# Patient Record
Sex: FEMALE | Race: WHITE | NOT HISPANIC OR LATINO | Employment: FULL TIME | URBAN - METROPOLITAN AREA
[De-identification: names, ages, dates, MRNs, and addresses within clinical notes are randomized per-mention and may not be internally consistent; named-entity substitution may affect disease eponyms.]

---

## 2021-07-15 ENCOUNTER — LAB (OUTPATIENT)
Dept: LAB | Facility: HOSPITAL | Age: 41
End: 2021-07-15

## 2021-07-15 ENCOUNTER — OFFICE VISIT (OUTPATIENT)
Dept: INTERNAL MEDICINE | Facility: CLINIC | Age: 41
End: 2021-07-15

## 2021-07-15 VITALS
BODY MASS INDEX: 28.88 KG/M2 | SYSTOLIC BLOOD PRESSURE: 114 MMHG | HEART RATE: 62 BPM | HEIGHT: 67 IN | RESPIRATION RATE: 14 BRPM | OXYGEN SATURATION: 98 % | WEIGHT: 184 LBS | TEMPERATURE: 97.2 F | DIASTOLIC BLOOD PRESSURE: 76 MMHG

## 2021-07-15 DIAGNOSIS — I10 ESSENTIAL HYPERTENSION: ICD-10-CM

## 2021-07-15 DIAGNOSIS — Z12.4 PAPANICOLAOU SMEAR: ICD-10-CM

## 2021-07-15 DIAGNOSIS — I10 ESSENTIAL HYPERTENSION: Primary | ICD-10-CM

## 2021-07-15 DIAGNOSIS — J30.2 SEASONAL ALLERGIES: ICD-10-CM

## 2021-07-15 LAB
ALBUMIN SERPL-MCNC: 4.2 G/DL (ref 3.5–5.2)
ALBUMIN/GLOB SERPL: 1.6 G/DL
ALP SERPL-CCNC: 93 U/L (ref 39–117)
ALT SERPL W P-5'-P-CCNC: 22 U/L (ref 1–33)
ANION GAP SERPL CALCULATED.3IONS-SCNC: 12.6 MMOL/L (ref 5–15)
AST SERPL-CCNC: 14 U/L (ref 1–32)
BILIRUB SERPL-MCNC: 0.4 MG/DL (ref 0–1.2)
BUN SERPL-MCNC: 18 MG/DL (ref 6–20)
BUN/CREAT SERPL: 16.4 (ref 7–25)
CALCIUM SPEC-SCNC: 8.7 MG/DL (ref 8.6–10.5)
CHLORIDE SERPL-SCNC: 100 MMOL/L (ref 98–107)
CHOLEST SERPL-MCNC: 180 MG/DL (ref 0–200)
CO2 SERPL-SCNC: 24.4 MMOL/L (ref 22–29)
CREAT SERPL-MCNC: 1.1 MG/DL (ref 0.57–1)
GFR SERPL CREATININE-BSD FRML MDRD: 55 ML/MIN/1.73
GLOBULIN UR ELPH-MCNC: 2.7 GM/DL
GLUCOSE SERPL-MCNC: 85 MG/DL (ref 65–99)
HDLC SERPL-MCNC: 37 MG/DL (ref 40–60)
LDLC SERPL CALC-MCNC: 117 MG/DL (ref 0–100)
LDLC/HDLC SERPL: 3.09 {RATIO}
POTASSIUM SERPL-SCNC: 4.3 MMOL/L (ref 3.5–5.2)
PROT SERPL-MCNC: 6.9 G/DL (ref 6–8.5)
SODIUM SERPL-SCNC: 137 MMOL/L (ref 136–145)
TRIGL SERPL-MCNC: 143 MG/DL (ref 0–150)
VLDLC SERPL-MCNC: 26 MG/DL (ref 5–40)

## 2021-07-15 PROCEDURE — 99204 OFFICE O/P NEW MOD 45 MIN: CPT | Performed by: INTERNAL MEDICINE

## 2021-07-15 PROCEDURE — 80053 COMPREHEN METABOLIC PANEL: CPT

## 2021-07-15 PROCEDURE — 36415 COLL VENOUS BLD VENIPUNCTURE: CPT

## 2021-07-15 PROCEDURE — 80061 LIPID PANEL: CPT

## 2021-07-15 RX ORDER — HYDROXYZINE HYDROCHLORIDE 10 MG/1
10 TABLET, FILM COATED ORAL 3 TIMES DAILY PRN
COMMUNITY
End: 2022-06-30 | Stop reason: SDUPTHER

## 2021-07-15 RX ORDER — CLOBETASOL PROPIONATE 0.46 MG/ML
SOLUTION TOPICAL
COMMUNITY
Start: 2021-04-02 | End: 2021-07-15

## 2021-07-15 RX ORDER — APREMILAST 30 MG/1
TABLET, FILM COATED ORAL
COMMUNITY
Start: 2021-06-11

## 2021-07-15 RX ORDER — DICYCLOMINE HYDROCHLORIDE 10 MG/1
10 CAPSULE ORAL
COMMUNITY
End: 2022-07-27

## 2021-07-15 RX ORDER — MONTELUKAST SODIUM 10 MG/1
10 TABLET ORAL NIGHTLY
Qty: 90 TABLET | Refills: 1 | Status: SHIPPED | OUTPATIENT
Start: 2021-07-15 | End: 2022-05-31

## 2021-07-15 RX ORDER — LISINOPRIL AND HYDROCHLOROTHIAZIDE 20; 12.5 MG/1; MG/1
1 TABLET ORAL DAILY
COMMUNITY
Start: 2021-07-07 | End: 2021-07-15 | Stop reason: SDUPTHER

## 2021-07-15 RX ORDER — CETIRIZINE HYDROCHLORIDE 10 MG/1
10 TABLET ORAL DAILY
COMMUNITY
End: 2021-08-02

## 2021-07-15 RX ORDER — LISINOPRIL AND HYDROCHLOROTHIAZIDE 20; 12.5 MG/1; MG/1
1 TABLET ORAL DAILY
Qty: 90 TABLET | Refills: 1 | Status: SHIPPED | OUTPATIENT
Start: 2021-07-15 | End: 2021-08-02 | Stop reason: SDUPTHER

## 2021-07-15 RX ORDER — FLUTICASONE PROPIONATE 50 MCG
2 SPRAY, SUSPENSION (ML) NASAL DAILY
COMMUNITY
End: 2023-01-04

## 2021-07-15 RX ORDER — OMEPRAZOLE 20 MG/1
CAPSULE, DELAYED RELEASE ORAL
COMMUNITY
Start: 2021-02-26 | End: 2022-07-27 | Stop reason: DRUGHIGH

## 2021-07-15 NOTE — PROGRESS NOTES
Office Note      Date: 07/15/2021  Patient Name: Lesly iBll  MRN: [id number]  : 1980    Chief Complaint   Patient presents with   • Hypertension   • Migraine       History of Present Illness: Lesly Bill is a 40 y.o. female who presents for Hypertension and Migraine.   Follows w/ [facility] for IBS. Trying FOD MAP diet.   Last pap close to 5 years ago.   On lisinopril hydrochlorothiazide for blood pressure.  Denies any dizziness.  Has migraines and unsure as from her sinuses.  Was recently on medication for fluid behind her ears.  Takes Zyrtec in the morning and uses Flonase in the morning.  Subjective      Review of Systems:   Pertinent review of systems per HPI.    Review of Systems   Constitutional: Negative for activity change, appetite change, chills, diaphoresis, fatigue, fever and unexpected weight change.   HENT: Negative for congestion, dental problem, drooling, ear discharge, ear pain, facial swelling, hearing loss and mouth sores.    Eyes: Negative for pain, discharge and itching.   Respiratory: Negative for apnea, cough, choking, chest tightness and shortness of breath.    Cardiovascular: Negative for chest pain, palpitations and leg swelling.   Gastrointestinal: Negative for abdominal distention, abdominal pain, blood in stool, constipation and diarrhea.   Endocrine: Negative for cold intolerance, heat intolerance, polydipsia and polyuria.   Genitourinary: Negative for difficulty urinating, dysuria, frequency and hematuria.   Skin: Negative for color change, pallor, rash and wound.   Allergic/Immunologic: Negative for environmental allergies, food allergies and immunocompromised state.   Neurological: Negative for dizziness, weakness and light-headedness.   Psychiatric/Behavioral: Negative for agitation, behavioral problems, confusion, decreased concentration and self-injury. The patient is not nervous/anxious.    All other systems reviewed and are negative.    Allergies   Allergen  "Reactions   • Sulfacetamide Hives and Unknown - Low Severity   • Other Other (See Comments) and Unknown - Low Severity     Sulfa  Clove powder       Objective     Physical Exam:  Vital Signs:   Vitals:    07/15/21 0934   BP: 114/76   Pulse: 62   Resp: 14   Temp: 97.2 °F (36.2 °C)   TempSrc: Temporal   SpO2: 98%   Weight: 83.5 kg (184 lb)   Height: 170.2 cm (67\")      Body mass index is 28.82 kg/m².    Physical Exam  Vitals and nursing note reviewed.   Constitutional:       General: She is not in acute distress.     Appearance: She is well-developed.   HENT:      Head: Normocephalic and atraumatic.      Right Ear: External ear normal.      Left Ear: External ear normal.      Ears:      Comments: Tympanic membranes clear and bulging bilaterally  Cardiovascular:      Rate and Rhythm: Normal rate and regular rhythm.      Heart sounds: Normal heart sounds. No murmur heard.   No friction rub. No gallop.    Pulmonary:      Effort: Pulmonary effort is normal. No respiratory distress.      Breath sounds: Normal breath sounds. No wheezing or rales.   Skin:     General: Skin is warm and dry.      Coloration: Skin is not pale.         Assessment / Plan      Assessment & Plan:    1. Essential hypertension  Blood pressure well controlled today.  Checking labs below, refilled lisinopril hydrochlorothiazide.  - Comprehensive Metabolic Panel; Future  - Lipid Panel; Future    2. Seasonal allergies  Rx for Singulair    3. Papanicolaou smear  Referral to OB/GYN for Pap smear  - Ambulatory Referral to Obstetrics / Gynecology      Oksana Majano MD  07/15/2021     Please note that portions of this note may have been completed with a voice recognition program. Efforts were made to edit the dictations, but occasionally words are mistranscribed.  "

## 2021-07-20 DIAGNOSIS — K58.9 IRRITABLE BOWEL SYNDROME, UNSPECIFIED TYPE: Primary | ICD-10-CM

## 2021-07-21 ENCOUNTER — PATIENT ROUNDING (BHMG ONLY) (OUTPATIENT)
Dept: INTERNAL MEDICINE | Facility: CLINIC | Age: 41
End: 2021-07-21

## 2021-07-21 NOTE — PROGRESS NOTES
July 21, 2021    Hello, may I speak with Lesly Bill?    My name is Katty.      I am  with [facility]  [address] [place] [address] [place] [id number] [phone].    Before we get started may I verify your date of birth? 1980    I am calling to officially welcome you to our practice and ask about your recent visit. Is this a good time to talk? yes    Tell me about your visit with us. What things went well? Everything went good. Dr. Majano was very attentive and referrals went quickly.        We're always looking for ways to make our patients' experiences even better. Do you have recommendations on ways we may improve?  no    Overall were you satisfied with your first visit to our practice? yes       I appreciate you taking the time to speak with me today. Is there anything else I can do for you? Yes, pt wanted to schedule her physical.       Thank you, and have a great day.

## 2021-08-01 PROBLEM — Z01.419 WELL WOMAN EXAM: Status: ACTIVE | Noted: 2021-08-01

## 2021-08-02 ENCOUNTER — OFFICE VISIT (OUTPATIENT)
Dept: INTERNAL MEDICINE | Facility: CLINIC | Age: 41
End: 2021-08-02

## 2021-08-02 ENCOUNTER — LAB (OUTPATIENT)
Dept: LAB | Facility: HOSPITAL | Age: 41
End: 2021-08-02

## 2021-08-02 ENCOUNTER — OFFICE VISIT (OUTPATIENT)
Dept: OBSTETRICS AND GYNECOLOGY | Facility: CLINIC | Age: 41
End: 2021-08-02

## 2021-08-02 VITALS
BODY MASS INDEX: 27.94 KG/M2 | WEIGHT: 178 LBS | TEMPERATURE: 97.3 F | RESPIRATION RATE: 18 BRPM | SYSTOLIC BLOOD PRESSURE: 98 MMHG | OXYGEN SATURATION: 98 % | DIASTOLIC BLOOD PRESSURE: 64 MMHG | HEART RATE: 68 BPM | HEIGHT: 67 IN

## 2021-08-02 VITALS
SYSTOLIC BLOOD PRESSURE: 100 MMHG | BODY MASS INDEX: 27.94 KG/M2 | DIASTOLIC BLOOD PRESSURE: 64 MMHG | WEIGHT: 178 LBS | HEIGHT: 67 IN

## 2021-08-02 DIAGNOSIS — Z12.31 BREAST CANCER SCREENING BY MAMMOGRAM: ICD-10-CM

## 2021-08-02 DIAGNOSIS — E66.3 OVERWEIGHT (BMI 25.0-29.9): ICD-10-CM

## 2021-08-02 DIAGNOSIS — R41.840 CONCENTRATION DEFICIT: ICD-10-CM

## 2021-08-02 DIAGNOSIS — Z00.00 ANNUAL PHYSICAL EXAM: Primary | ICD-10-CM

## 2021-08-02 DIAGNOSIS — I10 ESSENTIAL HYPERTENSION: ICD-10-CM

## 2021-08-02 DIAGNOSIS — Z01.419 WELL WOMAN EXAM WITH ROUTINE GYNECOLOGICAL EXAM: Primary | ICD-10-CM

## 2021-08-02 LAB
DEPRECATED RDW RBC AUTO: 42.2 FL (ref 37–54)
ERYTHROCYTE [DISTWIDTH] IN BLOOD BY AUTOMATED COUNT: 13 % (ref 12.3–15.4)
HBA1C MFR BLD: 5.5 % (ref 4.8–5.6)
HCT VFR BLD AUTO: 42.5 % (ref 34–46.6)
HGB BLD-MCNC: 14 G/DL (ref 12–15.9)
MCH RBC QN AUTO: 29 PG (ref 26.6–33)
MCHC RBC AUTO-ENTMCNC: 32.9 G/DL (ref 31.5–35.7)
MCV RBC AUTO: 88 FL (ref 79–97)
PLATELET # BLD AUTO: 263 10*3/MM3 (ref 140–450)
PMV BLD AUTO: 12.4 FL (ref 6–12)
RBC # BLD AUTO: 4.83 10*6/MM3 (ref 3.77–5.28)
WBC # BLD AUTO: 8.21 10*3/MM3 (ref 3.4–10.8)

## 2021-08-02 PROCEDURE — 85027 COMPLETE CBC AUTOMATED: CPT | Performed by: INTERNAL MEDICINE

## 2021-08-02 PROCEDURE — 99214 OFFICE O/P EST MOD 30 MIN: CPT | Performed by: INTERNAL MEDICINE

## 2021-08-02 PROCEDURE — 36415 COLL VENOUS BLD VENIPUNCTURE: CPT | Performed by: INTERNAL MEDICINE

## 2021-08-02 PROCEDURE — 99386 PREV VISIT NEW AGE 40-64: CPT | Performed by: OBSTETRICS & GYNECOLOGY

## 2021-08-02 PROCEDURE — 82306 VITAMIN D 25 HYDROXY: CPT | Performed by: INTERNAL MEDICINE

## 2021-08-02 PROCEDURE — 80061 LIPID PANEL: CPT | Performed by: INTERNAL MEDICINE

## 2021-08-02 PROCEDURE — 83036 HEMOGLOBIN GLYCOSYLATED A1C: CPT | Performed by: INTERNAL MEDICINE

## 2021-08-02 PROCEDURE — 99396 PREV VISIT EST AGE 40-64: CPT | Performed by: INTERNAL MEDICINE

## 2021-08-02 PROCEDURE — 80053 COMPREHEN METABOLIC PANEL: CPT | Performed by: INTERNAL MEDICINE

## 2021-08-02 PROCEDURE — 86803 HEPATITIS C AB TEST: CPT | Performed by: INTERNAL MEDICINE

## 2021-08-02 PROCEDURE — 82607 VITAMIN B-12: CPT | Performed by: INTERNAL MEDICINE

## 2021-08-02 RX ORDER — LISINOPRIL AND HYDROCHLOROTHIAZIDE 20; 12.5 MG/1; MG/1
0.5 TABLET ORAL DAILY
Qty: 90 TABLET | Refills: 1 | Status: SHIPPED | OUTPATIENT
Start: 2021-08-02 | End: 2022-11-21

## 2021-08-02 NOTE — PROGRESS NOTES
Office Note      Date: 2021  Patient Name: Lesly Bill  MRN: [id number]  : 1980    Chief Complaint   Patient presents with   • Annual Exam       History of Present Illness: Lesly Bill is a 40 y.o. female who presents for Annual Exam. Has lost weight trying FODMAP diet for IBS. Feels this has helped psorisasis as well.     Has dizziness when bending over.   On prinziade 20-12.5mg which she took this am.   Has anxiety and has tried multiple classes of meds for this without relief. Believes she may have underlying adhd causing anxiety.     Subjective      Review of Systems:   Pertinent review of systems per HPI.    Review of Systems   Constitutional: Negative for activity change, appetite change, chills, diaphoresis, fatigue, fever and unexpected weight change.   HENT: Negative for congestion, dental problem, drooling, ear discharge, ear pain, facial swelling, hearing loss and mouth sores.    Eyes: Negative for pain, discharge and itching.   Respiratory: Negative for apnea, cough, choking, chest tightness and shortness of breath.    Cardiovascular: Negative for chest pain, palpitations and leg swelling.   Gastrointestinal: Negative for abdominal distention, abdominal pain, blood in stool, constipation and diarrhea.   Endocrine: Negative for cold intolerance, heat intolerance, polydipsia and polyuria.   Genitourinary: Negative for difficulty urinating, dysuria, frequency and hematuria.   Skin: Negative for color change, pallor, rash and wound.   Allergic/Immunologic: Negative for environmental allergies, food allergies and immunocompromised state.   Neurological: Negative for dizziness, weakness and light-headedness.   Psychiatric/Behavioral: Negative for agitation, behavioral problems, confusion, decreased concentration and self-injury. The patient is not nervous/anxious.    All other systems reviewed and are negative.    Allergies   Allergen Reactions   • Sulfacetamide Hives and Unknown -  "Low Severity   • Other Other (See Comments) and Unknown - Low Severity     Sulfa  Clove powder       Objective     Physical Exam:  Vital Signs:   Vitals:    08/02/21 1301   BP: 98/64   Pulse: 68   Resp: 18   Temp: 97.3 °F (36.3 °C)   TempSrc: Temporal   SpO2: 98%   Weight: 80.7 kg (178 lb)   Height: 170.2 cm (67\")   PainSc: 0-No pain      Body mass index is 27.88 kg/m².    Physical Exam  Vitals and nursing note reviewed.   Constitutional:       General: She is not in acute distress.     Appearance: She is well-developed.   HENT:      Head: Normocephalic and atraumatic.      Right Ear: External ear normal.      Left Ear: External ear normal.   Eyes:      General: No scleral icterus.        Right eye: No discharge.         Left eye: No discharge.      Conjunctiva/sclera: Conjunctivae normal.   Cardiovascular:      Rate and Rhythm: Normal rate and regular rhythm.      Heart sounds: Normal heart sounds. No murmur heard.   No friction rub. No gallop.    Pulmonary:      Effort: Pulmonary effort is normal. No respiratory distress.      Breath sounds: Normal breath sounds. No wheezing or rales.   Skin:     General: Skin is warm and dry.      Coloration: Skin is not pale.         Assessment / Plan      Assessment & Plan:    1. Annual physical exam  Counseled on:  Mammo starting at age 40  Pap smear q5 years if normal pap cytology with neg HPV, otherwise q3 years  Colonoscopy at 45-51 y/o  PNA vaccinations starting at age 65  shingrix at age 50  Td/Tdap q 10 years  Wear seatbelt when driving  Flu shot annually    - CBC (No Diff)  - Comprehensive Metabolic Panel  - Lipid Panel  - Vitamin B12  - Vitamin D 25 Hydroxy  - Hemoglobin A1c  - Hepatitis C Antibody    2. Essential hypertension  BP improved due to weight loss, decrease prizide to 1/2 tab qd. F/u in 3-4 months for repeat BP check    3. Concentration deficit    - Ambulatory Referral to Neuropsychology    4. Overweight (BMI 25.0-29.9)  Discussed continued weight loss and " exercise. F/u in 3-4 months for weight check in case further weight loss helps control BP, can stop hctz at that time.       Oksana Majano MD  08/02/2021     Please note that portions of this note may have been completed with a voice recognition program. Efforts were made to edit the dictations, but occasionally words are mistranscribed.

## 2021-08-02 NOTE — PROGRESS NOTES
Subjective   Chief Complaint   Patient presents with   • Establish Care     New pt.   • Gynecologic Exam     annual. last pap per pt was about 3 years ago at .      Lesly Bill is a 40 y.o. year old  presenting to be seen for her annual exam.     SEXUAL Hx:  She is not currently sexually active.  In the past year there she has not been sexually active.    Condoms are not needed because she is not sexually active.  She would not like to be screened for STD's at today's exam.  Current birth control method: abstinence.  She is happy with her current method of contraception and does not want to discuss alternative methods of contraception.  MENSTRUAL Hx:  Patient's last menstrual period was 2021 (exact date).  In the past 6 months her cycles have been regular, predictable and occur monthly.  Her menstrual flow is typically normal.   Each month on average there are roughly 0 day(s) of very heavy flow.    Duration 4 days  Intermenstrual bleeding is absent.    Post-coital bleeding is n/a.  Dysmenorrhea: mild and is not affecting her activities of daily living  PMS: moderate and is not affecting her activities of daily living  Her cycles are not a source of concern for her that she wishes to discuss today.  HEALTH Hx:  She exercises regularly: yes.  She wears her seat belt: yes.  She has concerns about domestic violence: no.  OTHER THINGS SHE WANTS TO DISCUSS TODAY:  Nothing else    The following portions of the patient's history were reviewed and updated as appropriate:problem list, current medications, allergies, past family history, past medical history, past social history and past surgical history.    Social History    Tobacco Use      Smoking status: Never Smoker      Smokeless tobacco: Never Used    Review of Systems  Constitutional POS: nothing reported    NEG: anorexia or night sweats   Genitourinary POS: nothing reported    NEG: dysuria or hematuria      Gastointestinal POS: nothing reported     "NEG: bloating, change in bowel habits, melena or reflux symptoms   Integument POS: nothing reported    NEG: moles that are changing in size, shape, color or rashes   Breast POS: nothing reported    NEG: persistent breast lump, skin dimpling or nipple discharge        Objective   /64   Ht 170.2 cm (67\")   Wt 80.7 kg (178 lb)   LMP 07/13/2021 (Exact Date)   Breastfeeding No   BMI 27.88 kg/m²     General:  well developed; well nourished  no acute distress   Skin:  No suspicious lesions seen   Thyroid: normal to inspection and palpation   Breasts:  Examined in supine position  Symmetric without masses or skin dimpling  Nipples normal without inversion, lesions or discharge  There are no palpable axillary nodes   Abdomen: soft, non-tender; no masses  no umbilical or inguinal hernias are present  no hepato-splenomegaly   Pelvis: Clinical staff was present for exam  External genitalia:  normal appearance of the external genitalia including Bartholin's and [place]'s glands.  :  urethral meatus normal;  Vaginal:  normal pink mucosa without prolapse or lesions.  Cervix:  normal appearance.  Uterus:  normal size, shape and consistency.  Adnexa:  normal bimanual exam of the adnexa.  Rectal:  digital rectal exam not performed; anus visually normal appearing.        Assessment   1. Normal GYN exam     Plan   Pap and HPV were done today.  If she does not receive the results of the Pap within 2 weeks  time, she was instructed to call to find out the results.  I explained to Lesly that the recommendations for Pap smear interval in a low risk patient's has lengthened to 5 years time if both cytology and HPV testing were normal.  I encouraged her to be seen yearly for a full physical exam including breast and pelvic exam even during the off years when PAP's will not be performed.  She was encouraged to get yearly mammograms.  She should report any palpable breast lump(s) or skin changes regardless of mammographic findings. "  I explained to Lesly that notification regarding her mammogram results will come from the center performing the study.  Our office will not be routinely calling with mammogram results.  It is her responsibility to make sure that the results from the mammogram are communicated to her by the breast center.  If she has any questions about the results, she is welcome to call our office anytime.  Request last mammogram, colonoscopy, and pap  No prescription was given or electronically sent at today's visit  The importance of keeping all planned follow-up and taking all medications as prescribed was emphasized.  Follow up for annual exam in one year    No orders of the defined types were placed in this encounter.         This note was electronically signed.    Rola Banerjee MD  August 2, 2021    Note: Speech recognition transcription software may have been used to create portions of this document.  An attempt at proofreading has been made but errors in transcription could still be present.

## 2021-08-03 LAB
25(OH)D3 SERPL-MCNC: 32.6 NG/ML (ref 30–100)
ALBUMIN SERPL-MCNC: 4.2 G/DL (ref 3.5–5.2)
ALBUMIN/GLOB SERPL: 1.4 G/DL
ALP SERPL-CCNC: 74 U/L (ref 39–117)
ALT SERPL W P-5'-P-CCNC: 16 U/L (ref 1–33)
ANION GAP SERPL CALCULATED.3IONS-SCNC: 7.4 MMOL/L (ref 5–15)
AST SERPL-CCNC: 14 U/L (ref 1–32)
BILIRUB SERPL-MCNC: 0.4 MG/DL (ref 0–1.2)
BUN SERPL-MCNC: 14 MG/DL (ref 6–20)
BUN/CREAT SERPL: 15.9 (ref 7–25)
CALCIUM SPEC-SCNC: 8.9 MG/DL (ref 8.6–10.5)
CHLORIDE SERPL-SCNC: 104 MMOL/L (ref 98–107)
CHOLEST SERPL-MCNC: 163 MG/DL (ref 0–200)
CO2 SERPL-SCNC: 23.6 MMOL/L (ref 22–29)
CREAT SERPL-MCNC: 0.88 MG/DL (ref 0.57–1)
GFR SERPL CREATININE-BSD FRML MDRD: 71 ML/MIN/1.73
GLOBULIN UR ELPH-MCNC: 3 GM/DL
GLUCOSE SERPL-MCNC: 88 MG/DL (ref 65–99)
HCV AB SER DONR QL: NORMAL
HDLC SERPL-MCNC: 38 MG/DL (ref 40–60)
LDLC SERPL CALC-MCNC: 107 MG/DL (ref 0–100)
LDLC/HDLC SERPL: 2.78 {RATIO}
POTASSIUM SERPL-SCNC: 4.3 MMOL/L (ref 3.5–5.2)
PROT SERPL-MCNC: 7.2 G/DL (ref 6–8.5)
SODIUM SERPL-SCNC: 135 MMOL/L (ref 136–145)
TRIGL SERPL-MCNC: 96 MG/DL (ref 0–150)
VIT B12 BLD-MCNC: 418 PG/ML (ref 211–946)
VLDLC SERPL-MCNC: 18 MG/DL (ref 5–40)

## 2021-08-05 ENCOUNTER — HOSPITAL ENCOUNTER (EMERGENCY)
Facility: HOSPITAL | Age: 41
Discharge: HOME | End: 2021-08-06
Attending: EMERGENCY MEDICINE | Admitting: EMERGENCY MEDICINE

## 2021-08-05 ENCOUNTER — APPOINTMENT (OUTPATIENT)
Dept: CT IMAGING | Facility: HOSPITAL | Age: 41
End: 2021-08-05

## 2021-08-05 ENCOUNTER — APPOINTMENT (OUTPATIENT)
Dept: GENERAL RADIOLOGY | Facility: HOSPITAL | Age: 41
End: 2021-08-05

## 2021-08-05 VITALS
DIASTOLIC BLOOD PRESSURE: 78 MMHG | SYSTOLIC BLOOD PRESSURE: 117 MMHG | TEMPERATURE: 98.7 F | OXYGEN SATURATION: 97 % | BODY MASS INDEX: 28.09 KG/M2 | HEART RATE: 65 BPM | RESPIRATION RATE: 16 BRPM | WEIGHT: 179 LBS | HEIGHT: 67 IN

## 2021-08-05 DIAGNOSIS — D72.829 LEUKOCYTOSIS, UNSPECIFIED TYPE: ICD-10-CM

## 2021-08-05 DIAGNOSIS — K52.9 COLITIS: ICD-10-CM

## 2021-08-05 DIAGNOSIS — R55 SYNCOPE, UNSPECIFIED SYNCOPE TYPE: Primary | ICD-10-CM

## 2021-08-05 LAB
ABO GROUP BLD: NORMAL
ALBUMIN SERPL-MCNC: 4.8 G/DL (ref 3.5–5.2)
ALBUMIN/GLOB SERPL: 1.5 G/DL
ALP SERPL-CCNC: 90 U/L (ref 39–117)
ALT SERPL W P-5'-P-CCNC: 26 U/L (ref 1–33)
ANION GAP SERPL CALCULATED.3IONS-SCNC: 12 MMOL/L (ref 5–15)
AST SERPL-CCNC: 20 U/L (ref 1–32)
B-HCG UR QL: NEGATIVE
BACTERIA UR QL AUTO: ABNORMAL /HPF
BASOPHILS # BLD AUTO: 0.08 10*3/MM3 (ref 0–0.2)
BASOPHILS NFR BLD AUTO: 0.4 % (ref 0–1.5)
BILIRUB SERPL-MCNC: 0.4 MG/DL (ref 0–1.2)
BILIRUB UR QL STRIP: NEGATIVE
BLD GP AB SCN SERPL QL: NEGATIVE
BUN SERPL-MCNC: 22 MG/DL (ref 6–20)
BUN/CREAT SERPL: 17.6 (ref 7–25)
CALCIUM SPEC-SCNC: 9.9 MG/DL (ref 8.6–10.5)
CHLORIDE SERPL-SCNC: 101 MMOL/L (ref 98–107)
CLARITY UR: ABNORMAL
CO2 SERPL-SCNC: 24 MMOL/L (ref 22–29)
COLOR UR: YELLOW
CREAT SERPL-MCNC: 1.25 MG/DL (ref 0.57–1)
DEPRECATED RDW RBC AUTO: 39.8 FL (ref 37–54)
EOSINOPHIL # BLD AUTO: 0.17 10*3/MM3 (ref 0–0.4)
EOSINOPHIL NFR BLD AUTO: 0.9 % (ref 0.3–6.2)
ERYTHROCYTE [DISTWIDTH] IN BLOOD BY AUTOMATED COUNT: 12.4 % (ref 12.3–15.4)
GFR SERPL CREATININE-BSD FRML MDRD: 47 ML/MIN/1.73
GLOBULIN UR ELPH-MCNC: 3.1 GM/DL
GLUCOSE SERPL-MCNC: 158 MG/DL (ref 65–99)
GLUCOSE UR STRIP-MCNC: NEGATIVE MG/DL
HCT VFR BLD AUTO: 44.6 % (ref 34–46.6)
HGB BLD-MCNC: 14.8 G/DL (ref 12–15.9)
HGB UR QL STRIP.AUTO: NEGATIVE
HOLD SPECIMEN: NORMAL
HOLD SPECIMEN: NORMAL
HYALINE CASTS UR QL AUTO: ABNORMAL /LPF
IMM GRANULOCYTES # BLD AUTO: 0.09 10*3/MM3 (ref 0–0.05)
IMM GRANULOCYTES NFR BLD AUTO: 0.5 % (ref 0–0.5)
INTERNAL NEGATIVE CONTROL: NEGATIVE
INTERNAL POSITIVE CONTROL: POSITIVE
KETONES UR QL STRIP: ABNORMAL
LEUKOCYTE ESTERASE UR QL STRIP.AUTO: ABNORMAL
LIPASE SERPL-CCNC: 33 U/L (ref 13–60)
LYMPHOCYTES # BLD AUTO: 1.67 10*3/MM3 (ref 0.7–3.1)
LYMPHOCYTES NFR BLD AUTO: 8.8 % (ref 19.6–45.3)
Lab: NORMAL
MCH RBC QN AUTO: 29 PG (ref 26.6–33)
MCHC RBC AUTO-ENTMCNC: 33.2 G/DL (ref 31.5–35.7)
MCV RBC AUTO: 87.3 FL (ref 79–97)
MONOCYTES # BLD AUTO: 1.08 10*3/MM3 (ref 0.1–0.9)
MONOCYTES NFR BLD AUTO: 5.7 % (ref 5–12)
NEUTROPHILS NFR BLD AUTO: 15.9 10*3/MM3 (ref 1.7–7)
NEUTROPHILS NFR BLD AUTO: 83.7 % (ref 42.7–76)
NITRITE UR QL STRIP: NEGATIVE
NRBC BLD AUTO-RTO: 0 /100 WBC (ref 0–0.2)
PH UR STRIP.AUTO: <=5 [PH] (ref 5–8)
PLATELET # BLD AUTO: 293 10*3/MM3 (ref 140–450)
PMV BLD AUTO: 12.1 FL (ref 6–12)
POTASSIUM SERPL-SCNC: 3.9 MMOL/L (ref 3.5–5.2)
PROT SERPL-MCNC: 7.9 G/DL (ref 6–8.5)
PROT UR QL STRIP: ABNORMAL
RBC # BLD AUTO: 5.11 10*6/MM3 (ref 3.77–5.28)
RBC # UR: ABNORMAL /HPF
REF LAB TEST METHOD: ABNORMAL
RH BLD: POSITIVE
SODIUM SERPL-SCNC: 137 MMOL/L (ref 136–145)
SP GR UR STRIP: 1.02 (ref 1–1.03)
SQUAMOUS #/AREA URNS HPF: ABNORMAL /HPF
T&S EXPIRATION DATE: NORMAL
TROPONIN T SERPL-MCNC: <0.01 NG/ML (ref 0–0.03)
UROBILINOGEN UR QL STRIP: ABNORMAL
WBC # BLD AUTO: 18.99 10*3/MM3 (ref 3.4–10.8)
WBC UR QL AUTO: ABNORMAL /HPF
WHOLE BLOOD HOLD SPECIMEN: NORMAL

## 2021-08-05 PROCEDURE — 84484 ASSAY OF TROPONIN QUANT: CPT | Performed by: EMERGENCY MEDICINE

## 2021-08-05 PROCEDURE — 81001 URINALYSIS AUTO W/SCOPE: CPT | Performed by: EMERGENCY MEDICINE

## 2021-08-05 PROCEDURE — 86850 RBC ANTIBODY SCREEN: CPT | Performed by: EMERGENCY MEDICINE

## 2021-08-05 PROCEDURE — 86901 BLOOD TYPING SEROLOGIC RH(D): CPT

## 2021-08-05 PROCEDURE — 93005 ELECTROCARDIOGRAM TRACING: CPT | Performed by: EMERGENCY MEDICINE

## 2021-08-05 PROCEDURE — 99283 EMERGENCY DEPT VISIT LOW MDM: CPT

## 2021-08-05 PROCEDURE — 80053 COMPREHEN METABOLIC PANEL: CPT | Performed by: EMERGENCY MEDICINE

## 2021-08-05 PROCEDURE — 25010000002 IOPAMIDOL 61 % SOLUTION: Performed by: EMERGENCY MEDICINE

## 2021-08-05 PROCEDURE — 86900 BLOOD TYPING SEROLOGIC ABO: CPT | Performed by: EMERGENCY MEDICINE

## 2021-08-05 PROCEDURE — 86901 BLOOD TYPING SEROLOGIC RH(D): CPT | Performed by: EMERGENCY MEDICINE

## 2021-08-05 PROCEDURE — 71045 X-RAY EXAM CHEST 1 VIEW: CPT

## 2021-08-05 PROCEDURE — 74177 CT ABD & PELVIS W/CONTRAST: CPT

## 2021-08-05 PROCEDURE — 83690 ASSAY OF LIPASE: CPT | Performed by: EMERGENCY MEDICINE

## 2021-08-05 PROCEDURE — 86900 BLOOD TYPING SEROLOGIC ABO: CPT

## 2021-08-05 PROCEDURE — 81025 URINE PREGNANCY TEST: CPT | Performed by: EMERGENCY MEDICINE

## 2021-08-05 PROCEDURE — 85025 COMPLETE CBC W/AUTO DIFF WBC: CPT | Performed by: EMERGENCY MEDICINE

## 2021-08-05 RX ORDER — AMOXICILLIN AND CLAVULANATE POTASSIUM 875; 125 MG/1; MG/1
1 TABLET, FILM COATED ORAL ONCE
Status: COMPLETED | OUTPATIENT
Start: 2021-08-05 | End: 2021-08-06

## 2021-08-05 RX ORDER — AMOXICILLIN AND CLAVULANATE POTASSIUM 875; 125 MG/1; MG/1
1 TABLET, FILM COATED ORAL 2 TIMES DAILY
Qty: 20 TABLET | Refills: 0 | Status: SHIPPED | OUTPATIENT
Start: 2021-08-05 | End: 2021-09-08

## 2021-08-05 RX ORDER — SODIUM CHLORIDE 0.9 % (FLUSH) 0.9 %
10 SYRINGE (ML) INJECTION AS NEEDED
Status: DISCONTINUED | OUTPATIENT
Start: 2021-08-05 | End: 2021-08-06 | Stop reason: HOSPADM

## 2021-08-05 RX ADMIN — SODIUM CHLORIDE 1000 ML: 9 INJECTION, SOLUTION INTRAVENOUS at 22:27

## 2021-08-05 RX ADMIN — IOPAMIDOL 85 ML: 612 INJECTION, SOLUTION INTRAVENOUS at 23:22

## 2021-08-06 LAB
ABO GROUP BLD: NORMAL
HOLD SPECIMEN: NORMAL
QT INTERVAL: 396 MS
QTC INTERVAL: 442 MS
RH BLD: POSITIVE

## 2021-08-06 RX ADMIN — AMOXICILLIN AND CLAVULANATE POTASSIUM 1 TABLET: 875; 125 TABLET, FILM COATED ORAL at 00:04

## 2021-08-06 NOTE — ED PROVIDER NOTES
Subjective   40-year-old female presents for evaluation following near syncopal episode.  She states that this evening she went out she was shopping when she became lightheaded, pale, diaphoretic, and felt like she was going to pass out.  She denies any similar episodes before in the past.  She went home and had a bloody bowel movement.  Afterward, she came to the emergency department to be evaluated.  She states that just before she had her near syncopal episode at the shoe store she began experiencing intense abdominal cramps that have bothered her intermittently since that time.  No family history of sudden cardiac death.  She is not anticoagulated.  She states that her abdominal pain has significantly improved spontaneously since time of onset.          Review of Systems   Constitutional: Positive for diaphoresis.   Gastrointestinal: Positive for abdominal pain and blood in stool.   Neurological: Positive for dizziness and syncope (near).   All other systems reviewed and are negative.      Past Medical History:   Diagnosis Date   • Acid reflux    • Anxiety    • Fracture    • Hypertension    • IBS (irritable bowel syndrome)    • Psoriasis     [facility]        Allergies   Allergen Reactions   • Sulfacetamide Hives and Unknown - Low Severity   • Sulfa Antibiotics Hives   • Clove [Syzygium Aromaticum] Rash and GI Intolerance       Past Surgical History:   Procedure Laterality Date   • BREAST BIOPSY  2008   • WRIST SURGERY Left 2015       Family History   Problem Relation Age of Onset   • Hypertension Mother    • Cancer Father    • Hypertension Father    • Ovarian cancer Paternal Grandmother    • Breast cancer Neg Hx    • Uterine cancer Neg Hx    • Colon cancer Neg Hx    • Osteoporosis Neg Hx        Social History     Socioeconomic History   • Marital status: Single     Spouse name: Not on file   • Number of children: Not on file   • Years of education: Not on file   • Highest education level: Not on file    Tobacco Use   • Smoking status: Never Smoker   • Smokeless tobacco: Never Used   Substance and Sexual Activity   • Alcohol use: Yes     Comment: social    • Drug use: Never   • Sexual activity: Not Currently     Birth control/protection: None           Objective   Physical Exam  Vitals and nursing note reviewed.   Constitutional:       General: She is not in acute distress.     Appearance: Normal appearance. She is well-developed. She is not diaphoretic.      Comments: Nontoxic-appearing female   HENT:      Head: Normocephalic and atraumatic.   Eyes:      Pupils: Pupils are equal, round, and reactive to light.   Cardiovascular:      Rate and Rhythm: Normal rate and regular rhythm.      Pulses: Normal pulses.      Heart sounds: Normal heart sounds. No murmur heard.   No friction rub. No gallop.    Pulmonary:      Effort: Pulmonary effort is normal. No respiratory distress.      Breath sounds: Normal breath sounds. No wheezing or rales.   Abdominal:      General: Bowel sounds are normal. There is no distension.      Palpations: Abdomen is soft. There is no mass.      Tenderness: There is no abdominal tenderness. There is no guarding or rebound.      Comments: No focal abdominal tenderness noted, no peritoneal signs, no pain out of proportion to exam   Genitourinary:     Comments: No bright red blood per rectum, no visible or palpable hemorrhoids noted  Musculoskeletal:         General: Normal range of motion.      Cervical back: Neck supple.   Skin:     General: Skin is warm and dry.      Findings: No erythema or rash.   Neurological:      General: No focal deficit present.      Mental Status: She is alert and oriented to person, place, and time.   Psychiatric:         Mood and Affect: Mood normal.         Thought Content: Thought content normal.         Judgment: Judgment normal.         Procedures           ED Course  ED Course as of Aug 06 0334   Fri Aug 06, 2021   0333 40-year-old female presents for evaluation  following near syncopal episode this evening followed by a bloody bowel movement.  On arrival to the ED, the patient is nontoxic-appearing.  Benign exam.  EKG revealed normal sinus rhythm with a heart rate of 75 and no ST segments suggestive of or concerning for ischemia with normal NH and QT intervals and no EKG evidence of Brugada syndrome or hypertrophic cardiomyopathy.  No family history of sudden cardiac death.  Her near syncopal episode sounds pretty classic for a vasovagal spell.  Labs remarkable for leukocytosis and mild renal insufficiency.  Fecal occult blood test negative.  CT suggestive of colitis.  Prescription for Augmentin.  Upon reevaluation following IV fluids and medications, the patient feels well.  I feel that she can be managed as an outpatient at this point.  Prescription for Augmentin.  First dose given here in the emergency department.  She was referred to our heart valve syncope clinic and will follow-up as well.  Agreeable with plan and given appropriate strict return precautions.    [DD]      ED Course User Index  [DD] Jose Huertas MD                                   Recent Results (from the past 24 hour(s))   Comprehensive Metabolic Panel    Collection Time: 08/05/21  9:33 PM    Specimen: Blood   Result Value Ref Range    Glucose 158 (H) 65 - 99 mg/dL    BUN 22 (H) 6 - 20 mg/dL    Creatinine 1.25 (H) 0.57 - 1.00 mg/dL    Sodium 137 136 - 145 mmol/L    Potassium 3.9 3.5 - 5.2 mmol/L    Chloride 101 98 - 107 mmol/L    CO2 24.0 22.0 - 29.0 mmol/L    Calcium 9.9 8.6 - 10.5 mg/dL    Total Protein 7.9 6.0 - 8.5 g/dL    Albumin 4.80 3.50 - 5.20 g/dL    ALT (SGPT) 26 1 - 33 U/L    AST (SGOT) 20 1 - 32 U/L    Alkaline Phosphatase 90 39 - 117 U/L    Total Bilirubin 0.4 0.0 - 1.2 mg/dL    eGFR Non African Amer 47 (L) >60 mL/min/1.73    Globulin 3.1 gm/dL    A/G Ratio 1.5 g/dL    BUN/Creatinine Ratio 17.6 7.0 - 25.0    Anion Gap 12.0 5.0 - 15.0 mmol/L   Lipase    Collection Time: 08/05/21   9:33 PM    Specimen: Blood   Result Value Ref Range    Lipase 33 13 - 60 U/L   Troponin    Collection Time: 08/05/21  9:33 PM    Specimen: Blood   Result Value Ref Range    Troponin T <0.010 0.000 - 0.030 ng/mL   Type & Screen    Collection Time: 08/05/21  9:33 PM    Specimen: Blood   Result Value Ref Range    ABO Type A     RH type Positive     Antibody Screen Negative     T&S Expiration Date 8/8/2021 11:59:59 PM    Green Top (Gel)    Collection Time: 08/05/21  9:33 PM   Result Value Ref Range    Extra Tube Hold for add-ons.    Lavender Top    Collection Time: 08/05/21  9:33 PM   Result Value Ref Range    Extra Tube hold for add-on    Gold Top - SST    Collection Time: 08/05/21  9:33 PM   Result Value Ref Range    Extra Tube Hold for add-ons.    Gray Top    Collection Time: 08/05/21  9:33 PM   Result Value Ref Range    Extra Tube Hold for add-ons.    CBC Auto Differential    Collection Time: 08/05/21  9:33 PM    Specimen: Blood   Result Value Ref Range    WBC 18.99 (H) 3.40 - 10.80 10*3/mm3    RBC 5.11 3.77 - 5.28 10*6/mm3    Hemoglobin 14.8 12.0 - 15.9 g/dL    Hematocrit 44.6 34.0 - 46.6 %    MCV 87.3 79.0 - 97.0 fL    MCH 29.0 26.6 - 33.0 pg    MCHC 33.2 31.5 - 35.7 g/dL    RDW 12.4 12.3 - 15.4 %    RDW-SD 39.8 37.0 - 54.0 fl    MPV 12.1 (H) 6.0 - 12.0 fL    Platelets 293 140 - 450 10*3/mm3    Neutrophil % 83.7 (H) 42.7 - 76.0 %    Lymphocyte % 8.8 (L) 19.6 - 45.3 %    Monocyte % 5.7 5.0 - 12.0 %    Eosinophil % 0.9 0.3 - 6.2 %    Basophil % 0.4 0.0 - 1.5 %    Immature Grans % 0.5 0.0 - 0.5 %    Neutrophils, Absolute 15.90 (H) 1.70 - 7.00 10*3/mm3    Lymphocytes, Absolute 1.67 0.70 - 3.10 10*3/mm3    Monocytes, Absolute 1.08 (H) 0.10 - 0.90 10*3/mm3    Eosinophils, Absolute 0.17 0.00 - 0.40 10*3/mm3    Basophils, Absolute 0.08 0.00 - 0.20 10*3/mm3    Immature Grans, Absolute 0.09 (H) 0.00 - 0.05 10*3/mm3    nRBC 0.0 0.0 - 0.2 /100 WBC   Urinalysis With Microscopic If Indicated (No Culture) - Urine, Clean Catch     Collection Time: 08/05/21  9:39 PM    Specimen: Urine, Clean Catch   Result Value Ref Range    Color, UA Yellow Yellow, Straw    Appearance, UA Cloudy (A) Clear    pH, UA <=5.0 5.0 - 8.0    Specific Gravity, UA 1.023 1.001 - 1.030    Glucose, UA Negative Negative    Ketones, UA Trace (A) Negative    Bilirubin, UA Negative Negative    Blood, UA Negative Negative    Protein, UA 30 mg/dL (1+) (A) Negative    Leuk Esterase, UA Trace (A) Negative    Nitrite, UA Negative Negative    Urobilinogen, UA 1.0 E.U./dL 0.2 - 1.0 E.U./dL   Urinalysis, Microscopic Only - Urine, Clean Catch    Collection Time: 08/05/21  9:39 PM    Specimen: Urine, Clean Catch   Result Value Ref Range    RBC, UA 7-12 (A) None Seen, 0-2 /HPF    WBC, UA 6-12 (A) None Seen, 0-2 /HPF    Bacteria, UA 3+ (A) None Seen, Trace /HPF    Squamous Epithelial Cells, UA 3-6 (A) None Seen, 0-2 /HPF    Hyaline Casts, UA 21-30 0 - 6 /LPF    Methodology Manual Light Microscopy    POCT Pregnancy, Urine    Collection Time: 08/05/21  9:44 PM    Specimen: Urine   Result Value Ref Range    HCG, Urine, QL Negative Negative    Lot Number [id number]     Internal Positive Control Positive Positive, Passed    Internal Negative Control Negative Negative, Passed   ABO RH Specimen Verification    Collection Time: 08/05/21 10:15 PM    Specimen: Blood   Result Value Ref Range    ABO Type A     RH type Positive      Note: In addition to lab results from this visit, the labs listed above may include labs taken at another facility or during a different encounter within the last 24 hours. Please correlate lab times with ED admission and discharge times for further clarification of the services performed during this visit.    CT Abdomen Pelvis With Contrast   Final Result      1. Wall thickening of the descending colon and transverse colon suspicious for colitis given provided history. No complicating feature on CT.   2. Normal appendix.   3. Duplicated left renal collecting system with  "associated secondary atrophy and cortical scarring on the left.               Signer Name: Des Salazar MD    Signed: 8/5/2021 11:40 PM    Workstation Name: [facility]     [facility]      XR Chest 1 View   Final Result   1. Negative chest.      Signer Name: Des Salazar MD    Signed: 8/5/2021 10:37 PM    Workstation Name: [email or web address]     [facility]        Vitals:    08/05/21 2121 08/05/21 2215 08/05/21 2300 08/05/21 2330   BP: 94/59 98/71 97/62 117/78   BP Location: Left arm      Patient Position: Sitting      Pulse: 65      Resp: 16      Temp: 98.7 °F (37.1 °C)      TempSrc: Oral      SpO2: 99% 98% 98% 97%   Weight: 81.2 kg (179 lb)      Height: 170.2 cm (67\")        Medications   sodium chloride 0.9 % flush 10 mL (has no administration in time range)   sodium chloride 0.9 % bolus 1,000 mL (0 mL Intravenous Stopped 8/5/21 2332)   iopamidol (ISOVUE-300) 61 % injection 100 mL (85 mL Intravenous Given 8/5/21 2322)   amoxicillin-clavulanate (AUGMENTIN) 875-125 MG per tablet 1 tablet (1 tablet Oral Given 8/6/21 0004)     ECG/EMG Results (last 24 hours)     Procedure Component Value Units Date/Time    ECG 12 Lead [[id number]] Collected: 08/05/21 2139     Updated: 08/05/21 2139        ECG 12 Lead                     [facility]    Final diagnoses:   Syncope, unspecified syncope type   Colitis   Leukocytosis, unspecified type       ED Disposition  ED Disposition     ED Disposition Condition Comment    Discharge Stable           [facility]  [address]  [address]  [facility] [address] [phone]  In 3 days      Oksana Majano MD  [address]  [facility] [address] [phone]    In 1 week           Medication List      New Prescriptions    amoxicillin-clavulanate 875-125 MG per tablet  Commonly known as: AUGMENTIN  Take 1 tablet by mouth 2 (Two) Times a Day.           Where to Get Your Medications      These " medications were sent to [id number] - [place], [place] - [address] [facility] [address] - [phone]  - [phone] FX  [address] [facility] [address], [facility] [address]    Phone: [phone]   · amoxicillin-clavulanate 875-125 MG per tablet          Jose Huertas MD  08/06/21 0335

## 2021-08-12 ENCOUNTER — OFFICE VISIT (OUTPATIENT)
Dept: INTERNAL MEDICINE | Facility: CLINIC | Age: 41
End: 2021-08-12

## 2021-08-12 ENCOUNTER — LAB (OUTPATIENT)
Dept: LAB | Facility: HOSPITAL | Age: 41
End: 2021-08-12

## 2021-08-12 VITALS
WEIGHT: 177 LBS | OXYGEN SATURATION: 98 % | RESPIRATION RATE: 16 BRPM | TEMPERATURE: 97.3 F | HEART RATE: 79 BPM | SYSTOLIC BLOOD PRESSURE: 110 MMHG | DIASTOLIC BLOOD PRESSURE: 70 MMHG | HEIGHT: 67 IN | BODY MASS INDEX: 27.78 KG/M2

## 2021-08-12 DIAGNOSIS — K52.9 COLITIS: Primary | ICD-10-CM

## 2021-08-12 LAB
ALBUMIN SERPL-MCNC: 4.1 G/DL (ref 3.5–5.2)
ALBUMIN/GLOB SERPL: 1.5 G/DL
ALP SERPL-CCNC: 68 U/L (ref 39–117)
ALT SERPL W P-5'-P-CCNC: 25 U/L (ref 1–33)
ANION GAP SERPL CALCULATED.3IONS-SCNC: 8.8 MMOL/L (ref 5–15)
AST SERPL-CCNC: 15 U/L (ref 1–32)
BILIRUB SERPL-MCNC: 0.3 MG/DL (ref 0–1.2)
BUN SERPL-MCNC: 14 MG/DL (ref 6–20)
BUN/CREAT SERPL: 12.2 (ref 7–25)
CALCIUM SPEC-SCNC: 8.6 MG/DL (ref 8.6–10.5)
CHLORIDE SERPL-SCNC: 104 MMOL/L (ref 98–107)
CO2 SERPL-SCNC: 26.2 MMOL/L (ref 22–29)
CREAT SERPL-MCNC: 1.15 MG/DL (ref 0.57–1)
DEPRECATED RDW RBC AUTO: 40 FL (ref 37–54)
ERYTHROCYTE [DISTWIDTH] IN BLOOD BY AUTOMATED COUNT: 12.7 % (ref 12.3–15.4)
GFR SERPL CREATININE-BSD FRML MDRD: 52 ML/MIN/1.73
GLOBULIN UR ELPH-MCNC: 2.7 GM/DL
GLUCOSE SERPL-MCNC: 106 MG/DL (ref 65–99)
HCT VFR BLD AUTO: 42.1 % (ref 34–46.6)
HGB BLD-MCNC: 14 G/DL (ref 12–15.9)
MCH RBC QN AUTO: 29.2 PG (ref 26.6–33)
MCHC RBC AUTO-ENTMCNC: 33.3 G/DL (ref 31.5–35.7)
MCV RBC AUTO: 87.7 FL (ref 79–97)
PLATELET # BLD AUTO: 264 10*3/MM3 (ref 140–450)
PMV BLD AUTO: 12.4 FL (ref 6–12)
POTASSIUM SERPL-SCNC: 4.8 MMOL/L (ref 3.5–5.2)
PROT SERPL-MCNC: 6.8 G/DL (ref 6–8.5)
RBC # BLD AUTO: 4.8 10*6/MM3 (ref 3.77–5.28)
SODIUM SERPL-SCNC: 139 MMOL/L (ref 136–145)
WBC # BLD AUTO: 7.23 10*3/MM3 (ref 3.4–10.8)

## 2021-08-12 PROCEDURE — 99214 OFFICE O/P EST MOD 30 MIN: CPT | Performed by: INTERNAL MEDICINE

## 2021-08-12 PROCEDURE — 80053 COMPREHEN METABOLIC PANEL: CPT | Performed by: INTERNAL MEDICINE

## 2021-08-12 PROCEDURE — 85027 COMPLETE CBC AUTOMATED: CPT | Performed by: INTERNAL MEDICINE

## 2021-08-12 NOTE — PROGRESS NOTES
Office Note      Date: 2021  Patient Name: Lesly Bill  MRN: [id number]  : 1980    Chief Complaint   Patient presents with   • Loss of Consciousness   • Ulcerative Colitis       History of Present Illness: Lesly Bill is a 40 y.o. female who presents for Loss of Consciousness and Ulcerative Colitis.  Seen in the emergency department for near syncopal episode followed by bloody bowel movement.  Fecal occult blood test negative.  CT suggestive of colitis.  Given prescription for Augmentin.  Given IV fluids.  Refer to heart bowel syncope clinic.    Still taking augmentin. Has not f/u w/ syncope clinic bc she did not feel she needed too. Doing FODMAP diet. Has GI specialist.     Subjective      Review of Systems:   Pertinent review of systems per HPI.    Review of Systems   Constitutional: Negative for activity change, appetite change, chills, diaphoresis, fatigue, fever and unexpected weight change.   HENT: Negative for congestion, dental problem, drooling, ear discharge, ear pain, facial swelling, hearing loss and mouth sores.    Eyes: Negative for pain, discharge and itching.   Respiratory: Negative for apnea, cough, choking, chest tightness and shortness of breath.    Cardiovascular: Negative for chest pain, palpitations and leg swelling.   Gastrointestinal: Negative for abdominal distention, abdominal pain, blood in stool, constipation and diarrhea.   Endocrine: Negative for cold intolerance, heat intolerance, polydipsia and polyuria.   Genitourinary: Negative for difficulty urinating, dysuria, frequency and hematuria.   Skin: Negative for color change, pallor, rash and wound.   Allergic/Immunologic: Negative for environmental allergies, food allergies and immunocompromised state.   Neurological: Negative for dizziness, weakness and light-headedness.   Psychiatric/Behavioral: Negative for agitation, behavioral problems, confusion, decreased concentration and self-injury. The patient is  "not nervous/anxious.    All other systems reviewed and are negative.    Allergies   Allergen Reactions   • Sulfacetamide Hives and Unknown - Low Severity   • Sulfa Antibiotics Hives   • Clove [Syzygium Aromaticum] Rash and GI Intolerance       Objective     Physical Exam:  Vital Signs:   Vitals:    08/12/21 0905   BP: 110/70   Pulse: 79   Resp: 16   Temp: 97.3 °F (36.3 °C)   SpO2: 98%   Weight: 80.3 kg (177 lb)   Height: 170.2 cm (67\")      Body mass index is 27.72 kg/m².    Physical Exam  Vitals and nursing note reviewed.   Constitutional:       General: She is not in acute distress.     Appearance: She is well-developed.   HENT:      Head: Normocephalic and atraumatic.      Right Ear: External ear normal.      Left Ear: External ear normal.   Eyes:      General: No scleral icterus.        Right eye: No discharge.         Left eye: No discharge.      Conjunctiva/sclera: Conjunctivae normal.   Cardiovascular:      Rate and Rhythm: Normal rate and regular rhythm.      Heart sounds: Normal heart sounds. No murmur heard.   No friction rub. No gallop.    Pulmonary:      Effort: Pulmonary effort is normal. No respiratory distress.      Breath sounds: Normal breath sounds. No wheezing or rales.   Skin:     General: Skin is warm and dry.      Coloration: Skin is not pale.         Assessment / Plan      Assessment & Plan:    1. Colitis  Doing well. Continue augmentin. Advised f/u with GI. Repeat labs below.   - CBC (No Diff)  - Comprehensive Metabolic Panel      Oksana Majano MD  08/12/2021     Please note that portions of this note may have been completed with a voice recognition program. Efforts were made to edit the dictations, but occasionally words are mistranscribed.  "

## 2021-08-13 ENCOUNTER — TELEPHONE (OUTPATIENT)
Dept: INTERNAL MEDICINE | Facility: CLINIC | Age: 41
End: 2021-08-13

## 2021-08-13 NOTE — TELEPHONE ENCOUNTER
Katty, this patient called yesterday and said the the lab person dragged a needle. On her vein yesterday and on the 8/2/21. She wants you to call her. [phone]

## 2021-08-18 NOTE — TELEPHONE ENCOUNTER
Spoke w/pt about this and offered an appt. To have arm checked out. She said it was better and ok now. I informed Rob,  and completed a safe report.

## 2021-08-26 ENCOUNTER — HOSPITAL ENCOUNTER (OUTPATIENT)
Dept: NUTRITION | Facility: HOSPITAL | Age: 41
Setting detail: RECURRING SERIES
Discharge: HOME | End: 2021-08-26

## 2021-08-26 VITALS — BODY MASS INDEX: 27.78 KG/M2 | HEIGHT: 67 IN | WEIGHT: 177 LBS

## 2021-08-26 PROCEDURE — 97802 MEDICAL NUTRITION INDIV IN: CPT | Performed by: DIETITIAN, REGISTERED

## 2021-08-26 NOTE — CONSULTS
"Adult Outpatient Nutrition  Assessment/PES    Patient Name:  Lesly Bill  YOB: 1980  MRN: [id number]    Assessment Date:  8/26/2021    Comments:  Telephone nutrition consult, 60 minutes. This medical referred consult was provided as a telephone call, tele-health or e-visit, as patient is unable to attend an in-office appointment due to the COVID-19 crisis. Consent for treatment was given verbally.    Patient describes problems with    Wants to obtain information on    Instructional process includes the plate method, nutrition label, meal planning, portions, restaurant nutrition, food record keeping, and exercise.    Materials provided include [facility] Weight Loss Toolkit, kcal meal plan and sample menu, and supporting nutrition education materials.     Goals:  - challenge lactose  - low residue diet    Total of 60 minutes spent with patient on nutrition counseling. Education based on Academy of Dietetics and Nutrition guidelines. Patient was provided with RD's contact information. Follow up visit is scheduled for 9/24/21 at 11:15 am. Thank you for this referral.      General Info     Row Name 08/26/21 1132       Today's Session    Person(s) attending today's session  Patient     Services Used Today?  No       General Information    How Well Do You Speak English?  very well    Do You Speak a Language Other Than English at Home?  no    Are you able to read and write English?  Yes    Lives With  alone    Is patient pregnant?  no          Anthropometrics     Row Name 08/26/21 1134          Anthropometrics    Height  170.2 cm (67\")     Weight  80.3 kg (177 lb)        Ideal Body Weight (IBW)    Ideal Body Weight (IBW) (kg)  61.86     % Ideal Body Weight  129.78        Body Mass Index (BMI)    BMI (kg/m2)  27.78         Nutritional Info/Activity     Row Name 08/26/21 113       Nutritional Information    Have you had weight changes?  No    What is your desired body weight?  77.1 kg (170 " "lb)    Have you tried to lose weight before?  No    What is your motivation to lose weight?  none, maintain    Food Allergies  other (see comments) bell peppers, rosemary, fructose    History of eating disorder?  No    What cultural diet influences are important for you to follow?  none    Do you have difficulty chewing food?  No    Functional Status  able to prepare meals;able to purchase food;ambulatory    How often during the day do you find yourself snacking?  rarely    Do you use Food Assistance programs (WIC, food stamps, food bank)?  no    Do you need information about Food Assistance programs?  no    How many meals do you eat each day?  3    How many snacks do you eat each day?  1    What is the biggest challenge you have with your diet?  Food causing negative symptoms    What type of support do you currently use to help you with your health issues?  none    Enter everything you can remember eating in the last 24 hours (1 day)  Breakfast: overnight oats and fruit Lunch: chicken and roasted vegetables Dinner: meatloaf and potatoes or chicken with veggies and GF noodles       Eating Environment    Eating environment  Work;Alone          Estimated/Assessed Needs     Row Name 08/26/21 1134          Calculation Measurements    Weight Used For Calculations  80.3 kg (177 lb)     Height  170.2 cm (67\")        Estimated/Assessed Needs    Additional Documentation  Gove-St. Jeor Equation (Group)        Gove-St. Jeor Equation    RMR (Gove-St. Jeor Equation)  1500.495     Gove-St. Jeor Activity Factors  1.2     Activity Factors (Gove-St. Jeor)  1800.594           Labs/Tests/Procedures/Meds     Row Name 08/26/21 1142          Labs/Procedures/Meds    Lab Results Reviewed  reviewed        Diagnostic Tests/Procedures    Diagnostic Test/Procedure Reviewed  reviewed        Medications    Pertinent Medications Reviewed  reviewed             Problem/Interventions:  Problem 1     Row Name 08/26/21 1142          " Nutrition Diagnoses Problem 1    Problem 1  Altered GI Function     Etiology (related to)  Medical Diagnosis     Gastrointestinal  IBS;IBD;Colitis     Signs/Symptoms (evidenced by)  Report/Observation     Reported/Observed By  MD     Reported GI Symptoms  Diarrhea;GI distress                 Intervention Goal     Row Name 08/26/21 1146          Intervention Goal    General  Meet nutritional needs for age/condition;Disease management/therapy;Reduce/improve symptoms     PO  Meet estimated needs     Weight  Maintain weight           Nutrition Prescription     Row Name 08/26/21 1147          Nutrition Prescription PO    PO Prescription  Begin/change diet     Begin/Change Diet to  Regular     Fluid Consistency  Thin     Common Modifiers  GI Soft/Cochran     New PO Prescription Ordered?  No, recommended         Education/Evaluation     Row Name 08/26/21 1147          Education    Education  Education topics;Provided education regarding;Advised regarding habits/behavior     Provided education regarding  Diet rationale;Medical diagnosis;Avoidance of associated complications;Avoidance/improvement of symptoms     Education Topics  GI disease        Monitor/Evaluation    Monitor  Per protocol     Education Follow-up  Other (comment) 9/24/21           Electronically signed by:  Cherise Lee RD  08/26/21 12:58 EDT

## 2021-09-08 ENCOUNTER — TELEMEDICINE (OUTPATIENT)
Dept: PSYCHIATRY | Facility: CLINIC | Age: 41
End: 2021-09-08

## 2021-09-08 DIAGNOSIS — F33.1 MODERATE EPISODE OF RECURRENT MAJOR DEPRESSIVE DISORDER (HCC): Chronic | ICD-10-CM

## 2021-09-08 DIAGNOSIS — G47.9 SLEEPING DIFFICULTIES: ICD-10-CM

## 2021-09-08 DIAGNOSIS — R41.840 ATTENTION DEFICIT: ICD-10-CM

## 2021-09-08 DIAGNOSIS — F41.1 GENERALIZED ANXIETY DISORDER: Primary | Chronic | ICD-10-CM

## 2021-09-08 PROCEDURE — 90792 PSYCH DIAG EVAL W/MED SRVCS: CPT | Performed by: NURSE PRACTITIONER

## 2021-09-08 NOTE — PROGRESS NOTES
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 medications as prescribed.  The patient verbalized understanding and agreement with goals that were mutually set.      SUICIDE RISK ASSESSMENT: Unalterable demographics and a history of mental health intervention indicate this patient is in a high risk category compared to the general population. At present, the patient denies active SI/HI, intentions, or plans at this time and agrees to seek immediate care should such thoughts develop. The patient verbalizes understanding of how to access emergency care if needed and agrees to do so. Consideration of suicide risk and protective factors such as history, current presentation, individual strengths and weaknesses, psychosocial and environmental stressors and variables, psychiatric illness and symptoms, medical conditions and pain, took place in this interview. Based on those considerations, the patient is determined: within individual baseline and presenting no imminent risk for suicide or homicide. Other recommendations: The patient does not meet the criteria for inpatient admission and is not a safety risk to self or others at today's visit. Inpatient treatment offers no significant advantages over outpatient treatment for this patient at today's visit.      SAFETY PLAN:  Patient was given ample time for questions and  fully participated in treatment planning.  Patient was encouraged to call the clinic with any questions or concerns.  Patient was informed of access to emergency care. If patient were to develop any significant symptomatology, suicidal ideation, homicidal ideation, any concerns, or feel unsafe at any time they are to call the clinic and if unable to get immediate assistance should immediately call 911 or go to the nearest emergency room.  The patient is advised to remove or secure (lock away) all lethal weapons (including guns) and sharps (including razors, scissors, knives, etc.).  All medications (including any prescribed and any over the counter medications) should be stored in a safe and secured location that is not obtainable by children/adolescents.  Patient was given an opportunity and encouraged to ask questions about their medication, illness, and treatment. Patient contracted verbally for the following: If you are experiencing an emotional crisis or have thoughts of harming yourself or others, please go to your nearest local emergency room or call 911. Will continue to re-assess medication response and side effects frequently to establish efficacy and ensure safety. Risks, any black box warnings, side effects, off label usage, and benefits of medication and treatment discussed with patient, along with potential adverse side effects of current and/or newly prescribed medication, alternative treatment options, and OTC medications.  Patient verbalized understanding of potential risks, any off label use of medication, any black box warnings, and any side effects in their own words. The patient verbalized understanding and agreed to comply with the safety plan discussed in their own words.  Patient given the number to the office. Number also available to the 24- hour suicide hotline.      TREATMENT PLAN: Continue supportive psychotherapy efforts and medications as indicated.  Medication and treatment options,  "both pharmacological and non-pharmacological treatment options, discussed during today's visit, including any off label use of medication. Patient acknowledged and verbally consented with current treatment plan and was educated on the importance of compliance with treatment and follow-up appointments.        -Discussed with patient that due to past side effect of severe exacerbation of hypertension that caused her to seek evaluation in the emergency room with antidepressant medications, no medications will be started today until review of past records can be completed.  Patient states that she has requested records from the [facility] through her PCP, but endorses that they have not been sent to [facility] yet.  Patient endorsed that she cannot remember the names of the medications that caused the severe exacerbations of her hypertension, but endorses that she can recall she tried multiple medications \"in different families\" and had similar reactions with each of the medications.  Discussed patient that due to this risk this provider would like to review her past medical records to determine which medication she had this type of response with in order to make an appropriate decision regarding which medications she would better tolerate to address her depression and anxiety.  Patient verbalizes understanding and is agreeable to this.  -Discussed with patient in the event that it is determined she can tolerate serotonergic medications well and has not consistently shown adverse reactions to serotonergic medications, this provider would recommend that we begin Trintellix.  Discussed with patient that Trintellix can be of benefit for both depression and anxiety and has shown some benefit in additional studies for cognition and focus/concentration.  Discussed with patient that for this reason this medication would be a good choice for her and potentially address all of the symptoms that she is " experiencing.  Patient verbalizes understanding and would be agreeable to begin this medication once provider has been able to review her past medical records.  -Discussed with patient that based on today's evaluation and patient report of symptoms, it appears that difficulty with focus and concentration are occurring secondarily to uncontrolled depression and anxiety.  Discussed with patient that for this reason we will attempt to address her depression and anxiety with medications and psychotherapy.  Discussed with patient that if ADHD-like symptoms persist once anxiety and depression have become well managed and controlled, we will continue to further evaluate for diagnosis of ADHD and discuss treatment options to address the symptoms at that time.  Patient verbalizes understanding and is agreeable to this.  -Begin psychotherapy        MEDICATION ISSUES: None today          [facility] No Show Policy:  We understand unexpected circumstances arise; however, anytime you miss your appointment we are unable to provide you appropriate care.  In addition, each appointment missed could have been used to provide care for others.  We ask that you call at least 24 hours in advance to cancel or reschedule an appointment.  We would like to take this opportunity to remind you of our policy stating patients who miss THREE or more appointments without cancelling or rescheduling 24 hours in advance of the appointment may be subject to cancellation of any further visits with our clinic and recommendation to seek in-person services/visits.    Please call [phone] to reschedule your appointment. If there are reasons that make it difficult for you to keep the appointments, please call and let us know how we can help.  Please understand that medication prescribing will not continue without seeing your provider.      [facility]'s No Show Policy reviewed with patient at today's visit.  Patient verbalized understanding of this policy. Discussed with patient that in the event that there are three or more no show visits, it will be recommended that they pursue in-person services/visits as noncompliance with telehealth visits indicates that patient is not an appropriate candidate for telemedicine and would likely be more appropriate for in-person services/visits. Patient verbalizes understanding and is agreeable to this.        MEDS ORDERED DURING VISIT:  No orders of the defined types were placed in this encounter.      Return in about 4 weeks (around 10/6/2021), or if symptoms worsen or fail to improve, for Recheck.        Patient will follow-up in 4 weeks, highly encouraged the patient if she had any questions or concerns to contact the behavioral health virtual clinic for sooner appointment patient verbalized understanding.      Functional Status: Moderate impairment     Prognosis: Guarded with Ongoing Treatment             This document has been electronically signed by RAF Alva  September 8, 2021 15:57 EDT    Part of this note may be an electronic transcription/translation of spoken language to printed text using the Dragon Dictation System.

## 2021-09-23 ENCOUNTER — DOCUMENTATION (OUTPATIENT)
Dept: NUTRITION | Facility: HOSPITAL | Age: 41
End: 2021-09-23

## 2021-09-23 NOTE — CONSULTS
Adult Outpatient Nutrition  Assessment/PES    Patient Name:  Lesly Bill  YOB: 1980  MRN: [id number]    Assessment Date:  9/23/2021    Comments:  Chart Addendum for note written 8/26/21. EMR glitch in note written 8/26/21. Please review the information below for more detailed note.    Patient describes problems with diarrhea and fecal urgency. Patient states that she has been following low FODMAP diet elimination since July 5th with minimal improvement. She was evaluated in the ED on 8/10/21 following bloody bowel movement and near syncope. During ED course she was given IV fluids and underwent CT scan that was unremarkable. She was given a course of augmentin which she states has helped some. She has also proceeded to challenge gluten, onions and fructose. Of these, fructose produced symptoms. She has not challenged mannitol, sorbitol or lactose. She does note intolerance to bell peppers. RD advised to continue challenging low FODMAP foods systematically to identify triggers.    Goals:  - challenge lactose  - maintain weight    Total of 60 minutes spent with patient on nutrition counseling. Education based on Academy [employer] Diet[employer] and Nutrition guidelines. Patient was provided with RD's contact information. Follow up visit is scheduled for 9/24/21 at 11:15 am. Thank you for this referral.      Electronically signed by:  Cherise Lee RD  09/23/21 10:06 EDT

## 2021-09-24 ENCOUNTER — HOSPITAL ENCOUNTER (OUTPATIENT)
Dept: NUTRITION | Facility: HOSPITAL | Age: 41
Setting detail: RECURRING SERIES
Discharge: HOME | End: 2021-09-24

## 2021-09-24 NOTE — PROGRESS NOTES
Adult Outpatient Nutrition  Assessment/PES    Patient Name:  Lesly Bill  YOB: 1980  MRN: [id number]    Assessment Date:  9/24/2021    Comments:  Telephone nutrition consult, 30 minutes. This medical referred consult was provided as a ZOOM call, as patient is unable to attend an in-office appointment due to the COVID-19 crisis. Consent for treatment was given verbally.    Patient presents for continued follow up for low FODMAP diet. She has completed challenge phase and has identified that mannitol, particularly bell peppers, do cause adverse effects. She has also noted some intolerance to onions/garlic. These seems to only present either in high doses or subsequent exposures. RD discussed avoiding eating foods containing onions/garlic back to back or multiple days in a row. When eating out RD advised to discuss intolerances with  to ensure she is getting a meal that will not cause adverse reaction. Gluten, lactose and sorbitol did not cause adverse effects. Fructose was not well tolerated in challenge, however she states that foods such as ketchup and bbq sauce do not seem to cause problems, so this may be more of a dose-dependent tolerance.    Patient states that she feels well and now has a good understanding of what her body will and won't tolerate. This has helped tremendously to remove frustration of unexpected symptoms. She does report intent to begin eating healthier and asks RD for advise on meal prep strategies. RD referred to diabetes food hub as well as recommended several quick and healthy snacks. Patient verbalized appreciation for insight and information. She requests additional follow up for accountability.     Goals:  - challenge lactose, 100% met  - maintain weight, 100% met      Total of 60 minutes spent with patient on nutrition counseling. Education based on [employer] of Diet[employer] and Nutrition guidelines. Patient was provided with RD's contact information.  Follow up 11/3/21 at 11:15 am. Thank you for this referral.    Electronically signed by:  Cherise Lee RD  09/24/21 11:28 EDT

## 2021-09-27 ENCOUNTER — TELEMEDICINE (OUTPATIENT)
Dept: PSYCHIATRY | Facility: CLINIC | Age: 41
End: 2021-09-27

## 2021-09-27 DIAGNOSIS — F33.9 EPISODE OF RECURRENT MAJOR DEPRESSIVE DISORDER, UNSPECIFIED DEPRESSION EPISODE SEVERITY (HCC): ICD-10-CM

## 2021-09-27 DIAGNOSIS — F41.1 GENERALIZED ANXIETY DISORDER: Primary | ICD-10-CM

## 2021-09-27 PROCEDURE — 90791 PSYCH DIAGNOSTIC EVALUATION: CPT | Performed by: SOCIAL WORKER
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
## 2021-10-06 ENCOUNTER — TELEMEDICINE (OUTPATIENT)
Dept: PSYCHIATRY | Facility: CLINIC | Age: 41
End: 2021-10-06

## 2021-10-06 DIAGNOSIS — G47.9 SLEEPING DIFFICULTIES: ICD-10-CM

## 2021-10-06 DIAGNOSIS — F33.1 MODERATE EPISODE OF RECURRENT MAJOR DEPRESSIVE DISORDER (HCC): Chronic | ICD-10-CM

## 2021-10-06 DIAGNOSIS — R41.840 ATTENTION DEFICIT: ICD-10-CM

## 2021-10-06 DIAGNOSIS — F41.1 GENERALIZED ANXIETY DISORDER: Primary | Chronic | ICD-10-CM

## 2021-10-06 PROCEDURE — 99214 OFFICE O/P EST MOD 30 MIN: CPT | Performed by: NURSE PRACTITIONER

## 2021-10-06 NOTE — PROGRESS NOTES
"This provider is located at the [facility] (through [facility]), [address], [place], [address] using a secure [employer] Video Visit through [employer]. Patient is being seen remotely via telehealth at their home address in [place], and stated they are in a secure environment for this session. The patient's condition being diagnosed/treated is appropriate for telemedicine. The provider identified herself as well as her credentials. The patient, and/or patients guardian, consent to be seen remotely, and when consent is given they understand that the consent allows for patient identifiable information to be sent to a third party as needed. They may refuse to be seen remotely at any time. The electronic data is encrypted and password protected, and the patient and/or guardian has been advised of the potential risks to privacy not withstanding such measures.    You have chosen to receive care through a telehealth visit.  Do you consent to use a video/audio connection for your medical care today? Yes     Subjective   Lesly Bill is a 41 y.o. female who is here today for medication management follow up.    Chief Complaint: Depression, anxiety, sleeping difficulties, attention deficit     History of Present Illness:  The patient describes her mood as \"okay\" over the last few weeks.  Patient states that things have been about the same over the past few weeks and she has not noticed any changes recently.  Patient states that anxiety and her difficulty with focus and concentration continue to be her main concerns.  The patient reports her appetite as good.  Patient states that she has been trying to focus on an IBS recommended diet recently.  The patient reports her sleep as fair.  Patient states that sleep continues to be a struggle for her.  Patient states that she is continuing to have difficulty winding down in the evenings in order to fall asleep.  Patient states " "that whenever she goes to sleep typically stays asleep well.  The patient denies any nightmares or bad dreams.  The patient denies any new medical problems or changes in medications since last appointment with this facility.    The patient denies any abnormal muscle movements or tics.  The patient rates her depression at a 2-3/10 on a 0-10 scale, with 10 being the worst.  Patient states that it is difficult for her to write her anxiety on a 0-10 scale, 10 being worse.  Patient states that she does not know what is like to live without anxiety so she does not feel that she can adequately rate her anxiety on a scale.  Patient states she does know that it is on the \"higher end\", but endorses that she does not feel that she can rate it on the 0-to-10 scale.  Patient does state that although she does have a significant amount of anxiety she does not allow it to keep her from doing the things that she likes her needs to do.  Patient states that she is still functional and it is not debilitating, but endorses that her anxiety is significantly distressing for her.  Patient endorses that her difficulty with focus and concentration continue to be a struggle for her as well.  Patient states that it is also difficult for her to write this on a 0-to-10 scale.  the patient rates hopelessness at a 0/10 on a 0-10 scale with 10 being the worst.  The patient denies suicidal ideations and homicidal ideations, and is convincing.  The patient denies any auditory hallucinations or visual hallucinations.  The patient does not endorse significant symptoms consistent with bipolar disorder or a psychotic illness.                The patient denies any chance of pregnancy at this time.  The patient was educated that her prescribed medications can have potential risk to a developing fetus. The patient is advised to contact this APRN/this office if she becomes pregnant or plans to become pregnant.  Pt verbalizes understanding and acknowledged " agreement with this plan in her own words.      Prior Psychiatric Medications:  Zoloft  Cymbalta  Prozac  Lexapro  Effexor      The following portions of the patient's history were reviewed and updated as appropriate: allergies, current medications, past family history, past medical history, past social history, past surgical history and problem list.    Review of Systems   Constitutional: Positive for fatigue. Negative for activity change, appetite change and unexpected weight change.   Psychiatric/Behavioral: Positive for decreased concentration, dysphoric mood and sleep disturbance. Negative for agitation, behavioral problems, confusion, hallucinations, self-injury and suicidal ideas. The patient is nervous/anxious and is hyperactive.        Objective   Physical Exam  Constitutional:       Appearance: Normal appearance.   Neurological:      Mental Status: She is alert.   Psychiatric:         Attention and Perception: Perception normal. She is inattentive.         Mood and Affect: Affect normal. Mood is anxious.         Speech: Speech normal.         Behavior: Behavior normal. Behavior is cooperative.         Thought Content: Thought content normal. Thought content does not include homicidal or suicidal ideation. Thought content does not include homicidal or suicidal plan.         Cognition and Memory: Cognition and memory normal.         Judgment: Judgment normal.       not currently breastfeeding.    The patient was seen remotely today via a MyChart Video Visit through [facility].  Unable to obtain vital signs due to nature of remote visit.  Height stated at 67 inches.  Weight stated at 177 pounds.     Allergies   Allergen Reactions   • Sulfacetamide Hives and Unknown - Low Severity   • Sulfa Antibiotics Hives   • Clove [Syzygium Aromaticum] Rash and GI Intolerance       Recent Results (from the past 2016 hour(s))   Comprehensive Metabolic Panel    Collection Time: 07/15/21 11:57 AM    Specimen: Blood   Result Value  Ref Range    Glucose 85 65 - 99 mg/dL    BUN 18 6 - 20 mg/dL    Creatinine 1.10 (H) 0.57 - 1.00 mg/dL    Sodium 137 136 - 145 mmol/L    Potassium 4.3 3.5 - 5.2 mmol/L    Chloride 100 98 - 107 mmol/L    CO2 24.4 22.0 - 29.0 mmol/L    Calcium 8.7 8.6 - 10.5 mg/dL    Total Protein 6.9 6.0 - 8.5 g/dL    Albumin 4.20 3.50 - 5.20 g/dL    ALT (SGPT) 22 1 - 33 U/L    AST (SGOT) 14 1 - 32 U/L    Alkaline Phosphatase 93 39 - 117 U/L    Total Bilirubin 0.4 0.0 - 1.2 mg/dL    eGFR Non African Amer 55 (L) >60 mL/min/1.73    Globulin 2.7 gm/dL    A/G Ratio 1.6 g/dL    BUN/Creatinine Ratio 16.4 7.0 - 25.0    Anion Gap 12.6 5.0 - 15.0 mmol/L   Lipid Panel    Collection Time: 07/15/21 11:57 AM    Specimen: Blood   Result Value Ref Range    Total Cholesterol 180 0 - 200 mg/dL    Triglycerides 143 0 - 150 mg/dL    HDL Cholesterol 37 (L) 40 - 60 mg/dL    LDL Cholesterol  117 (H) 0 - 100 mg/dL    VLDL Cholesterol 26 5 - 40 mg/dL    LDL/HDL Ratio 3.09    CBC (No Diff)    Collection Time: 08/02/21  1:19 PM    Specimen: Blood   Result Value Ref Range    WBC 8.21 3.40 - 10.80 10*3/mm3    RBC 4.83 3.77 - 5.28 10*6/mm3    Hemoglobin 14.0 12.0 - 15.9 g/dL    Hematocrit 42.5 34.0 - 46.6 %    MCV 88.0 79.0 - 97.0 fL    MCH 29.0 26.6 - 33.0 pg    MCHC 32.9 31.5 - 35.7 g/dL    RDW 13.0 12.3 - 15.4 %    RDW-SD 42.2 37.0 - 54.0 fl    MPV 12.4 (H) 6.0 - 12.0 fL    Platelets 263 140 - 450 10*3/mm3   Comprehensive Metabolic Panel    Collection Time: 08/02/21  1:19 PM    Specimen: Blood   Result Value Ref Range    Glucose 88 65 - 99 mg/dL    BUN 14 6 - 20 mg/dL    Creatinine 0.88 0.57 - 1.00 mg/dL    Sodium 135 (L) 136 - 145 mmol/L    Potassium 4.3 3.5 - 5.2 mmol/L    Chloride 104 98 - 107 mmol/L    CO2 23.6 22.0 - 29.0 mmol/L    Calcium 8.9 8.6 - 10.5 mg/dL    Total Protein 7.2 6.0 - 8.5 g/dL    Albumin 4.20 3.50 - 5.20 g/dL    ALT (SGPT) 16 1 - 33 U/L    AST (SGOT) 14 1 - 32 U/L    Alkaline Phosphatase 74 39 - 117 U/L    Total Bilirubin 0.4 0.0 - 1.2  mg/dL    eGFR Non African Amer 71 >60 mL/min/1.73    Globulin 3.0 gm/dL    A/G Ratio 1.4 g/dL    BUN/Creatinine Ratio 15.9 7.0 - 25.0    Anion Gap 7.4 5.0 - 15.0 mmol/L   Lipid Panel    Collection Time: 08/02/21  1:19 PM    Specimen: Blood   Result Value Ref Range    Total Cholesterol 163 0 - 200 mg/dL    Triglycerides 96 0 - 150 mg/dL    HDL Cholesterol 38 (L) 40 - 60 mg/dL    LDL Cholesterol  107 (H) 0 - 100 mg/dL    VLDL Cholesterol 18 5 - 40 mg/dL    LDL/HDL Ratio 2.78    Vitamin B12    Collection Time: 08/02/21  1:19 PM    Specimen: Blood   Result Value Ref Range    Vitamin B-12 418 211 - 946 pg/mL   Vitamin D 25 Hydroxy    Collection Time: 08/02/21  1:19 PM    Specimen: Blood   Result Value Ref Range    25 Hydroxy, Vitamin D 32.6 30.0 - 100.0 ng/ml   Hemoglobin A1c    Collection Time: 08/02/21  1:19 PM    Specimen: Blood   Result Value Ref Range    Hemoglobin A1C 5.50 4.80 - 5.60 %   Hepatitis C Antibody    Collection Time: 08/02/21  1:19 PM    Specimen: Blood   Result Value Ref Range    Hepatitis C Ab Non-Reactive Non-Reactive   Comprehensive Metabolic Panel    Collection Time: 08/05/21  9:33 PM    Specimen: Blood   Result Value Ref Range    Glucose 158 (H) 65 - 99 mg/dL    BUN 22 (H) 6 - 20 mg/dL    Creatinine 1.25 (H) 0.57 - 1.00 mg/dL    Sodium 137 136 - 145 mmol/L    Potassium 3.9 3.5 - 5.2 mmol/L    Chloride 101 98 - 107 mmol/L    CO2 24.0 22.0 - 29.0 mmol/L    Calcium 9.9 8.6 - 10.5 mg/dL    Total Protein 7.9 6.0 - 8.5 g/dL    Albumin 4.80 3.50 - 5.20 g/dL    ALT (SGPT) 26 1 - 33 U/L    AST (SGOT) 20 1 - 32 U/L    Alkaline Phosphatase 90 39 - 117 U/L    Total Bilirubin 0.4 0.0 - 1.2 mg/dL    eGFR Non African Amer 47 (L) >60 mL/min/1.73    Globulin 3.1 gm/dL    A/G Ratio 1.5 g/dL    BUN/Creatinine Ratio 17.6 7.0 - 25.0    Anion Gap 12.0 5.0 - 15.0 mmol/L   Lipase    Collection Time: 08/05/21  9:33 PM    Specimen: Blood   Result Value Ref Range    Lipase 33 13 - 60 U/L   Troponin    Collection Time:  08/05/21  9:33 PM    Specimen: Blood   Result Value Ref Range    Troponin T <0.010 0.000 - 0.030 ng/mL   Type & Screen    Collection Time: 08/05/21  9:33 PM    Specimen: Blood   Result Value Ref Range    ABO Type A     RH type Positive     Antibody Screen Negative     T&S Expiration Date 8/8/2021 11:59:59 PM    Green Top (Gel)    Collection Time: 08/05/21  9:33 PM   Result Value Ref Range    Extra Tube Hold for add-ons.    Lavender Top    Collection Time: 08/05/21  9:33 PM   Result Value Ref Range    Extra Tube hold for add-on    Gold Top - SST    Collection Time: 08/05/21  9:33 PM   Result Value Ref Range    Extra Tube Hold for add-ons.    Gray Top    Collection Time: 08/05/21  9:33 PM   Result Value Ref Range    Extra Tube Hold for add-ons.    CBC Auto Differential    Collection Time: 08/05/21  9:33 PM    Specimen: Blood   Result Value Ref Range    WBC 18.99 (H) 3.40 - 10.80 10*3/mm3    RBC 5.11 3.77 - 5.28 10*6/mm3    Hemoglobin 14.8 12.0 - 15.9 g/dL    Hematocrit 44.6 34.0 - 46.6 %    MCV 87.3 79.0 - 97.0 fL    MCH 29.0 26.6 - 33.0 pg    MCHC 33.2 31.5 - 35.7 g/dL    RDW 12.4 12.3 - 15.4 %    RDW-SD 39.8 37.0 - 54.0 fl    MPV 12.1 (H) 6.0 - 12.0 fL    Platelets 293 140 - 450 10*3/mm3    Neutrophil % 83.7 (H) 42.7 - 76.0 %    Lymphocyte % 8.8 (L) 19.6 - 45.3 %    Monocyte % 5.7 5.0 - 12.0 %    Eosinophil % 0.9 0.3 - 6.2 %    Basophil % 0.4 0.0 - 1.5 %    Immature Grans % 0.5 0.0 - 0.5 %    Neutrophils, Absolute 15.90 (H) 1.70 - 7.00 10*3/mm3    Lymphocytes, Absolute 1.67 0.70 - 3.10 10*3/mm3    Monocytes, Absolute 1.08 (H) 0.10 - 0.90 10*3/mm3    Eosinophils, Absolute 0.17 0.00 - 0.40 10*3/mm3    Basophils, Absolute 0.08 0.00 - 0.20 10*3/mm3    Immature Grans, Absolute 0.09 (H) 0.00 - 0.05 10*3/mm3    nRBC 0.0 0.0 - 0.2 /100 WBC   Urinalysis With Microscopic If Indicated (No Culture) - Urine, Clean Catch    Collection Time: 08/05/21  9:39 PM    Specimen: Urine, Clean Catch   Result Value Ref Range    Color, UA  Yellow Yellow, Straw    Appearance, UA Cloudy (A) Clear    pH, UA <=5.0 5.0 - 8.0    Specific Gravity, UA 1.023 1.001 - 1.030    Glucose, UA Negative Negative    Ketones, UA Trace (A) Negative    Bilirubin, UA Negative Negative    Blood, UA Negative Negative    Protein, UA 30 mg/dL (1+) (A) Negative    Leuk Esterase, UA Trace (A) Negative    Nitrite, UA Negative Negative    Urobilinogen, UA 1.0 E.U./dL 0.2 - 1.0 E.U./dL   Urinalysis, Microscopic Only - Urine, Clean Catch    Collection Time: 08/05/21  9:39 PM    Specimen: Urine, Clean Catch   Result Value Ref Range    RBC, UA 7-12 (A) None Seen, 0-2 /HPF    WBC, UA 6-12 (A) None Seen, 0-2 /HPF    Bacteria, UA 3+ (A) None Seen, Trace /HPF    Squamous Epithelial Cells, UA 3-6 (A) None Seen, 0-2 /HPF    Hyaline Casts, UA 21-30 0 - 6 /LPF    Methodology Manual Light Microscopy    ECG 12 Lead    Collection Time: 08/05/21  9:39 PM   Result Value Ref Range    QT Interval 396 ms    QTC Interval 442 ms   POCT Pregnancy, Urine    Collection Time: 08/05/21  9:44 PM    Specimen: Urine   Result Value Ref Range    HCG, Urine, QL Negative Negative    Lot Number [id number]     Internal Positive Control Positive Positive, Passed    Internal Negative Control Negative Negative, Passed   ABO RH Specimen Verification    Collection Time: 08/05/21 10:15 PM    Specimen: Blood   Result Value Ref Range    ABO Type A     RH type Positive    CBC (No Diff)    Collection Time: 08/12/21  9:16 AM    Specimen: Blood   Result Value Ref Range    WBC 7.23 3.40 - 10.80 10*3/mm3    RBC 4.80 3.77 - 5.28 10*6/mm3    Hemoglobin 14.0 12.0 - 15.9 g/dL    Hematocrit 42.1 34.0 - 46.6 %    MCV 87.7 79.0 - 97.0 fL    MCH 29.2 26.6 - 33.0 pg    MCHC 33.3 31.5 - 35.7 g/dL    RDW 12.7 12.3 - 15.4 %    RDW-SD 40.0 37.0 - 54.0 fl    MPV 12.4 (H) 6.0 - 12.0 fL    Platelets 264 140 - 450 10*3/mm3   Comprehensive Metabolic Panel    Collection Time: 08/12/21  9:16 AM    Specimen: Blood   Result Value Ref Range    Glucose  106 (H) 65 - 99 mg/dL    BUN 14 6 - 20 mg/dL    Creatinine 1.15 (H) 0.57 - 1.00 mg/dL    Sodium 139 136 - 145 mmol/L    Potassium 4.8 3.5 - 5.2 mmol/L    Chloride 104 98 - 107 mmol/L    CO2 26.2 22.0 - 29.0 mmol/L    Calcium 8.6 8.6 - 10.5 mg/dL    Total Protein 6.8 6.0 - 8.5 g/dL    Albumin 4.10 3.50 - 5.20 g/dL    ALT (SGPT) 25 1 - 33 U/L    AST (SGOT) 15 1 - 32 U/L    Alkaline Phosphatase 68 39 - 117 U/L    Total Bilirubin 0.3 0.0 - 1.2 mg/dL    eGFR Non African Amer 52 (L) >60 mL/min/1.73    Globulin 2.7 gm/dL    A/G Ratio 1.5 g/dL    BUN/Creatinine Ratio 12.2 7.0 - 25.0    Anion Gap 8.8 5.0 - 15.0 mmol/L       Current Medications:   Current Outpatient Medications   Medication Sig Dispense Refill   • dicyclomine (BENTYL) 10 MG capsule Take 10 mg by mouth 4 (Four) Times a Day Before Meals & at Bedtime.     • fluticasone (FLONASE) 50 MCG/ACT nasal spray 2 sprays into the nostril(s) as directed by provider Daily.     • hydrOXYzine (ATARAX) 10 MG tablet Take 10 mg by mouth 3 (Three) Times a Day As Needed for Itching.     • lisinopril-hydrochlorothiazide (PRINZIDE,ZESTORETIC) 20-12.5 MG per tablet Take 0.5 tablets by mouth Daily. 90 tablet 1   • montelukast (Singulair) 10 MG tablet Take 1 tablet by mouth Every Night. 90 tablet 1   • omeprazole (priLOSEC) 20 MG capsule TAKE 1 CAPSULE DAILY EVERY MORNING BEFORE BREAKFAST.     • Otezla 30 MG tablet      • Vortioxetine HBr 5 MG tablet Take 5 mg by mouth Daily With Breakfast. 30 tablet 0     No current facility-administered medications for this visit.       Mental Status Exam:   Hygiene:   good  Cooperation:  Cooperative  Eye Contact:  Good  Psychomotor Behavior:  Appropriate  Affect:  Full range  Hopelessness: Denies  Speech:  Normal  Thought Process:  Goal directed  Thought Content:  Normal  Suicidal:  None  Homicidal:  None  Hallucinations:  None  Delusion:  None  Memory:  Intact  Orientation:  Person, Place, Time and Situation  Reliability:  good  Insight:   Good  Judgement:  Good  Impulse Control:  Good  Physical/Medical Issues:  Yes See medical history    PHQ-9 Depression Screening  Little interest or pleasure in doing things? (P) 1   Feeling down, depressed, or hopeless? (P) 1   Trouble falling or staying asleep, or sleeping too much? (P) 3   Feeling tired or having little energy? (P) 1   Poor appetite or overeating? (P) 1   Feeling bad about yourself - or that you are a failure or have let yourself or your family down? (P) 0   Trouble concentrating on things, such as reading the newspaper or watching television? (P) 3   Moving or speaking so slowly that other people could have noticed? Or the opposite - being so fidgety or restless that you have been moving around a lot more than usual? (P) 2   Thoughts that you would be better off dead, or of hurting yourself in some way? (P) 0   PHQ-9 Total Score (P) 12   If you checked off any problems, how difficult have these problems made it for you to do your work, take care of things at home, or get along with other people? (P) Somewhat difficult        PHQ-Score Total:  PHQ-9 Total Score: (P) 12    YOUNG-7  Feeling nervous, anxious or on edge: (P) More than half the days  Not being able to stop or control worrying: (P) Nearly every day  Worrying too much about different things: (P) Several days  Trouble Relaxing: (P) More than half the days  Being so restless that it is hard to sit still: (P) More than half the days  Feeling afraid as if something awful might happen: (P) Several days  Becoming easily annoyed or irritable: (P) Several days  YOUNG 7 Total Score: (P) 12  If you checked any problems, how difficult have these problems made it for you to do your work, take care of things at home, or get along with other people: (P) Somewhat difficult       PROMIS scale screening tool that patient filled out virtually reviewed by this APRN at today's encounter.     Assessment/Plan   Diagnoses and all orders for this visit:    1.  Generalized anxiety disorder (Primary)  -     Vortioxetine HBr 5 MG tablet; Take 5 mg by mouth Daily With Breakfast.  Dispense: 30 tablet; Refill: 0    2. Moderate episode of recurrent major depressive disorder (HCC)  -     Vortioxetine HBr 5 MG tablet; Take 5 mg by mouth Daily With Breakfast.  Dispense: 30 tablet; Refill: 0    3. Attention deficit    4. Sleeping difficulties            Visit Diagnoses:    ICD-10-CM ICD-9-CM   1. Generalized anxiety disorder  F41.1 300.02   2. Moderate episode of recurrent major depressive disorder (HCC)  F33.1 296.32   3. Attention deficit  R41.840 799.51   4. Sleeping difficulties  G47.9 780.50       GOALS:  Short Term Goals: Patient will be compliant with medication, and patient will have no significant medication related side effects.  Patient will be engaged in psychotherapy as indicated.  Patient will report subjective improvement of symptoms.  Long term goals: To stabilize mood and treat/improve subjective symptoms, the patient will stay out of the hospital, the patient will be at an optimal level of functioning, and the patient will take all medications as prescribed.  The patient verbalized understanding and agreement with goals that were mutually set.      SUICIDE RISK ASSESSMENT: Unalterable demographics and a history of mental health intervention indicate this patient is in a high risk category compared to the general population. At present, the patient denies active SI/HI, intentions, or plans at this time and agrees to seek immediate care should such thoughts develop. The patient verbalizes understanding of how to access emergency care if needed and agrees to do so. Consideration of suicide risk and protective factors such as history, current presentation, individual strengths and weaknesses, psychosocial and environmental stressors and variables, psychiatric illness and symptoms, medical conditions and pain, took place in this interview. Based on those considerations,  the patient is determined: within individual baseline and presenting no imminent risk for suicide or homicide. Other recommendations: The patient does not meet the criteria for inpatient admission and is not a safety risk to self or others at today's visit. Inpatient treatment offers no significant advantages over outpatient treatment for this patient at today's visit.      SAFETY PLAN:  Patient was given ample time for questions and fully participated in treatment planning.  Patient was encouraged to call the clinic with any questions or concerns.  Patient was informed of access to emergency care. If patient were to develop any significant symptomatology, suicidal ideation, homicidal ideation, any concerns, or feel unsafe at any time they are to call the clinic and if unable to get immediate assistance should immediately call 911 or go to the nearest emergency room.  The patient is advised to remove or secure (lock away) all lethal weapons (including guns) and sharps (including razors, scissors, knives, etc.).  All medications (including any prescribed and any over the counter medications) should be stored in a safe and secured location that is not obtainable by children/adolescents.  Patient was given an opportunity and encouraged to ask questions about their medication, illness, and treatment. Patient contracted verbally for the following: If you are experiencing an emotional crisis or have thoughts of harming yourself or others, please go to your nearest local emergency room or call 911. Will continue to re-assess medication response and side effects frequently to establish efficacy and ensure safety. Risks, any black box warnings, side effects, off label usage, and benefits of medication and treatment discussed with patient, along with potential adverse side effects of current and/or newly prescribed medication, alternative treatment options, and OTC medications.  Patient verbalized understanding of potential  risks, any off label use of medication, any black box warnings, and any side effects in their own words. The patient verbalized understanding and agreed to comply with the safety plan discussed in their own words.  Patient given the number to the office. Number also available to the 24- hour suicide hotline.      TREATMENT PLAN/GOALS: Continue medications and treatment plan as indicated. Treatment and medication options discussed during today's visit. Patient acknowledged and verbally consented to continue with current treatment plan and was educated on the importance of compliance with treatment and follow-up appointments.      -Begin Trintellix 5 mg daily for anxiety and depression.  Discussed with patient that we will likely have to do a prior authorization for her insurance to cover this medication.  Discussed with patient due to adequate trials and failures with other antidepressant medications, including both SSRIs and SNRIs, that the prior authorization should be approved.  -Educated patient to closely monitor for any changes in blood pressure with beginning Trintellix.  Patient has reported history of hypertension with taking antidepressant medications.  Discussed with patient that typically this is a side effect of stimulant medications that affect norepinephrine and serotonin and discussed with patient the Trintellix is a medication that only affect serotonin and should not cause elevated blood pressure.  Nevertheless encouraged patient to monitor closely for any changes in blood pressure intermittently discontinue the Trintellix and notify provider in the event that she develops signs or symptoms consistent with blood pressure increase after beginning the Trintellix.  Educated patient regarding signs and symptoms of hypertensive crisis and instructed patient to immediately discontinue Trintellix and seek evaluation at the nearest emergency department in the event that she develops any of the symptoms.   Patient verbalizes understanding and is agreeable to this.  -Discussed with patient that based on today's assessment, initial evaluation completed at last visit, and patient report of symptoms, it appears that difficulty with focus and concentration are occurring secondarily to uncontrolled depression and anxiety.  Discussed with patient that for this reason we will attempt to address her depression and anxiety with medication management and psychotherapy.  Discussed with patient that if ADHD-like symptoms persist once anxiety and depression have become well managed and controlled, we will continue to further evaluate for diagnosis of ADHD and discuss treatment options to address the symptoms at that time.  Patient verbalizes understanding and is agreeable to this.  -Continue psychotherapy with Dick Corey LCSW      MEDICATION ISSUES: Discussed medication options and treatment plan of prescribed medication, any off label use of medication, as well as the risks, benefits, any black box warnings including increased suicidality, and side effects including but not limited to potential falls, dizziness, possible impaired driving, GI side effects (change in appetite, abdominal discomfort, nausea, vomiting, diarrhea, and/or constipation), dry mouth, somnolence, sedation, insomnia, activation, agitation, irritation, tremors, abnormal muscle movements or disorders, headache, sweating, possible bruising or rare bleeding, electrolyte and/or fluid abnormalities, change in blood pressure/heart rate/and or heart rhythm, sexual dysfunction, and metabolic adversities among others. Patient and/or guardian agreeable to call the office with any worsening of symptoms or onset of side effects, or if any concerns or questions arise.  The contact information for the office is made available to the patient and/or guardian.  Patient and/or guardian agreeable to call 911 or go to the nearest ER should they begin having any SI/HI, or if  any urgent concerns arise. No medication side effects or related complaints today.            [facility] No Show Policy:  We understand unexpected circumstances arise; however, anytime you miss your appointment we are unable to provide you appropriate care.  In addition, each appointment missed could have been used to provide care for others.  We ask that you call at least 24 hours in advance to cancel or reschedule an appointment.  We would like to take this opportunity to remind you of our policy stating patients who miss THREE or more appointments without cancelling or rescheduling 24 hours in advance of the appointment may be subject to cancellation of any further visits with our clinic and recommendation to seek in-person services/visits.    Please call [phone] to reschedule your appointment. If there are reasons that make it difficult for you to keep the appointments, please call and let us know how we can help.  Please understand that medication prescribing will not continue without seeing your provider.      [facility]'s No Show Policy reviewed with patient at today's visit. Patient verbalized understanding of this policy. Discussed with patient that in the event that there are three or more no show visits, it will be recommended that they pursue in-person services/visits as noncompliance with telehealth visits indicates that patient is not an appropriate candidate for telemedicine and would likely be more appropriate for in-person services/visits. Patient verbalizes understanding and is agreeable to this.        MEDS ORDERED DURING VISIT:  New Medications Ordered This Visit   Medications   • Vortioxetine HBr 5 MG tablet     Sig: Take 5 mg by mouth Daily With Breakfast.     Dispense:  30 tablet     Refill:  0       Return in about 4 weeks (around 11/3/2021), or if symptoms worsen or fail to improve, for Recheck.        Patient will follow-up in 4 weeks,  highly encouraged the patient if she had any questions or concerns to contact the behavioral health [facility] clinic for sooner appointment patient verbalized understanding.      Functional Status: Moderate impairment     Prognosis: Guarded with Ongoing Treatment            This document has been electronically signed by RAF Alva  October 6, 2021 14:02 EDT    Part of this note may be an electronic transcription/translation of spoken language to printed text using the Dragon Dictation System.

## 2021-10-12 ENCOUNTER — TELEPHONE (OUTPATIENT)
Dept: PSYCHIATRY | Facility: CLINIC | Age: 41
End: 2021-10-12

## 2021-10-12 NOTE — TELEPHONE ENCOUNTER
Patient called, she went to  Vortioxetine and her co-pay was $60, she did not take the medication as she cannot afford the co-pay. She said you told her her there is a program that can help her to afford medicine, she wanted to get specifics on that please?    Thank You

## 2021-11-01 ENCOUNTER — TELEMEDICINE (OUTPATIENT)
Dept: PSYCHIATRY | Facility: CLINIC | Age: 41
End: 2021-11-01

## 2021-11-01 DIAGNOSIS — G47.9 SLEEPING DIFFICULTIES: ICD-10-CM

## 2021-11-01 DIAGNOSIS — F33.1 MODERATE EPISODE OF RECURRENT MAJOR DEPRESSIVE DISORDER (HCC): Chronic | ICD-10-CM

## 2021-11-01 DIAGNOSIS — R41.840 ATTENTION DEFICIT: ICD-10-CM

## 2021-11-01 DIAGNOSIS — F41.1 GENERALIZED ANXIETY DISORDER: Primary | Chronic | ICD-10-CM

## 2021-11-01 PROCEDURE — 99214 OFFICE O/P EST MOD 30 MIN: CPT | Performed by: NURSE PRACTITIONER

## 2021-11-01 PROCEDURE — 90833 PSYTX W PT W E/M 30 MIN: CPT | Performed by: NURSE PRACTITIONER

## 2021-11-01 NOTE — PROGRESS NOTES
"This provider is located at the [facility] (through [facility]), [address], [facility], [address] using a secure [employer] Video Visit through [employer]. Patient is being seen remotely via telehealth at their home address in [place], and stated they are in a secure environment for this session. The patient's condition being diagnosed/treated is appropriate for telemedicine. The provider identified herself as well as her credentials. The patient, and/or patients guardian, consent to be seen remotely, and when consent is given they understand that the consent allows for patient identifiable information to be sent to a third party as needed. They may refuse to be seen remotely at any time. The electronic data is encrypted and password protected, and the patient and/or guardian has been advised of the potential risks to privacy not withstanding such measures.    You have chosen to receive care through a telehealth visit.  Do you consent to use a video/audio connection for your medical care today? Yes     Subjective   Lesly Bill is a 41 y.o. female who is here today for medication management follow up.    Chief Complaint: Depression, anxiety, sleeping difficulties, attention deficit     History of Present Illness:  The patient describes her mood as \"okay\" over the last few weeks.  Patient states that things have been going about the same since her last visit.  Patient states that she was unable to start the Trintellix after her last visit due to her inability to afford the co-pay.  Patient states that she has submitted her information to a patient assistance program in effort to get this medication covered, but endorses that she has not heard anything back from them at this time.  Patient states that she submitted her paperwork approximately 1 week ago.  Discussed with patient getting samples for this medication from her PCP while she is awaiting to hear back from " the patient assistance program.  Discussed with patient that her PCP will likely need to see her for a visit in order to provide her with the samples, but that this provider will help facilitate getting this set up and call her primary care office.  Patient verbalizes understanding and endorses that she is agreeable to this.  Patient states that she has been researching about the Trintellix and she is very excited to start the medication as it seems that it will be very beneficial for the symptoms that she is experiencing.  Patient endorses that she is continuing to experience a lot of stress and anxiety as well as difficulty sleeping.  Patient states that she sleeps very well when she is able to fall asleep, but endorses that she has difficulty winding down in the evenings in order to go to sleep.  Patient states that she has been working on nonpharmacologic behavioral interventions to help with sleep which have been somewhat beneficial.  Patient states for type of that she is realized that if she exercises too late in the afternoons it causes her to more difficulty sleeping, so endorses that she has been focusing on this.  Patient states that she frequently feels as if she is experiencing a constant level of anxiety and stress at all times.  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  Patient states that whenever she was working in the office prior to COVID she had become very good at managing her stress and anxiety.  Patient states that she would find ways to push her limits and face her anxiety in order to manage it.  Patient states that since she was working remotely from home and very socially isolated due to COVID she feels that she regressed regarding how she manages and deals with her anxiety, leading to a subsequent increase in the symptoms.  Patient states that she thinks that her sleeping difficulties  have also been very related to this.  Patient states that when she is working from home she would just be able to wake up and go to her computer and begin working.  Patient states that since she is been working back in the office she has to wake up early in order to get ready and commute to her job.  Patient states that she frequently finds herself feeling anxious and overwhelmed regarding picking out her clothing/outfits for the day, which she was not having to do during COVID.  Patient states that she has been using nonpharmacologic behavioral interventions to help with this as well by planning out her outfits for the week and setting them out so that she does not have to think about what she is going to wear that day.  Patient states that this is been somewhat beneficial for managing anxiety/stress regarding the situation specifically.  Patient states that she has realized that over the past few months depression has been progressively increasing.  Patient states that initially she did not think that she felt depressed, but endorses that as she has self monitored the symptoms over the past few months she is noticed that they have been gradually increasing.  Patient states for example that she did not go out for Halloween and she normally enjoys this.  Patient states that she has noticed that she avoids situations that she normally enjoys, leading her to feel that depression is actually more present than she had realized previously.  Patient endorses that she continues to struggle with focus and concentration.  Patient reports significant difficulties with indecisiveness.  Patient states that she has significant difficulty making small, unimportant decisions.  Patient states for example that she frequently has difficulty deciding what she wants to eat or what she wants to wear to work, which she endorses causes her to feel very stressed and overwhelmed.  Patient states that whenever she is faced with a more  important decision she does not typically struggle with indecisiveness.  Patient states that she has noticed that she only struggles with the indecisiveness whenever it is a small, unimportant decision that only affects her.  Patient states that if the decision is more important and/or affects other people she can make those decisions without difficulty.  Patient rates her difficulties with focus concentration 6-7/10 on a 0-10 scale, with 10 being the worst.  The patient reports her appetite as fair.  The patient denies any new medical problems or changes in medications since last appointment with this facility.  The patient denies any abnormal muscle movements or tics.  The patient rates her depression at a 3-4/10 on a 0-10 scale, with 10 being the worst.  The patient rates her anxiety at a 8/10 on a 0-10 scale, with 10 being the worst.  The patient rates hopelessness at a 1-2/10 on a 0-10 scale with 10 being the worst.  The patient denies suicidal ideations and homicidal ideations, and is convincing.  The patient denies any auditory hallucinations or visual hallucinations.  The patient does not endorse significant symptoms consistent with bipolar disorder or a psychotic illness.                    The patient denies any chance of pregnancy at this time.  The patient was educated that her prescribed medications can have potential risk to a developing fetus. The patient is advised to contact this APRN/this office if she becomes pregnant or plans to become pregnant.  Pt verbalizes understanding and acknowledged agreement with this plan in her own words.      Prior Psychiatric Medications:  Zoloft  Cymbalta  Prozac  Lexapro  Effexor      The following portions of the patient's history were reviewed and updated as appropriate: allergies, current medications, past family history, past medical history, past social history, past surgical history and problem list.    Review of Systems   Constitutional: Positive for  fatigue. Negative for activity change, appetite change and unexpected weight change.   Psychiatric/Behavioral: Positive for decreased concentration, dysphoric mood and sleep disturbance. Negative for agitation, behavioral problems, confusion, hallucinations, self-injury and suicidal ideas. The patient is nervous/anxious and is hyperactive.        Objective   Physical Exam  Constitutional:       Appearance: Normal appearance.   Neurological:      Mental Status: She is alert.   Psychiatric:         Attention and Perception: Perception normal. She is inattentive.         Mood and Affect: Affect normal. Mood is anxious.         Speech: Speech normal.         Behavior: Behavior normal. Behavior is cooperative.         Thought Content: Thought content normal. Thought content does not include homicidal or suicidal ideation. Thought content does not include homicidal or suicidal plan.         Cognition and Memory: Cognition and memory normal.         Judgment: Judgment normal.       not currently breastfeeding.    The patient was seen remotely today via a MyChart Video Visit through [facility].  Unable to obtain vital signs due to nature of remote visit.  Height stated at 67 inches.  Weight stated at 177 pounds.     Allergies   Allergen Reactions   • Sulfacetamide Hives and Unknown - Low Severity   • Sulfa Antibiotics Hives   • Clove [Syzygium Aromaticum] Rash and GI Intolerance       Recent Results (from the past 2016 hour(s))   CBC (No Diff)    Collection Time: 08/12/21  9:16 AM    Specimen: Blood   Result Value Ref Range    WBC 7.23 3.40 - 10.80 10*3/mm3    RBC 4.80 3.77 - 5.28 10*6/mm3    Hemoglobin 14.0 12.0 - 15.9 g/dL    Hematocrit 42.1 34.0 - 46.6 %    MCV 87.7 79.0 - 97.0 fL    MCH 29.2 26.6 - 33.0 pg    MCHC 33.3 31.5 - 35.7 g/dL    RDW 12.7 12.3 - 15.4 %    RDW-SD 40.0 37.0 - 54.0 fl    MPV 12.4 (H) 6.0 - 12.0 fL    Platelets 264 140 - 450 10*3/mm3   Comprehensive Metabolic Panel    Collection Time: 08/12/21  9:16 AM     Specimen: Blood   Result Value Ref Range    Glucose 106 (H) 65 - 99 mg/dL    BUN 14 6 - 20 mg/dL    Creatinine 1.15 (H) 0.57 - 1.00 mg/dL    Sodium 139 136 - 145 mmol/L    Potassium 4.8 3.5 - 5.2 mmol/L    Chloride 104 98 - 107 mmol/L    CO2 26.2 22.0 - 29.0 mmol/L    Calcium 8.6 8.6 - 10.5 mg/dL    Total Protein 6.8 6.0 - 8.5 g/dL    Albumin 4.10 3.50 - 5.20 g/dL    ALT (SGPT) 25 1 - 33 U/L    AST (SGOT) 15 1 - 32 U/L    Alkaline Phosphatase 68 39 - 117 U/L    Total Bilirubin 0.3 0.0 - 1.2 mg/dL    eGFR Non African Amer 52 (L) >60 mL/min/1.73    Globulin 2.7 gm/dL    A/G Ratio 1.5 g/dL    BUN/Creatinine Ratio 12.2 7.0 - 25.0    Anion Gap 8.8 5.0 - 15.0 mmol/L   COVID-19,[facility] ROUTINE, NP/OP SWAB IN TRANSPORT MEDIA OR ESWAB 72 HR TAT - ,    Collection Time: 10/05/21 12:03 PM   Result Value Ref Range    SARS-CoV-2, MENDEZ Not Detected Not Detected       Current Medications:   Current Outpatient Medications   Medication Sig Dispense Refill   • dicyclomine (BENTYL) 10 MG capsule Take 10 mg by mouth 4 (Four) Times a Day Before Meals & at Bedtime.     • fluticasone (FLONASE) 50 MCG/ACT nasal spray 2 sprays into the nostril(s) as directed by provider Daily.     • hydrOXYzine (ATARAX) 10 MG tablet Take 10 mg by mouth 3 (Three) Times a Day As Needed for Itching.     • lisinopril-hydrochlorothiazide (PRINZIDE,ZESTORETIC) 20-12.5 MG per tablet Take 0.5 tablets by mouth Daily. 90 tablet 1   • montelukast (Singulair) 10 MG tablet Take 1 tablet by mouth Every Night. 90 tablet 1   • omeprazole (priLOSEC) 20 MG capsule TAKE 1 CAPSULE DAILY EVERY MORNING BEFORE BREAKFAST.     • Otezla 30 MG tablet      • Vortioxetine HBr 5 MG tablet Take 5 mg by mouth Daily With Breakfast. 30 tablet 0     No current facility-administered medications for this visit.       Mental Status Exam:   Hygiene:   good  Cooperation:  Cooperative  Eye Contact:  Good  Psychomotor Behavior:  Appropriate  Affect:  Full range  Hopelessness: 2  Speech:   Normal  Thought Process:  Goal directed  Thought Content:  Normal  Suicidal:  None  Homicidal:  None  Hallucinations:  None  Delusion:  None  Memory:  Intact  Orientation:  Person, Place, Time and Situation  Reliability:  good  Insight:  Good  Judgement:  Good  Impulse Control:  Good  Physical/Medical Issues:  Yes See medical history    PHQ-9 Depression Screening  Little interest or pleasure in doing things? (P) 1   Feeling down, depressed, or hopeless? (P) 1   Trouble falling or staying asleep, or sleeping too much? (P) 2   Feeling tired or having little energy? (P) 1   Poor appetite or overeating? (P) 1   Feeling bad about yourself - or that you are a failure or have let yourself or your family down? (P) 1   Trouble concentrating on things, such as reading the newspaper or watching television? (P) 2   Moving or speaking so slowly that other people could have noticed? Or the opposite - being so fidgety or restless that you have been moving around a lot more than usual? (P) 2   Thoughts that you would be better off dead, or of hurting yourself in some way? (P) 0   PHQ-9 Total Score (P) 11   If you checked off any problems, how difficult have these problems made it for you to do your work, take care of things at home, or get along with other people? (P) Very difficult        PHQ-Score Total:  PHQ-9 Total Score: (P) 11    YOUNG-7  Feeling nervous, anxious or on edge: (P) Nearly every day  Not being able to stop or control worrying: (P) Nearly every day  Worrying too much about different things: (P) More than half the days  Trouble Relaxing: (P) Nearly every day  Being so restless that it is hard to sit still: (P) Nearly every day  Feeling afraid as if something awful might happen: (P) Nearly every day  Becoming easily annoyed or irritable: (P) More than half the days  YOUNG 7 Total Score: (P) 19  If you checked any problems, how difficult have these problems made it for you to do your work, take care of things at home, or  get along with other people: (P) Extremely difficult       PROMIS scale screening tool that patient filled out virtually reviewed by this APRN at today's encounter.     Assessment/Plan   Diagnoses and all orders for this visit:    1. Generalized anxiety disorder (Primary)    2. Moderate episode of recurrent major depressive disorder (HCC)    3. Attention deficit    4. Sleeping difficulties        In/Start Time: 11:09 AM.  Out/Stop Time: 11:26 AM.  17 minutes of face to face direct patient care with patient was spent for supportive psychotherapy including promoting the therapeutic alliance, strengthening self awareness and insights, strengthening coping skills, counseling the patient regarding diagnoses, utilizing cognitive behavioral therapy to assist the patient in recognizing more appropriate coping mechanisms which are proven effective in reducing the severity of frequency of symptoms and and in coordination of care.  This APRN assisted the patient in processing the patient's diagnoses including depression and anxiety, and also acknowledged and normalized the patient's thoughts, feelings, and concerns  The patient is also strongly urged to eat healthy well balanced foods in order to reduce further health risks, and exercise as tolerated and in guidance with the patient's PCP's recommendations. This APRN allowed the patient to freely discuss issues without interruption or judgment.  This APRN answered any questions the patient had regarding medication and treatment plan.          Visit Diagnoses:    ICD-10-CM ICD-9-CM   1. Generalized anxiety disorder  F41.1 300.02   2. Moderate episode of recurrent major depressive disorder (HCC)  F33.1 296.32   3. Attention deficit  R41.840 799.51   4. Sleeping difficulties  G47.9 780.50       GOALS:  Short Term Goals: Patient will be compliant with medication, and patient will have no significant medication related side effects.  Patient will be engaged in psychotherapy as  indicated.  Patient will report subjective improvement of symptoms.  Long term goals: To stabilize mood and treat/improve subjective symptoms, the patient will stay out of the hospital, the patient will be at an optimal level of functioning, and the patient will take all medications as prescribed.  The patient verbalized understanding and agreement with goals that were mutually set.      SUICIDE RISK ASSESSMENT: Unalterable demographics and a history of mental health intervention indicate this patient is in a high risk category compared to the general population. At present, the patient denies active SI/HI, intentions, or plans at this time and agrees to seek immediate care should such thoughts develop. The patient verbalizes understanding of how to access emergency care if needed and agrees to do so. Consideration of suicide risk and protective factors such as history, current presentation, individual strengths and weaknesses, psychosocial and environmental stressors and variables, psychiatric illness and symptoms, medical conditions and pain, took place in this interview. Based on those considerations, the patient is determined: within individual baseline and presenting no imminent risk for suicide or homicide. Other recommendations: The patient does not meet the criteria for inpatient admission and is not a safety risk to self or others at today's visit. Inpatient treatment offers no significant advantages over outpatient treatment for this patient at today's visit.      SAFETY PLAN:  Patient was given ample time for questions and fully participated in treatment planning.  Patient was encouraged to call the clinic with any questions or concerns.  Patient was informed of access to emergency care. If patient were to develop any significant symptomatology, suicidal ideation, homicidal ideation, any concerns, or feel unsafe at any time they are to call the clinic and if unable to get immediate assistance should  immediately call 911 or go to the nearest emergency room.  The patient is advised to remove or secure (lock away) all lethal weapons (including guns) and sharps (including razors, scissors, knives, etc.).  All medications (including any prescribed and any over the counter medications) should be stored in a safe and secured location that is not obtainable by children/adolescents.  Patient was given an opportunity and encouraged to ask questions about their medication, illness, and treatment. Patient contracted verbally for the following: If you are experiencing an emotional crisis or have thoughts of harming yourself or others, please go to your nearest local emergency room or call 911. Will continue to re-assess medication response and side effects frequently to establish efficacy and ensure safety. Risks, any black box warnings, side effects, off label usage, and benefits of medication and treatment discussed with patient, along with potential adverse side effects of current and/or newly prescribed medication, alternative treatment options, and OTC medications.  Patient verbalized understanding of potential risks, any off label use of medication, any black box warnings, and any side effects in their own words. The patient verbalized understanding and agreed to comply with the safety plan discussed in their own words.  Patient given the number to the office. Number also available to the 24- hour suicide hotline.      TREATMENT PLAN/GOALS: Continue medications and treatment plan as indicated. Treatment and medication options discussed during today's visit. Patient acknowledged and verbally consented to continue with current treatment plan and was educated on the importance of compliance with treatment and follow-up appointments.        -Begin Trintellix 5 mg daily for anxiety and depression.  Patient states that she was unable to afford her co-pay for this medication, but endorses that she has submitted her  information to a patient assistance program to help get this medication paid for.  Patient states that she submitted her paperwork over a week ago, but endorses that she has not her back regarding approval for the assistance program.  Discussed with patient that while she is awaiting to hear from the patient assistance program we can reach out to her PCP regarding getting samples of this medication.  Discussed with patient that her PCP will likely need to see her for a visit in order to begin this medication, but that this provider will contact her PCPs office in order to facilitate this.  Patient verbalizes understanding and endorses that she is agreeable to this.  No refills sent in for this medication at this time because patient has not been able to  last month's prescription from the pharmacy yet due to inability to afford co-pay.  Plan to begin this medication with samples from PCP while awaiting approval from patient assistance program for coverage of the Trintellix.  Discussed with patient that if pharmacy needs a new prescription for this medication when she is ready to pick it up from the pharmacy to notify provider and a refill can be sent in at that time.  Patient verbalizes understanding and endorses that she is agreeable to this.  -Educated patient to closely monitor for any changes in blood pressure with beginning Trintellix.  Patient has reported history of hypertension with taking antidepressant medications in the past.  Discussed with patient that typically this is a side effect of stimulant medications and also of antidepressants that affect norepinephrine and serotonin, but discussed with patient the Trintellix is a medication that only affect serotonin and should not cause elevated blood pressure.  Nevertheless encouraged patient to monitor closely for any changes in blood pressure intermittently discontinue the Trintellix and notify provider in the event that she develops signs or  symptoms consistent with blood pressure increase after beginning the Trintellix.  Educated patient regarding signs and symptoms of hypertensive crisis and instructed patient to immediately discontinue Trintellix and seek evaluation at the nearest emergency department in the event that she develops any of these symptoms.  Patient verbalizes understanding and is agreeable to this.  -Discussed with patient that based on today's assessment, initial evaluation completed at last visit, and patient report of symptoms, it appears that difficulty with focus and concentration are occurring secondarily to uncontrolled depression and anxiety.  Discussed with patient that for this reason we will attempt to address her depression and anxiety with psychiatric medication management and psychotherapy.  Discussed with patient that if ADHD-like symptoms persist once anxiety and depression have become well managed and controlled, we will continue to further evaluate for diagnosis of ADHD and discuss treatment options to address the symptoms at that time.  Patient verbalizes understanding and is agreeable to this.  -Continue psychotherapy with Dick Corey LCSW        MEDICATION ISSUES: Discussed medication options and treatment plan of prescribed medication, any off label use of medication, as well as the risks, benefits, any black box warnings including increased suicidality, and side effects including but not limited to potential falls, dizziness, possible impaired driving, GI side effects (change in appetite, abdominal discomfort, nausea, vomiting, diarrhea, and/or constipation), dry mouth, somnolence, sedation, insomnia, activation, agitation, irritation, tremors, abnormal muscle movements or disorders, headache, sweating, possible bruising or rare bleeding, electrolyte and/or fluid abnormalities, change in blood pressure/heart rate/and or heart rhythm, sexual dysfunction, and metabolic adversities among others. Patient and/or  guardian agreeable to call the office with any worsening of symptoms or onset of side effects, or if any concerns or questions arise.  The contact information for the office is made available to the patient and/or guardian.  Patient and/or guardian agreeable to call 911 or go to the nearest ER should they begin having any SI/HI, or if any urgent concerns arise. No medication side effects or related complaints today.            [facility] No Show Policy:  We understand unexpected circumstances arise; however, anytime you miss your appointment we are unable to provide you appropriate care.  In addition, each appointment missed could have been used to provide care for others.  We ask that you call at least 24 hours in advance to cancel or reschedule an appointment.  We would like to take this opportunity to remind you of our policy stating patients who miss THREE or more appointments without cancelling or rescheduling 24 hours in advance of the appointment may be subject to cancellation of any further visits with our clinic and recommendation to seek in-person services/visits.    Please call [phone] to reschedule your appointment. If there are reasons that make it difficult for you to keep the appointments, please call and let us know how we can help.  Please understand that medication prescribing will not continue without seeing your provider.      [facility]'s No Show Policy reviewed with patient at today's visit. Patient verbalized understanding of this policy. Discussed with patient that in the event that there are three or more no show visits, it will be recommended that they pursue in-person services/visits as noncompliance with telehealth visits indicates that patient is not an appropriate candidate for telemedicine and would likely be more appropriate for in-person services/visits. Patient verbalizes understanding and is agreeable to this.        MEDS ORDERED  DURING VISIT:  No orders of the defined types were placed in this encounter.      Return in about 4 weeks (around 11/29/2021), or if symptoms worsen or fail to improve, for Recheck.        Patient will follow-up in 4 weeks, highly encouraged the patient if she had any questions or concerns to contact the behavioral health [facility] clinic for sooner appointment patient verbalized understanding.      Functional Status: Moderate impairment     Prognosis: Guarded with Ongoing Treatment            This document has been electronically signed by RAF Alva  November 1, 2021 12:16 EDT    Part of this note may be an electronic transcription/translation of spoken language to printed text using the Dragon Dictation System.

## 2021-11-03 ENCOUNTER — HOSPITAL ENCOUNTER (OUTPATIENT)
Dept: NUTRITION | Facility: HOSPITAL | Age: 41
Setting detail: RECURRING SERIES
Discharge: HOME | End: 2021-11-03

## 2021-11-03 ENCOUNTER — OFFICE VISIT (OUTPATIENT)
Dept: INTERNAL MEDICINE | Facility: CLINIC | Age: 41
End: 2021-11-03

## 2021-11-03 VITALS
HEART RATE: 78 BPM | DIASTOLIC BLOOD PRESSURE: 70 MMHG | SYSTOLIC BLOOD PRESSURE: 104 MMHG | HEIGHT: 67 IN | WEIGHT: 179 LBS | RESPIRATION RATE: 20 BRPM | BODY MASS INDEX: 28.09 KG/M2 | TEMPERATURE: 97.4 F | OXYGEN SATURATION: 98 %

## 2021-11-03 DIAGNOSIS — F32.A ANXIETY AND DEPRESSION: Primary | ICD-10-CM

## 2021-11-03 DIAGNOSIS — F41.9 ANXIETY AND DEPRESSION: Primary | ICD-10-CM

## 2021-11-03 DIAGNOSIS — F33.1 MODERATE EPISODE OF RECURRENT MAJOR DEPRESSIVE DISORDER (HCC): Chronic | ICD-10-CM

## 2021-11-03 PROCEDURE — 97803 MED NUTRITION INDIV SUBSEQ: CPT | Performed by: DIETITIAN, REGISTERED

## 2021-11-03 PROCEDURE — 99212 OFFICE O/P EST SF 10 MIN: CPT | Performed by: PHYSICIAN ASSISTANT

## 2021-11-03 RX ORDER — CLOBETASOL PROPIONATE 0.05 G/100ML
SHAMPOO TOPICAL
COMMUNITY
Start: 2021-10-07

## 2021-11-03 RX ORDER — DESONIDE 0.5 MG/G
CREAM TOPICAL
COMMUNITY
Start: 2021-10-07

## 2021-11-03 NOTE — PROGRESS NOTES
[facility]IMARY [facility]  [address]  [place] [address]  Dept: [phone]  Dept Fax: [phone]  Loc: [phone]  Loc Fax: [phone]    Lesly Aroradridge  1980    Follow Up Office Visit Note    History of Present Illness:  Pt is a 42 y/o female in today for anxiety and depression. Pt was seen and started on Trintellix but cannot afford this med so she was referred here to get samples. Pt does have anxieity and depression and has tried other meds without any significant alleviation of her sx. Pt denies any SI/HI.    Irritable Bowel Syndrome  Pertinent negatives include no arthralgias, chest pain, chills, congestion, coughing, fatigue, fever, headaches, myalgias, nausea, rash, sore throat or vomiting.       The following portions of the patient's history were reviewed and updated as appropriate: allergies, current medications, past family history, past medical history, past social history, past surgical history, and problem list.    Medications:    Current Outpatient Medications:   •  clobetasol propionate (CLOBEX) 0.05 % shampoo, APPLY TOPICALLY TO THE SCALP AND LET SIT FOR 5-10 MINUTES BEFORE RINSING AS NEEDED., Disp: , Rfl:   •  desonide (DESOWEN) 0.05 % cream, apply to the affected areas on scalp and feet twice daily x 2 wks. take week break then use as needed for flares, Disp: , Rfl:   •  dicyclomine (BENTYL) 10 MG capsule, Take 10 mg by mouth 4 (Four) Times a Day Before Meals & at Bedtime., Disp: , Rfl:   •  fluticasone (FLONASE) 50 MCG/ACT nasal spray, 2 sprays into the nostril(s) as directed by provider Daily., Disp: , Rfl:   •  hydrOXYzine (ATARAX) 10 MG tablet, Take 10 mg by mouth 3 (Three) Times a Day As Needed for Itching., Disp: , Rfl:   •  lisinopril-hydrochlorothiazide (PRINZIDE,ZESTORETIC) 20-12.5 MG per tablet, Take 0.5 tablets by mouth Daily., Disp: 90 tablet, Rfl: 1  •  montelukast (Singulair) 10 MG tablet, Take 1 tablet by  mouth Every Night., Disp: 90 tablet, Rfl: 1  •  omeprazole (priLOSEC) 20 MG capsule, TAKE 1 CAPSULE DAILY EVERY MORNING BEFORE BREAKFAST., Disp: , Rfl:   •  Otezla 30 MG tablet, , Disp: , Rfl:   •  Vortioxetine HBr 5 MG tablet, Take 5 mg by mouth Daily With Breakfast., Disp: 30 tablet, Rfl: 0    Subjective  Allergies   Allergen Reactions   • Sulfacetamide Hives and Unknown - Low Severity   • Sulfa Antibiotics Hives   • Clove [Syzygium Aromaticum] Rash and GI Intolerance        Past Medical History:   Diagnosis Date   • Acid reflux    • Anxiety    • Fracture    • Hypertension    • IBS (irritable bowel syndrome)    • Psoriasis     [facility]        Past Surgical History:   Procedure Laterality Date   • BREAST BIOPSY  2008   • WRIST SURGERY Left 2015       Family History   Problem Relation Age of Onset   • Hypertension Mother    • Cancer Father    • Hypertension Father    • Ovarian cancer Paternal Grandmother    • Anxiety disorder Maternal Grandmother    • Breast cancer Neg Hx    • Uterine cancer Neg Hx    • Colon cancer Neg Hx    • Osteoporosis Neg Hx         Social History     Socioeconomic History   • Marital status: Single   Tobacco Use   • Smoking status: Never Smoker   • Smokeless tobacco: Never Used   Vaping Use   • Vaping Use: Never used   Substance and Sexual Activity   • Alcohol use: Yes     Comment: social    • Drug use: Never   • Sexual activity: Not Currently       Review of Systems   Constitutional: Negative for activity change, chills, fatigue, fever and unexpected weight change.   HENT: Negative for congestion, ear pain, postnasal drip, sinus pressure and sore throat.    Eyes: Negative for pain, discharge and redness.   Respiratory: Negative for cough, shortness of breath and wheezing.    Cardiovascular: Negative for chest pain, palpitations and leg swelling.   Gastrointestinal: Negative for diarrhea, nausea and vomiting.   Endocrine: Negative for cold intolerance and heat intolerance.  "  Genitourinary: Negative for decreased urine volume and dysuria.   Musculoskeletal: Negative for arthralgias and myalgias.   Skin: Negative for rash and wound.   Neurological: Negative for dizziness, light-headedness and headaches.   Hematological: Does not bruise/bleed easily.   Psychiatric/Behavioral: Positive for dysphoric mood. Negative for confusion and sleep disturbance. The patient is nervous/anxious.          Objective  Vitals:    11/03/21 0942   BP: 104/70   Pulse: 78   Resp: 20   Temp: 97.4 °F (36.3 °C)   SpO2: 98%   Weight: 81.2 kg (179 lb)   Height: 170.2 cm (67\")     Body mass index is 28.04 kg/m².     Physical Exam  Physical Exam  Vitals and nursing note reviewed.   Constitutional:       General: She is not in acute distress.     Appearance: She is not ill-appearing.   HENT:      Head: Normocephalic.      Right Ear: Tympanic membrane, ear canal and external ear normal. There is no impacted cerumen.      Left Ear: Tympanic membrane, ear canal and external ear normal. There is no impacted cerumen.      Nose: No congestion or rhinorrhea.      Mouth/Throat:      Mouth: Mucous membranes are moist.      Pharynx: Oropharynx is clear. No oropharyngeal exudate or posterior oropharyngeal erythema.   Eyes:      General:         Right eye: No discharge.         Left eye: No discharge.      Extraocular Movements: Extraocular movements intact.      Conjunctiva/sclera: Conjunctivae normal.      Pupils: Pupils are equal, round, and reactive to light.   Cardiovascular:      Rate and Rhythm: Normal rate and regular rhythm.      Heart sounds: Normal heart sounds. No murmur heard.  No friction rub. No gallop.    Pulmonary:      Effort: Pulmonary effort is normal. No respiratory distress.      Breath sounds: Normal breath sounds. No wheezing.   Abdominal:      General: Bowel sounds are normal. There is no distension.      Palpations: Abdomen is soft. There is no mass.      Tenderness: There is no abdominal tenderness. "   Musculoskeletal:         General: No swelling. Normal range of motion.      Cervical back: Normal range of motion. No tenderness.      Right lower leg: No edema.      Left lower leg: No edema.   Lymphadenopathy:      Cervical: No cervical adenopathy.   Skin:     Findings: No bruising, erythema or rash.   Neurological:      Mental Status: She is oriented to person, place, and time.      Gait: Gait normal.   Psychiatric:         Mood and Affect: Mood normal.         Behavior: Behavior normal.         Thought Content: Thought content normal.         Judgment: Judgment normal.         Diagnostic Data  Procedures    Assessment  Diagnoses and all orders for this visit:    1. Anxiety and depression (Primary)    2. Moderate episode of recurrent major depressive disorder (HCC)  -     Vortioxetine HBr 5 MG tablet; Take 5 mg by mouth Daily With Breakfast.  Dispense: 30 tablet; Refill: 0        Plan  Anxiety and depression- start Trintellix 5 mg daily. Will titrate appropriately. Samples given in office. Advised for any behavioral changes or SI/HI to stop taking med and call 911. Patient verbalized understanding of all instructions given and complied.       Return in about 2 weeks (around 11/17/2021) for anxiety.    Des Connell PA-C  11/03/2021

## 2021-11-03 NOTE — PROGRESS NOTES
Adult Outpatient Nutrition  Assessment/PES    Patient Name:  Lesly Bill  YOB: 1980  MRN: [id number]    Assessment Date:  11/3/2021    Comments:  Telephone nutrition consult, 30 minutes. This medical referred consult was provided as a ZOOM call, as patient is unable to attend an in-office appointment due to the COVID-19 crisis. Consent for treatment was given verbally.     Patient presents for continued follow up for low FODMAP diet. She did complete low FODMAP elimination/challenge and identified mannitol, fructose and oligosaccharides as trigger foods. Over the last month she has done well avoiding triggers, but is still having 1-2 instances of explosive diarrhea daily. She did follow up with GI who is going to run a stool sample. RD also recommended fiber supplementation daily as well as recording intake and symptoms. Patient agreeable to both. We will review intake/symptom log at follow up to see if there are any trends to identify.  We will also consider low residue diet at follow up for GI reset.     Goals:   - fiber supplement daily  - log intake and symptoms     Total of 30 minutes spent with patient on nutrition counseling. Education based on Academy of Dietetics and Nutrition guidelines. Patient was provided with RD's contact information. Follow up 11/15/21 at 11:15 am. Thank you for this referral.    Electronically signed by:  Cherise Lee RD  11/03/21 11:19 EDT

## 2021-11-03 NOTE — PATIENT INSTRUCTIONS
"[email or web address]">   Generalized Anxiety Disorder, Adult  Generalized anxiety disorder (YOUNG) is a mental health condition. Unlike normal worries, anxiety related to YOUNG is not triggered by a specific event. These worries do not fade or get better with time. YOUNG interferes with relationships, work, and school.  YOUNG symptoms can vary from mild to severe. People with severe YOUNG can have intense waves of anxiety with physical symptoms that are similar to panic attacks.  What are the causes?  The exact cause of YOUNG is not known, but the following are believed to have an impact:  · Differences in natural brain chemicals.  · Genes passed down from parents to children.  · Differences in the way threats are perceived.  · Development during childhood.  · Personality.  What increases the risk?  The following factors may make you more likely to develop this condition:  · Being female.  · Having a family history of anxiety disorders.  · Being very shy.  · Experiencing very stressful life events, such as the death of a loved one.  · Having a very stressful family environment.  What are the signs or symptoms?  People with YOUNG often worry excessively about many things in their lives, such as their health and family. Symptoms may also include:  · Mental and emotional symptoms:  ? Worrying excessively about natural disasters.  ? Fear of being late.  ? Difficulty concentrating.  ? Fears that others are judging your performance.  · Physical symptoms:  ? Fatigue.  ? Headaches, muscle tension, muscle twitches, trembling, or feeling shaky.  ? Feeling like your heart is pounding or beating very fast.  ? Feeling out of breath or like you cannot take a deep breath.  ? Having trouble falling asleep or staying asleep, or experiencing restlessness.  ? Sweating.  ? Nausea, diarrhea, or irritable bowel syndrome (IBS).  · Behavioral symptoms:  ? Experiencing erratic moods or irritability.  ? Avoidance of new situations.  ? Avoidance of " people.  ? Extreme difficulty making decisions.  How is this diagnosed?  This condition is diagnosed based on your symptoms and medical history. You will also have a physical exam. Your health care provider may perform tests to rule out other possible causes of your symptoms.  To be diagnosed with YOUNG, a person must have anxiety that:  · Is out of his or her control.  · Affects several different aspects of his or her life, such as work and relationships.  · Causes distress that makes him or her unable to take part in normal activities.  · Includes at least three symptoms of YOUNG, such as restlessness, fatigue, trouble concentrating, irritability, muscle tension, or sleep problems.  Before your health care provider can confirm a diagnosis of YOUNG, these symptoms must be present more days than they are not, and they must last for 6 months or longer.  How is this treated?  This condition may be treated with:  · Medicine. Antidepressant medicine is usually prescribed for long-term daily control. Anti-anxiety medicines may be added in severe cases, especially when panic attacks occur.  · Talk therapy (psychotherapy). Certain types of talk therapy can be helpful in treating YOUNG by providing support, education, and guidance. Options include:  ? Cognitive behavioral therapy (CBT). People learn coping skills and self-calming techniques to ease their physical symptoms. They learn to identify unrealistic thoughts and behaviors and to replace them with more appropriate thoughts and behaviors.  ? Acceptance and commitment therapy (ACT). This treatment teaches people how to be mindful as a way to cope with unwanted thoughts and feelings.  ? Biofeedback. This process trains you to manage your body's response (physiological response) through breathing techniques and relaxation methods. You will work with a therapist while machines are used to monitor your physical symptoms.  · Stress management techniques. These include yoga,  meditation, and exercise.  A mental health specialist can help determine which treatment is best for you. Some people see improvement with one type of therapy. However, other people require a combination of therapies.  Follow these instructions at home:  Lifestyle  · Maintain a consistent routine and schedule.  · Anticipate stressful situations. Create a plan, and allow extra time to work with your plan.  · Practice stress management or self-calming techniques that you have learned from your therapist or your health care provider.  General instructions  · Take over-the-counter and prescription medicines only as told by your health care provider.  · Understand that you are likely to have setbacks. Accept this and be kind to yourself as you persist to take better care of yourself.  · Recognize and accept your accomplishments, even if you  them as small.  · Keep all follow-up visits as told by your health care provider. This is important.  Contact a health care provider if:  · Your symptoms do not get better.  · Your symptoms get worse.  · You have signs of depression, such as:  ? A persistently sad or irritable mood.  ? Loss of enjoyment in activities that used to bring you dc.  ? Change in weight or eating.  ? Changes in sleeping habits.  ? Avoiding friends or family members.  ? Loss of energy for normal tasks.  ? Feelings of guilt or worthlessness.  Get help right away if:  · You have serious thoughts about hurting yourself or others.  If you ever feel like you may hurt yourself or others, or have thoughts about taking your own life, get help right away. Go to your nearest emergency department or:  · Call your local emergency services (771 in the [place]  · Call a suicide crisis helpline, such as the National Suicide Prevention Lifeline at 1-582.842.4996. This is open 24 hours a day in the [place]  · Text the Crisis Text Line at 466909 (in the [place]  Summary  · Generalized anxiety disorder (YOUNG) is a mental  health condition that involves worry that is not triggered by a specific event.  · People with YOUNG often worry excessively about many things in their lives, such as their health and family.  · YOUNG may cause symptoms such as restlessness, trouble concentrating, sleep problems, frequent sweating, nausea, diarrhea, headaches, and trembling or muscle twitching.  · A mental health specialist can help determine which treatment is best for you. Some people see improvement with one type of therapy. However, other people require a combination of therapies.  This information is not intended to replace advice given to you by your health care provider. Make sure you discuss any questions you have with your health care provider.  Document Revised: 10/07/2020 Document Reviewed: 10/07/2020  [employer]vier Patient Education © 2021 [employer]

## 2021-11-16 ENCOUNTER — OFFICE VISIT (OUTPATIENT)
Dept: INTERNAL MEDICINE | Facility: CLINIC | Age: 41
End: 2021-11-16

## 2021-11-16 VITALS
BODY MASS INDEX: 28.41 KG/M2 | DIASTOLIC BLOOD PRESSURE: 72 MMHG | HEIGHT: 67 IN | OXYGEN SATURATION: 98 % | TEMPERATURE: 96.9 F | HEART RATE: 74 BPM | WEIGHT: 181 LBS | SYSTOLIC BLOOD PRESSURE: 118 MMHG

## 2021-11-16 DIAGNOSIS — F41.9 ANXIETY AND DEPRESSION: Primary | ICD-10-CM

## 2021-11-16 DIAGNOSIS — J01.90 ACUTE BACTERIAL SINUSITIS: ICD-10-CM

## 2021-11-16 DIAGNOSIS — F32.A ANXIETY AND DEPRESSION: Primary | ICD-10-CM

## 2021-11-16 DIAGNOSIS — B96.89 ACUTE BACTERIAL SINUSITIS: ICD-10-CM

## 2021-11-16 PROCEDURE — 99213 OFFICE O/P EST LOW 20 MIN: CPT | Performed by: PHYSICIAN ASSISTANT

## 2021-11-16 RX ORDER — AMOXICILLIN AND CLAVULANATE POTASSIUM 875; 125 MG/1; MG/1
1 TABLET, FILM COATED ORAL 2 TIMES DAILY
Qty: 20 TABLET | Refills: 0 | Status: SHIPPED | OUTPATIENT
Start: 2021-11-16 | End: 2021-11-26

## 2021-11-16 RX ORDER — DEXTROMETHORPHAN HYDROBROMIDE AND PROMETHAZINE HYDROCHLORIDE 15; 6.25 MG/5ML; MG/5ML
5 SYRUP ORAL 4 TIMES DAILY PRN
Qty: 240 ML | Refills: 0 | Status: SHIPPED | OUTPATIENT
Start: 2021-11-16 | End: 2021-11-24

## 2021-11-16 NOTE — PROGRESS NOTES
[facility] PRIMARY [facility]ARE  [address]  [facility] [address]  Dept: [phone]  Dept Fax: [phone]  Loc: [phone]  Loc Fax: [phone]    Lesly Bill  1980    Follow Up Office Visit Note    History of Present Illness:  Patient is a 41-year-old female in today to follow-up for anxiety and depression.  Patient on Trintellix 5 mg daily.  Patient taking medication as directed without any problems or side effects and reports good symptomatic control.    Patient also in today for sinus congestion.  Patient reports sinus pressure and pain.  Patient reports blowing thick, green mucus out of her nose.  This is been going on for over a week.      The following portions of the patient's history were reviewed and updated as appropriate: allergies, current medications, past family history, past medical history, past social history, past surgical history, and problem list.    Medications:    Current Outpatient Medications:   •  amoxicillin-clavulanate (Augmentin) 875-125 MG per tablet, Take 1 tablet by mouth 2 (Two) Times a Day for 10 days., Disp: 20 tablet, Rfl: 0  •  clobetasol propionate (CLOBEX) 0.05 % shampoo, APPLY TOPICALLY TO THE SCALP AND LET SIT FOR 5-10 MINUTES BEFORE RINSING AS NEEDED., Disp: , Rfl:   •  desonide (DESOWEN) 0.05 % cream, apply to the affected areas on scalp and feet twice daily x 2 wks. take week break then use as needed for flares, Disp: , Rfl:   •  dicyclomine (BENTYL) 10 MG capsule, Take 10 mg by mouth 4 (Four) Times a Day Before Meals & at Bedtime., Disp: , Rfl:   •  fluticasone (FLONASE) 50 MCG/ACT nasal spray, 2 sprays into the nostril(s) as directed by provider Daily., Disp: , Rfl:   •  hydrOXYzine (ATARAX) 10 MG tablet, Take 10 mg by mouth 3 (Three) Times a Day As Needed for Itching., Disp: , Rfl:   •  lisinopril-hydrochlorothiazide (PRINZIDE,ZESTORETIC) 20-12.5 MG per tablet, Take 0.5 tablets by mouth Daily., Disp:  90 tablet, Rfl: 1  •  montelukast (Singulair) 10 MG tablet, Take 1 tablet by mouth Every Night., Disp: 90 tablet, Rfl: 1  •  omeprazole (priLOSEC) 20 MG capsule, TAKE 1 CAPSULE DAILY EVERY MORNING BEFORE BREAKFAST., Disp: , Rfl:   •  Otezla 30 MG tablet, , Disp: , Rfl:   •  promethazine-dextromethorphan (PROMETHAZINE-DM) 6.25-15 MG/5ML syrup, Take 5 mL by mouth 4 (Four) Times a Day As Needed for Cough., Disp: 240 mL, Rfl: 0  •  Vortioxetine HBr 5 MG tablet, Take 5 mg by mouth Daily With Breakfast., Disp: 30 tablet, Rfl: 0    Subjective  Allergies   Allergen Reactions   • Sulfacetamide Hives and Unknown - Low Severity   • Sulfa Antibiotics Hives   • Clove [Syzygium Aromaticum] Rash and GI Intolerance        Past Medical History:   Diagnosis Date   • Acid reflux    • Anxiety    • Fracture    • Hypertension    • IBS (irritable bowel syndrome)    • Psoriasis     [facility]        Past Surgical History:   Procedure Laterality Date   • BREAST BIOPSY  2008   • WRIST SURGERY Left 2015       Family History   Problem Relation Age of Onset   • Hypertension Mother    • Cancer Father    • Hypertension Father    • Ovarian cancer Paternal Grandmother    • Anxiety disorder Maternal Grandmother    • Breast cancer Neg Hx    • Uterine cancer Neg Hx    • Colon cancer Neg Hx    • Osteoporosis Neg Hx         Social History     Socioeconomic History   • Marital status: Single   Tobacco Use   • Smoking status: Never Smoker   • Smokeless tobacco: Never Used   Vaping Use   • Vaping Use: Never used   Substance and Sexual Activity   • Alcohol use: Yes     Comment: social    • Drug use: Never   • Sexual activity: Not Currently       Review of Systems   Constitutional: Negative for activity change, chills, fatigue, fever and unexpected weight change.   HENT: Positive for postnasal drip, sinus pressure, sinus pain and sore throat. Negative for congestion and ear pain.    Eyes: Negative for pain, discharge and redness.   Respiratory:  "Negative for cough, shortness of breath and wheezing.    Cardiovascular: Negative for chest pain, palpitations and leg swelling.   Gastrointestinal: Negative for diarrhea, nausea and vomiting.   Endocrine: Negative for cold intolerance and heat intolerance.   Genitourinary: Negative for decreased urine volume and dysuria.   Musculoskeletal: Negative for arthralgias and myalgias.   Skin: Negative for rash and wound.   Neurological: Negative for dizziness, light-headedness and headaches.   Hematological: Does not bruise/bleed easily.   Psychiatric/Behavioral: Negative for confusion, dysphoric mood and sleep disturbance. The patient is not nervous/anxious.          Objective  Vitals:    11/16/21 0806   BP: 118/72   BP Location: Left arm   Patient Position: Sitting   Pulse: 74   Temp: 96.9 °F (36.1 °C)   TempSrc: Infrared   SpO2: 98%   Weight: 82.1 kg (181 lb)   Height: 170.2 cm (67.01\")     Body mass index is 28.34 kg/m².     Physical Exam  Physical Exam  Vitals and nursing note reviewed.   Constitutional:       General: She is not in acute distress.     Appearance: She is not ill-appearing.   HENT:      Head: Normocephalic.      Right Ear: Tympanic membrane, ear canal and external ear normal. There is no impacted cerumen.      Left Ear: Tympanic membrane, ear canal and external ear normal. There is no impacted cerumen.      Nose: No congestion or rhinorrhea.      Mouth/Throat:      Mouth: Mucous membranes are moist.      Pharynx: Oropharynx is clear. Posterior oropharyngeal erythema present. No oropharyngeal exudate.   Eyes:      General:         Right eye: No discharge.         Left eye: No discharge.      Extraocular Movements: Extraocular movements intact.      Conjunctiva/sclera: Conjunctivae normal.      Pupils: Pupils are equal, round, and reactive to light.   Cardiovascular:      Rate and Rhythm: Normal rate and regular rhythm.      Heart sounds: Normal heart sounds. No murmur heard.  No friction rub. No " gallop.    Pulmonary:      Effort: Pulmonary effort is normal. No respiratory distress.      Breath sounds: Normal breath sounds. No wheezing.   Abdominal:      General: Bowel sounds are normal. There is no distension.      Palpations: Abdomen is soft. There is no mass.      Tenderness: There is no abdominal tenderness.   Musculoskeletal:         General: No swelling. Normal range of motion.      Cervical back: Normal range of motion. No tenderness.      Right lower leg: No edema.      Left lower leg: No edema.   Lymphadenopathy:      Cervical: No cervical adenopathy.   Skin:     Findings: No bruising, erythema or rash.   Neurological:      Mental Status: She is oriented to person, place, and time.      Gait: Gait normal.   Psychiatric:         Mood and Affect: Mood normal.         Behavior: Behavior normal.         Thought Content: Thought content normal.         Judgment: Judgment normal.         Diagnostic Data  Procedures    Assessment  Diagnoses and all orders for this visit:    1. Anxiety and depression (Primary)    2. Acute bacterial sinusitis  -     promethazine-dextromethorphan (PROMETHAZINE-DM) 6.25-15 MG/5ML syrup; Take 5 mL by mouth 4 (Four) Times a Day As Needed for Cough.  Dispense: 240 mL; Refill: 0    Other orders  -     amoxicillin-clavulanate (Augmentin) 875-125 MG per tablet; Take 1 tablet by mouth 2 (Two) Times a Day for 10 days.  Dispense: 20 tablet; Refill: 0        Plan    1. Anxiety and depression (Primary)- well controlled on trintellix 5 mg daily. Keep the same. Cont to monitor.    2. Acute bacterial sinusitis- augmentin 875 mg bid x 10 days, phenergan dm cough syrup as directed.        Return in about 4 weeks (around 12/14/2021) for anxiety.    Des Connell PA-C  11/16/2021

## 2021-11-16 NOTE — PATIENT INSTRUCTIONS
Sinusitis, Adult  Sinusitis is inflammation of your sinuses. Sinuses are hollow spaces in the bones around your face. Your sinuses are located:  · Around your eyes.  · In the middle of your forehead.  · Behind your nose.  · In your cheekbones.  Mucus normally drains out of your sinuses. When your nasal tissues become inflamed or swollen, mucus can become trapped or blocked. This allows bacteria, viruses, and fungi to grow, which leads to infection. Most infections of the sinuses are caused by a virus.  Sinusitis can develop quickly. It can last for up to 4 weeks (acute) or for more than 12 weeks (chronic). Sinusitis often develops after a cold.  What are the causes?  This condition is caused by anything that creates swelling in the sinuses or stops mucus from draining. This includes:  · Allergies.  · Asthma.  · Infection from bacteria or viruses.  · Deformities or blockages in your nose or sinuses.  · Abnormal growths in the nose (nasal polyps).  · Pollutants, such as chemicals or irritants in the air.  · Infection from fungi (rare).  What increases the risk?  You are more likely to develop this condition if you:  · Have a weak body defense system (immune system).  · Do a lot of swimming or diving.  · Overuse nasal sprays.  · Smoke.  What are the signs or symptoms?  The main symptoms of this condition are pain and a feeling of pressure around the affected sinuses. Other symptoms include:  · Stuffy nose or congestion.  · Thick drainage from your nose.  · Swelling and warmth over the affected sinuses.  · Headache.  · Upper toothache.  · A cough that may get worse at night.  · Extra mucus that collects in the throat or the back of the nose (postnasal drip).  · Decreased sense of smell and taste.  · Fatigue.  · A fever.  · Sore throat.  · Bad breath.  How is this diagnosed?  This condition is diagnosed based on:  · Your symptoms.  · Your medical history.  · A physical exam.  · Tests to find out if your condition is  acute or chronic. This may include:  ? Checking your nose for nasal polyps.  ? Viewing your sinuses using a device that has a light (endoscope).  ? Testing for allergies or bacteria.  ? Imaging tests, such as an MRI or CT scan.  In rare cases, a bone biopsy may be done to rule out more serious types of fungal sinus disease.  How is this treated?  Treatment for sinusitis depends on the cause and whether your condition is chronic or acute.  · If caused by a virus, your symptoms should go away on their own within 10 days. You may be given medicines to relieve symptoms. They include:  ? Medicines that shrink swollen nasal passages (topical intranasal decongestants).  ? Medicines that treat allergies (antihistamines).  ? A spray that eases inflammation of the nostrils (topical intranasal corticosteroids).  ? Rinses that help get rid of thick mucus in your nose (nasal saline washes).  · If caused by bacteria, your health care provider may recommend waiting to see if your symptoms improve. Most bacterial infections will get better without antibiotic medicine. You may be given antibiotics if you have:  ? A severe infection.  ? A weak immune system.  · If caused by narrow nasal passages or nasal polyps, you may need to have surgery.  Follow these instructions at home:  Medicines  · Take, use, or apply over-the-counter and prescription medicines only as told by your health care provider. These may include nasal sprays.  · If you were prescribed an antibiotic medicine, take it as told by your health care provider. Do not stop taking the antibiotic even if you start to feel better.  Hydrate and humidify    · Drink enough fluid to keep your urine pale yellow. Staying hydrated will help to thin your mucus.  · Use a cool mist humidifier to keep the humidity level in your home above 50%.  · Inhale steam for 10-15 minutes, 3-4 times a day, or as told by your health care provider. You can do this in the bathroom while a hot shower is  running.  · Limit your exposure to cool or dry air.    Rest  · Rest as much as possible.  · Sleep with your head raised (elevated).  · Make sure you get enough sleep each night.  General instructions    · Apply a warm, moist washcloth to your face 3-4 times a day or as told by your health care provider. This will help with discomfort.  · Wash your hands often with soap and water to reduce your exposure to germs. If soap and water are not available, use hand .  · Do not smoke. Avoid being around people who are smoking (secondhand smoke).  · Keep all follow-up visits as told by your health care provider. This is important.    Contact a health care provider if:  · You have a fever.  · Your symptoms get worse.  · Your symptoms do not improve within 10 days.  Get help right away if:  · You have a severe headache.  · You have persistent vomiting.  · You have severe pain or swelling around your face or eyes.  · You have vision problems.  · You develop confusion.  · Your neck is stiff.  · You have trouble breathing.  Summary  · Sinusitis is soreness and inflammation of your sinuses. Sinuses are hollow spaces in the bones around your face.  · This condition is caused by nasal tissues that become inflamed or swollen. The swelling traps or blocks the flow of mucus. This allows bacteria, viruses, and fungi to grow, which leads to infection.  · If you were prescribed an antibiotic medicine, take it as told by your health care provider. Do not stop taking the antibiotic even if you start to feel better.  · Keep all follow-up visits as told by your health care provider. This is important.  This information is not intended to replace advice given to you by your health care provider. Make sure you discuss any questions you have with your health care provider.  Document Revised: 05/20/2019 Document Reviewed: 05/20/2019  [employer] Patient Education © 2021 [employer]

## 2021-11-18 ENCOUNTER — TELEPHONE (OUTPATIENT)
Dept: INTERNAL MEDICINE | Facility: CLINIC | Age: 41
End: 2021-11-18

## 2021-11-18 NOTE — TELEPHONE ENCOUNTER
Caller: Lesly Bill    Relationship to patient: Self    Best call back number: [phone]    What is the call regarding:  PATIENT STATES THAT SHE SAW SEEN EARLIER THIS WEEK FOR SINUS INFECTION, BUT NOW SHE IS COUGHING AND WONDERED IF SHE COULD GET SOME MEDICINE CALLED IN.  HER PHARMACY IS [facility]

## 2021-11-22 DIAGNOSIS — J01.90 ACUTE BACTERIAL SINUSITIS: ICD-10-CM

## 2021-11-22 DIAGNOSIS — B96.89 ACUTE BACTERIAL SINUSITIS: ICD-10-CM

## 2021-11-24 RX ORDER — DEXTROMETHORPHAN HYDROBROMIDE AND PROMETHAZINE HYDROCHLORIDE 15; 6.25 MG/5ML; MG/5ML
SYRUP ORAL
Qty: 240 ML | Refills: 0 | Status: SHIPPED | OUTPATIENT
Start: 2021-11-24 | End: 2022-05-31

## 2021-11-29 ENCOUNTER — TELEMEDICINE (OUTPATIENT)
Dept: PSYCHIATRY | Facility: CLINIC | Age: 41
End: 2021-11-29

## 2021-11-29 DIAGNOSIS — F41.1 GENERALIZED ANXIETY DISORDER: Primary | Chronic | ICD-10-CM

## 2021-11-29 DIAGNOSIS — G47.9 SLEEPING DIFFICULTIES: ICD-10-CM

## 2021-11-29 DIAGNOSIS — F33.1 MODERATE EPISODE OF RECURRENT MAJOR DEPRESSIVE DISORDER (HCC): Chronic | ICD-10-CM

## 2021-11-29 DIAGNOSIS — R41.840 ATTENTION DEFICIT: ICD-10-CM

## 2021-11-29 PROCEDURE — 99214 OFFICE O/P EST MOD 30 MIN: CPT | Performed by: NURSE PRACTITIONER

## 2021-11-29 NOTE — PROGRESS NOTES
"This provider is located at the [facility] (through [facility]), [address], [place], [address] using a secure Daily[employer] Video Visit through [employer]. Patient is being seen remotely via telehealth at their home address in [place], and stated they are in a secure environment for this session. The patient's condition being diagnosed/treated is appropriate for telemedicine. The provider identified herself as well as her credentials. The patient, and/or patients guardian, consent to be seen remotely, and when consent is given they understand that the consent allows for patient identifiable information to be sent to a third party as needed. They may refuse to be seen remotely at any time. The electronic data is encrypted and password protected, and the patient and/or guardian has been advised of the potential risks to privacy not withstanding such measures.    You have chosen to receive care through a telehealth visit.  Do you consent to use a video/audio connection for your medical care today? Yes     Subjective   Lesly Bill is a 41 y.o. female who is here today for medication management follow up.    Chief Complaint: Depression, anxiety, sleeping difficulties, attention deficit     History of Present Illness:  The patient describes her mood as \"okay\" over the last few weeks.  The patient states that she was able to start the Trintellix approximately 2 weeks ago.  The patient states that initially she began taking the medication in the morning, but endorses that it was causing her to feel more tired and fatigued.  The patient states that she switch to taking the medication at nighttime and endorses that this was much more tolerable for her.  The patient states that last week she noticed an improvement in both her depression and anxiety.  The patient states that she was off work the entire week for Thanksgiving and endorses that symptoms are very well controlled " during that time.  Patient states that today is her first day back to work and endorses that her anxiety has been significantly increased.  The patient states that she would like to allow the medication a more adequate trial before making any changes.  The patient states that she is only taking the medication for approximately 2 weeks and would like to allow more time to monitor for further therapeutic benefit and symptomatic control before making any adjustment/changes.  The patient reports her appetite as good.  The patient reports her sleep as good.  The patient denies any nightmares or bad dreams.  The patient denies any new medical problems or changes in medications since last appointment with this facility.  The patient reports compliance with current medication regimen.  The patient denies any abnormal muscle movements or tics.  The patient rates her depression at a 2-3/10 on a 0-10 scale, with 10 being the worst.  The patient rates her anxiety currently at a 7-8/10 on a 0-10 scale, with 10 being the worst.  The patient states that when she was off of work last week she would have rated her anxiety 3-4/10 on a 0-10 scale, with 10 being the worst.  The patient rates hopelessness at a 0/10 on a 0-10 scale with 10 being the worst.  The patient denies suicidal ideations and homicidal ideations, and is convincing.  The patient denies any auditory hallucinations or visual hallucinations.  The patient does not endorse significant symptoms consistent with bipolar disorder or a psychotic illness.                  LMP:  1 week ago.  The patient denies any chance of pregnancy at this time.  The patient was educated that her prescribed medications can have potential risk to a developing fetus. The patient is advised to contact this APRN/this office if she becomes pregnant or plans to become pregnant.  Pt verbalizes understanding and acknowledged agreement with this plan in her own words.          Prior Psychiatric  Medications:  Zoloft  Cymbalta  Prozac  Lexapro  Effexor      The following portions of the patient's history were reviewed and updated as appropriate: allergies, current medications, past family history, past medical history, past social history, past surgical history and problem list.    Review of Systems   Constitutional: Positive for fatigue. Negative for activity change, appetite change and unexpected weight change.   Psychiatric/Behavioral: Positive for decreased concentration and dysphoric mood. Negative for agitation, behavioral problems, confusion, hallucinations, self-injury, sleep disturbance and suicidal ideas. The patient is nervous/anxious. The patient is not hyperactive.        Objective   Physical Exam  Constitutional:       Appearance: Normal appearance.   Neurological:      Mental Status: She is alert.   Psychiatric:         Attention and Perception: Perception normal. She is inattentive.         Mood and Affect: Affect normal. Mood is anxious and depressed.         Speech: Speech normal.         Behavior: Behavior normal. Behavior is cooperative.         Thought Content: Thought content normal. Thought content does not include homicidal or suicidal ideation. Thought content does not include homicidal or suicidal plan.         Cognition and Memory: Cognition and memory normal.         Judgment: Judgment normal.       not currently breastfeeding.    The patient was seen remotely today via a MyChart Video Visit through [facility].  Unable to obtain vital signs due to nature of remote visit.  Height stated at 67 inches.  Weight stated at 181 pounds.     Allergies   Allergen Reactions   • Sulfacetamide Hives and Unknown - Low Severity   • Sulfa Antibiotics Hives   • Clove [Syzygium Aromaticum] Rash and GI Intolerance       Recent Results (from the past 2016 hour(s))   COVID-19,[facility] ROUTINE, NP/OP SWAB IN TRANSPORT MEDIA OR ESWAB 72 HR TAT - ,    Collection Time: 10/05/21 12:03 PM   Result Value Ref Range     SARS-CoV-2, MENDEZ Not Detected Not Detected       Current Medications:   Current Outpatient Medications   Medication Sig Dispense Refill   • clobetasol propionate (CLOBEX) 0.05 % shampoo APPLY TOPICALLY TO THE SCALP AND LET SIT FOR 5-10 MINUTES BEFORE RINSING AS NEEDED.     • desonide (DESOWEN) 0.05 % cream apply to the affected areas on scalp and feet twice daily x 2 wks. take week break then use as needed for flares     • dicyclomine (BENTYL) 10 MG capsule Take 10 mg by mouth 4 (Four) Times a Day Before Meals & at Bedtime.     • fluticasone (FLONASE) 50 MCG/ACT nasal spray 2 sprays into the nostril(s) as directed by provider Daily.     • hydrOXYzine (ATARAX) 10 MG tablet Take 10 mg by mouth 3 (Three) Times a Day As Needed for Itching.     • lisinopril-hydrochlorothiazide (PRINZIDE,ZESTORETIC) 20-12.5 MG per tablet Take 0.5 tablets by mouth Daily. 90 tablet 1   • montelukast (Singulair) 10 MG tablet Take 1 tablet by mouth Every Night. 90 tablet 1   • omeprazole (priLOSEC) 20 MG capsule TAKE 1 CAPSULE DAILY EVERY MORNING BEFORE BREAKFAST.     • Otezla 30 MG tablet      • promethazine-dextromethorphan (PROMETHAZINE-DM) 6.25-15 MG/5ML syrup TAKE 5 MLS BY MOUTH FOUR TIMES A DAY AS NEEDED FOR COUGH 240 mL 0   • Vortioxetine HBr 5 MG tablet Take 5 mg by mouth Daily With Breakfast. 30 tablet 0     No current facility-administered medications for this visit.       Mental Status Exam:   Hygiene:   good  Cooperation:  Cooperative  Eye Contact:  Good  Psychomotor Behavior:  Appropriate  Affect:  Full range  Hopelessness: Denies  Speech:  Normal  Thought Process:  Goal directed  Thought Content:  Normal  Suicidal:  None  Homicidal:  None  Hallucinations:  None  Delusion:  None  Memory:  Intact  Orientation:  Person, Place, Time and Situation  Reliability:  good  Insight:  Good  Judgement:  Good  Impulse Control:  Good  Physical/Medical Issues:  Yes See medical history      PHQ-2 Depression Screening  Little interest or  pleasure in doing things? 1   Feeling down, depressed, or hopeless? 1   PHQ-2 Total Score 2          Assessment/Plan   Diagnoses and all orders for this visit:    1. Generalized anxiety disorder (Primary)  -     Vortioxetine HBr 5 MG tablet; Take 5 mg by mouth Daily With Breakfast.  Dispense: 30 tablet; Refill: 0    2. Moderate episode of recurrent major depressive disorder (HCC)  -     Vortioxetine HBr 5 MG tablet; Take 5 mg by mouth Daily With Breakfast.  Dispense: 30 tablet; Refill: 0    3. Attention deficit    4. Sleeping difficulties            Visit Diagnoses:    ICD-10-CM ICD-9-CM   1. Generalized anxiety disorder  F41.1 300.02   2. Moderate episode of recurrent major depressive disorder (HCC)  F33.1 296.32   3. Attention deficit  R41.840 799.51   4. Sleeping difficulties  G47.9 780.50       GOALS:  Short Term Goals: Patient will be compliant with medication, and patient will have no significant medication related side effects.  Patient will be engaged in psychotherapy as indicated.  Patient will report subjective improvement of symptoms.  Long term goals: To stabilize mood and treat/improve subjective symptoms, the patient will stay out of the hospital, the patient will be at an optimal level of functioning, and the patient will take all medications as prescribed.  The patient verbalized understanding and agreement with goals that were mutually set.      SUICIDE RISK ASSESSMENT: Unalterable demographics and a history of mental health intervention indicate this patient is in a high risk category compared to the general population. At present, the patient denies active SI/HI, intentions, or plans at this time and agrees to seek immediate care should such thoughts develop. The patient verbalizes understanding of how to access emergency care if needed and agrees to do so. Consideration of suicide risk and protective factors such as history, current presentation, individual strengths and weaknesses, psychosocial  and environmental stressors and variables, psychiatric illness and symptoms, medical conditions and pain, took place in this interview. Based on those considerations, the patient is determined: within individual baseline and presenting no imminent risk for suicide or homicide. Other recommendations: The patient does not meet the criteria for inpatient admission and is not a safety risk to self or others at today's visit. Inpatient treatment offers no significant advantages over outpatient treatment for this patient at today's visit.      SAFETY PLAN:  Patient was given ample time for questions and fully participated in treatment planning.  Patient was encouraged to call the clinic with any questions or concerns.  Patient was informed of access to emergency care. If patient were to develop any significant symptomatology, suicidal ideation, homicidal ideation, any concerns, or feel unsafe at any time they are to call the clinic and if unable to get immediate assistance should immediately call 911 or go to the nearest emergency room.  The patient is advised to remove or secure (lock away) all lethal weapons (including guns) and sharps (including razors, scissors, knives, etc.).  All medications (including any prescribed and any over the counter medications) should be stored in a safe and secured location that is not obtainable by children/adolescents.  Patient was given an opportunity and encouraged to ask questions about their medication, illness, and treatment. Patient contracted verbally for the following: If you are experiencing an emotional crisis or have thoughts of harming yourself or others, please go to your nearest local emergency room or call 911. Will continue to re-assess medication response and side effects frequently to establish efficacy and ensure safety. Risks, any black box warnings, side effects, off label usage, and benefits of medication and treatment discussed with patient, along with potential  adverse side effects of current and/or newly prescribed medication, alternative treatment options, and OTC medications.  Patient verbalized understanding of potential risks, any off label use of medication, any black box warnings, and any side effects in their own words. The patient verbalized understanding and agreed to comply with the safety plan discussed in their own words.  Patient given the number to the office. Number also available to the 24- hour suicide hotline.      TREATMENT PLAN/GOALS: Continue medications and treatment plan as indicated. Treatment and medication options discussed during today's visit. Patient acknowledged and verbally consented to continue with current treatment plan and was educated on the importance of compliance with treatment and follow-up appointments.        -Continue Trintellix 5 mg daily for anxiety and depression        MEDICATION ISSUES: Discussed medication options and treatment plan of prescribed medication, any off label use of medication, as well as the risks, benefits, any black box warnings including increased suicidality, and side effects including but not limited to potential falls, dizziness, possible impaired driving, GI side effects (change in appetite, abdominal discomfort, nausea, vomiting, diarrhea, and/or constipation), dry mouth, somnolence, sedation, insomnia, activation, agitation, irritation, tremors, abnormal muscle movements or disorders, headache, sweating, possible bruising or rare bleeding, electrolyte and/or fluid abnormalities, change in blood pressure/heart rate/and or heart rhythm, sexual dysfunction, and metabolic adversities among others. Patient and/or guardian agreeable to call the office with any worsening of symptoms or onset of side effects, or if any concerns or questions arise.  The contact information for the office is made available to the patient and/or guardian.  Patient and/or guardian agreeable to call 911 or go to the nearest ER  should they begin having any SI/HI, or if any urgent concerns arise. No medication side effects or related complaints today.                  [facility] No Show Policy:  We understand unexpected circumstances arise; however, anytime you miss your appointment we are unable to provide you appropriate care.  In addition, each appointment missed could have been used to provide care for others.  We ask that you call at least 24 hours in advance to cancel or reschedule an appointment.  We would like to take this opportunity to remind you of our policy stating patients who miss THREE or more appointments without cancelling or rescheduling 24 hours in advance of the appointment may be subject to cancellation of any further visits with our clinic and recommendation to seek in-person services/visits.    Please call [phone] to reschedule your appointment. If there are reasons that make it difficult for you to keep the appointments, please call and let us know how we can help.  Please understand that medication prescribing will not continue without seeing your provider.      [facility]'s No Show Policy reviewed with patient at today's visit. Patient verbalized understanding of this policy. Discussed with patient that in the event that there are three or more no show visits, it will be recommended that they pursue in-person services/visits as noncompliance with telehealth visits indicates that patient is not an appropriate candidate for telemedicine and would likely be more appropriate for in-person services/visits. Patient verbalizes understanding and is agreeable to this.          MEDS ORDERED DURING VISIT:  New Medications Ordered This Visit   Medications   • Vortioxetine HBr 5 MG tablet     Sig: Take 5 mg by mouth Daily With Breakfast.     Dispense:  30 tablet     Refill:  0       Return in about 5 weeks (around 1/3/2022), or if symptoms worsen or fail to improve, for  Recheck.        Patient will follow-up in 5 weeks, highly encouraged the patient if she had any questions or concerns to contact the behavioral health [facility] clinic for sooner appointment patient verbalized understanding.      Functional Status: Moderate impairment     Prognosis: Guarded with Ongoing Treatment            This document has been electronically signed by RAF Alva  November 29, 2021 11:47 EST    Part of this note may be an electronic transcription/translation of spoken language to printed text using the Dragon Dictation System.

## 2021-12-03 ENCOUNTER — HOSPITAL ENCOUNTER (OUTPATIENT)
Dept: NUTRITION | Facility: HOSPITAL | Age: 41
Setting detail: RECURRING SERIES
Discharge: HOME | End: 2021-12-03

## 2021-12-03 NOTE — PROGRESS NOTES
"Adult Outpatient Nutrition  Assessment    Patient Name:  Lesly Bill  YOB: 1980  MRN: [id number]    Assessment Date:  12/3/2021    Telephone nutrition consult, 30 minutes. This medical referred consult was provided as a telephone call, tele-health or e-visit, as patient is unable to attend an in-office appointment due to the COVID-19 crisis. Consent for treatment was given verbally.    Comments:      Patient seen today for follow-up appointment for nutrition counseling r/t management of IBS. Patient reports significant improvements to consistency and timing of bowel movements by the inclusion of a fiber supplement. Patient states that with the fiber supplement and with avoiding triggering foods the urgency of their bowel movements has decreased significantly. Patient describes recent exposure to triggering foods during Thanksgiving Holiday but since returning to \"normal\" eating habits their symptoms have resided as well. RD determined not necessary to attempt a low-residue diet d/t the significant improvement of overall symptoms. Patient describes certain pre-packaged foods that cause negative symptoms, RD reviewed potentially triggering ingredients and encouraged patient to continue to practice label reading. Patient was actively engaged during the session with questions and demonstrates competency with regards to their ability to manage their symptoms with dietary changes.     RD will continue to monitor PRN. Thank you for this referral.                     Electronically signed by:  Yaneli Montoya RD  12/03/21 15:18 EST   "

## 2021-12-28 ENCOUNTER — TELEMEDICINE (OUTPATIENT)
Dept: INTERNAL MEDICINE | Facility: CLINIC | Age: 41
End: 2021-12-28

## 2021-12-28 DIAGNOSIS — H66.90 ACUTE OTITIS MEDIA, UNSPECIFIED OTITIS MEDIA TYPE: Primary | ICD-10-CM

## 2021-12-28 PROCEDURE — 99213 OFFICE O/P EST LOW 20 MIN: CPT | Performed by: PHYSICIAN ASSISTANT

## 2021-12-28 RX ORDER — AMOXICILLIN AND CLAVULANATE POTASSIUM 875; 125 MG/1; MG/1
1 TABLET, FILM COATED ORAL 2 TIMES DAILY
Qty: 20 TABLET | Refills: 0 | Status: SHIPPED | OUTPATIENT
Start: 2021-12-28 | End: 2022-01-03 | Stop reason: SDUPTHER

## 2021-12-28 NOTE — PROGRESS NOTES
[facility]  [address]  [address] [place] [address]  Dept: [phone]  Dept Fax: [phone]  Loc: [phone]  Loc Fax: [phone]    Lesly Bill  1980    Televisit Note    You have chosen to receive care through a telephone visit. Do you consent to use a telephone visit for your medical care today? Yes    History of Present Illness:  Lesly Bill is a 41 y.o. female who presents via telehealth for ear pain of the left side.  Patient states she feels as if she is getting fluid behind her left ear and now is having significant amount of pain.  Patient has had ear infections in the past and states this feels like this is what she is experiencing now.    The following portions of the patient's history were reviewed and updated as appropriate: allergies, current medications, past family history, past medical history, past social history, past surgical history, and problem list.    This visit was scheduled as a phone visit to comply with patient safety concerns in accordance with CDC recommendations.  Time spent in discussion with the patient was 15 minutes.     Medications    Current Outpatient Medications:   •  amoxicillin-clavulanate (Augmentin) 875-125 MG per tablet, Take 1 tablet by mouth 2 (Two) Times a Day for 10 days., Disp: 20 tablet, Rfl: 0  •  clobetasol propionate (CLOBEX) 0.05 % shampoo, APPLY TOPICALLY TO THE SCALP AND LET SIT FOR 5-10 MINUTES BEFORE RINSING AS NEEDED., Disp: , Rfl:   •  desonide (DESOWEN) 0.05 % cream, apply to the affected areas on scalp and feet twice daily x 2 wks. take week break then use as needed for flares, Disp: , Rfl:   •  dicyclomine (BENTYL) 10 MG capsule, Take 10 mg by mouth 4 (Four) Times a Day Before Meals & at Bedtime., Disp: , Rfl:   •  fluticasone (FLONASE) 50 MCG/ACT nasal spray, 2 sprays into the nostril(s) as directed by provider Daily., Disp: , Rfl:   •  hydrOXYzine (ATARAX)  10 MG tablet, Take 10 mg by mouth 3 (Three) Times a Day As Needed for Itching., Disp: , Rfl:   •  lisinopril-hydrochlorothiazide (PRINZIDE,ZESTORETIC) 20-12.5 MG per tablet, Take 0.5 tablets by mouth Daily., Disp: 90 tablet, Rfl: 1  •  montelukast (Singulair) 10 MG tablet, Take 1 tablet by mouth Every Night., Disp: 90 tablet, Rfl: 1  •  omeprazole (priLOSEC) 20 MG capsule, TAKE 1 CAPSULE DAILY EVERY MORNING BEFORE BREAKFAST., Disp: , Rfl:   •  Otezla 30 MG tablet, , Disp: , Rfl:   •  promethazine-dextromethorphan (PROMETHAZINE-DM) 6.25-15 MG/5ML syrup, TAKE 5 MLS BY MOUTH FOUR TIMES A DAY AS NEEDED FOR COUGH, Disp: 240 mL, Rfl: 0  •  Vortioxetine HBr 5 MG tablet, Take 5 mg by mouth Daily With Breakfast., Disp: 30 tablet, Rfl: 0    Subjective  Allergies   Allergen Reactions   • Sulfacetamide Hives and Unknown - Low Severity   • Sulfa Antibiotics Hives   • Clove [Syzygium Aromaticum] Rash and GI Intolerance        Past Medical History:   Diagnosis Date   • Acid reflux    • Anxiety    • Fracture    • Hypertension    • IBS (irritable bowel syndrome)    • Psoriasis     [facility]        Past Surgical History:   Procedure Laterality Date   • BREAST BIOPSY  2008   • WRIST SURGERY Left 2015       Family History   Problem Relation Age of Onset   • Hypertension Mother    • Cancer Father    • Hypertension Father    • Ovarian cancer Paternal Grandmother    • Anxiety disorder Maternal Grandmother    • Breast cancer Neg Hx    • Uterine cancer Neg Hx    • Colon cancer Neg Hx    • Osteoporosis Neg Hx         Social History     Socioeconomic History   • Marital status: Single   Tobacco Use   • Smoking status: Never Smoker   • Smokeless tobacco: Never Used   Vaping Use   • Vaping Use: Never used   Substance and Sexual Activity   • Alcohol use: Yes     Comment: social    • Drug use: Never   • Sexual activity: Not Currently         Objective  There were no vitals filed for this visit.  There is no height or weight on file to  calculate BMI.    Assessment  Diagnoses and all orders for this visit:    1. Acute otitis media, unspecified otitis media type (Primary)  -     amoxicillin-clavulanate (Augmentin) 875-125 MG per tablet; Take 1 tablet by mouth 2 (Two) Times a Day for 10 days.  Dispense: 20 tablet; Refill: 0        Plan    1. Acute otitis media, unspecified otitis media type (Primary)- augmentin 875 mg bid x 10 days.      Return if symptoms worsen or fail to improve.    Des Connell PA-C  12/28/2021

## 2022-01-03 ENCOUNTER — TELEMEDICINE (OUTPATIENT)
Dept: PSYCHIATRY | Facility: CLINIC | Age: 42
End: 2022-01-03

## 2022-01-03 DIAGNOSIS — G47.9 SLEEPING DIFFICULTIES: ICD-10-CM

## 2022-01-03 DIAGNOSIS — F41.1 GENERALIZED ANXIETY DISORDER: Primary | Chronic | ICD-10-CM

## 2022-01-03 DIAGNOSIS — F33.1 MODERATE EPISODE OF RECURRENT MAJOR DEPRESSIVE DISORDER: Chronic | ICD-10-CM

## 2022-01-03 PROCEDURE — 99214 OFFICE O/P EST MOD 30 MIN: CPT | Performed by: NURSE PRACTITIONER

## 2022-01-03 PROCEDURE — 87086 URINE CULTURE/COLONY COUNT: CPT | Performed by: FAMILY MEDICINE

## 2022-01-03 NOTE — PROGRESS NOTES
"This provider is located at the [facility] (through [facility]), [address], [place], [address] using a secure [employer] Video Visit through [facility]. Patient is being seen remotely via telehealth at their home address in [place], and stated they are in a secure environment for this session. The patient's condition being diagnosed/treated is appropriate for telemedicine. The provider identified herself as well as her credentials. The patient, and/or patients guardian, consent to be seen remotely, and when consent is given they understand that the consent allows for patient identifiable information to be sent to a third party as needed. They may refuse to be seen remotely at any time. The electronic data is encrypted and password protected, and the patient and/or guardian has been advised of the potential risks to privacy not withstanding such measures.    You have chosen to receive care through a telehealth visit.  Do you consent to use a video/audio connection for your medical care today? Yes     Subjective   Lesly Bill is a 41 y.o. female who is here today for medication management follow up.    Chief Complaint: Depression, anxiety, sleeping difficulties    History of Present Illness:  The patient describes her mood as \"okay\" over the last few weeks.  The patient states that she has been off work since December 13.  The patient states that her employer because for the holidays and she has been able to be off work and spent some time with family recently.  The patient states that during her time off of work her depression and anxiety were much more well controlled.  The patient states that her depression and anxiety were very minimal during that time.  The patient states that she does have to work tomorrow and endorses that she is experiencing a lot of dread for this and endorses that it has resulted in an exacerbation of both depression and anxiety.  The " patient states that she thinks that her job is a significant source of stress for her and endorses that it significantly impacts her mental health.  The patient reports her appetite as good.  The patient reports her sleep as fluctuating.  The patient states that while she was off of work she slept very well.  The patient states that since she has been anxious about going back to work tomorrow she not not slept as well over the past few days.  The patient denies any nightmares or bad dreams.  The patient denies any new medical problems or changes in medications since last appointment with this facility.  The patient reports compliance with current medication regimen.  The patient denies any current side effects from her current medication regimen.  The patient denies any abnormal muscle movements or tics.  The patient rates her depression at a 3-4/10 on a 0-10 scale, with 10 being the worst.  The patient rates her anxiety at a 8/10 on a 0-10 scale, with 10 being the worst.  The patient rates hopelessness at a 0/10 on a 0-10 scale with 10 being the worst.  The patient would like to increase her medications at this visit.  The patient denies suicidal ideations and homicidal ideations, and is convincing.  The patient denies any auditory hallucinations or visual hallucinations.  The patient does not endorse significant symptoms consistent with bipolar disorder or a psychotic illness.            LMP:  3 weeks ago.  The patient denies any chance of pregnancy at this time.  The patient was educated that her prescribed medications can have potential risk to a developing fetus. The patient is advised to contact this APRN/this office if she becomes pregnant or plans to become pregnant.  Pt verbalizes understanding and acknowledged agreement with this plan in her own words.          Prior Psychiatric Medications:  Zoloft  Cymbalta  Prozac  Lexapro  Effexor      The following portions of the patient's history were reviewed and  updated as appropriate: allergies, current medications, past family history, past medical history, past social history, past surgical history and problem list.    Review of Systems   Constitutional: Positive for fatigue. Negative for activity change, appetite change and unexpected weight change.   Psychiatric/Behavioral: Positive for decreased concentration, dysphoric mood and sleep disturbance. Negative for agitation, behavioral problems, confusion, hallucinations, self-injury and suicidal ideas. The patient is nervous/anxious. The patient is not hyperactive.        Objective   Physical Exam  Constitutional:       Appearance: Normal appearance.   Neurological:      Mental Status: She is alert.   Psychiatric:         Attention and Perception: Attention and perception normal.         Mood and Affect: Affect normal. Mood is anxious and depressed.         Speech: Speech normal.         Behavior: Behavior normal. Behavior is cooperative.         Thought Content: Thought content normal. Thought content does not include homicidal or suicidal ideation. Thought content does not include homicidal or suicidal plan.         Cognition and Memory: Cognition and memory normal.         Judgment: Judgment normal.       Last menstrual period 12/13/2021, not currently breastfeeding.    The patient was seen remotely today via a MyChart Video Visit through [facility].  Unable to obtain vital signs due to nature of remote visit.  Height stated at 67 inches.  Weight stated at 181 pounds.     Allergies   Allergen Reactions   • Sulfacetamide Hives and Unknown - Low Severity   • Sulfa Antibiotics Hives   • Clove [Syzygium Aromaticum] Rash and GI Intolerance       Recent Results (from the past 2016 hour(s))   POC Urinalysis Dipstick, Multipro (Automated dipstick)    Collection Time: 01/03/22 10:35 AM    Specimen: Urine   Result Value Ref Range    Color Dark Yellow Yellow, Straw, Dark Yellow, Dora    Clarity, UA Clear Clear    Glucose, UA Negative  Negative, 1000 mg/dL (3+) mg/dL    Bilirubin Negative Negative    Ketones, UA Negative Negative    Specific Gravity  1.030 1.005 - 1.030    Blood, UA 3+ (A) Negative    pH, Urine 6.0 5.0 - 8.0    Protein, POC Trace (A) Negative mg/dL    Urobilinogen, UA Normal Normal    Nitrite, UA Negative Negative    Leukocytes Negative Negative       Current Medications:   Current Outpatient Medications   Medication Sig Dispense Refill   • [START ON 1/4/2022] amoxicillin-clavulanate (Augmentin) 875-125 MG per tablet Take 1 tablet by mouth 2 (Two) Times a Day for 14 days. 28 tablet 0   • clobetasol propionate (CLOBEX) 0.05 % shampoo APPLY TOPICALLY TO THE SCALP AND LET SIT FOR 5-10 MINUTES BEFORE RINSING AS NEEDED.     • desonide (DESOWEN) 0.05 % cream apply to the affected areas on scalp and feet twice daily x 2 wks. take week break then use as needed for flares     • dicyclomine (BENTYL) 10 MG capsule Take 10 mg by mouth 4 (Four) Times a Day Before Meals & at Bedtime.     • fluticasone (FLONASE) 50 MCG/ACT nasal spray 2 sprays into the nostril(s) as directed by provider Daily.     • hydrOXYzine (ATARAX) 10 MG tablet Take 10 mg by mouth 3 (Three) Times a Day As Needed for Itching.     • lisinopril-hydrochlorothiazide (PRINZIDE,ZESTORETIC) 20-12.5 MG per tablet Take 0.5 tablets by mouth Daily. 90 tablet 1   • montelukast (Singulair) 10 MG tablet Take 1 tablet by mouth Every Night. 90 tablet 1   • omeprazole (priLOSEC) 20 MG capsule TAKE 1 CAPSULE DAILY EVERY MORNING BEFORE BREAKFAST.     • Otezla 30 MG tablet      • promethazine-dextromethorphan (PROMETHAZINE-DM) 6.25-15 MG/5ML syrup TAKE 5 MLS BY MOUTH FOUR TIMES A DAY AS NEEDED FOR COUGH 240 mL 0   • Vortioxetine HBr 10 MG tablet Take 10 mg by mouth Daily. 30 tablet 0     No current facility-administered medications for this visit.       Mental Status Exam:   Hygiene:   good  Cooperation:  Cooperative  Eye Contact:  Good  Psychomotor Behavior:  Appropriate  Affect:  Full  range  Hopelessness: Denies  Speech:  Normal  Thought Process:  Goal directed  Thought Content:  Normal  Suicidal:  None  Homicidal:  None  Hallucinations:  None  Delusion:  None  Memory:  Intact  Orientation:  Person, Place, Time and Situation  Reliability:  good  Insight:  Good  Judgement:  Good  Impulse Control:  Good  Physical/Medical Issues:  Yes See medical history         Assessment/Plan   Diagnoses and all orders for this visit:    1. Generalized anxiety disorder (Primary)  -     Vortioxetine HBr 10 MG tablet; Take 10 mg by mouth Daily.  Dispense: 30 tablet; Refill: 0    2. Moderate episode of recurrent major depressive disorder (HCC)  -     Vortioxetine HBr 10 MG tablet; Take 10 mg by mouth Daily.  Dispense: 30 tablet; Refill: 0    3. Sleeping difficulties            Visit Diagnoses:    ICD-10-CM ICD-9-CM   1. Generalized anxiety disorder  F41.1 300.02   2. Moderate episode of recurrent major depressive disorder (HCC)  F33.1 296.32   3. Sleeping difficulties  G47.9 780.50       GOALS:  Short Term Goals: Patient will be compliant with medication, and patient will have no significant medication related side effects.  Patient will be engaged in psychotherapy as indicated.  Patient will report subjective improvement of symptoms.  Long term goals: To stabilize mood and treat/improve subjective symptoms, the patient will stay out of the hospital, the patient will be at an optimal level of functioning, and the patient will take all medications as prescribed.  The patient verbalized understanding and agreement with goals that were mutually set.      SUICIDE RISK ASSESSMENT: Unalterable demographics and a history of mental health intervention indicate this patient is in a high risk category compared to the general population. At present, the patient denies active SI/HI, intentions, or plans at this time and agrees to seek immediate care should such thoughts develop. The patient verbalizes understanding of how to  access emergency care if needed and agrees to do so. Consideration of suicide risk and protective factors such as history, current presentation, individual strengths and weaknesses, psychosocial and environmental stressors and variables, psychiatric illness and symptoms, medical conditions and pain, took place in this interview. Based on those considerations, the patient is determined: within individual baseline and presenting no imminent risk for suicide or homicide. Other recommendations: The patient does not meet the criteria for inpatient admission and is not a safety risk to self or others at today's visit. Inpatient treatment offers no significant advantages over outpatient treatment for this patient at today's visit.      SAFETY PLAN:  Patient was given ample time for questions and fully participated in treatment planning.  Patient was encouraged to call the clinic with any questions or concerns.  Patient was informed of access to emergency care. If patient were to develop any significant symptomatology, suicidal ideation, homicidal ideation, any concerns, or feel unsafe at any time they are to call the clinic and if unable to get immediate assistance should immediately call 911 or go to the nearest emergency room.  The patient is advised to remove or secure (lock away) all lethal weapons (including guns) and sharps (including razors, scissors, knives, etc.).  All medications (including any prescribed and any over the counter medications) should be stored in a safe and secured location that is not obtainable by children/adolescents.  Patient was given an opportunity and encouraged to ask questions about their medication, illness, and treatment. Patient contracted verbally for the following: If you are experiencing an emotional crisis or have thoughts of harming yourself or others, please go to your nearest local emergency room or call 911. Will continue to re-assess medication response and side effects  frequently to establish efficacy and ensure safety. Risks, any black box warnings, side effects, off label usage, and benefits of medication and treatment discussed with patient, along with potential adverse side effects of current and/or newly prescribed medication, alternative treatment options, and OTC medications.  Patient verbalized understanding of potential risks, any off label use of medication, any black box warnings, and any side effects in their own words. The patient verbalized understanding and agreed to comply with the safety plan discussed in their own words.  Patient given the number to the office. Number also available to the 24- hour suicide hotline.      TREATMENT PLAN/GOALS: Continue medications and treatment plan as indicated. Treatment and medication options discussed during today's visit. Patient acknowledged and verbally consented to continue with current treatment plan and was educated on the importance of compliance with treatment and follow-up appointments.        -Increase Trintellix to 10 mg daily for anxiety and depression        MEDICATION ISSUES: Discussed medication options and treatment plan of prescribed medication, any off label use of medication, as well as the risks, benefits, any black box warnings including increased suicidality, and side effects including but not limited to potential falls, dizziness, possible impaired driving, GI side effects (change in appetite, abdominal discomfort, nausea, vomiting, diarrhea, and/or constipation), dry mouth, somnolence, sedation, insomnia, activation, agitation, irritation, tremors, abnormal muscle movements or disorders, headache, sweating, possible bruising or rare bleeding, electrolyte and/or fluid abnormalities, change in blood pressure/heart rate/and or heart rhythm, sexual dysfunction, and metabolic adversities among others. Patient and/or guardian agreeable to call the office with any worsening of symptoms or onset of side effects,  or if any concerns or questions arise.  The contact information for the office is made available to the patient and/or guardian.  Patient and/or guardian agreeable to call 911 or go to the nearest ER should they begin having any SI/HI, or if any urgent concerns arise. No medication side effects or related complaints today.                  [facility] No Show Policy:  We understand unexpected circumstances arise; however, anytime you miss your appointment we are unable to provide you appropriate care.  In addition, each appointment missed could have been used to provide care for others.  We ask that you call at least 24 hours in advance to cancel or reschedule an appointment.  We would like to take this opportunity to remind you of our policy stating patients who miss THREE or more appointments without cancelling or rescheduling 24 hours in advance of the appointment may be subject to cancellation of any further visits with our clinic and recommendation to seek in-person services/visits.    Please call [phone] to reschedule your appointment. If there are reasons that make it difficult for you to keep the appointments, please call and let us know how we can help.  Please understand that medication prescribing will not continue without seeing your provider.      [facility]'s No Show Policy reviewed with patient at today's visit. Patient verbalized understanding of this policy. Discussed with patient that in the event that there are three or more no show visits, it will be recommended that they pursue in-person services/visits as noncompliance with telehealth visits indicates that patient is not an appropriate candidate for telemedicine and would likely be more appropriate for in-person services/visits. Patient verbalizes understanding and is agreeable to this.          MEDS ORDERED DURING VISIT:  New Medications Ordered This Visit   Medications   • Vortioxetine HBr  10 MG tablet     Sig: Take 10 mg by mouth Daily.     Dispense:  30 tablet     Refill:  0       Return in about 4 weeks (around 1/31/2022), or if symptoms worsen or fail to improve, for Recheck.        Patient will follow-up in 4 weeks, highly encouraged the patient if she had any questions or concerns to contact the behavioral health virtual clinic for sooner appointment patient verbalized understanding.      Functional Status: Moderate impairment     Prognosis: Guarded with Ongoing Treatment            This document has been electronically signed by RAF Alva  January 3, 2022 11:51 EST    Part of this note may be an electronic transcription/translation of spoken language to printed text using the Dragon Dictation System.

## 2022-02-16 ENCOUNTER — TELEPHONE (OUTPATIENT)
Dept: INTERNAL MEDICINE | Facility: CLINIC | Age: 42
End: 2022-02-16

## 2022-02-16 NOTE — TELEPHONE ENCOUNTER
Caller: Lesly Bill    Relationship: Self    Best call back number: [phone]     What is the best time to reach you: ANY TIME    Who are you requesting to speak with (clinical staff, provider,  specific staff member): DR. LEWIS OR HER NURSE     Do you know the name of the person who called: N/A    What was the call regarding: PATIENT TRIED TO SCHEDULE AN APPOINTMENT IN OUR OFFICE ON 02/14/22 DUE TO EAR PAIN. SHE WAS NOT ABLE TO GET IN AND WAS TOLD WE WERE BOOKED. SO SHE WENT TO THE [facility] IN [facility] AND SHE WAS GIVEN AMOXICILLIN FOR AN EAR INFECTION. PATIENT NOW HAS A RASH ON BOTH ARMS AND NOW HER STOMACH IS STARTING TO ITCH. SHE HAS CALLED THE [facility] BUT HAS NOT BEEN ABLE TO GET A HOLD OF ANYONE SO SHE IS NEEDING SUGGESTIONS AND RECOMMENDATIONS ON WHAT TO DO.    SHE NEEDS TO KNOW IF SHE NEEDS TO STOP THE MEDICATION AND IF SHE CAN RECEIVE SOMETHING FOR THE RASH OR WHAT TO DO BECAUSE SHE STILL HAS AN EAR INFECTION      Do you require a callback: YES     [id number] - [facility] [address] - [phone]  - [phone]

## 2022-02-17 NOTE — TELEPHONE ENCOUNTER
Advise her to stop amoxicillin and take benadryl for rash. F[facility]u with [facility] or in our clinic for evaluation.     Dr. Majano

## 2022-05-31 ENCOUNTER — TELEMEDICINE (OUTPATIENT)
Dept: PSYCHIATRY | Facility: CLINIC | Age: 42
End: 2022-05-31

## 2022-05-31 ENCOUNTER — PRIOR AUTHORIZATION (OUTPATIENT)
Dept: PSYCHIATRY | Facility: CLINIC | Age: 42
End: 2022-05-31

## 2022-05-31 DIAGNOSIS — F41.1 GENERALIZED ANXIETY DISORDER: Primary | Chronic | ICD-10-CM

## 2022-05-31 DIAGNOSIS — F33.1 MODERATE EPISODE OF RECURRENT MAJOR DEPRESSIVE DISORDER: Chronic | ICD-10-CM

## 2022-05-31 DIAGNOSIS — F51.04 PSYCHOPHYSIOLOGICAL INSOMNIA: ICD-10-CM

## 2022-05-31 PROCEDURE — 99214 OFFICE O/P EST MOD 30 MIN: CPT | Performed by: NURSE PRACTITIONER

## 2022-05-31 RX ORDER — CLONIDINE HYDROCHLORIDE 0.1 MG/1
0.1 TABLET ORAL NIGHTLY
Qty: 30 TABLET | Refills: 0 | Status: SHIPPED | OUTPATIENT
Start: 2022-05-31 | End: 2022-06-30

## 2022-05-31 RX ORDER — VILAZODONE HYDROCHLORIDE 10 MG/1
10 TABLET ORAL DAILY
Qty: 30 TABLET | Refills: 0 | Status: SHIPPED | OUTPATIENT
Start: 2022-05-31 | End: 2022-06-29

## 2022-05-31 RX ORDER — CETIRIZINE HYDROCHLORIDE 10 MG/1
10 TABLET ORAL DAILY
COMMUNITY

## 2022-05-31 NOTE — PROGRESS NOTES
"This provider is located at the [facility] (through [facility]), [address], [facility], [address] using a secure [employer] Video Visit through [employer] Patient is being seen remotely via telehealth at their home address in [place], and stated they are in a secure environment for this session. The patient's condition being diagnosed/treated is appropriate for telemedicine. The provider identified herself as well as her credentials. The patient, and/or patients guardian, consent to be seen remotely, and when consent is given they understand that the consent allows for patient identifiable information to be sent to a third party as needed. They may refuse to be seen remotely at any time. The electronic data is encrypted and password protected, and the patient and/or guardian has been advised of the potential risks to privacy not withstanding such measures.    You have chosen to receive care through a telehealth visit.  Do you consent to use a video/audio connection for your medical care today? Yes     Subjective   Lesly Bill is a 41 y.o. female who is here today for medication management follow up.    Chief Complaint: Depression, anxiety    History of Present Illness:  The patient describes her mood as \"not great\" over the last few weeks.  The patient states that she accidentally no showed her last appointment with his APRN and had difficulty getting rescheduled.  The patient endorses that she has been doing well over the past few weeks.  The patient states that she ran out of Trintellix approximately 1.5 months ago and has been taking the medication since then.  The patient states that she was never able to get approved by the patient assistance program so she was having to continue to get samples of the Trintellix from PCP every month.  The patient states that it was very difficult for her to get to PCP office every month as she was having a liquid work " early, so states that she discontinued medication approximately 1.5 months ago.  The patient endorses that her depression and anxiety have been worse since stopping the medication.  The patient states that she even had 2 friends tell her that she needs to get back in with her psychiatric provider to discuss medications as they have also noticed her depression and anxiety worsening since stopping the Trintellix.  The patient endorses that she has not been sleeping well recently.  The patient endorses that it is difficult for her to fall asleep due to anxiety.  The patient states that her mind is constantly racing and thinking, so endorses that it takes her several hours to fall asleep.  The patient states that she difficult her sleep around 2 or 3 AM and then has to wake up for work approximately 3 to 4 hours later.  The patient states that she sleeps well when she falls asleep, but endorses that she has been rather fatigued recently due to not sleeping an adequate amount.  The patient denies any nightmares or bad dreams.  The patient reports her appetite as good.  The patient denies any abnormal muscle movements or tics.  The patient rates her depression at a 5/10 on a 0-10 scale, with 10 being the worst.  The patient rates her anxiety at a 7.5/10 on a 0-10 scale, with 10 being the worst.  The patient rates hopelessness at a 0/10 on a 0-10 scale with 10 being the worst.  The patient denies suicidal ideations and homicidal ideations, and is convincing.  The patient denies any auditory hallucinations or visual hallucinations.  The patient does not endorse significant symptoms consistent with bipolar disorder or a psychotic illness.                LMP:  1 week ago.  The patient denies any chance of pregnancy at this time.  The patient was educated that her prescribed medications can have potential risk to a developing fetus. The patient is advised to contact this APRN/this office if she becomes pregnant or plans to  become pregnant.  Pt verbalizes understanding and acknowledged agreement with this plan in her own words.          Prior Psychiatric Medications:  Zoloft  Cymbalta  Prozac  Lexapro  Effexor  Trintellix      The following portions of the patient's history were reviewed and updated as appropriate: allergies, current medications, past family history, past medical history, past social history, past surgical history and problem list.    Review of Systems   Constitutional: Positive for fatigue. Negative for activity change, appetite change and unexpected weight change.   Psychiatric/Behavioral: Positive for decreased concentration, dysphoric mood and sleep disturbance. Negative for agitation, behavioral problems, confusion, hallucinations, self-injury and suicidal ideas. The patient is nervous/anxious. The patient is not hyperactive.        Objective   Physical Exam  Constitutional:       Appearance: Normal appearance.   Neurological:      Mental Status: She is alert.   Psychiatric:         Attention and Perception: Attention and perception normal.         Mood and Affect: Affect normal. Mood is anxious and depressed.         Speech: Speech normal.         Behavior: Behavior normal. Behavior is cooperative.         Thought Content: Thought content normal. Thought content does not include homicidal or suicidal ideation. Thought content does not include homicidal or suicidal plan.         Cognition and Memory: Cognition and memory normal.         Judgment: Judgment normal.       not currently breastfeeding.    The patient was seen remotely today via a MyChart Video Visit through [facility].  Unable to obtain vital signs due to nature of remote visit.  Height stated at 67 inches.  Weight stated at 181 pounds.     Allergies   Allergen Reactions   • Sulfacetamide Hives and Unknown - Low Severity   • Sulfa Antibiotics Hives   • Clove [Syzygium Aromaticum] Rash and GI Intolerance       No results found for this or any previous visit  (from the past 2016 hour(s)).    Current Medications:   Current Outpatient Medications   Medication Sig Dispense Refill   • cetirizine (zyrTEC) 10 MG tablet Take 10 mg by mouth Daily.     • clobetasol propionate (CLOBEX) 0.05 % shampoo APPLY TOPICALLY TO THE SCALP AND LET SIT FOR 5-10 MINUTES BEFORE RINSING AS NEEDED.     • desonide (DESOWEN) 0.05 % cream apply to the affected areas on scalp and feet twice daily x 2 wks. take week break then use as needed for flares     • dicyclomine (BENTYL) 10 MG capsule Take 10 mg by mouth 4 (Four) Times a Day Before Meals & at Bedtime.     • fluticasone (FLONASE) 50 MCG/ACT nasal spray 2 sprays into the nostril(s) as directed by provider Daily.     • hydrOXYzine (ATARAX) 10 MG tablet Take 10 mg by mouth 3 (Three) Times a Day As Needed for Itching.     • lisinopril-hydrochlorothiazide (PRINZIDE,ZESTORETIC) 20-12.5 MG per tablet Take 0.5 tablets by mouth Daily. 90 tablet 1   • omeprazole (priLOSEC) 20 MG capsule TAKE 1 CAPSULE DAILY EVERY MORNING BEFORE BREAKFAST.     • Otezla 30 MG tablet      • cloNIDine (CATAPRES) 0.1 MG tablet Take 1 tablet by mouth Every Night. 30 tablet 0   • vilazodone (VIIBRYD) 10 MG tablet tablet Take 1 tablet by mouth Daily. 30 tablet 0     No current facility-administered medications for this visit.       Mental Status Exam:   Hygiene:   good  Cooperation:  Cooperative  Eye Contact:  Good  Psychomotor Behavior:  Appropriate  Affect:  Full range  Hopelessness: Denies  Speech:  Normal  Thought Process:  Goal directed  Thought Content:  Normal  Suicidal:  None  Homicidal:  None  Hallucinations:  None  Delusion:  None  Memory:  Intact  Orientation:  Person, Place, Time and Situation  Reliability:  good  Insight:  Good  Judgement:  Good  Impulse Control:  Good  Physical/Medical Issues:  Yes See medical history        PHQ-2 Depression Screening  Little interest or pleasure in doing things? 2-->more than half the days   Feeling down, depressed, or hopeless?  1-->several days   PHQ-2 Total Score 3          Assessment & Plan   Diagnoses and all orders for this visit:    1. Generalized anxiety disorder (Primary)  -     vilazodone (VIIBRYD) 10 MG tablet tablet; Take 1 tablet by mouth Daily.  Dispense: 30 tablet; Refill: 0  -     cloNIDine (CATAPRES) 0.1 MG tablet; Take 1 tablet by mouth Every Night.  Dispense: 30 tablet; Refill: 0    2. Moderate episode of recurrent major depressive disorder (HCC)  -     vilazodone (VIIBRYD) 10 MG tablet tablet; Take 1 tablet by mouth Daily.  Dispense: 30 tablet; Refill: 0    3. Psychophysiological insomnia  -     cloNIDine (CATAPRES) 0.1 MG tablet; Take 1 tablet by mouth Every Night.  Dispense: 30 tablet; Refill: 0            Visit Diagnoses:    ICD-10-CM ICD-9-CM   1. Generalized anxiety disorder  F41.1 300.02   2. Moderate episode of recurrent major depressive disorder (HCC)  F33.1 296.32   3. Psychophysiological insomnia  F51.04 307.42       GOALS:  Short Term Goals: Patient will be compliant with medication, and patient will have no significant medication related side effects.  Patient will be engaged in psychotherapy as indicated.  Patient will report subjective improvement of symptoms.  Long term goals: To stabilize mood and treat/improve subjective symptoms, the patient will stay out of the hospital, the patient will be at an optimal level of functioning, and the patient will take all medications as prescribed.  The patient verbalized understanding and agreement with goals that were mutually set.      SUICIDE RISK ASSESSMENT: Unalterable demographics and a history of mental health intervention indicate this patient is in a high risk category compared to the general population. At present, the patient denies active SI/HI, intentions, or plans at this time and agrees to seek immediate care should such thoughts develop. The patient verbalizes understanding of how to access emergency care if needed and agrees to do so. Consideration of  suicide risk and protective factors such as history, current presentation, individual strengths and weaknesses, psychosocial and environmental stressors and variables, psychiatric illness and symptoms, medical conditions and pain, took place in this interview. Based on those considerations, the patient is determined: within individual baseline and presenting no imminent risk for suicide or homicide. Other recommendations: The patient does not meet the criteria for inpatient admission and is not a safety risk to self or others at today's visit. Inpatient treatment offers no significant advantages over outpatient treatment for this patient at today's visit.      SAFETY PLAN:  Patient was given ample time for questions and fully participated in treatment planning.  Patient was encouraged to call the clinic with any questions or concerns.  Patient was informed of access to emergency care. If patient were to develop any significant symptomatology, suicidal ideation, homicidal ideation, any concerns, or feel unsafe at any time they are to call the clinic and if unable to get immediate assistance should immediately call 911 or go to the nearest emergency room.  The patient is advised to remove or secure (lock away) all lethal weapons (including guns) and sharps (including razors, scissors, knives, etc.).  All medications (including any prescribed and any over the counter medications) should be stored in a safe and secured location that is not obtainable by children/adolescents.  Patient was given an opportunity and encouraged to ask questions about their medication, illness, and treatment. Patient contracted verbally for the following: If you are experiencing an emotional crisis or have thoughts of harming yourself or others, please go to your nearest local emergency room or call 911. Will continue to re-assess medication response and side effects frequently to establish efficacy and ensure safety. Risks, any black box  warnings, side effects, off label usage, and benefits of medication and treatment discussed with patient, along with potential adverse side effects of current and/or newly prescribed medication, alternative treatment options, and OTC medications.  Patient verbalized understanding of potential risks, any off label use of medication, any black box warnings, and any side effects in their own words. The patient verbalized understanding and agreed to comply with the safety plan discussed in their own words.  Patient given the number to the office. Number also available to the 24- hour suicide hotline.      TREATMENT PLAN/GOALS: Continue medications and treatment plan as indicated. Treatment and medication options discussed during today's visit. Patient acknowledged and verbally consented to continue with current treatment plan and was educated on the importance of compliance with treatment and follow-up appointments.        -Begin Viibryd 10 mg daily for anxiety and depression  -Begin Clonidine 0.1 mg nightly for anxiety/insomnia.  Discussed with the patient potential side effects with this medication, specifically hypotension.  Discussed with the patient that risk of hypotension can be increased when taking this medication concurrently with other antihypertensives.  Instructed the patient to monitor BP and notify this APRN if hypotension/discontinue Clonidine if this occurs after beginning the medication.  The patient verbalizes understanding and endorses that she is agreeable to this.         MEDICATION ISSUES: Discussed medication options and treatment plan of prescribed medication, any off label use of medication, as well as the risks, benefits, any black box warnings including increased suicidality, and side effects including but not limited to potential falls, dizziness, possible impaired driving, GI side effects (change in appetite, abdominal discomfort, nausea, vomiting, diarrhea, and/or constipation), dry mouth,  somnolence, sedation, insomnia, activation, agitation, irritation, tremors, abnormal muscle movements or disorders, headache, sweating, possible bruising or rare bleeding, electrolyte and/or fluid abnormalities, change in blood pressure/heart rate/and or heart rhythm, sexual dysfunction, and metabolic adversities among others. Patient and/or guardian agreeable to call the office with any worsening of symptoms or onset of side effects, or if any concerns or questions arise.  The contact information for the office is made available to the patient and/or guardian.  Patient and/or guardian agreeable to call 911 or go to the nearest ER should they begin having any SI/HI, or if any urgent concerns arise. No medication side effects or related complaints today.                  [facility] No Show Policy:  We understand unexpected circumstances arise; however, anytime you miss your appointment we are unable to provide you appropriate care.  In addition, each appointment missed could have been used to provide care for others.  We ask that you call at least 24 hours in advance to cancel or reschedule an appointment.  We would like to take this opportunity to remind you of our policy stating patients who miss THREE or more appointments without cancelling or rescheduling 24 hours in advance of the appointment may be subject to cancellation of any further visits with our clinic and recommendation to seek in-person services/visits.    Please call [phone] to reschedule your appointment. If there are reasons that make it difficult for you to keep the appointments, please call and let us know how we can help.  Please understand that medication prescribing will not continue without seeing your provider.      [facility]'s No Show Policy reviewed with patient at today's visit. Patient verbalized understanding of this policy. Discussed with patient that in the event that there are  three or more no show visits, it will be recommended that they pursue in-person services/visits as noncompliance with telehealth visits indicates that patient is not an appropriate candidate for telemedicine and would likely be more appropriate for in-person services/visits. Patient verbalizes understanding and is agreeable to this.          MEDS ORDERED DURING VISIT:  New Medications Ordered This Visit   Medications   • vilazodone (VIIBRYD) 10 MG tablet tablet     Sig: Take 1 tablet by mouth Daily.     Dispense:  30 tablet     Refill:  0   • cloNIDine (CATAPRES) 0.1 MG tablet     Sig: Take 1 tablet by mouth Every Night.     Dispense:  30 tablet     Refill:  0       Return in about 2 weeks (around 6/14/2022), or if symptoms worsen or fail to improve, for Recheck.        Patient will follow-up in 2 weeks, highly encouraged the patient if she had any questions or concerns to contact the behavioral health [facility] clinic for sooner appointment patient verbalized understanding.      Functional Status: Moderate impairment     Prognosis: Guarded with Ongoing Treatment            This document has been electronically signed by RAF Alva  May 31, 2022 16:05 EDT       Some of the data in this electronic note has been brought forward from a previous encounter, any necessary changes have been made, it has been reviewed by this APRN, and it is accurate.      Part of this note may be an electronic transcription/translation of spoken language to printed text using the Dragon Dictation System.

## 2022-06-25 DIAGNOSIS — F41.1 GENERALIZED ANXIETY DISORDER: Chronic | ICD-10-CM

## 2022-06-25 DIAGNOSIS — F51.04 PSYCHOPHYSIOLOGICAL INSOMNIA: ICD-10-CM

## 2022-06-27 RX ORDER — CLONIDINE HYDROCHLORIDE 0.1 MG/1
0.1 TABLET ORAL NIGHTLY
Qty: 30 TABLET | Refills: 0 | OUTPATIENT
Start: 2022-06-27

## 2022-06-29 ENCOUNTER — OFFICE VISIT (OUTPATIENT)
Dept: OBSTETRICS AND GYNECOLOGY | Facility: CLINIC | Age: 42
End: 2022-06-29

## 2022-06-29 VITALS
DIASTOLIC BLOOD PRESSURE: 88 MMHG | SYSTOLIC BLOOD PRESSURE: 128 MMHG | BODY MASS INDEX: 29.19 KG/M2 | HEIGHT: 67 IN | WEIGHT: 186 LBS

## 2022-06-29 DIAGNOSIS — K62.5 RECTAL BLEEDING: Primary | ICD-10-CM

## 2022-06-29 DIAGNOSIS — N92.6 IRREGULAR BLEEDING: ICD-10-CM

## 2022-06-29 PROBLEM — N94.6 DYSMENORRHEA: Status: ACTIVE | Noted: 2022-06-29

## 2022-06-29 PROCEDURE — 99213 OFFICE O/P EST LOW 20 MIN: CPT | Performed by: NURSE PRACTITIONER

## 2022-06-29 RX ORDER — ETONOGESTREL AND ETHINYL ESTRADIOL 11.7; 2.7 MG/1; MG/1
1 INSERT, EXTENDED RELEASE VAGINAL
Qty: 3 EACH | Refills: 4 | Status: SHIPPED | OUTPATIENT
Start: 2022-06-29 | End: 2022-08-10

## 2022-06-29 NOTE — PROGRESS NOTES
"Subjective   Chief Complaint   Patient presents with   • Gynecologic Exam   • Menstrual Problem     Pt feels like she is bleeding from her rectum when she is on her cycle.     Lesly Bill is a 41 y.o. year old .  Patient's last menstrual period was 2022 (approximate).  She presents to be seen as she has noted rectal bleeding during her menstrual cycle.  She states she does not have rectal bleeding at times other than her period.  She is certain that rectal bleeding is present as she has had bleeding noted and when she removed her tampon her tampon would be mostly dry.  She does have a history of anal fissures.  She had a colonoscopy in .  She denies constipation or straining with bowel movements.  When present the bleeding is bright red.  She states over the last year she has noticed this bleeding.  She also states over the last year her periods have become more cramping and painful.  She also has blood clots with her.  Periods are only 3 days but they will be heavy the whole 3 days and painful cramping.  She states she has used NuvaRing in the past for period control and has done well with this.  She is up-to-date on her Pap with her last annual exam 2021.    The following portions of the patient's history were reviewed and updated as appropriate:current medications and allergies    Social History    Tobacco Use      Smoking status: Never Smoker      Smokeless tobacco: Never Used         Objective   /88 (BP Location: Left arm, Patient Position: Sitting, Cuff Size: Adult)   Ht 170.2 cm (67\")   Wt 84.4 kg (186 lb)   LMP 2022 (Approximate)   Breastfeeding No   BMI 29.13 kg/m²   Pelvic exam: normal external genitalia, vulva, vagina, cervix, uterus and adnexa, VULVA: normal appearing vulva with no masses, tenderness or lesions, VAGINA: normal appearing vagina with normal color and discharge, no lesions, CERVIX: normal appearing cervix without discharge or lesions, " UTERUS: uterus is normal size, shape, consistency and nontender, ADNEXA: normal adnexa in size, nontender and no masses, RECTAL: normal rectal, no masses.    Lab Review   No data reviewed    Imaging   Pelvic ultrasound report   Transvaginal ultrasound in office today shows anteverted uterus ovaries appear normal no free fluid no fibroids noted     Assessment   1. Rectal bleeding with menstrual cycle  2. Dysmenorrhea  3. Menorrhagia     Plan   1. Discussed ultrasound findings with patient.  Discussed rectal bleeding at times menses could be due to endometriosis if lesions are present in those tissues.  Given her history of anal fissures also discussed with patient the increased pelvic pressure and cramping with her periods could be causing bleeding from fissures or internal hemorrhoids.  Discussed treatment options with patient.  She would like to restart NuvaRing for her heavy painful periods and see if this helps with her rectal bleeding.  We will also refer patient to GI for further evaluation of rectal bleeding.  2. The importance of keeping all planned follow-up and taking all medications as prescribed was emphasized.  3. Return in about 3 months (around 9/29/2022).  4.     New Medications Ordered This Visit   Medications   • etonogestrel-ethinyl estradiol (NuvaRing) 0.12-0.015 MG/24HR vaginal ring     Sig: Insert 1 each into the vagina Every 28 (Twenty-Eight) Days. Insert vaginally and leave in place for 3 consecutive weeks, then remove for 1 week.     Dispense:  3 each     Refill:  4          This note was electronically signed.    RAF Hernandes  June 29, 2022

## 2022-06-30 ENCOUNTER — TELEMEDICINE (OUTPATIENT)
Dept: PSYCHIATRY | Facility: CLINIC | Age: 42
End: 2022-06-30

## 2022-06-30 DIAGNOSIS — F33.1 MODERATE EPISODE OF RECURRENT MAJOR DEPRESSIVE DISORDER: Chronic | ICD-10-CM

## 2022-06-30 DIAGNOSIS — F51.04 PSYCHOPHYSIOLOGICAL INSOMNIA: Chronic | ICD-10-CM

## 2022-06-30 DIAGNOSIS — F41.1 GENERALIZED ANXIETY DISORDER: Primary | Chronic | ICD-10-CM

## 2022-06-30 PROCEDURE — 99214 OFFICE O/P EST MOD 30 MIN: CPT | Performed by: NURSE PRACTITIONER

## 2022-06-30 RX ORDER — HYDROXYZINE PAMOATE 25 MG/1
25 CAPSULE ORAL NIGHTLY PRN
Qty: 30 CAPSULE | Refills: 0 | Status: SHIPPED | OUTPATIENT
Start: 2022-06-30 | End: 2022-07-27

## 2022-06-30 RX ORDER — DESVENLAFAXINE 25 MG/1
25 TABLET, EXTENDED RELEASE ORAL DAILY
Qty: 30 TABLET | Refills: 0 | Status: SHIPPED | OUTPATIENT
Start: 2022-06-30 | End: 2022-07-27 | Stop reason: SDUPTHER

## 2022-06-30 RX ORDER — MOLNUPIRAVIR 200 MG/1
CAPSULE ORAL
COMMUNITY
Start: 2022-06-29 | End: 2022-11-21

## 2022-06-30 RX ORDER — HYDROXYZINE HYDROCHLORIDE 10 MG/1
10 TABLET, FILM COATED ORAL 3 TIMES DAILY PRN
Qty: 30 TABLET | Refills: 0 | Status: SHIPPED | OUTPATIENT
Start: 2022-06-30 | End: 2022-07-27 | Stop reason: SDUPTHER

## 2022-06-30 NOTE — PROGRESS NOTES
"This provider is located at the [facility] (through [facility]), [address], [facility], [address] using a secure [employer] Video Visit through [employer]. Patient is being seen remotely via telehealth at their home address in [place], and stated they are in a secure environment for this session. The patient's condition being diagnosed/treated is appropriate for telemedicine. The provider identified herself as well as her credentials. The patient, and/or patients guardian, consent to be seen remotely, and when consent is given they understand that the consent allows for patient identifiable information to be sent to a third party as needed. They may refuse to be seen remotely at any time. The electronic data is encrypted and password protected, and the patient and/or guardian has been advised of the potential risks to privacy not withstanding such measures.    You have chosen to receive care through a telehealth visit.  Do you consent to use a video/audio connection for your medical care today? Yes     Subjective   Lesly Bill is a 41 y.o. female who is here today for medication management follow up.    Chief Complaint: Depression, anxiety, insomnia     History of Present Illness:  The patient describes her mood as \"not great\" over the last few weeks.  Patient states that she found out she had COVID yesterday.  The patient states that she started antiviral treatment yesterday and has done well with this.  The patient states that she started getting sick Monday but did not test positive until yesterday.  The patient states that she has not been taking either the Viibryd or clonidine.  The patient states that she discontinued the clonidine after 2 days because it caused palpitations.  The patient states that she continued the Viibryd for approximately 2 weeks before discontinuing.  The patient states that it did seem to help with her mood and anxiety, but states " that it caused epigastric pain so she had to discontinue after approximately 2 weeks.  The patient reports her appetite as fluctuating.  The patient reports her sleep as fluctuating.  The patient states that she has slept well this week since she has been sick, but states that prior to this she was still having a lot of trouble falling asleep.  She states that her anxiety causes her to worry and dread work in the mornings that it takes several hours for her to fall asleep.  The patient denies any nightmares or bad dreams.  The patient denies any abnormal muscle movements or tics.  The patient rates her depression at a 3/10 on a 0-10 scale, with 10 being the worst.  The patient states that her anxiety fluctuates based on the time of day.  The patient states that her anxiety is more well-controlled throughout the daytime, but states that it tends to increase at night.  The patient rates her anxiety in the daytime at a 3/10 on a 0-10 scale, with 10 being the worst.  The patient rates her anxiety at nighttime at a 6-7/10 on a 0-10 scale, with 10 being the worst.  The patient rates hopelessness at a 0/10 on a 0-10 scale with 10 being the worst.  The patient denies suicidal ideations and homicidal ideations, and is convincing.  The patient denies any auditory hallucinations or visual hallucinations.  The patient does not endorse significant symptoms consistent with bipolar disorder or a psychotic illness.              LMP:  1 week ago.  The patient denies any chance of pregnancy at this time.  The patient was educated that her prescribed medications can have potential risk to a developing fetus. The patient is advised to contact this APRN/this office if she becomes pregnant or plans to become pregnant.  Pt verbalizes understanding and acknowledged agreement with this plan in her own words.          Prior Psychiatric Medications:  Zoloft  Cymbalta  Prozac  Lexapro  Effexor  Trintellix - ineffective   Viibryd 10 mg daily -  reports was helpful for mood/anxiety, but caused epigastric pain   Clonidine 0.1 mg nightly - caused heart palpitations      The following portions of the patient's history were reviewed and updated as appropriate: allergies, current medications, past family history, past medical history, past social history, past surgical history and problem list.    Review of Systems   Constitutional: Positive for fatigue. Negative for activity change, appetite change and unexpected weight change.   Psychiatric/Behavioral: Positive for decreased concentration, dysphoric mood and sleep disturbance. Negative for agitation, behavioral problems, confusion, hallucinations, self-injury and suicidal ideas. The patient is nervous/anxious. The patient is not hyperactive.        Objective   Physical Exam  Constitutional:       Appearance: Normal appearance.   Neurological:      Mental Status: She is alert.   Psychiatric:         Attention and Perception: Attention and perception normal.         Mood and Affect: Affect normal. Mood is anxious and depressed.         Speech: Speech normal.         Behavior: Behavior normal. Behavior is cooperative.         Thought Content: Thought content normal. Thought content does not include homicidal or suicidal ideation. Thought content does not include homicidal or suicidal plan.         Cognition and Memory: Cognition and memory normal.         Judgment: Judgment normal.       Last menstrual period 06/23/2022, not currently breastfeeding.    The patient was seen remotely today via a MyChart Video Visit through [facility].  Unable to obtain vital signs due to nature of remote visit.  Height stated at 67 inches.  Weight stated at 186 pounds.     Allergies   Allergen Reactions   • Sulfacetamide Hives and Unknown - Low Severity   • Sulfa Antibiotics Hives   • Clove [Syzygium Aromaticum] Rash and GI Intolerance       No results found for this or any previous visit (from the past 2016 hour(s)).    Current  Medications:   Current Outpatient Medications   Medication Sig Dispense Refill   • cetirizine (zyrTEC) 10 MG tablet Take 10 mg by mouth Daily.     • clobetasol propionate (CLOBEX) 0.05 % shampoo APPLY TOPICALLY TO THE SCALP AND LET SIT FOR 5-10 MINUTES BEFORE RINSING AS NEEDED.     • desonide (DESOWEN) 0.05 % cream apply to the affected areas on scalp and feet twice daily x 2 wks. take week break then use as needed for flares     • dicyclomine (BENTYL) 10 MG capsule Take 10 mg by mouth 4 (Four) Times a Day Before Meals & at Bedtime.     • etonogestrel-ethinyl estradiol (NuvaRing) 0.12-0.015 MG/24HR vaginal ring Insert 1 each into the vagina Every 28 (Twenty-Eight) Days. Insert vaginally and leave in place for 3 consecutive weeks, then remove for 1 week. 3 each 4   • fluticasone (FLONASE) 50 MCG/ACT nasal spray 2 sprays into the nostril(s) as directed by provider Daily.     • hydrOXYzine (ATARAX) 10 MG tablet Take 1 tablet by mouth 3 (Three) Times a Day As Needed for Anxiety. 30 tablet 0   • Lagevrio 200 MG capsule      • lisinopril-hydrochlorothiazide (PRINZIDE,ZESTORETIC) 20-12.5 MG per tablet Take 0.5 tablets by mouth Daily. 90 tablet 1   • omeprazole (priLOSEC) 20 MG capsule TAKE 1 CAPSULE DAILY EVERY MORNING BEFORE BREAKFAST.     • Otezla 30 MG tablet      • Desvenlafaxine Succinate ER 25 MG tablet sustained-release 24 hour Take 1 tablet by mouth Daily. 30 tablet 0   • hydrOXYzine pamoate (VISTARIL) 25 MG capsule Take 1 capsule by mouth At Night As Needed (anxiety/insomnia). 30 capsule 0     No current facility-administered medications for this visit.       Mental Status Exam:   Hygiene:   good  Cooperation:  Cooperative  Eye Contact:  Good  Psychomotor Behavior:  Appropriate  Affect:  Full range  Hopelessness: Denies  Speech:  Normal  Thought Process:  Goal directed  Thought Content:  Normal  Suicidal:  None  Homicidal:  None  Hallucinations:  None  Delusion:  None  Memory:  Intact  Orientation:  Person, Place,  Time and Situation  Reliability:  good  Insight:  Good  Judgement:  Good  Impulse Control:  Good  Physical/Medical Issues:  Yes See medical history           Assessment & Plan   Diagnoses and all orders for this visit:    1. Generalized anxiety disorder (Primary)  -     Desvenlafaxine Succinate ER 25 MG tablet sustained-release 24 hour; Take 1 tablet by mouth Daily.  Dispense: 30 tablet; Refill: 0  -     hydrOXYzine pamoate (VISTARIL) 25 MG capsule; Take 1 capsule by mouth At Night As Needed (anxiety/insomnia).  Dispense: 30 capsule; Refill: 0  -     hydrOXYzine (ATARAX) 10 MG tablet; Take 1 tablet by mouth 3 (Three) Times a Day As Needed for Anxiety.  Dispense: 30 tablet; Refill: 0    2. Moderate episode of recurrent major depressive disorder (HCC)  -     Desvenlafaxine Succinate ER 25 MG tablet sustained-release 24 hour; Take 1 tablet by mouth Daily.  Dispense: 30 tablet; Refill: 0    3. Psychophysiological insomnia  -     hydrOXYzine pamoate (VISTARIL) 25 MG capsule; Take 1 capsule by mouth At Night As Needed (anxiety/insomnia).  Dispense: 30 capsule; Refill: 0            Visit Diagnoses:    ICD-10-CM ICD-9-CM   1. Generalized anxiety disorder  F41.1 300.02   2. Moderate episode of recurrent major depressive disorder (HCC)  F33.1 296.32   3. Psychophysiological insomnia  F51.04 307.42       GOALS:  Short Term Goals: Patient will be compliant with medication, and patient will have no significant medication related side effects.  Patient will be engaged in psychotherapy as indicated.  Patient will report subjective improvement of symptoms.  Long term goals: To stabilize mood and treat/improve subjective symptoms, the patient will stay out of the hospital, the patient will be at an optimal level of functioning, and the patient will take all medications as prescribed.  The patient verbalized understanding and agreement with goals that were mutually set.      SUICIDE RISK ASSESSMENT: Unalterable demographics and a  history of mental health intervention indicate this patient is in a high risk category compared to the general population. At present, the patient denies active SI/HI, intentions, or plans at this time and agrees to seek immediate care should such thoughts develop. The patient verbalizes understanding of how to access emergency care if needed and agrees to do so. Consideration of suicide risk and protective factors such as history, current presentation, individual strengths and weaknesses, psychosocial and environmental stressors and variables, psychiatric illness and symptoms, medical conditions and pain, took place in this interview. Based on those considerations, the patient is determined: within individual baseline and presenting no imminent risk for suicide or homicide. Other recommendations: The patient does not meet the criteria for inpatient admission and is not a safety risk to self or others at today's visit. Inpatient treatment offers no significant advantages over outpatient treatment for this patient at today's visit.      SAFETY PLAN:  Patient was given ample time for questions and fully participated in treatment planning.  Patient was encouraged to call the clinic with any questions or concerns.  Patient was informed of access to emergency care. If patient were to develop any significant symptomatology, suicidal ideation, homicidal ideation, any concerns, or feel unsafe at any time they are to call the clinic and if unable to get immediate assistance should immediately call 911 or go to the nearest emergency room.  The patient is advised to remove or secure (lock away) all lethal weapons (including guns) and sharps (including razors, scissors, knives, etc.).  All medications (including any prescribed and any over the counter medications) should be stored in a safe and secured location that is not obtainable by children/adolescents.  Patient was given an opportunity and encouraged to ask questions  about their medication, illness, and treatment. Patient contracted verbally for the following: If you are experiencing an emotional crisis or have thoughts of harming yourself or others, please go to your nearest local emergency room or call 911. Will continue to re-assess medication response and side effects frequently to establish efficacy and ensure safety. Risks, any black box warnings, side effects, off label usage, and benefits of medication and treatment discussed with patient, along with potential adverse side effects of current and/or newly prescribed medication, alternative treatment options, and OTC medications.  Patient verbalized understanding of potential risks, any off label use of medication, any black box warnings, and any side effects in their own words. The patient verbalized understanding and agreed to comply with the safety plan discussed in their own words.  Patient given the number to the office. Number also available to the 24- hour suicide hotline.      TREATMENT PLAN/GOALS: Continue medications and treatment plan as indicated. Treatment and medication options discussed during today's visit. Patient acknowledged and verbally consented to continue with current treatment plan and was educated on the importance of compliance with treatment and follow-up appointments.        -Begin Pristiq 25 mg daily for anxiety/depression   -Begin Hydroxyzine 25 mg nightly as needed for insomnia/anxiety   -Continue Hydroxyzine 10 mg three times daily as needed for anxiety         MEDICATION ISSUES: Discussed medication options and treatment plan of prescribed medication, any off label use of medication, as well as the risks, benefits, any black box warnings including increased suicidality, and side effects including but not limited to potential falls, dizziness, possible impaired driving, GI side effects (change in appetite, abdominal discomfort, nausea, vomiting, diarrhea, and/or constipation), dry mouth,  somnolence, sedation, insomnia, activation, agitation, irritation, tremors, abnormal muscle movements or disorders, headache, sweating, possible bruising or rare bleeding, electrolyte and/or fluid abnormalities, change in blood pressure/heart rate/and or heart rhythm, sexual dysfunction, and metabolic adversities among others. Patient and/or guardian agreeable to call the office with any worsening of symptoms or onset of side effects, or if any concerns or questions arise.  The contact information for the office is made available to the patient and/or guardian.  Patient and/or guardian agreeable to call 911 or go to the nearest ER should they begin having any SI/HI, or if any urgent concerns arise. No medication side effects or related complaints today.    This APRN has discussed with the patient/guardian about the possibility of serotonin syndrome when certain medications are taken together, as is the case with this patient.  This APRN has provided the patient/guardian with a list of symptoms of serotonin syndrome including symptoms of autonomic instability, altered sensorium, confusion, restlessness, agitation, myoclonus, hyperreflexia, hyperthermia, diaphoresis, tremor, chills, diarrhea and cramps, ataxia, headache, migraines, seizures, and insomnia; which could lead to permanent hyperthermic brain damage, cardiovascular collapse, coma, or even death.  The patient/guardian are instructed to stop medications immediately and either contact this APRN/this office during regular office hours, or go to the emergency department/call 911, if they begin to experience any of the symptoms discussed.  The benefits and risks of the current medication regimen are discussed with the patient/guardian, and they feel that the benefits out weigh the risks.  The patient/guardian verbalized understanding and agreement in their own words.                  [facility] No Show Policy:  We understand unexpected  circumstances arise; however, anytime you miss your appointment we are unable to provide you appropriate care.  In addition, each appointment missed could have been used to provide care for others.  We ask that you call at least 24 hours in advance to cancel or reschedule an appointment.  We would like to take this opportunity to remind you of our policy stating patients who miss THREE or more appointments without cancelling or rescheduling 24 hours in advance of the appointment may be subject to cancellation of any further visits with our clinic and recommendation to seek in-person services/visits.    Please call [phone] to reschedule your appointment. If there are reasons that make it difficult for you to keep the appointments, please call and let us know how we can help.  Please understand that medication prescribing will not continue without seeing your provider.      [facility]'s No Show Policy reviewed with patient at today's visit. Patient verbalized understanding of this policy. Discussed with patient that in the event that there are three or more no show visits, it will be recommended that they pursue in-person services/visits as noncompliance with telehealth visits indicates that patient is not an appropriate candidate for telemedicine and would likely be more appropriate for in-person services/visits. Patient verbalizes understanding and is agreeable to this.          MEDS ORDERED DURING VISIT:  New Medications Ordered This Visit   Medications   • Desvenlafaxine Succinate ER 25 MG tablet sustained-release 24 hour     Sig: Take 1 tablet by mouth Daily.     Dispense:  30 tablet     Refill:  0   • hydrOXYzine pamoate (VISTARIL) 25 MG capsule     Sig: Take 1 capsule by mouth At Night As Needed (anxiety/insomnia).     Dispense:  30 capsule     Refill:  0   • hydrOXYzine (ATARAX) 10 MG tablet     Sig: Take 1 tablet by mouth 3 (Three) Times a Day As Needed for Anxiety.     Dispense:   30 tablet     Refill:  0       Return in about 4 weeks (around 7/28/2022), or if symptoms worsen or fail to improve, for Recheck.        Patient will follow-up in 4 weeks, highly encouraged the patient if she had any questions or concerns to contact the behavioral health virtual clinic for sooner appointment patient verbalized understanding.      Functional Status: Moderate impairment     Prognosis: Guarded with Ongoing Treatment            This document has been electronically signed by RAF Alva  June 30, 2022 11:29 EDT       Some of the data in this electronic note has been brought forward from a previous encounter, any necessary changes have been made, it has been reviewed by this APRN, and it is accurate.      Part of this note may be an electronic transcription/translation of spoken language to printed text using the Dragon Dictation System.

## 2022-07-06 ENCOUNTER — TELEPHONE (OUTPATIENT)
Dept: PSYCHIATRY | Facility: CLINIC | Age: 42
End: 2022-07-06

## 2022-07-06 NOTE — TELEPHONE ENCOUNTER
"Patient called said shes been really down lately and shes trying to get a routine around her home and \"get stuff back together\" She wants to know if you know of a \"To Do List\" of good routines to do around the home, something she can do everyday to keep a routine, she feels this would be good for improving her mood.     Please Advise?    Thank You  "

## 2022-07-06 NOTE — TELEPHONE ENCOUNTER
Patient said she would definitely like to see a therapist and would be grateful to have you make a referral for our office.      Thank You                  (Just let me know - who you recommend and I can set this up for you if you like?)

## 2022-07-06 NOTE — TELEPHONE ENCOUNTER
Scheduled with Niyah Joradn September 1, 2022, I will be keeping an eye open for a quicker appointment

## 2022-07-08 NOTE — TELEPHONE ENCOUNTER
Pa was approved , Crystal at the pharmacy was made aware and patient was notified also.    TRANSITIONAL CARE MANAGEMENT - HOSPITAL DISCHARGE FOLLOW-UP    Contacted Mr. Villanueva regarding follow-up for back pain, elevated INR after hospital discharge. He was discharged from the hospital on 7-7-2022. Review of the After Visit Summary from the recent hospitalization indicates that the patient needs to follow up with PCP.    He feels that he is doing fairly well at home.   His diet concern is none. Overall, the patient is eating well.   Ambulation: improved  Fever: is not present  Pain: he is experiencing pain in the back. The pain is perceived as moderate (4-6 pain scale)  Activities of Daily Living (global): Self-care   Patient states that he does have sufficient family support. He feels that he is able to ask for assistance when needed.     Additional patient/family concerns: None .    Discharge medications were verified with the patient. He is fully compliant with the medication regimen prescribed at the time of discharge. He reports that he is not experiencing any medication side effects.    Upon discharge, the patient was to receive no additional services.     Advance Directive:   The patient has the following documents:  Power of  for Health Care      Patient reports he was told he has masses on pancreas, liver, and kidney.  Patient is scheduled with Dr. Yo in Oncology on 7-. Hospitalist recommends patient have a urine test and BMP within the next 3 days.  Spoke with an alternate FP provider and she will review chart and advise from there.  Informed patient that Triage RN will call patient back after provider has a change to do a chart review.     Please Advise

## 2022-07-27 ENCOUNTER — TELEMEDICINE (OUTPATIENT)
Dept: PSYCHIATRY | Facility: CLINIC | Age: 42
End: 2022-07-27

## 2022-07-27 ENCOUNTER — OFFICE VISIT (OUTPATIENT)
Dept: GASTROENTEROLOGY | Facility: CLINIC | Age: 42
End: 2022-07-27

## 2022-07-27 VITALS
HEIGHT: 67 IN | HEART RATE: 80 BPM | WEIGHT: 186.8 LBS | TEMPERATURE: 98 F | DIASTOLIC BLOOD PRESSURE: 78 MMHG | BODY MASS INDEX: 29.32 KG/M2 | OXYGEN SATURATION: 99 % | SYSTOLIC BLOOD PRESSURE: 110 MMHG | RESPIRATION RATE: 18 BRPM

## 2022-07-27 DIAGNOSIS — F51.04 PSYCHOPHYSIOLOGICAL INSOMNIA: Chronic | ICD-10-CM

## 2022-07-27 DIAGNOSIS — F33.0 MILD EPISODE OF RECURRENT MAJOR DEPRESSIVE DISORDER: Chronic | ICD-10-CM

## 2022-07-27 DIAGNOSIS — F41.1 GENERALIZED ANXIETY DISORDER: Primary | Chronic | ICD-10-CM

## 2022-07-27 DIAGNOSIS — K21.9 GASTROESOPHAGEAL REFLUX DISEASE, UNSPECIFIED WHETHER ESOPHAGITIS PRESENT: ICD-10-CM

## 2022-07-27 DIAGNOSIS — K58.0 IRRITABLE BOWEL SYNDROME WITH DIARRHEA: Primary | ICD-10-CM

## 2022-07-27 PROCEDURE — 99214 OFFICE O/P EST MOD 30 MIN: CPT | Performed by: NURSE PRACTITIONER

## 2022-07-27 RX ORDER — MONTELUKAST SODIUM 4 MG/1
2 TABLET, CHEWABLE ORAL DAILY
Qty: 60 TABLET | Refills: 5 | Status: SHIPPED | OUTPATIENT
Start: 2022-07-27

## 2022-07-27 RX ORDER — OMEPRAZOLE 40 MG/1
40 CAPSULE, DELAYED RELEASE ORAL DAILY
Qty: 90 CAPSULE | Refills: 3 | Status: SHIPPED | OUTPATIENT
Start: 2022-07-27

## 2022-07-27 RX ORDER — DESVENLAFAXINE 25 MG/1
25 TABLET, EXTENDED RELEASE ORAL DAILY
Qty: 30 TABLET | Refills: 0 | Status: SHIPPED | OUTPATIENT
Start: 2022-07-27 | End: 2022-08-23 | Stop reason: SDUPTHER

## 2022-07-27 RX ORDER — HYDROXYZINE HYDROCHLORIDE 10 MG/1
10 TABLET, FILM COATED ORAL 3 TIMES DAILY PRN
Qty: 30 TABLET | Refills: 0 | Status: SHIPPED | OUTPATIENT
Start: 2022-07-27 | End: 2022-08-23 | Stop reason: SDUPTHER

## 2022-07-27 RX ORDER — HYDROXYZINE PAMOATE 25 MG/1
25 CAPSULE ORAL NIGHTLY PRN
Qty: 30 CAPSULE | Refills: 0
Start: 2022-07-27 | End: 2022-08-23 | Stop reason: SDUPTHER

## 2022-07-27 NOTE — PROGRESS NOTES
"This provider is located at the [facility] (through [facility]), [address], [facility], [address] using a secure [employer] Video Visit through Remote. Patient is being seen remotely via telehealth at their home address in [place], and stated they are in a secure environment for this session. The patient's condition being diagnosed/treated is appropriate for telemedicine. The provider identified herself as well as her credentials. The patient, and/or patients guardian, consent to be seen remotely, and when consent is given they understand that the consent allows for patient identifiable information to be sent to a third party as needed. They may refuse to be seen remotely at any time. The electronic data is encrypted and password protected, and the patient and/or guardian has been advised of the potential risks to privacy not withstanding such measures.    You have chosen to receive care through a telehealth visit.  Do you consent to use a video/audio connection for your medical care today? Yes     Subjective   Lesly Bill is a 41 y.o. female who is here today for medication management follow up.    Chief Complaint: Depression, anxiety, insomnia     History of Present Illness:  The patient describes her mood as \"better\" over the last few weeks.  The patient endorses that she has been feeling better over the past few weeks.  The patient states that she has had some life changes recently that she thinks has contributed to the improvement in symptoms that she has noticed recently.  The patient states for example that her best friend from [place] is currently in town visiting her, so states that she has been really happy about this.  The patient states that she also thinks she has noticed improvements from the medication.  The patient endorses that overall she has noticed improvements both in her mood/depression and anxiety.  The patient states that her anxiety " "has always been a little higher than her depression and she states that she  feels she has noticed more improvement in her mood/depression than anxiety, but endorses that overall she has noticed improvements in both since beginning the Pristiq.  The patient states that she has been able to tackle some different things that were causing her a lot of stress/anxiety and affecting her mood/depression.  The patient states for example that her house really needed to be cleaned and was really bothering her, so states that she began cleaning and this was a huge relief for her.  The patient endorses that she will also be starting a new job soon and she was really stressed about this, but states that a lot of her worry was alleviated after learning she is going to be given an appropriate training.  The patient reports her appetite as good.  The patient reports her sleep as \"better\".  The patient states that she has been getting to sleep earlier, which she states she been easier for her to do recently since she has been so tired/fatigue from having COVID.  The patient states that she has only been using the 10 mg of Hydroxyzine at nighttime as needed for sleep because she has been more tired/fatigued, so she hasn't felt like she has needed the 25 mg.  But she states that once she completely recovers from COVID if she has more trouble falling asleep she plans to trial using the 25 mg of Hydroxyzine if needed.  The patient denies any nightmares or bad dreams.  The patient denies any new medical problems or changes in medications since last appointment with this facility.  The patient reports compliance with current medication regimen.  The patient denies any current side effects from her current medication regimen.  The patient denies any abnormal muscle movements or tics.  The patient rates her depression at a 2/10 on a 0-10 scale, with 10 being the worst.  The patient rates her anxiety at a 3-4/10 on a 0-10 scale, with 10 " being the worst.  The patient rates hopelessness at a 0/10 on a 0-10 scale with 10 being the worst.  The patient would like to not adjust or change her medications at this visit.  The patient denies suicidal ideations and homicidal ideations, and is convincing.  The patient denies any auditory hallucinations or visual hallucinations.  The patient does not endorse significant symptoms consistent with bipolar disorder or a psychotic illness.            LMP:  4 weeks ago.  The patient denies any chance of pregnancy at this time.  The patient was educated that her prescribed medications can have potential risk to a developing fetus. The patient is advised to contact this APRN/this office if she becomes pregnant or plans to become pregnant.  Pt verbalizes understanding and acknowledged agreement with this plan in her own words.          Prior Psychiatric Medications:  Zoloft  Cymbalta  Prozac  Lexapro  Effexor  Trintellix - ineffective   Viibryd 10 mg daily - reports was helpful for mood/anxiety, but caused epigastric pain   Clonidine 0.1 mg nightly - caused heart palpitations      The following portions of the patient's history were reviewed and updated as appropriate: allergies, current medications, past family history, past medical history, past social history, past surgical history and problem list.    Review of Systems   Constitutional: Positive for fatigue. Negative for activity change, appetite change and unexpected weight change.   Psychiatric/Behavioral: Positive for dysphoric mood. Negative for agitation, behavioral problems, confusion, decreased concentration, hallucinations, self-injury, sleep disturbance and suicidal ideas. The patient is nervous/anxious. The patient is not hyperactive.        Objective   Physical Exam  Constitutional:       Appearance: Normal appearance.   Neurological:      Mental Status: She is alert.   Psychiatric:         Attention and Perception: Attention and perception normal.          Mood and Affect: Affect normal. Mood is anxious.         Speech: Speech normal.         Behavior: Behavior normal. Behavior is cooperative.         Thought Content: Thought content normal. Thought content does not include homicidal or suicidal ideation. Thought content does not include homicidal or suicidal plan.         Cognition and Memory: Cognition and memory normal.         Judgment: Judgment normal.       not currently breastfeeding.    The patient was seen remotely today via a MyChart Video Visit through [facility].  Unable to obtain vital signs due to nature of remote visit.  Height stated at 67 inches.  Weight stated at 186 pounds.     Allergies   Allergen Reactions   • Sulfacetamide Hives and Unknown - Low Severity   • Sulfa Antibiotics Hives   • Clove [Syzygium Aromaticum] Rash and GI Intolerance       No results found for this or any previous visit (from the past 2016 hour(s)).    Current Medications:   Current Outpatient Medications   Medication Sig Dispense Refill   • cetirizine (zyrTEC) 10 MG tablet Take 10 mg by mouth Daily.     • clobetasol propionate (CLOBEX) 0.05 % shampoo APPLY TOPICALLY TO THE SCALP AND LET SIT FOR 5-10 MINUTES BEFORE RINSING AS NEEDED.     • colestipol (COLESTID) 1 g tablet Take 2 tablets by mouth Daily. 60 tablet 5   • desonide (DESOWEN) 0.05 % cream apply to the affected areas on scalp and feet twice daily x 2 wks. take week break then use as needed for flares     • Desvenlafaxine Succinate ER 25 MG tablet sustained-release 24 hour Take 1 tablet by mouth Daily. 30 tablet 0   • etonogestrel-ethinyl estradiol (NuvaRing) 0.12-0.015 MG/24HR vaginal ring Insert 1 each into the vagina Every 28 (Twenty-Eight) Days. Insert vaginally and leave in place for 3 consecutive weeks, then remove for 1 week. 3 each 4   • fluticasone (FLONASE) 50 MCG/ACT nasal spray 2 sprays into the nostril(s) as directed by provider Daily.     • hydrOXYzine (ATARAX) 10 MG tablet Take 1 tablet by mouth 3 (Three)  Times a Day As Needed for Anxiety. 30 tablet 0   • hydrOXYzine pamoate (VISTARIL) 25 MG capsule Take 1 capsule by mouth At Night As Needed (anxiety/insomnia). 30 capsule 0   • Lagevrio 200 MG capsule      • lisinopril-hydrochlorothiazide (PRINZIDE,ZESTORETIC) 20-12.5 MG per tablet Take 0.5 tablets by mouth Daily. 90 tablet 1   • omeprazole (priLOSEC) 40 MG capsule Take 1 capsule by mouth Daily. 90 capsule 3   • Otezla 30 MG tablet        No current facility-administered medications for this visit.       Mental Status Exam:   Hygiene:   good  Cooperation:  Cooperative  Eye Contact:  Good  Psychomotor Behavior:  Appropriate  Affect:  Full range  Hopelessness: Denies  Speech:  Normal  Thought Process:  Goal directed  Thought Content:  Normal  Suicidal:  None  Homicidal:  None  Hallucinations:  None  Delusion:  None  Memory:  Intact  Orientation:  Person, Place, Time and Situation  Reliability:  good  Insight:  Good  Judgement:  Good  Impulse Control:  Good  Physical/Medical Issues:  Yes See medical history           Assessment & Plan   Diagnoses and all orders for this visit:    1. Generalized anxiety disorder (Primary)  -     Desvenlafaxine Succinate ER 25 MG tablet sustained-release 24 hour; Take 1 tablet by mouth Daily.  Dispense: 30 tablet; Refill: 0  -     hydrOXYzine (ATARAX) 10 MG tablet; Take 1 tablet by mouth 3 (Three) Times a Day As Needed for Anxiety.  Dispense: 30 tablet; Refill: 0  -     hydrOXYzine pamoate (VISTARIL) 25 MG capsule; Take 1 capsule by mouth At Night As Needed (anxiety/insomnia).  Dispense: 30 capsule; Refill: 0    2. Mild episode of recurrent major depressive disorder (HCC)  -     Desvenlafaxine Succinate ER 25 MG tablet sustained-release 24 hour; Take 1 tablet by mouth Daily.  Dispense: 30 tablet; Refill: 0    3. Psychophysiological insomnia  -     hydrOXYzine pamoate (VISTARIL) 25 MG capsule; Take 1 capsule by mouth At Night As Needed (anxiety/insomnia).  Dispense: 30 capsule; Refill:  0            Visit Diagnoses:    ICD-10-CM ICD-9-CM   1. Generalized anxiety disorder  F41.1 300.02   2. Mild episode of recurrent major depressive disorder (HCC)  F33.0 296.31   3. Psychophysiological insomnia  F51.04 307.42       GOALS:  Short Term Goals: Patient will be compliant with medication, and patient will have no significant medication related side effects.  Patient will be engaged in psychotherapy as indicated.  Patient will report subjective improvement of symptoms.  Long term goals: To stabilize mood and treat/improve subjective symptoms, the patient will stay out of the hospital, the patient will be at an optimal level of functioning, and the patient will take all medications as prescribed.  The patient verbalized understanding and agreement with goals that were mutually set.      SUICIDE RISK ASSESSMENT: Unalterable demographics and a history of mental health intervention indicate this patient is in a high risk category compared to the general population. At present, the patient denies active SI/HI, intentions, or plans at this time and agrees to seek immediate care should such thoughts develop. The patient verbalizes understanding of how to access emergency care if needed and agrees to do so. Consideration of suicide risk and protective factors such as history, current presentation, individual strengths and weaknesses, psychosocial and environmental stressors and variables, psychiatric illness and symptoms, medical conditions and pain, took place in this interview. Based on those considerations, the patient is determined: within individual baseline and presenting no imminent risk for suicide or homicide. Other recommendations: The patient does not meet the criteria for inpatient admission and is not a safety risk to self or others at today's visit. Inpatient treatment offers no significant advantages over outpatient treatment for this patient at today's visit.      SAFETY PLAN:  Patient was given  ample time for questions and fully participated in treatment planning.  Patient was encouraged to call the clinic with any questions or concerns.  Patient was informed of access to emergency care. If patient were to develop any significant symptomatology, suicidal ideation, homicidal ideation, any concerns, or feel unsafe at any time they are to call the clinic and if unable to get immediate assistance should immediately call 911 or go to the nearest emergency room.  The patient is advised to remove or secure (lock away) all lethal weapons (including guns) and sharps (including razors, scissors, knives, etc.).  All medications (including any prescribed and any over the counter medications) should be stored in a safe and secured location that is not obtainable by children/adolescents.  Patient was given an opportunity and encouraged to ask questions about their medication, illness, and treatment. Patient contracted verbally for the following: If you are experiencing an emotional crisis or have thoughts of harming yourself or others, please go to your nearest local emergency room or call 911. Will continue to re-assess medication response and side effects frequently to establish efficacy and ensure safety. Risks, any black box warnings, side effects, off label usage, and benefits of medication and treatment discussed with patient, along with potential adverse side effects of current and/or newly prescribed medication, alternative treatment options, and OTC medications.  Patient verbalized understanding of potential risks, any off label use of medication, any black box warnings, and any side effects in their own words. The patient verbalized understanding and agreed to comply with the safety plan discussed in their own words.  Patient given the number to the office. Number also available to the 24- hour suicide hotline.      TREATMENT PLAN/GOALS: Continue medications and treatment plan as indicated. Treatment and  medication options discussed during today's visit. Patient acknowledged and verbally consented to continue with current treatment plan and was educated on the importance of compliance with treatment and follow-up appointments.        -Continue Pristiq 25 mg daily for anxiety/depression   -Continue Hydroxyzine 25 mg nightly as needed for insomnia/anxiety   -Continue Hydroxyzine 10 mg three times daily as needed for anxiety        MEDICATION ISSUES: Discussed medication options and treatment plan of prescribed medication, any off label use of medication, as well as the risks, benefits, any black box warnings including increased suicidality, and side effects including but not limited to potential falls, dizziness, possible impaired driving, GI side effects (change in appetite, abdominal discomfort, nausea, vomiting, diarrhea, and/or constipation), dry mouth, somnolence, sedation, insomnia, activation, agitation, irritation, tremors, abnormal muscle movements or disorders, headache, sweating, possible bruising or rare bleeding, electrolyte and/or fluid abnormalities, change in blood pressure/heart rate/and or heart rhythm, sexual dysfunction, and metabolic adversities among others. Patient and/or guardian agreeable to call the office with any worsening of symptoms or onset of side effects, or if any concerns or questions arise.  The contact information for the office is made available to the patient and/or guardian.  Patient and/or guardian agreeable to call 911 or go to the nearest ER should they begin having any SI/HI, or if any urgent concerns arise. No medication side effects or related complaints today.    This APRN has discussed with the patient/guardian about the possibility of serotonin syndrome when certain medications are taken together, as is the case with this patient.  This APRN has provided the patient/guardian with a list of symptoms of serotonin syndrome including symptoms of autonomic instability,  altered sensorium, confusion, restlessness, agitation, myoclonus, hyperreflexia, hyperthermia, diaphoresis, tremor, chills, diarrhea and cramps, ataxia, headache, migraines, seizures, and insomnia; which could lead to permanent hyperthermic brain damage, cardiovascular collapse, coma, or even death.  The patient/guardian are instructed to stop medications immediately and either contact this APRN/this office during regular office hours, or go to the emergency department/call 911, if they begin to experience any of the symptoms discussed.  The benefits and risks of the current medication regimen are discussed with the patient/guardian, and they feel that the benefits out weigh the risks.  The patient/guardian verbalized understanding and agreement in their own words.                  [facility] No Show Policy:  We understand unexpected circumstances arise; however, anytime you miss your appointment we are unable to provide you appropriate care.  In addition, each appointment missed could have been used to provide care for others.  We ask that you call at least 24 hours in advance to cancel or reschedule an appointment.  We would like to take this opportunity to remind you of our policy stating patients who miss THREE or more appointments without cancelling or rescheduling 24 hours in advance of the appointment may be subject to cancellation of any further visits with our clinic and recommendation to seek in-person services/visits.    Please call [phone] to reschedule your appointment. If there are reasons that make it difficult for you to keep the appointments, please call and let us know how we can help.  Please understand that medication prescribing will not continue without seeing your provider.      [facility]'s No Show Policy reviewed with patient at today's visit. Patient verbalized understanding of this policy. Discussed with patient that in the event that  there are three or more no show visits, it will be recommended that they pursue in-person services/visits as noncompliance with telehealth visits indicates that patient is not an appropriate candidate for telemedicine and would likely be more appropriate for in-person services/visits. Patient verbalizes understanding and is agreeable to this.          MEDS ORDERED DURING VISIT:  New Medications Ordered This Visit   Medications   • Desvenlafaxine Succinate ER 25 MG tablet sustained-release 24 hour     Sig: Take 1 tablet by mouth Daily.     Dispense:  30 tablet     Refill:  0   • hydrOXYzine (ATARAX) 10 MG tablet     Sig: Take 1 tablet by mouth 3 (Three) Times a Day As Needed for Anxiety.     Dispense:  30 tablet     Refill:  0   • hydrOXYzine pamoate (VISTARIL) 25 MG capsule     Sig: Take 1 capsule by mouth At Night As Needed (anxiety/insomnia).     Dispense:  30 capsule     Refill:  0       Return in about 4 weeks (around 8/24/2022), or if symptoms worsen or fail to improve, for Recheck.        Patient will follow-up in 4 weeks, highly encouraged the patient if she had any questions or concerns to contact the behavioral health [facility] clinic for sooner appointment patient verbalized understanding.      Functional Status: Mild impairment     Prognosis: Fair with Ongoing Treatment             This document has been electronically signed by RAF Alva  July 27, 2022 11:29 EDT       Some of the data in this electronic note has been brought forward from a previous encounter, any necessary changes have been made, it has been reviewed by this APRN, and it is accurate.      Part of this note may be an electronic transcription/translation of spoken language to printed text using the Dragon Dictation System.

## 2022-07-27 NOTE — PROGRESS NOTES
GASTROENTEROLOGY OFFICE NOTE  Lesly Bill  [id number]  1980    CARE TEAM  Patient Care Team:  Oksana Majano MD as PCP - General (Internal Medicine)    Referring Provider: Oksana Majano MD    Chief Complaint   Patient presents with   • Rectal Bleeding   • Irritable Bowel Syndrome   • Heartburn        HISTORY OF PRESENT ILLNESS:  Lesly Bill is a 41 year old female seen today with complaints of diarrhea, intermittent rectal bleeding, acid reflux with past medical history significant for HTN, anxiety, and asthma.     She has previously been followed by  GI and has had an extensive workup for her complaints. She was diagnosed with IBS-D, GERD and anal fissure. Colonoscopy in  November 2020 showed a normal visualized ileum and entirely normal colon.Abnormal mucosa of anus which appear to be a healed fissure.  Random colonic biopsies were unremarkable. In August 2021 she experienced an increase in diarrhea, abdominal pain, and rectal bleeding. Presented to [facility] ED and CT A/P was completed which showed wall thickening of the descending and transverse colon which may reflect low grade inflammatory/infectious colitis although pseudothickening from incomplete distention could also present similiarly. She was started on Augmentin and discharged. She had improvement in symptoms following use of the antibiotic. However, within 3 months she began an increase in postprandial diarrhea and generalized abdominal cramping. Having 2-3 loose BM/day associated with fecal urgency. Workup at that time included fecal calprotectin, GI panel by PCR and O&P which were all normal.     Currently she complains of daily loose stools, having 2-3 daily with an increase in frequency during her menstrual cycle. She has rectal bleeding with bowel movements during her menstrual cycle with associated rectal pain, consistent with previously diagnosed anal fissures.     She complains of frequent heartburn,  occurring on a daily basis. She is prescribed Omeprazole 20 mg daily and famotidine 20 mg at bedtime but she reports she takes these only on an as needed basis.     She denies dysphagia, odynophagia, early satiety, nausea or vomiting.     PAST MEDICAL HISTORY  Past Medical History:   Diagnosis Date   • Acid reflux    • Anxiety    • Fracture    • Hypertension    • IBS (irritable bowel syndrome)    • Psoriasis     [facility]         PAST SURGICAL HISTORY  Past Surgical History:   Procedure Laterality Date   • BREAST BIOPSY  2008   • WRIST SURGERY Left 2015        MEDICATIONS:    Current Outpatient Medications:   •  cetirizine (zyrTEC) 10 MG tablet, Take 10 mg by mouth Daily., Disp: , Rfl:   •  clobetasol propionate (CLOBEX) 0.05 % shampoo, APPLY TOPICALLY TO THE SCALP AND LET SIT FOR 5-10 MINUTES BEFORE RINSING AS NEEDED., Disp: , Rfl:   •  desonide (DESOWEN) 0.05 % cream, apply to the affected areas on scalp and feet twice daily x 2 wks. take week break then use as needed for flares, Disp: , Rfl:   •  Desvenlafaxine Succinate ER 25 MG tablet sustained-release 24 hour, Take 1 tablet by mouth Daily., Disp: 30 tablet, Rfl: 0  •  etonogestrel-ethinyl estradiol (NuvaRing) 0.12-0.015 MG/24HR vaginal ring, Insert 1 each into the vagina Every 28 (Twenty-Eight) Days. Insert vaginally and leave in place for 3 consecutive weeks, then remove for 1 week., Disp: 3 each, Rfl: 4  •  fluticasone (FLONASE) 50 MCG/ACT nasal spray, 2 sprays into the nostril(s) as directed by provider Daily., Disp: , Rfl:   •  hydrOXYzine (ATARAX) 10 MG tablet, Take 1 tablet by mouth 3 (Three) Times a Day As Needed for Anxiety., Disp: 30 tablet, Rfl: 0  •  Lagevrio 200 MG capsule, , Disp: , Rfl:   •  lisinopril-hydrochlorothiazide (PRINZIDE,ZESTORETIC) 20-12.5 MG per tablet, Take 0.5 tablets by mouth Daily., Disp: 90 tablet, Rfl: 1  •  Otezla 30 MG tablet, , Disp: , Rfl:   •  colestipol (COLESTID) 1 g tablet, Take 2 tablets by mouth Daily.,  "Disp: 60 tablet, Rfl: 5  •  omeprazole (priLOSEC) 40 MG capsule, Take 1 capsule by mouth Daily., Disp: 90 capsule, Rfl: 3    ALLERGIES  Allergies   Allergen Reactions   • Sulfacetamide Hives and Unknown - Low Severity   • Sulfa Antibiotics Hives   • Clove [Syzygium Aromaticum] Rash and GI Intolerance       FAMILY HISTORY:  Family History   Problem Relation Age of Onset   • Hypertension Mother    • Cancer Father    • Hypertension Father    • Ovarian cancer Paternal Grandmother    • Anxiety disorder Maternal Grandmother    • Breast cancer Neg Hx    • Uterine cancer Neg Hx    • Colon cancer Neg Hx    • Osteoporosis Neg Hx        SOCIAL HISTORY  Social History     Socioeconomic History   • Marital status: Single   • Number of children: 0   Tobacco Use   • Smoking status: Never Smoker   • Smokeless tobacco: Never Used   Vaping Use   • Vaping Use: Never used   Substance and Sexual Activity   • Alcohol use: Yes     Comment: rarely   • Drug use: Never   • Sexual activity: Not Currently     Partners: Male           PHYSICAL EXAM   /78   Pulse 80   Temp 98 °F (36.7 °C)   Resp 18   Ht 170.2 cm (67.01\")   Wt 84.7 kg (186 lb 12.8 oz)   SpO2 99%   BMI 29.25 kg/m²   Physical Exam  Vitals and nursing note reviewed.   Constitutional:       Appearance: Normal appearance. She is well-developed.   HENT:      Head: Normocephalic and atraumatic.      Nose: Nose normal.      Mouth/Throat:      Mouth: Mucous membranes are moist.      Pharynx: Oropharynx is clear.   Eyes:      Pupils: Pupils are equal, round, and reactive to light.   Cardiovascular:      Rate and Rhythm: Normal rate and regular rhythm.   Pulmonary:      Effort: Pulmonary effort is normal.      Breath sounds: Normal breath sounds. No wheezing or rales.   Abdominal:      General: Bowel sounds are normal.      Palpations: Abdomen is soft. There is no mass.      Tenderness: There is no abdominal tenderness. There is no guarding or rebound.      Hernia: No hernia is " present.   Musculoskeletal:         General: Normal range of motion.      Cervical back: Normal range of motion and neck supple.   Skin:     General: Skin is warm and dry.   Neurological:      Mental Status: She is alert and oriented to person, place, and time.      Cranial Nerves: No cranial nerve deficit.   Psychiatric:         Behavior: Behavior normal.         Judgment: Judgment normal.         Results Review:  Component   Ref Range & Units 8 mo ago   CALPROTECTIN   <=49 ug/g 13    Resulting Agency [facility] ([place])     Narrative  Performed by [facility] ([place])  REFERENCE INTERVAL: Calprotectin, Fecal by Immunoassay       Less than 50 ug/g.........Normal      ug/g...............Borderline elevated, test should be                               re-evaluated in 4-6 weeks.     121 ug/g or greater.......Elevated   Performed By: [facility]   [address] [place] [id number]   : Apple White MD  Specimen Collected: 11/04/21  5:29 PM Last Resulted: 11/09/21  6:52 AM   Received From: [facility]  Result Received: 05/31/22  3:26 PM     DEPARTMENT OF PATHOLOGY   AND LABORATORY MEDICINE   SURGICAL PATHOLOGY REPORT   Phone: [phone] Fax:  [phone]   Email: [email or web address]       ATTENDING MD: REJI Silverman MD     Service: [facility]     Location: [facility]   OTHER MD(S):      Reported: 11/9/2020 12:49   DIAGNOSIS   COLON, RANDOM BIOPSY:    - NO PATHOLOGIC ABNORMALITY        Electronically Signed Out        yx/11/9/2020 DU Roberts MD ( RES )        CLINICAL HISTORY   Working diagnosis: Diarrhea, hematochezia   Operative findings: The examined portion of the ileum was normal. The entire examined   colon is normal. Abnormal mucosa of anus which appear to be a healed fissure.   Operative procedure: Colonoscopy     DESCRIPTION OF SPECIMEN:   A:  Random colon biopsy   GROSS DESCRIPTION   Specimen is received in formalin labeled  "\"random colon biopsy\" and consists of six   white-tan soft tissue fragments measuring 0.2-0.5 cm in greatest dimension. Entirely   submitted in cassette A1.   bp/11/6/2020 JEFF LANDAVERDE resident may have participated in this service.  A pathologist has performed and is   responsible for the reported pathologic evaluation.   ICD: R19.7     Diarrhea, unspecified   K92.1     Melena    SNOMED CODES:   A; [id number]    F: A; [id number]   Specimen Collected: 11/06/20 12:29 PM Last Resulted: 11/09/20 12:49 PM   Received From: [facility]  Result Received: 05/31/22  3:26 PM         ASSESSMENT / PLAN  1. IBS-D  -Colestid 1-2 grams daily  2. GERD  -Omeprazole 40 mg daily    Return in about 3 months (around 10/27/2022).    I discussed the patients findings and my recommendations with patient    RAF Murguia                    "

## 2022-08-10 ENCOUNTER — OFFICE VISIT (OUTPATIENT)
Dept: OBSTETRICS AND GYNECOLOGY | Facility: CLINIC | Age: 42
End: 2022-08-10

## 2022-08-10 VITALS — DIASTOLIC BLOOD PRESSURE: 70 MMHG | WEIGHT: 186.6 LBS | SYSTOLIC BLOOD PRESSURE: 100 MMHG | BODY MASS INDEX: 29.22 KG/M2

## 2022-08-10 DIAGNOSIS — Z30.09 GENERAL COUNSELING AND ADVICE FOR CONTRACEPTIVE MANAGEMENT: ICD-10-CM

## 2022-08-10 DIAGNOSIS — Z01.419 WELL WOMAN EXAM WITH ROUTINE GYNECOLOGICAL EXAM: Primary | ICD-10-CM

## 2022-08-10 DIAGNOSIS — I10 ESSENTIAL HYPERTENSION: ICD-10-CM

## 2022-08-10 DIAGNOSIS — Z12.31 BREAST CANCER SCREENING BY MAMMOGRAM: ICD-10-CM

## 2022-08-10 DIAGNOSIS — K62.5 RECTAL BLEEDING: ICD-10-CM

## 2022-08-10 PROBLEM — H26.9 CATARACT, IMMATURE: Status: ACTIVE | Noted: 2021-06-24

## 2022-08-10 PROBLEM — H43.811 POSTERIOR VITREOUS DETACHMENT OF RIGHT EYE: Status: ACTIVE | Noted: 2020-07-23

## 2022-08-10 PROBLEM — H35.373 EPIRETINAL MEMBRANE, BOTH EYES: Status: ACTIVE | Noted: 2021-06-24

## 2022-08-10 PROCEDURE — 99396 PREV VISIT EST AGE 40-64: CPT | Performed by: OBSTETRICS & GYNECOLOGY

## 2022-08-10 RX ORDER — ACETAMINOPHEN AND CODEINE PHOSPHATE 120; 12 MG/5ML; MG/5ML
1 SOLUTION ORAL DAILY
Qty: 28 TABLET | Refills: 12 | Status: SHIPPED | OUTPATIENT
Start: 2022-08-10 | End: 2022-11-21

## 2022-08-10 RX ORDER — DROSPIRENONE 4 MG/1
4 TABLET, FILM COATED ORAL DAILY
Qty: 28 TABLET | Refills: 11 | Status: SHIPPED | OUTPATIENT
Start: 2022-08-10

## 2022-08-10 RX ORDER — COVID-19 ANTIGEN TEST
KIT MISCELLANEOUS
COMMUNITY
Start: 2022-08-09 | End: 2022-09-21

## 2022-08-10 NOTE — PROGRESS NOTES
Subjective   Chief Complaint   Patient presents with   • Annual Exam     No c/c     Lesly Bill is a 41 y.o. year old  presenting to be seen for her annual exam.     SEXUAL Hx:  She is not currently sexually active.  In the past year there she has not been sexually active.    Condoms are not needed because she is not sexually active.  She would not like to be screened for STD's at today's exam.  Current birth control method: abstinence and NuvaRing.  She is happy with her current method of contraception and does not want to discuss alternative methods of contraception. She just recently started the NuvaRing about 1.5 months ago. She is currently on an anti-hypertensive and she reports she has been for years and has required a hospitalization for BP control in the past.   MENSTRUAL Hx:  Patient's last menstrual period was 2022 (within days).  In the past 6 months her cycles have been regular, predictable and occur monthly.  Her menstrual flow is typically normal.   Each month on average there are roughly 2 day(s) of very heavy flow.    Duration about 5 days  Intermenstrual bleeding is absent.    Post-coital bleeding is absent.  Dysmenorrhea: moderate and affecting her activities of daily living  PMS: none  Her cycles ARE a source of concern for her that she wishes to discuss today.  HEALTH Hx:  She exercises regularly: yes.  She wears her seat belt: yes.  She has concerns about domestic violence: no.  OTHER THINGS SHE WANTS TO DISCUSS TODAY:  She was seen by Bernie around end of . She has only had one episode of rectal bleeding since then. She has had GI referral and they think it potentially could be related to IBS-D. She has follow up with them in October.     The following portions of the patient's history were reviewed and updated as appropriate:problem list, current medications, allergies, past family history, past medical history, past social history and past surgical  history.    Social History    Tobacco Use      Smoking status: Never Smoker      Smokeless tobacco: Never Used    Review of Systems  Constitutional POS: nothing reported    NEG: anorexia or night sweats   Genitourinary POS: nothing reported    NEG: dysuria or hematuria      Gastointestinal POS: nothing reported and Chronic IBS issues    NEG: bloating, change in bowel habits, melena or reflux symptoms   Integument POS: nothing reported    NEG: moles that are changing in size, shape, color or rashes   Breast POS: nothing reported    NEG: persistent breast lump, skin dimpling or nipple discharge        Objective   /70 (BP Location: Right arm, Patient Position: Sitting, Cuff Size: Adult)   Wt 84.6 kg (186 lb 9.6 oz)   LMP 07/27/2022 (Within Days)   Breastfeeding No   BMI 29.22 kg/m²     General:  well developed; well nourished  no acute distress   Skin:  No suspicious lesions seen   Thyroid: normal to inspection and palpation   Breasts:  Examined in supine position  Symmetric without masses or skin dimpling  Nipples normal without inversion, lesions or discharge  There are no palpable axillary nodes   Abdomen: soft, non-tender; no masses  no umbilical or inguinal hernias are present  no hepato-splenomegaly   Pelvis: Clinical staff was present for exam  External genitalia:  normal appearance of the external genitalia including Bartholin's and [place]'s glands.  :  urethral meatus normal;  Vaginal:  normal pink mucosa without prolapse or lesions.  Cervix:  normal appearance.  Uterus:  normal size, shape and consistency.  Adnexa:  normal bimanual exam of the adnexa.  Rectal:  digital rectal exam not performed; anus visually normal appearing. recto-vaginal exam unremarkable and confirms findings;        Assessment   1. Normal GYN exam  2. History of rectal bleeding - ?improving, history of normal colonoscopy at , s/p GI consult with follow up in ~ 2 months  3. Hypertension on medication  4. Contraceptive  counseling      Plan   Pap was not done today.  I explained to Lesly that the recommendations for Pap smear interval in a low risk patient has lengthened to 3 years time.  I told Lesly she still needs to be seen in our office yearly for a full physical including breast and pelvic exam.  She was encouraged to get yearly mammograms.  She should report any palpable breast lump(s) or skin changes regardless of mammographic findings.  I explained to Lesly that notification regarding her mammogram results will come from the center performing the study.  Our office will not be routinely calling with mammogram results.  It is her responsibility to make sure that the results from the mammogram are communicated to her by the breast center.  If she has any questions about the results, she is welcome to call our office anytime.  Recommended not being on a CHC when being treated for hypertension. We reviewed other hormonal contraceptive options not containing estrogen and she desires to try a POP. Slynd rx sent to [facility] but I do not have samples available today. Micronor rx sent to her regular [facility] pharmacy to make sure she has something to start as I would recommend removing the nuvaring. She verbalized understanding   Reviewed the importance of exercise 2-3 times per week with at least 20-30 minutes of cardio  The importance of keeping all planned follow-up and taking all medications as prescribed was emphasized.  Follow up for annual exam in ~one year and in 3 months for recheck of above     New Medications Ordered This Visit   Medications   • Drospirenone (Slynd) 4 MG tablet     Sig: Take 4 mg by mouth Daily.     Dispense:  28 tablet     Refill:  11   • norethindrone (MICRONOR) 0.35 MG tablet     Sig: Take 1 tablet by mouth Daily.     Dispense:  28 tablet     Refill:  12          This note was electronically signed.    Rola Banerjee MD  August 10, 2022

## 2022-08-19 ENCOUNTER — HOSPITAL ENCOUNTER (OUTPATIENT)
Dept: MAMMOGRAPHY | Facility: HOSPITAL | Age: 42
Discharge: HOME | End: 2022-08-19

## 2022-08-23 ENCOUNTER — TELEMEDICINE (OUTPATIENT)
Dept: PSYCHIATRY | Facility: CLINIC | Age: 42
End: 2022-08-23

## 2022-08-23 DIAGNOSIS — F51.04 PSYCHOPHYSIOLOGICAL INSOMNIA: Chronic | ICD-10-CM

## 2022-08-23 DIAGNOSIS — F41.1 GENERALIZED ANXIETY DISORDER: Chronic | ICD-10-CM

## 2022-08-23 DIAGNOSIS — F33.0 MILD EPISODE OF RECURRENT MAJOR DEPRESSIVE DISORDER: Chronic | ICD-10-CM

## 2022-08-23 PROCEDURE — 99214 OFFICE O/P EST MOD 30 MIN: CPT | Performed by: NURSE PRACTITIONER

## 2022-08-23 RX ORDER — DESVENLAFAXINE 25 MG/1
25 TABLET, EXTENDED RELEASE ORAL DAILY
Qty: 30 TABLET | Refills: 0 | Status: SHIPPED | OUTPATIENT
Start: 2022-08-23 | End: 2022-09-21 | Stop reason: SDUPTHER

## 2022-08-23 RX ORDER — HYDROXYZINE HYDROCHLORIDE 10 MG/1
10 TABLET, FILM COATED ORAL 3 TIMES DAILY PRN
Qty: 30 TABLET | Refills: 0 | Status: SHIPPED | OUTPATIENT
Start: 2022-08-23 | End: 2022-09-21 | Stop reason: SDUPTHER

## 2022-08-23 RX ORDER — HYDROXYZINE PAMOATE 25 MG/1
25 CAPSULE ORAL NIGHTLY PRN
Qty: 30 CAPSULE | Refills: 0 | Status: SHIPPED | OUTPATIENT
Start: 2022-08-23 | End: 2022-09-21 | Stop reason: SDUPTHER

## 2022-08-23 NOTE — PROGRESS NOTES
"This provider is located at the [facility] (through [facility]), [address], [place], [address] using a secure [employer] Video Visit through [employer]. Patient is being seen remotely via telehealth at their home address in [place], and stated they are in a secure environment for this session. The patient's condition being diagnosed/treated is appropriate for telemedicine. The provider identified herself as well as her credentials. The patient, and/or patients guardian, consent to be seen remotely, and when consent is given they understand that the consent allows for patient identifiable information to be sent to a third party as needed. They may refuse to be seen remotely at any time. The electronic data is encrypted and password protected, and the patient and/or guardian has been advised of the potential risks to privacy not withstanding such measures.    You have chosen to receive care through a telehealth visit.  Do you consent to use a video/audio connection for your medical care today? Yes     Subjective   Lesly Bill is a 41 y.o. female who is here today for medication management follow up.    Chief Complaint: Depression, anxiety, insomnia     History of Present Illness:  The patient describes her mood as \"more level\" over the last few weeks.  The patient endorses that she has continued to notice improvements in her mood and anxiety over the past few weeks.  The patient endorses that she has been doing really well and endorses that her mood feels \"more steady\" and states that she is not having the \"ups and downs\" what she had been.  She states that she thinks the Pristiq has been very helpful for this but also states that she has been doing a lot of nonpharmacologic behavioral interventions and coping mechanisms that she feels also contributed to improvements she has noticed recently.  The patient states her example that she going to the gym again and " also focusing on eating a healthier diet.  She endorses that she has been focusing on getting to sleep earlier and endorses having she has been sleeping better and overall recently, which she endorses has also been helpful for her.  She states that she takes the hydroxyzine approximately 1 hour before she wants to go to sleep and she is not having nearly as much trouble falling asleep currently.  She endorses that it is a trigger several hours to fall asleep but now she is not having much difficulty at all.  The patient denies any nightmares or bad dreams.  She endorses that she feels more rested upon awakening.  She states that she has been working on getting rid of a lot of things in her house that she has been mean to address for a long time.  She endorses that this was a significant source of stress for her, so states that since she has been able to make some progress on cleaning things out she has noticed she is less stressed and her mood has been improved.  The patient endorses that she has continued to have more anxiety than depression recently, but endorses that now she feels that she is better able to manage and control her anxiety which has been a significant improvement for her.  The patient reports her appetite as good.  The patient denies any new medical problems or changes in medications since last appointment with this facility.  The patient reports compliance with current medication regimen.  The patient denies any current side effects from her current medication regimen.  The patient denies any abnormal muscle movements or tics.  The patient rates her depression at a 2/10 on a 0-10 scale, with 10 being the worst.  The patient rates her anxiety at a 3/10 on a 0-10 scale, with 10 being the worst.  The patient rates hopelessness at a 0/10 on a 0-10 scale with 10 being the worst.  The patient would like to not adjust or change her medications at this visit.  The patient denies suicidal ideations and  homicidal ideations, and is convincing.  The patient denies any auditory hallucinations or visual hallucinations.  The patient does not endorse significant symptoms consistent with bipolar disorder or a psychotic illness.                  LMP:  3.5-4 weeks ago.  The patient denies any chance of pregnancy at this time.  The patient was educated that her prescribed medications can have potential risk to a developing fetus. The patient is advised to contact this APRN/this office if she becomes pregnant or plans to become pregnant.  Pt verbalizes understanding and acknowledged agreement with this plan in her own words.          Prior Psychiatric Medications:  Zoloft  Cymbalta  Prozac  Lexapro  Effexor  Trintellix - ineffective   Viibryd 10 mg daily - reports was helpful for mood/anxiety, but caused epigastric pain   Clonidine 0.1 mg nightly - caused heart palpitations      The following portions of the patient's history were reviewed and updated as appropriate: allergies, current medications, past family history, past medical history, past social history, past surgical history and problem list.    Review of Systems   Constitutional: Negative for activity change, appetite change, fatigue and unexpected weight change.   Psychiatric/Behavioral: Positive for dysphoric mood. Negative for agitation, behavioral problems, confusion, decreased concentration, hallucinations, self-injury, sleep disturbance and suicidal ideas. The patient is nervous/anxious. The patient is not hyperactive.        Objective   Physical Exam  Constitutional:       Appearance: Normal appearance.   Neurological:      Mental Status: She is alert.   Psychiatric:         Attention and Perception: Attention and perception normal.         Mood and Affect: Affect normal. Mood is anxious. Mood is not depressed.         Speech: Speech normal.         Behavior: Behavior normal. Behavior is cooperative.         Thought Content: Thought content normal. Thought  content does not include homicidal or suicidal ideation. Thought content does not include homicidal or suicidal plan.         Cognition and Memory: Cognition and memory normal.         Judgment: Judgment normal.       Last menstrual period 07/27/2022, not currently breastfeeding.    The patient was seen remotely today via a MyChart Video Visit through [facility].  Unable to obtain vital signs due to nature of remote visit.  Height stated at 67 inches.  Weight stated at 186 pounds.     Allergies   Allergen Reactions   • Sulfacetamide Hives and Unknown - Low Severity   • Sulfa Antibiotics Hives   • Clove [Syzygium Aromaticum] Rash and GI Intolerance       No results found for this or any previous visit (from the past 2016 hour(s)).    Current Medications:   Current Outpatient Medications   Medication Sig Dispense Refill   • cetirizine (zyrTEC) 10 MG tablet Take 10 mg by mouth Daily.     • clobetasol propionate (CLOBEX) 0.05 % shampoo APPLY TOPICALLY TO THE SCALP AND LET SIT FOR 5-10 MINUTES BEFORE RINSING AS NEEDED.     • colestipol (COLESTID) 1 g tablet Take 2 tablets by mouth Daily. 60 tablet 5   • desonide (DESOWEN) 0.05 % cream apply to the affected areas on scalp and feet twice daily x 2 wks. take week break then use as needed for flares     • Desvenlafaxine Succinate ER 25 MG tablet sustained-release 24 hour Take 1 tablet by mouth Daily. 30 tablet 0   • Drospirenone (Slynd) 4 MG tablet Take 4 mg by mouth Daily. 28 tablet 11   • Flowflex COVID-19 Ag Home Test kit      • fluticasone (FLONASE) 50 MCG/ACT nasal spray 2 sprays into the nostril(s) as directed by provider Daily.     • hydrOXYzine (ATARAX) 10 MG tablet Take 1 tablet by mouth 3 (Three) Times a Day As Needed for Anxiety. 30 tablet 0   • hydrOXYzine pamoate (VISTARIL) 25 MG capsule Take 1 capsule by mouth At Night As Needed (anxiety/insomnia). 30 capsule 0   • Lagevrio 200 MG capsule      • lisinopril-hydrochlorothiazide (PRINZIDE,ZESTORETIC) 20-12.5 MG per  tablet Take 0.5 tablets by mouth Daily. 90 tablet 1   • norethindrone (MICRONOR) 0.35 MG tablet Take 1 tablet by mouth Daily. 28 tablet 12   • omeprazole (priLOSEC) 40 MG capsule Take 1 capsule by mouth Daily. 90 capsule 3   • Otezla 30 MG tablet        No current facility-administered medications for this visit.       Mental Status Exam:   Hygiene:   good  Cooperation:  Cooperative  Eye Contact:  Good  Psychomotor Behavior:  Appropriate  Affect:  Full range  Hopelessness: Denies  Speech:  Normal  Thought Process:  Goal directed  Thought Content:  Normal  Suicidal:  None  Homicidal:  None  Hallucinations:  None  Delusion:  None  Memory:  Intact  Orientation:  Person, Place, Time and Situation  Reliability:  good  Insight:  Good  Judgement:  Good  Impulse Control:  Good  Physical/Medical Issues:  Yes See medical history           Assessment & Plan   Diagnoses and all orders for this visit:    1. Generalized anxiety disorder  -     Desvenlafaxine Succinate ER 25 MG tablet sustained-release 24 hour; Take 1 tablet by mouth Daily.  Dispense: 30 tablet; Refill: 0  -     hydrOXYzine (ATARAX) 10 MG tablet; Take 1 tablet by mouth 3 (Three) Times a Day As Needed for Anxiety.  Dispense: 30 tablet; Refill: 0  -     hydrOXYzine pamoate (VISTARIL) 25 MG capsule; Take 1 capsule by mouth At Night As Needed (anxiety/insomnia).  Dispense: 30 capsule; Refill: 0    2. Mild episode of recurrent major depressive disorder (HCC)  -     Desvenlafaxine Succinate ER 25 MG tablet sustained-release 24 hour; Take 1 tablet by mouth Daily.  Dispense: 30 tablet; Refill: 0    3. Psychophysiological insomnia  -     hydrOXYzine pamoate (VISTARIL) 25 MG capsule; Take 1 capsule by mouth At Night As Needed (anxiety/insomnia).  Dispense: 30 capsule; Refill: 0            Visit Diagnoses:    ICD-10-CM ICD-9-CM   1. Generalized anxiety disorder  F41.1 300.02   2. Mild episode of recurrent major depressive disorder (HCC)  F33.0 296.31   3. Psychophysiological  insomnia  F51.04 307.42       GOALS:  Short Term Goals: Patient will be compliant with medication, and patient will have no significant medication related side effects.  Patient will be engaged in psychotherapy as indicated.  Patient will report subjective improvement of symptoms.  Long term goals: To stabilize mood and treat/improve subjective symptoms, the patient will stay out of the hospital, the patient will be at an optimal level of functioning, and the patient will take all medications as prescribed.  The patient verbalized understanding and agreement with goals that were mutually set.      SUICIDE RISK ASSESSMENT: Unalterable demographics and a history of mental health intervention indicate this patient is in a high risk category compared to the general population. At present, the patient denies active SI/HI, intentions, or plans at this time and agrees to seek immediate care should such thoughts develop. The patient verbalizes understanding of how to access emergency care if needed and agrees to do so. Consideration of suicide risk and protective factors such as history, current presentation, individual strengths and weaknesses, psychosocial and environmental stressors and variables, psychiatric illness and symptoms, medical conditions and pain, took place in this interview. Based on those considerations, the patient is determined: within individual baseline and presenting no imminent risk for suicide or homicide. Other recommendations: The patient does not meet the criteria for inpatient admission and is not a safety risk to self or others at today's visit. Inpatient treatment offers no significant advantages over outpatient treatment for this patient at today's visit.      SAFETY PLAN:  Patient was given ample time for questions and fully participated in treatment planning.  Patient was encouraged to call the clinic with any questions or concerns.  Patient was informed of access to emergency care. If  patient were to develop any significant symptomatology, suicidal ideation, homicidal ideation, any concerns, or feel unsafe at any time they are to call the clinic and if unable to get immediate assistance should immediately call 911 or go to the nearest emergency room.  The patient is advised to remove or secure (lock away) all lethal weapons (including guns) and sharps (including razors, scissors, knives, etc.).  All medications (including any prescribed and any over the counter medications) should be stored in a safe and secured location that is not obtainable by children/adolescents.  Patient was given an opportunity and encouraged to ask questions about their medication, illness, and treatment. Patient contracted verbally for the following: If you are experiencing an emotional crisis or have thoughts of harming yourself or others, please go to your nearest local emergency room or call 911. Will continue to re-assess medication response and side effects frequently to establish efficacy and ensure safety. Risks, any black box warnings, side effects, off label usage, and benefits of medication and treatment discussed with patient, along with potential adverse side effects of current and/or newly prescribed medication, alternative treatment options, and OTC medications.  Patient verbalized understanding of potential risks, any off label use of medication, any black box warnings, and any side effects in their own words. The patient verbalized understanding and agreed to comply with the safety plan discussed in their own words.  Patient given the number to the office. Number also available to the 24- hour suicide hotline.      TREATMENT PLAN/GOALS: Continue medications and treatment plan as indicated. Treatment and medication options discussed during today's visit. Patient acknowledged and verbally consented to continue with current treatment plan and was educated on the importance of compliance with treatment and  follow-up appointments.        -Continue Pristiq 25 mg daily for anxiety/depression   -Continue Hydroxyzine 25 mg nightly as needed for insomnia/anxiety   -Continue Hydroxyzine 10 mg three times daily as needed for anxiety        MEDICATION ISSUES: Discussed medication options and treatment plan of prescribed medication, any off label use of medication, as well as the risks, benefits, any black box warnings including increased suicidality, and side effects including but not limited to potential falls, dizziness, possible impaired driving, GI side effects (change in appetite, abdominal discomfort, nausea, vomiting, diarrhea, and/or constipation), dry mouth, somnolence, sedation, insomnia, activation, agitation, irritation, tremors, abnormal muscle movements or disorders, headache, sweating, possible bruising or rare bleeding, electrolyte and/or fluid abnormalities, change in blood pressure/heart rate/and or heart rhythm, sexual dysfunction, and metabolic adversities among others. Patient and/or guardian agreeable to call the office with any worsening of symptoms or onset of side effects, or if any concerns or questions arise.  The contact information for the office is made available to the patient and/or guardian.  Patient and/or guardian agreeable to call 911 or go to the nearest ER should they begin having any SI/HI, or if any urgent concerns arise. No medication side effects or related complaints today.    This APRN has discussed with the patient/guardian about the possibility of serotonin syndrome when certain medications are taken together, as is the case with this patient.  This APRN has provided the patient/guardian with a list of symptoms of serotonin syndrome including symptoms of autonomic instability, altered sensorium, confusion, restlessness, agitation, myoclonus, hyperreflexia, hyperthermia, diaphoresis, tremor, chills, diarrhea and cramps, ataxia, headache, migraines, seizures, and insomnia; which  could lead to permanent hyperthermic brain damage, cardiovascular collapse, coma, or even death.  The patient/guardian are instructed to stop medications immediately and either contact this APRN/this office during regular office hours, or go to the emergency department/call 911, if they begin to experience any of the symptoms discussed.  The benefits and risks of the current medication regimen are discussed with the patient/guardian, and they feel that the benefits out weigh the risks.  The patient/guardian verbalized understanding and agreement in their own words.                  [facility] No Show Policy:  We understand unexpected circumstances arise; however, anytime you miss your appointment we are unable to provide you appropriate care.  In addition, each appointment missed could have been used to provide care for others.  We ask that you call at least 24 hours in advance to cancel or reschedule an appointment.  We would like to take this opportunity to remind you of our policy stating patients who miss THREE or more appointments without cancelling or rescheduling 24 hours in advance of the appointment may be subject to cancellation of any further visits with our clinic and recommendation to seek in-person services/visits.    Please call [phone] to reschedule your appointment. If there are reasons that make it difficult for you to keep the appointments, please call and let us know how we can help.  Please understand that medication prescribing will not continue without seeing your provider.      [facility]'s No Show Policy reviewed with patient at today's visit. Patient verbalized understanding of this policy. Discussed with patient that in the event that there are three or more no show visits, it will be recommended that they pursue in-person services/visits as noncompliance with telehealth visits indicates that patient is not an appropriate candidate for  telemedicine and would likely be more appropriate for in-person services/visits. Patient verbalizes understanding and is agreeable to this.          MEDS ORDERED DURING VISIT:  New Medications Ordered This Visit   Medications   • Desvenlafaxine Succinate ER 25 MG tablet sustained-release 24 hour     Sig: Take 1 tablet by mouth Daily.     Dispense:  30 tablet     Refill:  0   • hydrOXYzine (ATARAX) 10 MG tablet     Sig: Take 1 tablet by mouth 3 (Three) Times a Day As Needed for Anxiety.     Dispense:  30 tablet     Refill:  0   • hydrOXYzine pamoate (VISTARIL) 25 MG capsule     Sig: Take 1 capsule by mouth At Night As Needed (anxiety/insomnia).     Dispense:  30 capsule     Refill:  0       Return in about 4 weeks (around 9/20/2022), or if symptoms worsen or fail to improve, for Recheck.        Patient will follow-up in 4 weeks, highly encouraged the patient if she had any questions or concerns to contact the behavioral health virtual clinic for sooner appointment patient verbalized understanding.      Functional Status: Mild impairment     Prognosis: Fair with Ongoing Treatment             This document has been electronically signed by RAF Alva  August 23, 2022 11:43 EDT       Some of the data in this electronic note has been brought forward from a previous encounter, any necessary changes have been made, it has been reviewed by this APRN, and it is accurate.      Part of this note may be an electronic transcription/translation of spoken language to printed text using the Dragon Dictation System.

## 2022-09-01 ENCOUNTER — TELEMEDICINE (OUTPATIENT)
Dept: PSYCHIATRY | Facility: CLINIC | Age: 42
End: 2022-09-01

## 2022-09-01 DIAGNOSIS — F41.1 GENERALIZED ANXIETY DISORDER: Primary | ICD-10-CM

## 2022-09-01 PROCEDURE — 90791 PSYCH DIAGNOSTIC EVALUATION: CPT | Performed by: COUNSELOR

## 2022-09-01 NOTE — PROGRESS NOTES
This provider is located at the [facility] (through [facility]), [address], [place] [address] using a secure [employer] Video Visit through [employer]. Patient is being seen remotely via telehealth at home address in [place] and stated they are in a secure environment for this session. The patient's condition being diagnosed/treated is appropriate for telemedicine. The provider identified herself as well as her credentials. The patient, and/or patients guardian, consent to be seen remotely, and when consent is given they understand that the consent allows for patient identifiable information to be sent to a third party as needed. They may refuse to be seen remotely at any time. The electronic data is encrypted and password protected, and the patient and/or guardian has been advised of the potential risks to privacy not withstanding such measures.     You have chosen to receive care through a telehealth visit.  Do you consent to use a video/audio connection for your medical care today? Yes    Subjective   Lesly Bill is a 42 y.o. female who presents today for initial evaluation  for therapy. Patient has been in therapy prior, however patient was still explain therapeutic process and confidentiality.  Patient stated that she is trying to find balance. Patient stated she has awareness and wants to navigate those little bumps. Patient stated that she has stressors and needs to have the ability to focus. Patient stated that she doesn't sleep well. Patient has difficulty with going to sleep. Patient expressed that she has thoughts that make it difficulty shutting her brain down. Patient stated that it is mostly anxiety that is occurring that they it can turn into depression. Patient has rarely had panic attacks. Patient discussed that at times her anxiety will reach a tipping point. Patient stated that she is always wanting validation. Patient expressed a history  of bullying bullied when she was younger. Patient stated that she often feels like her feelings are being hurt or that she's not accepted. Patient stated that she has done a lot for her sister and that the expectation is that she drops what she is doing for others.  Patient adamantly and convincingly denies current suicidal or homicidal ideation or perceptual disturbance.      Time: 12:32 pm   Name of PCP:  Oksana Majano   Referral source: Sameera Moya    Chief Complaint:  Anxiety         Childhood Experiences:   Has patient experienced a major accident or tragic events as a child? Yes  Patient grew up in a 2 parent home. Patient stated that her dad drank a lt. Patient stated that her mom wants to be in control all the time. Patient also has a sister. Patient stated that she got pushed to do a lot of things because she was first. Patient stated that she got a lot of awards in school for citizenship and behavior because she wouldn't talk or interact.  Patient stated that she never felt like she fit in. Patient stated in elementary school none of the girls would hang out with her. Patient stated that there was club and she wasn't invited to join. Patient expressed that she went to a private [employer] and then she got to high school. Patient stated that people would throw gum in her hair and that the bullying occurred everyday. Patient stated that she thought that it was her.     Has patient experienced any other significant life events or trauma (such as verbal, physical, sexual abuse)? no      Significant Life Events:  Has patient been through or witnessed a divorce? no  Patient stated that her grandparents prior to her     Has patient experienced a death / loss of relationship? yes  Patient stated her grandmother passed away when she first turned 17. Patient stated this was the first death experienced.  Patients father passed away in 2016    Has patient experienced a major accident or tragic events?  yes  Patient stated that her big thing for stress relief was weightlifting. Patient stated that she had to have repair done to a wrist. Patient stated that she had to find ways in which release endorphins. Patent stated that she had to have therapy in order to address. Patient stated that no one helped her and that she had to do a lot of things on her own.     Has patient experienced any other significant life events or trauma (such as verbal, physical, sexual abuse)? no    Social History:   Social History     Socioeconomic History   • Marital status: Single   • Number of children: 0   Tobacco Use   • Smoking status: Never Smoker   • Smokeless tobacco: Never Used   Vaping Use   • Vaping Use: Never used   Substance and Sexual Activity   • Alcohol use: Yes     Comment: rarely   • Drug use: Never   • Sexual activity: Not Currently     Partners: Male     Marital Status: single    Patient's current living situation: Patient stated that she owns her home and she has 3 cats. Patient stated that her nephew comes over 3 nights a week.     Support system: extended family and friends    Difficulty getting along with peers: no    Difficulty making new friendships: no    Difficulty maintaining friendships: no  Patient provided an example of her close friend Don who moved to [place] but they are maintain friendships.     Close with family members: Patient stated that they are around a lot. Patient stated that they are a loving family but somewhat judges.    Religous: Patient stated that she is a part of [employer] and does a lot of volunteer work but does not consider self very Jainism.3    Work History:  Highest level of education obtained: Masters Degree    Ever been active duty in the ? 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    Describe patient's current and past work experience: Patient worked at [employer] for  about 17 years. 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     Legal History:  The patient has no significant history of legal issues.    Past Medical History:  Past Medical History:   Diagnosis Date   • Acid reflux    • Anxiety    • Fracture    • Hypertension    • IBS (irritable bowel syndrome)    • Psoriasis     [facility]        Past Surgical History:  Past Surgical History:   Procedure Laterality Date   • BREAST BIOPSY  2008   • WRIST SURGERY Left 2015       Physical Exam:   not currently breastfeeding. There is no height or weight on file to calculate BMI.     History of prior treatment or hospitalization: Patient has been in therapy prior. Patient has been in therapy for a couple of year. Patient stated in 2007 or 2008 she was in therapy for a couple of years. Patient has never been hospitalized for mental health concerns.     Are there any significant health issues (current or past): Patient stated that she is struggling with IBS and that she is aware that she it can be triggered by stress.     History of seizures: no    Allergy:   Allergies   Allergen Reactions   • Sulfacetamide Hives and Unknown - Low Severity   • Sulfa Antibiotics Hives   • Clove [Syzygium Aromaticum] Rash and GI Intolerance        Current Medications:   Current Outpatient Medications   Medication Sig Dispense Refill   • cetirizine (zyrTEC) 10 MG tablet Take 10 mg by mouth Daily.     • clobetasol propionate (CLOBEX) 0.05 % shampoo APPLY TOPICALLY TO THE SCALP AND LET SIT FOR 5-10 MINUTES BEFORE RINSING AS NEEDED.     • colestipol (COLESTID) 1 g tablet Take 2 tablets by mouth Daily. 60 tablet 5   • desonide (DESOWEN) 0.05 % cream apply to the affected areas on scalp and feet twice daily x 2 wks. take week break then use as needed for flares     • Desvenlafaxine Succinate ER 25 MG tablet sustained-release 24 hour Take 1 tablet by mouth Daily. 30 tablet 0   • Drospirenone (Slynd) 4 MG tablet Take 4 mg by mouth Daily. 28 tablet  11   • Flowflex COVID-19 Ag Home Test kit      • fluticasone (FLONASE) 50 MCG/ACT nasal spray 2 sprays into the nostril(s) as directed by provider Daily.     • hydrOXYzine (ATARAX) 10 MG tablet Take 1 tablet by mouth 3 (Three) Times a Day As Needed for Anxiety. 30 tablet 0   • hydrOXYzine pamoate (VISTARIL) 25 MG capsule Take 1 capsule by mouth At Night As Needed (anxiety/insomnia). 30 capsule 0   • Lagevrio 200 MG capsule      • lisinopril-hydrochlorothiazide (PRINZIDE,ZESTORETIC) 20-12.5 MG per tablet Take 0.5 tablets by mouth Daily. 90 tablet 1   • norethindrone (MICRONOR) 0.35 MG tablet Take 1 tablet by mouth Daily. 28 tablet 12   • omeprazole (priLOSEC) 40 MG capsule Take 1 capsule by mouth Daily. 90 capsule 3   • Otezla 30 MG tablet        No current facility-administered medications for this visit.       Lab Results:   No visits with results within 1 Month(s) from this visit.   Latest known visit with results is:   Admission on 01/03/2022, Discharged on 01/03/2022   Component Date Value Ref Range Status   • Color 01/03/2022 Dark Yellow  Yellow, Straw, Dark Yellow, Dora Final   • Clarity, UA 01/03/2022 Clear  Clear Final   • Glucose, UA 01/03/2022 Negative  Negative, 1000 mg/dL (3+) mg/dL Final   • Bilirubin 01/03/2022 Negative  Negative Final   • Ketones, UA 01/03/2022 Negative  Negative Final   • Specific Gravity  01/03/2022 1.030  1.005 - 1.030 Final   • Blood, UA 01/03/2022 3+ (A) Negative Final   • pH, Urine 01/03/2022 6.0  5.0 - 8.0 Final   • Protein, POC 01/03/2022 Trace (A) Negative mg/dL Final   • Urobilinogen, UA 01/03/2022 Normal  Normal Final   • Nitrite, UA 01/03/2022 Negative  Negative Final   • Leukocytes 01/03/2022 Negative  Negative Final   • Urine Culture 01/03/2022 No growth   Final       Family History:  Family History   Problem Relation Age of Onset   • Hypertension Mother    • Cancer Father    • Hypertension Father    • Ovarian cancer Paternal Grandmother    • Anxiety disorder Maternal  Grandmother    • Breast cancer Neg Hx    • Uterine cancer Neg Hx    • Colon cancer Neg Hx    • Osteoporosis Neg Hx        Problem List:  Patient Active Problem List   Diagnosis   • Well woman exam   • Anxiety and depression   • Dysmenorrhea   • Cataract, immature   • Epiretinal membrane, both eyes   • Posterior vitreous detachment of right eye         History of Substance Use:   Patient answered no  to experiencing two or more of the following problems related to substance use: using more than intended or over longer period than intended; difficulty quitting or cutting back use; spending a great deal of time obtaining, using, or recovering from using; craving or strong desire or urge to use;  work and/or school problems; financial problems; family problems; using in dangerous situations; physical or mental health problems; relapse; feelings of guilt or remorse about use; times when used and/or drank alone; needing to use more in order to achieve the desired effect; illness or withdrawal when stopping or cutting back use; using to relieve or avoid getting ill or developing withdrawal symptoms; and black outs and/or memory issues when using.        Substance Age Frequency Amount Method Last use   Nicotine          Alcohol  Occasionally; not regularly       Marijuana        Benzo        Pain Pills        Cocaine        Meth        Heroin        Suboxone        Synthetics/Other:            SUICIDE RISK ASSESSMENT/CSSRS  1. Does patient have thoughts of suicide? No Patient stated she has never had those.   2. Does patient have intent for suicide? no  3. Does patient have a current plan for suicide? no  4. History of suicide attempts: no  5. Family history of suicide or attempts: no Patient stated that her sister is very depressed and will make statements.   6. History of violent behaviors towards others or property or thoughts of committing suicide: no  7. History of sexual aggression toward others: no  8. Access to  firearms or weapons: yes    PHQ-9 Score:   PHQ-9 Total Score:   Not completed by patient    YOUNG-7 Score Total: not completed by patient       (Scales based on 0 - 10 with 10 being the worst)  Depression:  3-4 but can be up to a 6-7  Anxiety:  normal day 5-6 but can go way up there but it depends on if there are other situations impacting it.      Mental Status Exam:   Hygiene:   good  Cooperation:  Cooperative  Eye Contact:  Good  Psychomotor Behavior:  Appropriate  Affect:  Full range  Mood: normal and sad  Hopelessness: 3  Patient has some feelings of hopelessness. Patient stated it depends on the day.   Speech:  Normal  Thought Process:  Goal directed  Thought Content:  Normal  Suicidal:  None  Homicidal:  None  Hallucinations:  None  Delusion:  None  Memory:  Intact  Orientation:  Person, Place, Time and Situation  Reliability:  fair  Insight:  Good and Fair  Judgement:  Good  Impulse Control:  Good    Impression/Formulation:    Patient appeared alert and oriented.  Patient is voluntarily requesting to begin outpatient therapy at [facility].  Patient is receptive to assistance with maintaining a stable lifestyle.  Patient presents with history of depression and anxiety symptoms. Patient is agreeable to attend routine therapy sessions.  Patient expressed desire to maintain stability and participate in the therapeutic process.            Visit Diagnoses:    ICD-10-CM ICD-9-CM   1. Generalized anxiety disorder  F41.1 300.02        Functional Status: Moderate impairment     Prognosis: Good with Ongoing Treatment     Treatment Plan: Continue supportive psychotherapy efforts and medications as indicated. Obtain release of information for current treatment team for continuity of care as needed. Patient will adhere to medication regimen as prescribed and report any side effects. Patient will contact this office, call 911 or present to the nearest emergency room should suicidal or  homicidal ideations occur.    Short Term Goals: Patient will be compliant with medication, and patient will have no significant medication related side effects.  Patient will be engaged in psychotherapy as indicated.  Patient will report subjective improvement of symptoms.    Long Term Goals: To stabilize mood and treat/improve subjective symptoms, the patient will stay out of the hospital, the patient will be at an optimal level of functioning, and the patient will take all medications as prescribed.The patient verbalized understanding and agreement with goals that were mutually set.    Crisis Plan:    If symptoms/behaviors persist, patient will present to the nearest hospital for an assessment. Advised patient of [facility] 24/7 assessment services.       This document has been electronically signed by LEA Jang  September 1, 2022 13:06 EDT    Part of this note may be an electronic transcription/translation of spoken language to printed text using the Dragon Dictation System.

## 2022-09-19 DIAGNOSIS — F33.0 MILD EPISODE OF RECURRENT MAJOR DEPRESSIVE DISORDER: Chronic | ICD-10-CM

## 2022-09-19 DIAGNOSIS — F41.1 GENERALIZED ANXIETY DISORDER: Chronic | ICD-10-CM

## 2022-09-19 RX ORDER — DESVENLAFAXINE 25 MG/1
TABLET, EXTENDED RELEASE ORAL
Qty: 30 TABLET | Refills: 0 | OUTPATIENT
Start: 2022-09-19

## 2022-09-21 ENCOUNTER — TELEMEDICINE (OUTPATIENT)
Dept: PSYCHIATRY | Facility: CLINIC | Age: 42
End: 2022-09-21

## 2022-09-21 DIAGNOSIS — F51.04 PSYCHOPHYSIOLOGICAL INSOMNIA: Chronic | ICD-10-CM

## 2022-09-21 DIAGNOSIS — F41.1 GENERALIZED ANXIETY DISORDER: Primary | Chronic | ICD-10-CM

## 2022-09-21 DIAGNOSIS — F33.0 MILD EPISODE OF RECURRENT MAJOR DEPRESSIVE DISORDER: Chronic | ICD-10-CM

## 2022-09-21 PROCEDURE — 99214 OFFICE O/P EST MOD 30 MIN: CPT | Performed by: NURSE PRACTITIONER

## 2022-09-21 RX ORDER — HYDROXYZINE HYDROCHLORIDE 10 MG/1
10 TABLET, FILM COATED ORAL 3 TIMES DAILY PRN
Qty: 30 TABLET | Refills: 0 | Status: SHIPPED | OUTPATIENT
Start: 2022-09-21

## 2022-09-21 RX ORDER — HYDROXYZINE PAMOATE 25 MG/1
25 CAPSULE ORAL NIGHTLY PRN
Qty: 30 CAPSULE | Refills: 0 | Status: SHIPPED | OUTPATIENT
Start: 2022-09-21 | End: 2023-01-11

## 2022-09-21 RX ORDER — DESVENLAFAXINE 25 MG/1
25 TABLET, EXTENDED RELEASE ORAL DAILY
Qty: 30 TABLET | Refills: 0 | Status: SHIPPED | OUTPATIENT
Start: 2022-09-21 | End: 2023-01-11 | Stop reason: SDUPTHER

## 2022-09-21 NOTE — PROGRESS NOTES
"This provider is located at the [facility] (through [facility]), [address], [facility], [address] using a secure [employer] Video Visit through [employer]. Patient is being seen remotely via telehealth at their home address in [place], and stated they are in a secure environment for this session. The patient's condition being diagnosed/treated is appropriate for telemedicine. The provider identified herself as well as her credentials. The patient, and/or patients guardian, consent to be seen remotely, and when consent is given they understand that the consent allows for patient identifiable information to be sent to a third party as needed. They may refuse to be seen remotely at any time. The electronic data is encrypted and password protected, and the patient and/or guardian has been advised of the potential risks to privacy not withstanding such measures.    You have chosen to receive care through a telehealth visit.  Do you consent to use a video/audio connection for your medical care today? Yes     Patient confirmed consent for today's encounter on 9/21/22.        Subjective   Lesly Bill is a 42 y.o. female who is here today for medication management follow up.    Chief Complaint: Depression, anxiety, insomnia     History of Present Illness:  The patient describes her mood as \"better\" over the last few weeks.  Patient endorses that overall she has been doing okay over the past few weeks.  The patient states that she has continued to notice a lot of progress and states that she is pleased with this.  The patient states that she has been more active recently and doing a lot of things that she had once been interested in but had been putting off doing for quite some time, which she endorses as a significant improvement for her.  The patient endorses that she started therapy and had her first visit on 9/1.  The patient endorses that she feels that this therapy " "visit was difficult for her because it \"stirred up a lot of resentment\" and this negatively affected her mood.  The patient states that initially after the visit her depressive symptoms were increased, but states that recently her mood has been improved and depression has been more well-controlled.  The patient endorses that she would like to be in therapy more frequently, but states that her next visit isn't until November.  The patient states that she has realized that she has a lot of resentment and that she is \"always pushed down\" and states \"when it gets stirred up I know what to do with it.  The patient states that since having a therapy visit her resentment has been \"stirred up\" and she does really know how to deal with this and what to do about it.  Discussed with the patient that this APRN will reach out to ancillary staff and request the patient be scheduled in therapy more frequently if possible per patient's request.  The patient reports her appetite as good.  The patient reports that overall she has been sleeping well recently.  The patient states that there have been approximately 2 nights over the past few months that she had a lot of difficulty falling asleep.  The patient endorses that even with taking the hydroxyzine on these nights it took her several hours to \"turn her mind off\" and fall asleep.  The patient states that other than these 2 nights she has been sleeping really well and has not had difficulty falling asleep with taking the hydroxyzine.  The patient endorses that this is a significant appointment for her as she was having significant difficulty falling asleep every night prior to beginning the hydroxyzine.  The patient denies any nightmares or bad dreams.  The patient endorses that overall she is pleased with sleep at this time and endorses that she usually awakens feeling more rested as her sleep has improved.  The patient denies any new medical problems or changes in medications " since last appointment with this facility.  The patient reports compliance with current medication regimen.  The patient denies any current side effects from her current medication regimen.  The patient denies any abnormal muscle movements or tics.  The patient rates her depression at a 5/10 on a 0-10 scale, with 10 being the worst.  The patient rates her anxiety at a 3-4/10 on a 0-10 scale, with 10 being the worst.  The patient endorses that she feels that her anxiety has been improved recently and more well-controlled than her mood/depression.  The patient states that she feels that she has the appropriate tools and coping mechanisms to help deal with her anxiety.  The patient rates hopelessness at a 0/10 on a 0-10 scale with 10 being the worst.  The patient would like to not adjust or change her medications at this visit.  The patient endorses that rather than making any medication adjustment/changes she would rather get back into therapy and have visits more frequently as she feels this would be more beneficial for her in managing her symptoms.  The patient endorses that if her mood/depression has not improved with getting into therapy more frequently she would be interested in discussing potentially increasing her Pristiq at that time.  Discussed with patient that we will continue to monitor symptoms and discuss potential medication adjustment/changes as necessary.  The patient verbalizes understanding and endorses that she is agreeable to this.  The patient denies suicidal ideations and homicidal ideations, and is convincing.  The patient denies any auditory hallucinations or visual hallucinations.  The patient does not endorse significant symptoms consistent with bipolar disorder or a psychotic illness.              LMP:  3-4 weeks ago.  The patient denies any chance of pregnancy at this time.  The patient was educated that her prescribed medications can have potential risk to a developing fetus. The patient  is advised to contact this APRN/this office if she becomes pregnant or plans to become pregnant.  Pt verbalizes understanding and acknowledged agreement with this plan in her own words.      Prior Psychiatric Medications:  Zoloft  Cymbalta  Prozac  Lexapro  Effexor  Trintellix - ineffective   Viibryd 10 mg daily - reports was helpful for mood/anxiety, but caused epigastric pain   Clonidine 0.1 mg nightly - caused heart palpitations      The following portions of the patient's history were reviewed and updated as appropriate: allergies, current medications, past family history, past medical history, past social history, past surgical history and problem list.    Review of Systems   Constitutional: Negative for activity change, appetite change, fatigue and unexpected weight change.   Psychiatric/Behavioral: Positive for dysphoric mood. Negative for agitation, behavioral problems, confusion, decreased concentration, hallucinations, self-injury, sleep disturbance and suicidal ideas. The patient is nervous/anxious. The patient is not hyperactive.        Objective   Physical Exam  Constitutional:       Appearance: Normal appearance.   Neurological:      Mental Status: She is alert.   Psychiatric:         Attention and Perception: Attention and perception normal.         Mood and Affect: Affect normal. Mood is depressed.         Speech: Speech normal.         Behavior: Behavior normal. Behavior is cooperative.         Thought Content: Thought content normal. Thought content does not include homicidal or suicidal ideation. Thought content does not include homicidal or suicidal plan.         Cognition and Memory: Cognition and memory normal.         Judgment: Judgment normal.       not currently breastfeeding.    The patient was seen remotely today via a MyChart Video Visit through [facility].  Unable to obtain vital signs due to nature of remote visit.  Height stated at 67 inches.  Weight stated at 186 pounds.     Allergies    Allergen Reactions   • Sulfacetamide Hives and Unknown - Low Severity   • Sulfa Antibiotics Hives   • Clove [Syzygium Aromaticum] Rash and GI Intolerance       No results found for this or any previous visit (from the past 2016 hour(s)).    Current Medications:   Current Outpatient Medications   Medication Sig Dispense Refill   • cetirizine (zyrTEC) 10 MG tablet Take 10 mg by mouth Daily.     • clobetasol propionate (CLOBEX) 0.05 % shampoo APPLY TOPICALLY TO THE SCALP AND LET SIT FOR 5-10 MINUTES BEFORE RINSING AS NEEDED.     • colestipol (COLESTID) 1 g tablet Take 2 tablets by mouth Daily. 60 tablet 5   • desonide (DESOWEN) 0.05 % cream apply to the affected areas on scalp and feet twice daily x 2 wks. take week break then use as needed for flares     • Desvenlafaxine Succinate ER 25 MG tablet sustained-release 24 hour Take 1 tablet by mouth Daily. 30 tablet 0   • Drospirenone (Slynd) 4 MG tablet Take 4 mg by mouth Daily. 28 tablet 11   • fluticasone (FLONASE) 50 MCG/ACT nasal spray 2 sprays into the nostril(s) as directed by provider Daily.     • hydrOXYzine (ATARAX) 10 MG tablet Take 1 tablet by mouth 3 (Three) Times a Day As Needed for Anxiety. 30 tablet 0   • hydrOXYzine pamoate (VISTARIL) 25 MG capsule Take 1 capsule by mouth At Night As Needed (anxiety/insomnia). 30 capsule 0   • Lagevrio 200 MG capsule      • lisinopril-hydrochlorothiazide (PRINZIDE,ZESTORETIC) 20-12.5 MG per tablet Take 0.5 tablets by mouth Daily. 90 tablet 1   • norethindrone (MICRONOR) 0.35 MG tablet Take 1 tablet by mouth Daily. 28 tablet 12   • omeprazole (priLOSEC) 40 MG capsule Take 1 capsule by mouth Daily. 90 capsule 3   • Otezla 30 MG tablet        No current facility-administered medications for this visit.       Mental Status Exam:   Hygiene:   good  Cooperation:  Cooperative  Eye Contact:  Good  Psychomotor Behavior:  Appropriate  Affect:  Full range  Hopelessness: Denies  Speech:  Normal  Thought Process:  Goal  directed  Thought Content:  Normal  Suicidal:  None  Homicidal:  None  Hallucinations:  None  Delusion:  None  Memory:  Intact  Orientation:  Person, Place, Time and Situation  Reliability:  good  Insight:  Good  Judgement:  Good  Impulse Control:  Good  Physical/Medical Issues:  Yes See medical history           Assessment & Plan   Diagnoses and all orders for this visit:    1. Generalized anxiety disorder (Primary)  -     hydrOXYzine pamoate (VISTARIL) 25 MG capsule; Take 1 capsule by mouth At Night As Needed (anxiety/insomnia).  Dispense: 30 capsule; Refill: 0  -     Desvenlafaxine Succinate ER 25 MG tablet sustained-release 24 hour; Take 1 tablet by mouth Daily.  Dispense: 30 tablet; Refill: 0  -     hydrOXYzine (ATARAX) 10 MG tablet; Take 1 tablet by mouth 3 (Three) Times a Day As Needed for Anxiety.  Dispense: 30 tablet; Refill: 0    2. Mild episode of recurrent major depressive disorder (HCC)  -     Desvenlafaxine Succinate ER 25 MG tablet sustained-release 24 hour; Take 1 tablet by mouth Daily.  Dispense: 30 tablet; Refill: 0    3. Psychophysiological insomnia  -     hydrOXYzine pamoate (VISTARIL) 25 MG capsule; Take 1 capsule by mouth At Night As Needed (anxiety/insomnia).  Dispense: 30 capsule; Refill: 0            Visit Diagnoses:    ICD-10-CM ICD-9-CM   1. Generalized anxiety disorder  F41.1 300.02   2. Mild episode of recurrent major depressive disorder (HCC)  F33.0 296.31   3. Psychophysiological insomnia  F51.04 307.42       GOALS:  Short Term Goals: Patient will be compliant with medication, and patient will have no significant medication related side effects.  Patient will be engaged in psychotherapy as indicated.  Patient will report subjective improvement of symptoms.  Long term goals: To stabilize mood and treat/improve subjective symptoms, the patient will stay out of the hospital, the patient will be at an optimal level of functioning, and the patient will take all medications as  prescribed.  The patient verbalized understanding and agreement with goals that were mutually set.      SUICIDE RISK ASSESSMENT: Unalterable demographics and a history of mental health intervention indicate this patient is in a high risk category compared to the general population. At present, the patient denies active SI/HI, intentions, or plans at this time and agrees to seek immediate care should such thoughts develop. The patient verbalizes understanding of how to access emergency care if needed and agrees to do so. Consideration of suicide risk and protective factors such as history, current presentation, individual strengths and weaknesses, psychosocial and environmental stressors and variables, psychiatric illness and symptoms, medical conditions and pain, took place in this interview. Based on those considerations, the patient is determined: within individual baseline and presenting no imminent risk for suicide or homicide. Other recommendations: The patient does not meet the criteria for inpatient admission and is not a safety risk to self or others at today's visit. Inpatient treatment offers no significant advantages over outpatient treatment for this patient at today's visit.      SAFETY PLAN:  Patient was given ample time for questions and fully participated in treatment planning.  Patient was encouraged to call the clinic with any questions or concerns.  Patient was informed of access to emergency care. If patient were to develop any significant symptomatology, suicidal ideation, homicidal ideation, any concerns, or feel unsafe at any time they are to call the clinic and if unable to get immediate assistance should immediately call 911 or go to the nearest emergency room.  The patient is advised to remove or secure (lock away) all lethal weapons (including guns) and sharps (including razors, scissors, knives, etc.).  All medications (including any prescribed and any over the counter medications) should  be stored in a safe and secured location that is not obtainable by children/adolescents.  Patient was given an opportunity and encouraged to ask questions about their medication, illness, and treatment. Patient contracted verbally for the following: If you are experiencing an emotional crisis or have thoughts of harming yourself or others, please go to your nearest local emergency room or call 911. Will continue to re-assess medication response and side effects frequently to establish efficacy and ensure safety. Risks, any black box warnings, side effects, off label usage, and benefits of medication and treatment discussed with patient, along with potential adverse side effects of current and/or newly prescribed medication, alternative treatment options, and OTC medications.  Patient verbalized understanding of potential risks, any off label use of medication, any black box warnings, and any side effects in their own words. The patient verbalized understanding and agreed to comply with the safety plan discussed in their own words.  Patient given the number to the office. Number also available to the 24- hour suicide hotline.      TREATMENT PLAN/GOALS: Continue medications and treatment plan as indicated. Treatment and medication options discussed during today's visit. Patient acknowledged and verbally consented to continue with current treatment plan and was educated on the importance of compliance with treatment and follow-up appointments.        -Continue Pristiq 25 mg daily for anxiety/depression   -Continue Hydroxyzine 25 mg nightly as needed for insomnia/anxiety   -Continue Hydroxyzine 10 mg three times daily as needed for anxiety        MEDICATION ISSUES: Discussed medication options and treatment plan of prescribed medication, any off label use of medication, as well as the risks, benefits, any black box warnings including increased suicidality, and side effects including but not limited to potential falls,  dizziness, possible impaired driving, GI side effects (change in appetite, abdominal discomfort, nausea, vomiting, diarrhea, and/or constipation), dry mouth, somnolence, sedation, insomnia, activation, agitation, irritation, tremors, abnormal muscle movements or disorders, headache, sweating, possible bruising or rare bleeding, electrolyte and/or fluid abnormalities, change in blood pressure/heart rate/and or heart rhythm, sexual dysfunction, and metabolic adversities among others. Patient and/or guardian agreeable to call the office with any worsening of symptoms or onset of side effects, or if any concerns or questions arise.  The contact information for the office is made available to the patient and/or guardian.  Patient and/or guardian agreeable to call 911 or go to the nearest ER should they begin having any SI/HI, or if any urgent concerns arise. No medication side effects or related complaints today.    This APRN has discussed with the patient/guardian about the possibility of serotonin syndrome when certain medications are taken together, as is the case with this patient.  This APRN has provided the patient/guardian with a list of symptoms of serotonin syndrome including symptoms of autonomic instability, altered sensorium, confusion, restlessness, agitation, myoclonus, hyperreflexia, hyperthermia, diaphoresis, tremor, chills, diarrhea and cramps, ataxia, headache, migraines, seizures, and insomnia; which could lead to permanent hyperthermic brain damage, cardiovascular collapse, coma, or even death.  The patient/guardian are instructed to stop medications immediately and either contact this APRN/this office during regular office hours, or go to the emergency department/call 911, if they begin to experience any of the symptoms discussed.  The benefits and risks of the current medication regimen are discussed with the patient/guardian, and they feel that the benefits out weigh the risks.  The  patient/guardian verbalized understanding and agreement in their own words.                  [facility] No Show Policy:  We understand unexpected circumstances arise; however, anytime you miss your appointment we are unable to provide you appropriate care.  In addition, each appointment missed could have been used to provide care for others.  We ask that you call at least 24 hours in advance to cancel or reschedule an appointment.  We would like to take this opportunity to remind you of our policy stating patients who miss THREE or more appointments without cancelling or rescheduling 24 hours in advance of the appointment may be subject to cancellation of any further visits with our clinic and recommendation to seek in-person services/visits.    Please call [phone] to reschedule your appointment. If there are reasons that make it difficult for you to keep the appointments, please call and let us know how we can help.  Please understand that medication prescribing will not continue without seeing your provider.      [facility]'s No Show Policy reviewed with patient at today's visit. Patient verbalized understanding of this policy. Discussed with patient that in the event that there are three or more no show visits, it will be recommended that they pursue in-person services/visits as noncompliance with telehealth visits indicates that patient is not an appropriate candidate for telemedicine and would likely be more appropriate for in-person services/visits. Patient verbalizes understanding and is agreeable to this.          MEDS ORDERED DURING VISIT:  New Medications Ordered This Visit   Medications   • hydrOXYzine pamoate (VISTARIL) 25 MG capsule     Sig: Take 1 capsule by mouth At Night As Needed (anxiety/insomnia).     Dispense:  30 capsule     Refill:  0   • Desvenlafaxine Succinate ER 25 MG tablet sustained-release 24 hour     Sig: Take 1 tablet by mouth Daily.      Dispense:  30 tablet     Refill:  0   • hydrOXYzine (ATARAX) 10 MG tablet     Sig: Take 1 tablet by mouth 3 (Three) Times a Day As Needed for Anxiety.     Dispense:  30 tablet     Refill:  0       Return in about 4 weeks (around 10/19/2022), or if symptoms worsen or fail to improve, for Recheck.        Patient will follow-up in 4 weeks, highly encouraged the patient if she had any questions or concerns to contact the behavioral health virtual clinic for sooner appointment patient verbalized understanding.      Functional Status: Moderate impairment     Prognosis: Fair with Ongoing Treatment             This document has been electronically signed by RAF Alva  September 21, 2022 12:02 EDT       Some of the data in this electronic note has been brought forward from a previous encounter, any necessary changes have been made, it has been reviewed by this APRN, and it is accurate.      Part of this note may be an electronic transcription/translation of spoken language to printed text using the Dragon Dictation System.

## 2022-09-22 ENCOUNTER — OFFICE VISIT (OUTPATIENT)
Dept: INTERNAL MEDICINE | Facility: CLINIC | Age: 42
End: 2022-09-22

## 2022-09-22 ENCOUNTER — LAB (OUTPATIENT)
Dept: LAB | Facility: HOSPITAL | Age: 42
End: 2022-09-22

## 2022-09-22 VITALS — BODY MASS INDEX: 27.75 KG/M2 | TEMPERATURE: 97.7 F | OXYGEN SATURATION: 99 % | WEIGHT: 176.8 LBS | HEIGHT: 67 IN

## 2022-09-22 DIAGNOSIS — R42 DIZZINESS: Primary | ICD-10-CM

## 2022-09-22 DIAGNOSIS — I95.1 ORTHOSTATIC HYPOTENSION: ICD-10-CM

## 2022-09-22 LAB
ALBUMIN SERPL-MCNC: 4.4 G/DL (ref 3.5–5.2)
ALBUMIN/GLOB SERPL: 1.8 G/DL
ALP SERPL-CCNC: 77 U/L (ref 39–117)
ALT SERPL W P-5'-P-CCNC: 19 U/L (ref 1–33)
ANION GAP SERPL CALCULATED.3IONS-SCNC: 10.1 MMOL/L (ref 5–15)
AST SERPL-CCNC: 18 U/L (ref 1–32)
BASOPHILS # BLD AUTO: 0.08 10*3/MM3 (ref 0–0.2)
BASOPHILS NFR BLD AUTO: 1 % (ref 0–1.5)
BILIRUB SERPL-MCNC: 0.4 MG/DL (ref 0–1.2)
BUN SERPL-MCNC: 11 MG/DL (ref 6–20)
BUN/CREAT SERPL: 11.8 (ref 7–25)
CALCIUM SPEC-SCNC: 9.1 MG/DL (ref 8.6–10.5)
CHLORIDE SERPL-SCNC: 103 MMOL/L (ref 98–107)
CO2 SERPL-SCNC: 22.9 MMOL/L (ref 22–29)
CREAT SERPL-MCNC: 0.93 MG/DL (ref 0.57–1)
DEPRECATED RDW RBC AUTO: 40 FL (ref 37–54)
EGFRCR SERPLBLD CKD-EPI 2021: 78.4 ML/MIN/1.73
EOSINOPHIL # BLD AUTO: 0 10*3/MM3 (ref 0–0.4)
EOSINOPHIL NFR BLD AUTO: 0 % (ref 0.3–6.2)
ERYTHROCYTE [DISTWIDTH] IN BLOOD BY AUTOMATED COUNT: 12.3 % (ref 12.3–15.4)
GLOBULIN UR ELPH-MCNC: 2.4 GM/DL
GLUCOSE SERPL-MCNC: 95 MG/DL (ref 65–99)
HCT VFR BLD AUTO: 42.1 % (ref 34–46.6)
HGB BLD-MCNC: 13.9 G/DL (ref 12–15.9)
IMM GRANULOCYTES # BLD AUTO: 0.02 10*3/MM3 (ref 0–0.05)
IMM GRANULOCYTES NFR BLD AUTO: 0.3 % (ref 0–0.5)
LYMPHOCYTES # BLD AUTO: 2.12 10*3/MM3 (ref 0.7–3.1)
LYMPHOCYTES NFR BLD AUTO: 27.7 % (ref 19.6–45.3)
MCH RBC QN AUTO: 29.3 PG (ref 26.6–33)
MCHC RBC AUTO-ENTMCNC: 33 G/DL (ref 31.5–35.7)
MCV RBC AUTO: 88.8 FL (ref 79–97)
MONOCYTES # BLD AUTO: 0.76 10*3/MM3 (ref 0.1–0.9)
MONOCYTES NFR BLD AUTO: 9.9 % (ref 5–12)
NEUTROPHILS NFR BLD AUTO: 4.67 10*3/MM3 (ref 1.7–7)
NEUTROPHILS NFR BLD AUTO: 61.1 % (ref 42.7–76)
NRBC BLD AUTO-RTO: 0 /100 WBC (ref 0–0.2)
PLATELET # BLD AUTO: 272 10*3/MM3 (ref 140–450)
PMV BLD AUTO: 11.8 FL (ref 6–12)
POTASSIUM SERPL-SCNC: 4.5 MMOL/L (ref 3.5–5.2)
PROT SERPL-MCNC: 6.8 G/DL (ref 6–8.5)
RBC # BLD AUTO: 4.74 10*6/MM3 (ref 3.77–5.28)
SODIUM SERPL-SCNC: 136 MMOL/L (ref 136–145)
TSH SERPL DL<=0.05 MIU/L-ACNC: 2.16 UIU/ML (ref 0.27–4.2)
WBC NRBC COR # BLD: 7.65 10*3/MM3 (ref 3.4–10.8)

## 2022-09-22 PROCEDURE — 36415 COLL VENOUS BLD VENIPUNCTURE: CPT | Performed by: PHYSICIAN ASSISTANT

## 2022-09-22 PROCEDURE — 80050 GENERAL HEALTH PANEL: CPT | Performed by: PHYSICIAN ASSISTANT

## 2022-09-22 PROCEDURE — 99213 OFFICE O/P EST LOW 20 MIN: CPT | Performed by: PHYSICIAN ASSISTANT

## 2022-09-22 RX ORDER — ETONOGESTREL AND ETHINYL ESTRADIOL 11.7; 2.7 MG/1; MG/1
INSERT, EXTENDED RELEASE VAGINAL
COMMUNITY
Start: 2022-09-16 | End: 2022-12-05

## 2022-09-22 NOTE — PROGRESS NOTES
[facility]  [address]  [facility] [address]  Dept: [phone]  Dept Fax: [phone]  Loc: [phone]  Loc Fax: [phone]    Lesly Nayloridge  1980    Follow Up Office Visit Note    History of Present Illness:  Patient is a 42-year-old female in today for dizziness.  This has been going on for several weeks.  Worse when she gets up and down.  Has been exercising regularly but not drinking enough water.  Denies any syncope or near syncopal episodes.  Denies any chest pain.  Denies any shortness of breath.  Denies any leg swelling.      The following portions of the patient's history were reviewed and updated as appropriate: allergies, current medications, past family history, past medical history, past social history, past surgical history, and problem list.    Medications:    Current Outpatient Medications:   •  cetirizine (zyrTEC) 10 MG tablet, Take 10 mg by mouth Daily., Disp: , Rfl:   •  clobetasol propionate (CLOBEX) 0.05 % shampoo, APPLY TOPICALLY TO THE SCALP AND LET SIT FOR 5-10 MINUTES BEFORE RINSING AS NEEDED., Disp: , Rfl:   •  colestipol (COLESTID) 1 g tablet, Take 2 tablets by mouth Daily., Disp: 60 tablet, Rfl: 5  •  desonide (DESOWEN) 0.05 % cream, apply to the affected areas on scalp and feet twice daily x 2 wks. take week break then use as needed for flares, Disp: , Rfl:   •  Desvenlafaxine Succinate ER 25 MG tablet sustained-release 24 hour, Take 1 tablet by mouth Daily., Disp: 30 tablet, Rfl: 0  •  Drospirenone (Slynd) 4 MG tablet, Take 4 mg by mouth Daily., Disp: 28 tablet, Rfl: 11  •  fluticasone (FLONASE) 50 MCG/ACT nasal spray, 2 sprays into the nostril(s) as directed by provider Daily., Disp: , Rfl:   •  hydrOXYzine (ATARAX) 10 MG tablet, Take 1 tablet by mouth 3 (Three) Times a Day As Needed for Anxiety., Disp: 30 tablet, Rfl: 0  •  hydrOXYzine pamoate (VISTARIL) 25 MG capsule, Take 1 capsule by mouth At  Night As Needed (anxiety/insomnia)., Disp: 30 capsule, Rfl: 0  •  Lagevrio 200 MG capsule, , Disp: , Rfl:   •  lisinopril-hydrochlorothiazide (PRINZIDE,ZESTORETIC) 20-12.5 MG per tablet, Take 0.5 tablets by mouth Daily., Disp: 90 tablet, Rfl: 1  •  norethindrone (MICRONOR) 0.35 MG tablet, Take 1 tablet by mouth Daily., Disp: 28 tablet, Rfl: 12  •  omeprazole (priLOSEC) 40 MG capsule, Take 1 capsule by mouth Daily., Disp: 90 capsule, Rfl: 3  •  Otezla 30 MG tablet, , Disp: , Rfl:   •  etonogestrel-ethinyl estradiol (NUVARING) 0.12-0.015 MG/24HR vaginal ring, , Disp: , Rfl:     Subjective  Allergies   Allergen Reactions   • Sulfacetamide Hives and Unknown - Low Severity   • Sulfa Antibiotics Hives   • Clove [Syzygium Aromaticum] Rash and GI Intolerance        Past Medical History:   Diagnosis Date   • Acid reflux    • Anxiety    • Depression    • Fracture    • Hypertension    • IBS (irritable bowel syndrome)    • Psoriasis     [facility]    • Visual impairment 2019    Vitreal detachment floaters       Past Surgical History:   Procedure Laterality Date   • BREAST BIOPSY  2008   • WRIST SURGERY Left 2015       Family History   Problem Relation Age of Onset   • Hypertension Mother    • Cancer Father         mesothelioma   • Hypertension Father    • Ovarian cancer Paternal Grandmother    • Anxiety disorder Maternal Grandmother    • Breast cancer Neg Hx    • Uterine cancer Neg Hx    • Colon cancer Neg Hx    • Osteoporosis Neg Hx         Social History     Socioeconomic History   • Marital status: Single   • Number of children: 0   Tobacco Use   • Smoking status: Never Smoker   • Smokeless tobacco: Never Used   Vaping Use   • Vaping Use: Never used   Substance and Sexual Activity   • Alcohol use: Yes     Comment: rarely   • Drug use: Never   • Sexual activity: Not Currently     Partners: Male     Birth control/protection: Pill, Birth control pill       Review of Systems   Constitutional: Negative for activity  "change, chills, fatigue, fever and unexpected weight change.   HENT: Negative for congestion, ear pain, postnasal drip, sinus pressure and sore throat.    Eyes: Negative for pain, discharge and redness.   Respiratory: Negative for cough, shortness of breath and wheezing.    Cardiovascular: Negative for chest pain, palpitations and leg swelling.   Gastrointestinal: Negative for diarrhea, nausea and vomiting.   Endocrine: Negative for cold intolerance and heat intolerance.   Genitourinary: Negative for decreased urine volume and dysuria.   Musculoskeletal: Negative for arthralgias and myalgias.   Skin: Negative for rash and wound.   Neurological: Positive for dizziness. Negative for light-headedness and headaches.   Hematological: Does not bruise/bleed easily.   Psychiatric/Behavioral: Negative for confusion, dysphoric mood and sleep disturbance. The patient is not nervous/anxious.          Objective  Vitals:    09/22/22 0954   BP Location: Left arm   Patient Position: Sitting   Temp: 97.7 °F (36.5 °C)   TempSrc: Temporal   SpO2: 99%   Weight: 80.2 kg (176 lb 12.8 oz)   Height: 170.2 cm (67.01\")     Body mass index is 27.68 kg/m².     Physical Exam  Physical Exam  Vitals and nursing note reviewed.   Constitutional:       General: She is not in acute distress.     Appearance: She is not ill-appearing.   HENT:      Head: Normocephalic.      Right Ear: Tympanic membrane, ear canal and external ear normal. There is no impacted cerumen.      Left Ear: Tympanic membrane, ear canal and external ear normal. There is no impacted cerumen.      Nose: No congestion or rhinorrhea.      Mouth/Throat:      Mouth: Mucous membranes are moist.      Pharynx: Oropharynx is clear. No oropharyngeal exudate or posterior oropharyngeal erythema.   Eyes:      General:         Right eye: No discharge.         Left eye: No discharge.      Extraocular Movements: Extraocular movements intact.      Conjunctiva/sclera: Conjunctivae normal.      " Pupils: Pupils are equal, round, and reactive to light.   Cardiovascular:      Rate and Rhythm: Normal rate and regular rhythm.      Heart sounds: Normal heart sounds. No murmur heard.    No friction rub. No gallop.   Pulmonary:      Effort: Pulmonary effort is normal. No respiratory distress.      Breath sounds: Normal breath sounds. No wheezing.   Abdominal:      General: Bowel sounds are normal. There is no distension.      Palpations: Abdomen is soft. There is no mass.      Tenderness: There is no abdominal tenderness.   Musculoskeletal:         General: No swelling. Normal range of motion.      Cervical back: Normal range of motion. No tenderness.      Right lower leg: No edema.      Left lower leg: No edema.   Lymphadenopathy:      Cervical: No cervical adenopathy.   Skin:     Findings: No bruising, erythema or rash.   Neurological:      Mental Status: She is oriented to person, place, and time.      Gait: Gait normal.   Psychiatric:         Mood and Affect: Mood normal.         Behavior: Behavior normal.         Thought Content: Thought content normal.         Judgment: Judgment normal.         Diagnostic Data  Procedures    Assessment  Diagnoses and all orders for this visit:    1. Dizziness (Primary)  -     CBC w AUTO Differential; Future  -     Comprehensive Metabolic Panel  -     TSH; Future  -     CBC w AUTO Differential  -     TSH    2. Orthostatic hypotension        Plan    Dizziness and orthostatic hypotension- advised on adequate hydration.  Obtain labs.  If labs normal and symptoms progress, may need imaging of the brain.      Return for Next scheduled follow up.    Des Connell PA-C  09/22/2022  Answers for HPI/ROS submitted by the patient on 9/22/2022  What is the primary reason for your visit?: High Blood Pressure

## 2022-09-23 ENCOUNTER — TELEPHONE (OUTPATIENT)
Dept: INTERNAL MEDICINE | Facility: CLINIC | Age: 42
End: 2022-09-23

## 2022-09-23 NOTE — TELEPHONE ENCOUNTER
HUB TO READ AND CAN RELAY MESSAGE    Attempted to reach patient no answer lvm to call the office. Des wanted patient to know that her labs are normal.

## 2022-11-01 ENCOUNTER — TELEPHONE (OUTPATIENT)
Dept: INTERNAL MEDICINE | Facility: CLINIC | Age: 42
End: 2022-11-01

## 2022-11-01 NOTE — TELEPHONE ENCOUNTER
Caller: Lesly Bill    Relationship to patient: Self    Best call back number:[phone]    Chief complaint: NA    Type of visit: PHYSICAL    Requested date: NA     If rescheduling, when is the original appointment: 11/1/22    Additional notes: PATIENT SCHEDULED WITH [facility] FOR A PHYSICAL.  PROVIDER WAS NOT AVAILABLE AGAIN UNTIL NEXT YEAR

## 2022-11-02 ENCOUNTER — TELEMEDICINE (OUTPATIENT)
Dept: PSYCHIATRY | Facility: CLINIC | Age: 42
End: 2022-11-02

## 2022-11-02 DIAGNOSIS — F41.1 GENERALIZED ANXIETY DISORDER: Primary | ICD-10-CM

## 2022-11-02 PROCEDURE — 90837 PSYTX W PT 60 MINUTES: CPT | Performed by: COUNSELOR

## 2022-11-02 NOTE — PROGRESS NOTES
Date: November 2, 2022  Time In: 12:30 pm   Time Out: 1:31 pm   This provider is located at the [facility]), [address], [place] [address] using a secure [employer] Video Visit through [employer]. Patient is being seen remotely via telehealth at home address in [place] and stated they are in a secure environment for this session. The patient's condition being diagnosed/treated is appropriate for telemedicine. The provider identified herself as well as her credentials. The patient, and/or patients guardian, consent to be seen remotely, and when consent is given they understand that the consent allows for patient identifiable information to be sent to a third party as needed. They may refuse to be seen remotely at any time. The electronic data is encrypted and password protected, and the patient and/or guardian has been advised of the potential risks to privacy not withstanding such measures.     You have chosen to receive care through a telehealth visit.  Do you consent to use a video/audio connection for your medical care today? Yes    PROGRESS NOTE  Data:  Lesly Bill is a 42 y.o. female who presents today for follow up.  Patient explained that a friend passed away while patient was out of town for a conference. Patient expressed that she has been in the business of it but that she hasn't processed it. Patient shared that initial assessed brought up things and that it has been impacting her. Patient reported that she has been keeping it together and feels a little lost. Patient was encouraged during contact to talk and process about her friend. Patient explored that she had a difficult time as it was also recently the anniversary loss of her father. Patient and therapist processed ways in which to self monitor own needs while supporting others. Processed taking on the stressors of others. Patient expressed that she is worried but the  nagging, stressors are being pushed away. Patient expressed an awareness that those things don't have to stay. Patient participated in the collaborative development of treatment plan.     Chief Complaint: anxiety, recent loss of a friend, creating balance    History of Present Illness: on-going anxiety       Clinical Maneuvering/Intervention: treatment planning     (Scales based on 0 - 10 with 10 being the worst)  Depression:  patient reported it was getting better but now there is a sadness. Anxiety:   Not specifically scaled        Assisted patient in processing above session content; acknowledged and normalized patient’s thoughts, feelings, and concerns.  Rationalized patient thought process regarding difficulty asking for support or acknowledgement of feelings. Patient identified that friends that have known her for a long time don't always know how to help which can be hurtful.   Discussed triggers associated with patient's anxiety.  Also discussed coping skills for patient to implement such as boundaries and awareness of not depleting self while supporting others.    Allowed patient to freely discuss issues without interruption or judgment. Provided safe, confidential environment to facilitate the development of positive therapeutic relationship and encourage open, honest communication. Assisted patient in identifying risk factors which would indicate the need for higher level of care including thoughts to harm self or others and/or self-harming behavior and encouraged patient to contact this office, call 911, or present to the nearest emergency room should any of these events occur. Discussed crisis intervention services and means to access. Patient adamantly and convincingly denies current suicidal or homicidal ideation or perceptual disturbance.    Assessment:   Assessment   Patient appears to maintain relative stability as compared to their baseline.  However, patient continues to struggle with anxiety   which continues to cause impairment in important areas of functioning.  A result, they can be reasonably expected to continue to benefit from treatment and would likely be at increased risk for decompensation otherwise.    Mental Status Exam:   Hygiene:   good  Cooperation:  Cooperative  Eye Contact:  Good  Psychomotor Behavior:  Appropriate  Affect:  Appropriate  Mood: normal  Speech:  Normal  Thought Process:  Goal directed  Thought Content:  Normal  Suicidal:  None  Homicidal:  None  Hallucinations:  None  Delusion:  None  Memory:  Intact  Orientation:  Person, Place, Time and Situation  Reliability:  good  Insight:  Good  Judgement:  Good  Impulse Control:  Good  Physical/Medical Issues:  No        Patient's Support Network Includes:  mother, extended family and friends    Functional Status: Moderate impairment     Progress toward goal: Not at goal    Prognosis: Good with Ongoing Treatment          Plan:  Patient will continue in individual outpatient therapy with focus on improved functioning and coping skills, maintaining stability, and avoiding decompensation and the need for higher level of care.    Patient will adhere to medication regimen as prescribed and report any side effects. Patient will contact this office, call 911 or present to the nearest emergency room should suicidal or homicidal ideations occur. Provide Cognitive Behavioral Therapy and Solution Focused Therapy to improve functioning, maintain stability, and avoid decompensation and the need for higher level of care.     Return in about 2 weeks, or earlier if symptoms worsen or fail to improve.           VISIT DIAGNOSIS:     ICD-10-CM ICD-9-CM   1. Generalized anxiety disorder  F41.1 300.02             This document has been electronically signed by ELA Jang  November 2, 2022 13:31 EDT      Part of this note may be an electronic transcription/translation of spoken language to printed text using the Dragon Dictation System.

## 2022-11-02 NOTE — TREATMENT PLAN
Multi-Disciplinary Problems (from Behavioral Health Treatment Plan)    Active Problems     Problem: Relationship Issues  Start Date: 11/02/22    Problem Details: The patient self-scales this problem as a 3 with 10 being the worst.  Scaled based on relationships and the ability to give within a friendship and also receive.         Goal Priority Start Date Expected End Date End Date    Patient will initiate personal change to improve relationships. -- 11/02/22 -- --    Goal Details: Progress toward goal:  Not appropriate to rate progress toward goal since this is the initial treatment plan.        Goal Intervention Frequency Start Date End Date    Assist patient in identifying behaviors that focus on relationship building and process the changes needed to improve relationships. Q2 Weeks 11/02/22 --    Intervention Details: Duration of treatment until until discharged.                    Reviewed By     Niyah Jordan LPCC 11/02/22 1320                 I have discussed and reviewed this treatment plan with the patient.

## 2022-11-11 ENCOUNTER — TELEPHONE (OUTPATIENT)
Dept: OBSTETRICS AND GYNECOLOGY | Facility: CLINIC | Age: 42
End: 2022-11-11

## 2022-11-14 NOTE — TELEPHONE ENCOUNTER
Spoke with Blanka in regards to this encounter, she states that it was sent in error as she was addressing a no show from the patient, no action needed.

## 2022-11-21 ENCOUNTER — OFFICE VISIT (OUTPATIENT)
Dept: INTERNAL MEDICINE | Facility: CLINIC | Age: 42
End: 2022-11-21

## 2022-11-21 ENCOUNTER — LAB (OUTPATIENT)
Dept: LAB | Facility: HOSPITAL | Age: 42
End: 2022-11-21

## 2022-11-21 VITALS
DIASTOLIC BLOOD PRESSURE: 80 MMHG | WEIGHT: 179 LBS | SYSTOLIC BLOOD PRESSURE: 116 MMHG | BODY MASS INDEX: 28.09 KG/M2 | HEIGHT: 67 IN | HEART RATE: 73 BPM | RESPIRATION RATE: 16 BRPM | OXYGEN SATURATION: 98 % | TEMPERATURE: 97.8 F

## 2022-11-21 DIAGNOSIS — Z13.21 ENCOUNTER FOR VITAMIN DEFICIENCY SCREENING: ICD-10-CM

## 2022-11-21 DIAGNOSIS — Z13.0 SCREENING FOR BLOOD DISEASE: ICD-10-CM

## 2022-11-21 DIAGNOSIS — E55.9 VITAMIN D DEFICIENCY: ICD-10-CM

## 2022-11-21 DIAGNOSIS — Z12.31 ENCOUNTER FOR SCREENING MAMMOGRAM FOR MALIGNANT NEOPLASM OF BREAST: ICD-10-CM

## 2022-11-21 DIAGNOSIS — Z13.1 SCREENING FOR DIABETES MELLITUS: ICD-10-CM

## 2022-11-21 DIAGNOSIS — Z00.00 WELLNESS EXAMINATION: Primary | ICD-10-CM

## 2022-11-21 DIAGNOSIS — Z13.29 THYROID DISORDER SCREENING: ICD-10-CM

## 2022-11-21 DIAGNOSIS — Z13.220 LIPID SCREENING: ICD-10-CM

## 2022-11-21 DIAGNOSIS — I10 ESSENTIAL HYPERTENSION: ICD-10-CM

## 2022-11-21 DIAGNOSIS — R51.9 PERSISTENT HEADACHES: ICD-10-CM

## 2022-11-21 PROBLEM — K58.0 IRRITABLE BOWEL SYNDROME WITH DIARRHEA: Status: ACTIVE | Noted: 2022-11-21

## 2022-11-21 PROBLEM — N28.9 CONGENITAL KIDNEY DISEASE: Status: ACTIVE | Noted: 2022-11-21

## 2022-11-21 LAB
ALBUMIN SERPL-MCNC: 4.1 G/DL (ref 3.5–5.2)
ALBUMIN/GLOB SERPL: 1.5 G/DL
ALP SERPL-CCNC: 85 U/L (ref 39–117)
ALT SERPL W P-5'-P-CCNC: 20 U/L (ref 1–33)
ANION GAP SERPL CALCULATED.3IONS-SCNC: 9.5 MMOL/L (ref 5–15)
AST SERPL-CCNC: 20 U/L (ref 1–32)
BILIRUB SERPL-MCNC: <0.2 MG/DL (ref 0–1.2)
BUN SERPL-MCNC: 13 MG/DL (ref 6–20)
BUN/CREAT SERPL: 13.5 (ref 7–25)
CALCIUM SPEC-SCNC: 9 MG/DL (ref 8.6–10.5)
CHLORIDE SERPL-SCNC: 104 MMOL/L (ref 98–107)
CHOLEST SERPL-MCNC: 198 MG/DL (ref 0–200)
CO2 SERPL-SCNC: 23.5 MMOL/L (ref 22–29)
CREAT SERPL-MCNC: 0.96 MG/DL (ref 0.57–1)
DEPRECATED RDW RBC AUTO: 41 FL (ref 37–54)
EGFRCR SERPLBLD CKD-EPI 2021: 75.4 ML/MIN/1.73
ERYTHROCYTE [DISTWIDTH] IN BLOOD BY AUTOMATED COUNT: 12.5 % (ref 12.3–15.4)
FOLATE SERPL-MCNC: 5.12 NG/ML (ref 4.78–24.2)
GLOBULIN UR ELPH-MCNC: 2.7 GM/DL
GLUCOSE SERPL-MCNC: 110 MG/DL (ref 65–99)
HBA1C MFR BLD: 5.8 % (ref 4.8–5.6)
HCT VFR BLD AUTO: 42.7 % (ref 34–46.6)
HDLC SERPL-MCNC: 49 MG/DL (ref 40–60)
HGB BLD-MCNC: 14.3 G/DL (ref 12–15.9)
LDLC SERPL CALC-MCNC: 124 MG/DL (ref 0–100)
LDLC/HDLC SERPL: 2.46 {RATIO}
MCH RBC QN AUTO: 29.9 PG (ref 26.6–33)
MCHC RBC AUTO-ENTMCNC: 33.5 G/DL (ref 31.5–35.7)
MCV RBC AUTO: 89.1 FL (ref 79–97)
PLATELET # BLD AUTO: 262 10*3/MM3 (ref 140–450)
PMV BLD AUTO: 12.6 FL (ref 6–12)
POTASSIUM SERPL-SCNC: 4.5 MMOL/L (ref 3.5–5.2)
PROT SERPL-MCNC: 6.8 G/DL (ref 6–8.5)
RBC # BLD AUTO: 4.79 10*6/MM3 (ref 3.77–5.28)
SODIUM SERPL-SCNC: 137 MMOL/L (ref 136–145)
TRIGL SERPL-MCNC: 143 MG/DL (ref 0–150)
VIT B12 BLD-MCNC: 284 PG/ML (ref 211–946)
VLDLC SERPL-MCNC: 25 MG/DL (ref 5–40)
WBC NRBC COR # BLD: 10.57 10*3/MM3 (ref 3.4–10.8)

## 2022-11-21 PROCEDURE — 99396 PREV VISIT EST AGE 40-64: CPT | Performed by: NURSE PRACTITIONER

## 2022-11-21 PROCEDURE — 82746 ASSAY OF FOLIC ACID SERUM: CPT | Performed by: NURSE PRACTITIONER

## 2022-11-21 PROCEDURE — 99213 OFFICE O/P EST LOW 20 MIN: CPT | Performed by: NURSE PRACTITIONER

## 2022-11-21 PROCEDURE — 80061 LIPID PANEL: CPT | Performed by: NURSE PRACTITIONER

## 2022-11-21 PROCEDURE — 82607 VITAMIN B-12: CPT | Performed by: NURSE PRACTITIONER

## 2022-11-21 PROCEDURE — 82306 VITAMIN D 25 HYDROXY: CPT | Performed by: NURSE PRACTITIONER

## 2022-11-21 PROCEDURE — 84439 ASSAY OF FREE THYROXINE: CPT | Performed by: NURSE PRACTITIONER

## 2022-11-21 PROCEDURE — 83036 HEMOGLOBIN GLYCOSYLATED A1C: CPT | Performed by: NURSE PRACTITIONER

## 2022-11-21 PROCEDURE — 80050 GENERAL HEALTH PANEL: CPT | Performed by: NURSE PRACTITIONER

## 2022-11-21 RX ORDER — LISINOPRIL 20 MG/1
20 TABLET ORAL DAILY
Qty: 30 TABLET | Refills: 1 | Status: SHIPPED | OUTPATIENT
Start: 2022-11-21

## 2022-11-22 LAB
25(OH)D3 SERPL-MCNC: 25.2 NG/ML (ref 30–100)
T4 FREE SERPL-MCNC: 1.2 NG/DL (ref 0.93–1.7)
TSH SERPL DL<=0.05 MIU/L-ACNC: 4.66 UIU/ML (ref 0.27–4.2)

## 2022-11-30 ENCOUNTER — TELEMEDICINE (OUTPATIENT)
Dept: PSYCHIATRY | Facility: CLINIC | Age: 42
End: 2022-11-30

## 2022-11-30 DIAGNOSIS — F41.1 GENERALIZED ANXIETY DISORDER: Primary | ICD-10-CM

## 2022-11-30 PROCEDURE — 90837 PSYTX W PT 60 MINUTES: CPT | Performed by: COUNSELOR

## 2022-12-02 ENCOUNTER — HOSPITAL ENCOUNTER (OUTPATIENT)
Dept: MAMMOGRAPHY | Facility: HOSPITAL | Age: 42
Discharge: HOME | End: 2022-12-02
Admitting: NURSE PRACTITIONER

## 2022-12-02 DIAGNOSIS — Z12.31 ENCOUNTER FOR SCREENING MAMMOGRAM FOR MALIGNANT NEOPLASM OF BREAST: ICD-10-CM

## 2022-12-02 PROCEDURE — 77063 BREAST TOMOSYNTHESIS BI: CPT | Performed by: RADIOLOGY

## 2022-12-02 PROCEDURE — 77067 SCR MAMMO BI INCL CAD: CPT

## 2022-12-02 PROCEDURE — 77063 BREAST TOMOSYNTHESIS BI: CPT

## 2022-12-02 PROCEDURE — 77067 SCR MAMMO BI INCL CAD: CPT | Performed by: RADIOLOGY

## 2022-12-05 ENCOUNTER — TELEMEDICINE (OUTPATIENT)
Dept: INTERNAL MEDICINE | Facility: CLINIC | Age: 42
End: 2022-12-05

## 2022-12-05 DIAGNOSIS — E03.8 SUBCLINICAL HYPOTHYROIDISM: ICD-10-CM

## 2022-12-05 DIAGNOSIS — I10 ESSENTIAL HYPERTENSION: ICD-10-CM

## 2022-12-05 DIAGNOSIS — R73.03 PRE-DIABETES: ICD-10-CM

## 2022-12-05 DIAGNOSIS — R51.9 CHRONIC INTRACTABLE HEADACHE, UNSPECIFIED HEADACHE TYPE: Primary | ICD-10-CM

## 2022-12-05 DIAGNOSIS — G89.29 CHRONIC INTRACTABLE HEADACHE, UNSPECIFIED HEADACHE TYPE: Primary | ICD-10-CM

## 2022-12-05 PROCEDURE — 99214 OFFICE O/P EST MOD 30 MIN: CPT | Performed by: INTERNAL MEDICINE

## 2022-12-05 NOTE — PROGRESS NOTES
Video visit Note      Date: 2022  Patient Name: Lesly Bill  MRN: [id number]  : 1980    CC: HA    History of Present Illness: Lesly Bill is a 42 y.o. female who presents for HA. Taken off hctz and started on lisinopril due to possible dehydration contributing to HA. Drink lots of tea. HA improved but not completely resolved. Wants to go over labs from last visit.     Subjective      Review of Systems:   Pertinent review of systems per HPI.    Review of Systems   Constitutional: Negative for activity change, appetite change, chills, diaphoresis, fatigue, fever and unexpected weight change.   HENT: Negative for congestion, dental problem, drooling, ear discharge, ear pain, facial swelling, hearing loss and mouth sores.    Eyes: Negative for pain, discharge and itching.   Respiratory: Negative for apnea, cough, choking, chest tightness and shortness of breath.    Cardiovascular: Negative for chest pain, palpitations and leg swelling.   Gastrointestinal: Negative for abdominal distention, abdominal pain, blood in stool, constipation and diarrhea.   Endocrine: Negative for cold intolerance, heat intolerance, polydipsia and polyuria.   Genitourinary: Negative for difficulty urinating, dysuria, frequency and hematuria.   Skin: Negative for color change, pallor, rash and wound.   Allergic/Immunologic: Negative for environmental allergies, food allergies and immunocompromised state.   Neurological: Negative for dizziness, weakness and light-headedness.   Psychiatric/Behavioral: Negative for agitation, behavioral problems, confusion, decreased concentration and self-injury. The patient is not nervous/anxious.    All other systems reviewed and are negative.    Allergies   Allergen Reactions   • Sulfacetamide Hives and Unknown - Low Severity   • Sulfa Antibiotics Hives   • Clove [Syzygium Aromaticum] Rash and GI Intolerance       Objective     Physical Exam:  Vital Signs: There  were no vitals filed for this visit.   There is no height or weight on file to calculate BMI.    Physical Exam    Assessment / Plan      Assessment & Plan:    1. Chronic intractable headache, unspecified headache type  Advised dec tea intake and inc water intake.     2. Pre-diabetes  Counseled on healthy diet and weight loss.     3. Subclinical hypothyroidism  Counseled on what this is, no sx so need to treat, also tsh very close to normal. Repeat in 3-6 months.     4. Essential hypertension  Cont ace inh, repeat cmp at next OV    You have chosen to receive care through a telehealth visit.  Do you consent to use a video/audio connection for your medical care today? Yes  Both pt and provider located in [place] during encounter. I spent 45 mins on this encounter.         Oksana Majano MD  12/05/2022

## 2022-12-21 ENCOUNTER — TELEMEDICINE (OUTPATIENT)
Dept: INTERNAL MEDICINE | Facility: CLINIC | Age: 42
End: 2022-12-21

## 2022-12-21 DIAGNOSIS — J01.90 ACUTE BACTERIAL SINUSITIS: Primary | ICD-10-CM

## 2022-12-21 DIAGNOSIS — B96.89 ACUTE BACTERIAL SINUSITIS: Primary | ICD-10-CM

## 2022-12-21 PROCEDURE — 99213 OFFICE O/P EST LOW 20 MIN: CPT | Performed by: PHYSICIAN ASSISTANT

## 2022-12-21 RX ORDER — AMOXICILLIN AND CLAVULANATE POTASSIUM 875; 125 MG/1; MG/1
1 TABLET, FILM COATED ORAL 2 TIMES DAILY
Qty: 20 TABLET | Refills: 0 | Status: SHIPPED | OUTPATIENT
Start: 2022-12-21 | End: 2022-12-31

## 2022-12-21 NOTE — PROGRESS NOTES
[facility]  [address]  [facility] [address]  Dept: [phone]  Dept Fax: [phone]  Loc: [phone]  Loc Fax: [phone]    Lesly Bill  1980    Televisit Note    You have chosen to receive care through a telephone visit. Do you consent to use a telephone visit for your medical care today? Yes.  Patient at home and provider in office during visit today.    History of Present Illness:  Lesly Bill is a 42 y.o. female who presents via video-conference for sinus congestion.  Patient reports sinus pressure and pain.  Blowing thick, green mucus out of her nose.  Also has some ear pain.  Denies any fever or chills.  Denies any nausea, vomiting, or diarrhea.    The following portions of the patient's history were reviewed and updated as appropriate: allergies, current medications, past family history, past medical history, past social history, past surgical history, and problem list.    This visit was scheduled as a phone visit to comply with patient safety concerns in accordance with CDC recommendations.    Medications    Current Outpatient Medications:   •  amoxicillin-clavulanate (Augmentin) 875-125 MG per tablet, Take 1 tablet by mouth 2 (Two) Times a Day for 10 days., Disp: 20 tablet, Rfl: 0  •  cetirizine (zyrTEC) 10 MG tablet, Take 10 mg by mouth Daily., Disp: , Rfl:   •  clobetasol propionate (CLOBEX) 0.05 % shampoo, APPLY TOPICALLY TO THE SCALP AND LET SIT FOR 5-10 MINUTES BEFORE RINSING AS NEEDED., Disp: , Rfl:   •  colestipol (COLESTID) 1 g tablet, Take 2 tablets by mouth Daily., Disp: 60 tablet, Rfl: 5  •  desonide (DESOWEN) 0.05 % cream, apply to the affected areas on scalp and feet twice daily x 2 wks. take week break then use as needed for flares, Disp: , Rfl:   •  Desvenlafaxine Succinate ER 25 MG tablet sustained-release 24 hour, Take 1 tablet by mouth Daily., Disp: 30 tablet, Rfl: 0  •  Drospirenone  (Slynd) 4 MG tablet, Take 4 mg by mouth Daily., Disp: 28 tablet, Rfl: 11  •  fluticasone (FLONASE) 50 MCG/ACT nasal spray, 2 sprays into the nostril(s) as directed by provider Daily., Disp: , Rfl:   •  hydrOXYzine (ATARAX) 10 MG tablet, Take 1 tablet by mouth 3 (Three) Times a Day As Needed for Anxiety., Disp: 30 tablet, Rfl: 0  •  hydrOXYzine pamoate (VISTARIL) 25 MG capsule, Take 1 capsule by mouth At Night As Needed (anxiety/insomnia)., Disp: 30 capsule, Rfl: 0  •  lisinopril (PRINIVIL,ZESTRIL) 20 MG tablet, Take 1 tablet by mouth Daily., Disp: 30 tablet, Rfl: 1  •  omeprazole (priLOSEC) 40 MG capsule, Take 1 capsule by mouth Daily., Disp: 90 capsule, Rfl: 3  •  Otezla 30 MG tablet, , Disp: , Rfl:     Subjective  Allergies   Allergen Reactions   • Sulfacetamide Hives and Unknown - Low Severity   • Sulfa Antibiotics Hives   • Clove [Syzygium Aromaticum] Rash and GI Intolerance        Past Medical History:   Diagnosis Date   • Acid reflux    • Anxiety    • Depression    • Fracture    • Hypertension    • IBS (irritable bowel syndrome)    • Psoriasis     [facility]    • Visual impairment 2019    Vitreal detachment floaters       Past Surgical History:   Procedure Laterality Date   • BREAST BIOPSY  2008   • WRIST SURGERY Left 2015       Family History   Problem Relation Age of Onset   • Hypertension Mother    • Cancer Father         mesothelioma   • Hypertension Father    • Anxiety disorder Maternal Grandmother    • Ovarian cancer Paternal Grandmother         unknown   • Breast cancer Paternal Aunt         60's   • Uterine cancer Neg Hx    • Colon cancer Neg Hx    • Osteoporosis Neg Hx         Social History     Socioeconomic History   • Marital status: Single   • Number of children: 0   Tobacco Use   • Smoking status: Never   • Smokeless tobacco: Never   Vaping Use   • Vaping Use: Never used   Substance and Sexual Activity   • Alcohol use: Yes     Comment: rarely   • Drug use: Never   • Sexual activity:  Not Currently     Partners: Male     Birth control/protection: Pill, Birth control pill         Objective  There were no vitals filed for this visit.  There is no height or weight on file to calculate BMI.    Assessment  Diagnoses and all orders for this visit:    1. Acute bacterial sinusitis (Primary)  -     amoxicillin-clavulanate (Augmentin) 875-125 MG per tablet; Take 1 tablet by mouth 2 (Two) Times a Day for 10 days.  Dispense: 20 tablet; Refill: 0        Plan    1. Acute bacterial sinusitis (Primary)- augmentin 875 mg twice daily x10 days.  Advised to take with food.  Advised probiotic use.      Return if symptoms worsen or fail to improve.    Des Connell PA-C  12/21/2022

## 2023-01-04 ENCOUNTER — OFFICE VISIT (OUTPATIENT)
Dept: INTERNAL MEDICINE | Facility: CLINIC | Age: 43
End: 2023-01-04
Payer: COMMERCIAL

## 2023-01-04 ENCOUNTER — LAB (OUTPATIENT)
Dept: LAB | Facility: HOSPITAL | Age: 43
End: 2023-01-04
Payer: COMMERCIAL

## 2023-01-04 VITALS
BODY MASS INDEX: 29.66 KG/M2 | HEIGHT: 67 IN | HEART RATE: 74 BPM | SYSTOLIC BLOOD PRESSURE: 108 MMHG | TEMPERATURE: 98.3 F | DIASTOLIC BLOOD PRESSURE: 74 MMHG | OXYGEN SATURATION: 99 % | WEIGHT: 189 LBS

## 2023-01-04 DIAGNOSIS — J01.00 ACUTE MAXILLARY SINUSITIS, RECURRENCE NOT SPECIFIED: ICD-10-CM

## 2023-01-04 DIAGNOSIS — R79.89 ABNORMAL TSH: Primary | ICD-10-CM

## 2023-01-04 DIAGNOSIS — H53.8 BLURRED VISION: ICD-10-CM

## 2023-01-04 PROCEDURE — 80048 BASIC METABOLIC PNL TOTAL CA: CPT | Performed by: NURSE PRACTITIONER

## 2023-01-04 PROCEDURE — 84443 ASSAY THYROID STIM HORMONE: CPT | Performed by: NURSE PRACTITIONER

## 2023-01-04 PROCEDURE — 84439 ASSAY OF FREE THYROXINE: CPT | Performed by: NURSE PRACTITIONER

## 2023-01-04 PROCEDURE — 87428 SARSCOV & INF VIR A&B AG IA: CPT | Performed by: NURSE PRACTITIONER

## 2023-01-04 PROCEDURE — 36415 COLL VENOUS BLD VENIPUNCTURE: CPT | Performed by: NURSE PRACTITIONER

## 2023-01-04 PROCEDURE — 99214 OFFICE O/P EST MOD 30 MIN: CPT | Performed by: NURSE PRACTITIONER

## 2023-01-04 RX ORDER — FLUTICASONE PROPIONATE 50 MCG
2 SPRAY, SUSPENSION (ML) NASAL DAILY
Qty: 9.9 ML | Refills: 1 | Status: SHIPPED | OUTPATIENT
Start: 2023-01-04

## 2023-01-04 NOTE — PROGRESS NOTES
Chief Complaint  Head pressure and eye problems (Has  new glasses still having issue seeing, started this morning.)    Subjective        Lesly Bill presents to [facility]  History of Present Illness  The patient woke up this morning with blurred vision. History of blurred vision in the past but it resolved quickly. She has pressure around the eyes, Denies sore throat, shortness of breath, cough, nausea, vomiting, diarrhea. Positive for sinus pain, occasional sharp pains in the ears. History of abnormal thyroid levels. Recent onset of fullness and difficulty swallowing. Positive history of GERD.  Objective   Vital Signs:  /74   Pulse 74   Temp 98.3 °F (36.8 °C)   Ht 170.2 cm (67.01\")   Wt 85.7 kg (189 lb)   SpO2 99%   BMI 29.59 kg/m²   Estimated body mass index is 29.59 kg/m² as calculated from the following:    Height as of this encounter: 170.2 cm (67.01\").    Weight as of this encounter: 85.7 kg (189 lb).          Physical Exam  Constitutional:       Appearance: Normal appearance.   HENT:      Head: Normocephalic.      Right Ear: Tympanic membrane, ear canal and external ear normal.      Left Ear: Tympanic membrane, ear canal and external ear normal.      Ears:      Comments: Maxillary tenderness     Mouth/Throat:      Pharynx: Oropharynx is clear.   Eyes:      Conjunctiva/sclera: Conjunctivae normal.   Neck:      Comments: No thyroid enlargement  Cardiovascular:      Rate and Rhythm: Normal rate and regular rhythm.      Pulses: Normal pulses.      Heart sounds: Normal heart sounds.   Pulmonary:      Effort: Pulmonary effort is normal.      Breath sounds: Normal breath sounds.   Musculoskeletal:         General: Normal range of motion.      Cervical back: Neck supple.   Lymphadenopathy:      Cervical: No cervical adenopathy.   Skin:     General: Skin is warm.   Neurological:      General: No focal deficit present.      Mental Status: She is alert.    Psychiatric:         Mood and Affect: Mood normal.         Behavior: Behavior normal.         Thought Content: Thought content normal.         Judgment: Judgment normal.        Results for orders placed or performed in visit on 01/04/23   TSH    Specimen: Blood   Result Value Ref Range    TSH 3.120 0.270 - 4.200 uIU/mL   T4, free    Specimen: Blood   Result Value Ref Range    Free T4 1.30 0.93 - 1.70 ng/dL   Basic metabolic panel    Specimen: Blood   Result Value Ref Range    Glucose 93 65 - 99 mg/dL    BUN 15 6 - 20 mg/dL    Creatinine 0.86 0.57 - 1.00 mg/dL    Sodium 138 136 - 145 mmol/L    Potassium 4.0 3.5 - 5.2 mmol/L    Chloride 103 98 - 107 mmol/L    CO2 24.4 22.0 - 29.0 mmol/L    Calcium 8.6 8.6 - 10.5 mg/dL    BUN/Creatinine Ratio 17.4 7.0 - 25.0    Anion Gap 10.6 5.0 - 15.0 mmol/L    eGFR 86.1 >60.0 mL/min/1.73   POCT SARS-CoV-2 Antigen JARAD    Specimen: Swab   Result Value Ref Range    SARS Antigen Not Detected Not Detected, Presumptive Negative    Influenza A Antigen JARAD Not Detected Not Detected    Influenza B Antigen JARAD Not Detected Not Detected    Internal Control Passed Passed    Lot Number [id number]     Expiration Date 02/08/2023      Result Review :                Assessment and Plan   Diagnoses and all orders for this visit:    1. Abnormal TSH (Primary)  -     TSH; Future  -     T4, free; Future  -     TSH  -     T4, free    2. Blurred vision  -     Basic metabolic panel; Future  -     Basic metabolic panel    3. Acute maxillary sinusitis, recurrence not specified  -     fluticasone (FLONASE) 50 MCG/ACT nasal spray; 2 sprays into the nostril(s) as directed by provider Daily.  Dispense: 9.9 mL; Refill: 1  -     POCT SARS-CoV-2 Antigen JARAD      Reviewed exam results with patient. Most recent TSH abnormal but Free t4 normal. Lab draw today. Consider thyroid ultrasound as indicated. Return for persistent/progressive symptoms.       Follow Up   Return if symptoms worsen or fail to improve.  Patient  was given instructions and counseling regarding her condition or for health maintenance advice. Please see specific information pulled into the AVS if appropriate.

## 2023-01-05 LAB
ANION GAP SERPL CALCULATED.3IONS-SCNC: 10.6 MMOL/L (ref 5–15)
BUN SERPL-MCNC: 15 MG/DL (ref 6–20)
BUN/CREAT SERPL: 17.4 (ref 7–25)
CALCIUM SPEC-SCNC: 8.6 MG/DL (ref 8.6–10.5)
CHLORIDE SERPL-SCNC: 103 MMOL/L (ref 98–107)
CO2 SERPL-SCNC: 24.4 MMOL/L (ref 22–29)
CREAT SERPL-MCNC: 0.86 MG/DL (ref 0.57–1)
EGFRCR SERPLBLD CKD-EPI 2021: 86.1 ML/MIN/1.73
EXPIRATION DATE: NORMAL
FLUAV AG UPPER RESP QL IA.RAPID: NOT DETECTED
FLUBV AG UPPER RESP QL IA.RAPID: NOT DETECTED
GLUCOSE SERPL-MCNC: 93 MG/DL (ref 65–99)
INTERNAL CONTROL: NORMAL
Lab: NORMAL
POTASSIUM SERPL-SCNC: 4 MMOL/L (ref 3.5–5.2)
SARS-COV-2 AG UPPER RESP QL IA.RAPID: NOT DETECTED
SODIUM SERPL-SCNC: 138 MMOL/L (ref 136–145)
T4 FREE SERPL-MCNC: 1.3 NG/DL (ref 0.93–1.7)
TSH SERPL DL<=0.05 MIU/L-ACNC: 3.12 UIU/ML (ref 0.27–4.2)

## 2023-01-11 ENCOUNTER — TELEMEDICINE (OUTPATIENT)
Dept: PSYCHIATRY | Facility: CLINIC | Age: 43
End: 2023-01-11
Payer: COMMERCIAL

## 2023-01-11 DIAGNOSIS — F33.0 MILD EPISODE OF RECURRENT MAJOR DEPRESSIVE DISORDER: Chronic | ICD-10-CM

## 2023-01-11 DIAGNOSIS — F51.04 PSYCHOPHYSIOLOGICAL INSOMNIA: Chronic | ICD-10-CM

## 2023-01-11 DIAGNOSIS — F41.1 GENERALIZED ANXIETY DISORDER: Primary | Chronic | ICD-10-CM

## 2023-01-11 PROCEDURE — 99214 OFFICE O/P EST MOD 30 MIN: CPT | Performed by: NURSE PRACTITIONER

## 2023-01-11 RX ORDER — TRAZODONE HYDROCHLORIDE 50 MG/1
25-50 TABLET ORAL NIGHTLY PRN
Qty: 30 TABLET | Refills: 0 | Status: SHIPPED | OUTPATIENT
Start: 2023-01-11

## 2023-01-11 RX ORDER — DESVENLAFAXINE 25 MG/1
25 TABLET, EXTENDED RELEASE ORAL DAILY
Qty: 30 TABLET | Refills: 0 | Status: SHIPPED | OUTPATIENT
Start: 2023-01-11

## 2023-01-11 NOTE — PROGRESS NOTES
"This provider is located at the [facility] (through [facility]), [address], [place], [address] using a secure [employer] Video Visit through [employer]. Patient is being seen remotely via telehealth at their home address in [place], and stated they are in a secure environment for this session. The patient's condition being diagnosed/treated is appropriate for telemedicine. The provider identified herself as well as her credentials. The patient, and/or patients guardian, consent to be seen remotely, and when consent is given they understand that the consent allows for patient identifiable information to be sent to a third party as needed. They may refuse to be seen remotely at any time. The electronic data is encrypted and password protected, and the patient and/or guardian has been advised of the potential risks to privacy not withstanding such measures.    You have chosen to receive care through a telehealth visit.  Do you consent to use a video/audio connection for your medical care today? Yes     Patient identifiers utilized: Name and date of birth.    Patient verbally confirmed consent for today's encounter 23.        Subjective   Lesly Bill is a 42 y.o. female who is here today for medication management follow up.    Chief Complaint: Depression, anxiety, insomnia     History of Present Illness:  The patient describes her mood as \"overwhelmed\" over the last few weeks.  The patient states that she has been dealing with some situational stressors recently that have contributed to her anxiety.  She states that her best friend's  passed away and the grieving process regarding this was difficult for her.  She states that the  planning for her best friend's  was lifted to her, which she states was a very stressful process.  She states that she has not been taking her Pristiq recently as she ran out of the medication.  She states that " her mood/depression has not been too bad, but states that her anxiety has been most concerning for her.  She states that she has been having a lot of difficulty sleeping again recently as her anxiety has been increased.  She states that she is having a lot of trouble falling asleep, but denies any difficulty staying asleep when she is able to fall asleep.  The patient denies any nightmares or bad dreams.  She states that she has been using the hydroxyzine at nighttime as needed for insomnia/anxiety, but states that it has not been effective recently as anxiety has been increased.  The patient denies any abnormal muscle movements or tics.  The patient rates her depression at a 2-3/10 on a 0-10 scale, with 10 being the worst.  The patient rates her anxiety at a 7-8/10 on a 0-10 scale, with 10 being the worst.  The patient rates hopelessness at a 0/10 on a 0-10 scale with 10 being the worst.  The patient denies suicidal ideations and homicidal ideations, and is convincing.  The patient denies any auditory hallucinations or visual hallucinations.  The patient does not endorse significant symptoms consistent with bipolar disorder or a psychotic illness.            LMP:  Reports she does not have regular menstrual cycles due to her birth control.  The patient denies any chance of pregnancy at this time.  The patient was educated that her prescribed medications can have potential risk to a developing fetus. The patient is advised to contact this APRN/this office if she becomes pregnant or plans to become pregnant.  Pt verbalizes understanding and acknowledged agreement with this plan in her own words.      Prior Psychiatric Medications:  Zoloft  Cymbalta  Prozac  Lexapro  Effexor  Trintellix - ineffective   Viibryd 10 mg daily - reports was helpful for mood/anxiety, but caused epigastric pain   Clonidine 0.1 mg nightly - caused heart palpitations      The following portions of the patient's history were reviewed and  updated as appropriate: allergies, current medications, past family history, past medical history, past social history, past surgical history and problem list.    Review of Systems   Constitutional: Negative for activity change, appetite change, fatigue and unexpected weight change.   Psychiatric/Behavioral: Positive for dysphoric mood and sleep disturbance. Negative for agitation, behavioral problems, confusion, decreased concentration, hallucinations, self-injury and suicidal ideas. The patient is nervous/anxious. The patient is not hyperactive.        Objective   Physical Exam  Constitutional:       Appearance: Normal appearance.   Neurological:      Mental Status: She is alert.   Psychiatric:         Attention and Perception: Attention and perception normal.         Mood and Affect: Affect normal. Mood is anxious and depressed.         Speech: Speech normal.         Behavior: Behavior normal. Behavior is cooperative.         Thought Content: Thought content normal. Thought content does not include homicidal or suicidal ideation. Thought content does not include homicidal or suicidal plan.         Cognition and Memory: Cognition and memory normal.         Judgment: Judgment normal.       not currently breastfeeding.    The patient was seen remotely today via a MyChart Video Visit through [facility].  Unable to obtain vital signs due to nature of remote visit.  Height stated at 67 inches.  Weight stated at 189 pounds.     Allergies   Allergen Reactions   • Sulfacetamide Hives and Unknown - Low Severity   • Sulfa Antibiotics Hives   • Clove [Syzygium Aromaticum] Rash and GI Intolerance       Recent Results (from the past 2016 hour(s))   CBC (No Diff)    Collection Time: 11/21/22 10:21 AM    Specimen: Blood   Result Value Ref Range    WBC 10.57 3.40 - 10.80 10*3/mm3    RBC 4.79 3.77 - 5.28 10*6/mm3    Hemoglobin 14.3 12.0 - 15.9 g/dL    Hematocrit 42.7 34.0 - 46.6 %    MCV 89.1 79.0 - 97.0 fL    MCH 29.9 26.6 - 33.0 pg     MCHC 33.5 31.5 - 35.7 g/dL    RDW 12.5 12.3 - 15.4 %    RDW-SD 41.0 37.0 - 54.0 fl    MPV 12.6 (H) 6.0 - 12.0 fL    Platelets 262 140 - 450 10*3/mm3   Comprehensive Metabolic Panel    Collection Time: 11/21/22 10:21 AM    Specimen: Blood   Result Value Ref Range    Glucose 110 (H) 65 - 99 mg/dL    BUN 13 6 - 20 mg/dL    Creatinine 0.96 0.57 - 1.00 mg/dL    Sodium 137 136 - 145 mmol/L    Potassium 4.5 3.5 - 5.2 mmol/L    Chloride 104 98 - 107 mmol/L    CO2 23.5 22.0 - 29.0 mmol/L    Calcium 9.0 8.6 - 10.5 mg/dL    Total Protein 6.8 6.0 - 8.5 g/dL    Albumin 4.10 3.50 - 5.20 g/dL    ALT (SGPT) 20 1 - 33 U/L    AST (SGOT) 20 1 - 32 U/L    Alkaline Phosphatase 85 39 - 117 U/L    Total Bilirubin <0.2 0.0 - 1.2 mg/dL    Globulin 2.7 gm/dL    A/G Ratio 1.5 g/dL    BUN/Creatinine Ratio 13.5 7.0 - 25.0    Anion Gap 9.5 5.0 - 15.0 mmol/L    eGFR 75.4 >60.0 mL/min/1.73   Lipid Panel    Collection Time: 11/21/22 10:21 AM    Specimen: Blood   Result Value Ref Range    Total Cholesterol 198 0 - 200 mg/dL    Triglycerides 143 0 - 150 mg/dL    HDL Cholesterol 49 40 - 60 mg/dL    LDL Cholesterol  124 (H) 0 - 100 mg/dL    VLDL Cholesterol 25 5 - 40 mg/dL    LDL/HDL Ratio 2.46    Hemoglobin A1c    Collection Time: 11/21/22 10:21 AM    Specimen: Blood   Result Value Ref Range    Hemoglobin A1C 5.80 (H) 4.80 - 5.60 %   TSH Rfx On Abnormal To Free T4    Collection Time: 11/21/22 10:21 AM    Specimen: Blood   Result Value Ref Range    TSH 4.660 (H) 0.270 - 4.200 uIU/mL   Vitamin B12 & Folate    Collection Time: 11/21/22 10:21 AM    Specimen: Blood   Result Value Ref Range    Folate 5.12 4.78 - 24.20 ng/mL    Vitamin B-12 284 211 - 946 pg/mL   Vitamin D,25-Hydroxy    Collection Time: 11/21/22 10:21 AM    Specimen: Blood   Result Value Ref Range    25 Hydroxy, Vitamin D 25.2 (L) 30.0 - 100.0 ng/ml   T4, Free    Collection Time: 11/21/22 10:21 AM    Specimen: Blood   Result Value Ref Range    Free T4 1.20 0.93 - 1.70 ng/dL   TSH     Collection Time: 01/04/23  2:34 PM    Specimen: Blood   Result Value Ref Range    TSH 3.120 0.270 - 4.200 uIU/mL   T4, free    Collection Time: 01/04/23  2:34 PM    Specimen: Blood   Result Value Ref Range    Free T4 1.30 0.93 - 1.70 ng/dL   Basic metabolic panel    Collection Time: 01/04/23  2:34 PM    Specimen: Blood   Result Value Ref Range    Glucose 93 65 - 99 mg/dL    BUN 15 6 - 20 mg/dL    Creatinine 0.86 0.57 - 1.00 mg/dL    Sodium 138 136 - 145 mmol/L    Potassium 4.0 3.5 - 5.2 mmol/L    Chloride 103 98 - 107 mmol/L    CO2 24.4 22.0 - 29.0 mmol/L    Calcium 8.6 8.6 - 10.5 mg/dL    BUN/Creatinine Ratio 17.4 7.0 - 25.0    Anion Gap 10.6 5.0 - 15.0 mmol/L    eGFR 86.1 >60.0 mL/min/1.73   POCT SARS-CoV-2 Antigen JARAD    Collection Time: 01/05/23 10:55 AM    Specimen: Swab   Result Value Ref Range    SARS Antigen Not Detected Not Detected, Presumptive Negative    Influenza A Antigen JARAD Not Detected Not Detected    Influenza B Antigen JARAD Not Detected Not Detected    Internal Control Passed Passed    Lot Number [id number]     Expiration Date 02/08/2023        Current Medications:   Current Outpatient Medications   Medication Sig Dispense Refill   • cetirizine (zyrTEC) 10 MG tablet Take 10 mg by mouth Daily.     • clobetasol propionate (CLOBEX) 0.05 % shampoo APPLY TOPICALLY TO THE SCALP AND LET SIT FOR 5-10 MINUTES BEFORE RINSING AS NEEDED.     • colestipol (COLESTID) 1 g tablet Take 2 tablets by mouth Daily. 60 tablet 5   • desonide (DESOWEN) 0.05 % cream apply to the affected areas on scalp and feet twice daily x 2 wks. take week break then use as needed for flares     • Drospirenone (Slynd) 4 MG tablet Take 4 mg by mouth Daily. 28 tablet 11   • fluticasone (FLONASE) 50 MCG/ACT nasal spray 2 sprays into the nostril(s) as directed by provider Daily. 9.9 mL 1   • hydrOXYzine (ATARAX) 10 MG tablet Take 1 tablet by mouth 3 (Three) Times a Day As Needed for Anxiety. 30 tablet 0   • lisinopril (PRINIVIL,ZESTRIL) 20 MG  tablet Take 1 tablet by mouth Daily. 30 tablet 1   • omeprazole (priLOSEC) 40 MG capsule Take 1 capsule by mouth Daily. 90 capsule 3   • Otezla 30 MG tablet      • Desvenlafaxine Succinate ER 25 MG tablet sustained-release 24 hour Take 1 tablet by mouth Daily. 30 tablet 0   • traZODone (DESYREL) 50 MG tablet Take 0.5-1 tablets by mouth At Night As Needed for Sleep. 30 tablet 0     No current facility-administered medications for this visit.       Mental Status Exam:   Hygiene:   good  Cooperation:  Cooperative  Eye Contact:  Good  Psychomotor Behavior:  Appropriate  Affect:  Full range  Hopelessness: Denies  Speech:  Normal  Thought Process:  Goal directed and Linear  Thought Content:  Normal  Suicidal:  None  Homicidal:  None  Hallucinations:  None  Delusion:  None  Memory:  Intact  Orientation:  Person, Place, Time and Situation  Reliability:  good  Insight:  Good  Judgement:  Good  Impulse Control:  Good  Physical/Medical Issues:  Yes See medical history          PHQ-2 Depression Screening  Little interest or pleasure in doing things? 1-->several days   Feeling down, depressed, or hopeless? 1-->several days   PHQ-2 Total Score 2            Assessment & Plan   Diagnoses and all orders for this visit:    1. Generalized anxiety disorder (Primary)  -     Desvenlafaxine Succinate ER 25 MG tablet sustained-release 24 hour; Take 1 tablet by mouth Daily.  Dispense: 30 tablet; Refill: 0    2. Mild episode of recurrent major depressive disorder (HCC)  -     Desvenlafaxine Succinate ER 25 MG tablet sustained-release 24 hour; Take 1 tablet by mouth Daily.  Dispense: 30 tablet; Refill: 0    3. Psychophysiological insomnia  -     traZODone (DESYREL) 50 MG tablet; Take 0.5-1 tablets by mouth At Night As Needed for Sleep.  Dispense: 30 tablet; Refill: 0            Visit Diagnoses:    ICD-10-CM ICD-9-CM   1. Generalized anxiety disorder  F41.1 300.02   2. Mild episode of recurrent major depressive disorder (HCC)  F33.0 296.31    3. Psychophysiological insomnia  F51.04 307.42       GOALS:  Short Term Goals: Patient will be compliant with medication, and patient will have no significant medication related side effects.  Patient will be engaged in psychotherapy as indicated.  Patient will report subjective improvement of symptoms.  Long term goals: To stabilize mood and treat/improve subjective symptoms, the patient will stay out of the hospital, the patient will be at an optimal level of functioning, and the patient will take all medications as prescribed.  The patient verbalized understanding and agreement with goals that were mutually set.      SUICIDE RISK ASSESSMENT: Unalterable demographics and a history of mental health intervention indicate this patient is in a high risk category compared to the general population. At present, the patient denies active SI/HI, intentions, or plans at this time and agrees to seek immediate care should such thoughts develop. The patient verbalizes understanding of how to access emergency care if needed and agrees to do so. Consideration of suicide risk and protective factors such as history, current presentation, individual strengths and weaknesses, psychosocial and environmental stressors and variables, psychiatric illness and symptoms, medical conditions and pain, took place in this interview. Based on those considerations, the patient is determined: within individual baseline and presenting no imminent risk for suicide or homicide. Other recommendations: The patient does not meet the criteria for inpatient admission and is not a safety risk to self or others at today's visit. Inpatient treatment offers no significant advantages over outpatient treatment for this patient at today's visit.      SAFETY PLAN:  Patient was given ample time for questions and fully participated in treatment planning.  Patient was encouraged to call the clinic with any questions or concerns.  Patient was informed of access  to emergency care. If patient were to develop any significant symptomatology, suicidal ideation, homicidal ideation, any concerns, or feel unsafe at any time they are to call the clinic and if unable to get immediate assistance should immediately call 911 or go to the nearest emergency room.  The patient is advised to remove or secure (lock away) all lethal weapons (including guns) and sharps (including razors, scissors, knives, etc.).  All medications (including any prescribed and any over the counter medications) should be stored in a safe and secured location that is not obtainable by children/adolescents.  Patient was given an opportunity and encouraged to ask questions about their medication, illness, and treatment. Patient contracted verbally for the following: If you are experiencing an emotional crisis or have thoughts of harming yourself or others, please go to your nearest local emergency room or call 911. Will continue to re-assess medication response and side effects frequently to establish efficacy and ensure safety. Risks, any black box warnings, side effects, off label usage, and benefits of medication and treatment discussed with patient, along with potential adverse side effects of current and/or newly prescribed medication, alternative treatment options, and OTC medications.  Patient verbalized understanding of potential risks, any off label use of medication, any black box warnings, and any side effects in their own words. The patient verbalized understanding and agreed to comply with the safety plan discussed in their own words.  Patient given the number to the office. Number also available to the 24- hour suicide hotline.      TREATMENT PLAN/GOALS: Continue medications and treatment plan as indicated. Treatment and medication options discussed during today's visit. Patient acknowledged and verbally consented to continue with current treatment plan and was educated on the importance of compliance  with treatment and follow-up appointments.        -Discontinue Hydroxyzine 25 mg nightly as needed for insomnia/anxiety due to loss of efficacy   -Begin Trazodone 25-50 mg nightly as needed for insomnia   -Resume Pristiq 25 mg daily for anxiety/depression   -Continue Hydroxyzine 10 mg three times daily as needed for anxiety        MEDICATION ISSUES: Discussed medication options and treatment plan of prescribed medication, any off label use of medication, as well as the risks, benefits, any black box warnings including increased suicidality, and side effects including but not limited to potential falls, dizziness, possible impaired driving, GI side effects (change in appetite, abdominal discomfort, nausea, vomiting, diarrhea, and/or constipation), dry mouth, somnolence, sedation, insomnia, activation, agitation, irritation, tremors, abnormal muscle movements or disorders, headache, sweating, possible bruising or rare bleeding, electrolyte and/or fluid abnormalities, change in blood pressure/heart rate/and or heart rhythm, sexual dysfunction, and metabolic adversities among others. Patient and/or guardian agreeable to call the office with any worsening of symptoms or onset of side effects, or if any concerns or questions arise.  The contact information for the office is made available to the patient and/or guardian.  Patient and/or guardian agreeable to call 911 or go to the nearest ER should they begin having any SI/HI, or if any urgent concerns arise. No medication side effects or related complaints today.    This APRN has discussed with the patient/guardian about the possibility of serotonin syndrome when certain medications are taken together, as is the case with this patient.  This APRN has provided the patient/guardian with a list of symptoms of serotonin syndrome including symptoms of autonomic instability, altered sensorium, confusion, restlessness, agitation, myoclonus, hyperreflexia, hyperthermia,  diaphoresis, tremor, chills, diarrhea and cramps, ataxia, headache, migraines, seizures, and insomnia; which could lead to permanent hyperthermic brain damage, cardiovascular collapse, coma, or even death.  The patient/guardian are instructed to stop medications immediately and either contact this APRN/this office during regular office hours, or go to the emergency department/call 911, if they begin to experience any of the symptoms discussed.  The benefits and risks of the current medication regimen are discussed with the patient/guardian, and they feel that the benefits out weigh the risks.  The patient/guardian verbalized understanding and agreement in their own words.                    VERBAL INFORMED CONSENT FOR MEDICATION:  The patient was educated that their proposed/prescribed psychotropic medication(s) has potential risks, side effects, adverse effects, and black box warnings; and these have been discussed with the patient.  The patient has been informed that their treatment and medication dosage is to be individualized, and may even be above or below the recommended range/dosage due to patient individualization and response, but medication is prescribed using a shared decision making approach, and no medication or dosage will be prescribed without the patient's verbal consent.  The reason for the use of the medication including any off label use and alternative modes of treatment other than or in addition to medication has been considered and discussed, the probable consequences of not receiving the proposed treatment have been discussed, and any treatment side effects, black box warnings, and cautions associated with treatment have been discussed with the patient.  The patient is allowed ample time to openly discuss and ask questions regarding the proposed medication(s) and treatment plan and the patient verbalizes understanding the reasons for the use of the medication, its potential risks and benefits,  other alternative treatment(s), and the probable consequences that may occur if the proposed medication is not given.  The patient has been given ample time to ask questions and study the information and find the information to be specific, accurate, and complete.  The patient gives verbal consent for the medication(s) proposed/prescribed, they verbalized understanding that they can refuse and withdraw consent at any time with the assistance of this APRN, and the patient has verbally confirmed that they are aware, and are willing, to take the prescribed medication and follow the treatment plan with the known possible risks, side effect, black box warnings, and any potential medication interactions, and the patient reports they will be worse off without this medication and treatment plan.  The patient is advised to contact this APRN/this office if any questions or concerns arise at any time (at [phone]), or call 911/go to the closest emergency department if needed or outside of office hours.          [facility] No Show Policy:  We understand unexpected circumstances arise; however, anytime you miss your appointment we are unable to provide you appropriate care.  In addition, each appointment missed could have been used to provide care for others.  We ask that you call at least 24 hours in advance to cancel or reschedule an appointment.  We would like to take this opportunity to remind you of our policy stating patients who miss THREE or more appointments without cancelling or rescheduling 24 hours in advance of the appointment may be subject to cancellation of any further visits with our clinic and recommendation to seek in-person services/visits.    Please call [phone] to reschedule your appointment. If there are reasons that make it difficult for you to keep the appointments, please call and let us know how we can help.  Please understand that medication prescribing will not  continue without seeing your provider.      [facility]'s No Show Policy reviewed with patient at today's visit. Patient verbalized understanding of this policy. Discussed with patient that in the event that there are three or more no show visits, it will be recommended that they pursue in-person services/visits as noncompliance with telehealth visits indicates that patient is not an appropriate candidate for telemedicine and would likely be more appropriate for in-person services/visits. Patient verbalizes understanding and is agreeable to this.          MEDS ORDERED DURING VISIT:  New Medications Ordered This Visit   Medications   • Desvenlafaxine Succinate ER 25 MG tablet sustained-release 24 hour     Sig: Take 1 tablet by mouth Daily.     Dispense:  30 tablet     Refill:  0   • traZODone (DESYREL) 50 MG tablet     Sig: Take 0.5-1 tablets by mouth At Night As Needed for Sleep.     Dispense:  30 tablet     Refill:  0       Return in about 4 weeks (around 2/8/2023), or if symptoms worsen or fail to improve, for Recheck.        Patient will follow-up in 4 weeks, highly encouraged the patient if she had any questions or concerns to contact the behavioral health virtual clinic for sooner appointment patient verbalized understanding.      Functional Status: Moderate impairment     Prognosis: Guarded with Ongoing Treatment            This document has been electronically signed by RAF Alva  January 11, 2023 11:37 EST       Some of the data in this electronic note has been brought forward from a previous encounter, any necessary changes have been made, it has been reviewed by this APRN, and it is accurate.      Part of this note may be an electronic transcription/translation of spoken language to printed text using the Dragon Dictation System.

## 2023-01-12 ENCOUNTER — TELEMEDICINE (OUTPATIENT)
Dept: PSYCHIATRY | Facility: CLINIC | Age: 43
End: 2023-01-12
Payer: COMMERCIAL

## 2023-01-12 DIAGNOSIS — F41.1 GENERALIZED ANXIETY DISORDER: ICD-10-CM

## 2023-01-12 DIAGNOSIS — F33.0 MILD EPISODE OF RECURRENT MAJOR DEPRESSIVE DISORDER: Primary | ICD-10-CM

## 2023-01-12 PROCEDURE — 90832 PSYTX W PT 30 MINUTES: CPT | Performed by: COUNSELOR

## 2023-01-12 NOTE — PROGRESS NOTES
Date: January 12, 2023  Time In: 11:40 am   Time Out: 12:14 pm   This provider is located at the [facility] (through [facility]), [address], [place] [address] using a secure [employer] Video Visit through [employer]. Patient is being seen remotely via telehealth at home address in [place] and stated they are in a secure environment for this session. The patient's condition being diagnosed/treated is appropriate for telemedicine. The provider identified herself as well as her credentials. The patient, and/or patients guardian, consent to be seen remotely, and when consent is given they understand that the consent allows for patient identifiable information to be sent to a third party as needed. They may refuse to be seen remotely at any time. The electronic data is encrypted and password protected, and the patient and/or guardian has been advised of the potential risks to privacy not withstanding such measures.     You have chosen to receive care through a telehealth visit.  Do you consent to use a video/audio connection for your medical care today? Yes    PROGRESS NOTE  Data:  Lesly Bill is a 42 y.o. female who presents today for follow up. Patient reported that it has been a little bit bumpy the last few weeks. Patient stated that she has been the primary support person for her friend whom as been staying with her. Patient expressed that the services went really well for her friends  who passed away. . Patient stated that she has been super anxious recently.Patient explored thoughts and feelings with therapist regarding expectations and feeling like she has to do things to fit in.  Patient expressed that she shuts down and doesn't talk about things. Patient reported to therapist that in  friendships the ability to maintain is challenging due to compartmentalizing friendships. Patient expressed that her friends may not know all about her. Patient  reported feeling upset about the ability being judged for being weird or quirky. Therapist and patient processed changing the view of herself as weird appears negative. Patient was also encouraged to challenge her thoughts related to how others may perceive her. Patient is in agreement to focus on self esteem and self perception during upcoming therapy appointments.     Chief Complaint: anxiety, support to others    History of Present Illness: on-going       Clinical Maneuvering/Intervention: CBT     (Scales based on 0 - 10 with 10 being the worst)  Depression:  not scaled during contact Anxiety: Not scaled during contact        Assisted patient in processing above session content; acknowledged and normalized patient’s thoughts, feelings, and concerns.  Rationalized patient thought process regarding difficulty with social engagement with others. Patient discussed ways in which he avoids some interaction and ways in which her it depletes her..  Discussed triggers associated with patient's interactions/communication and self image.  Also discussed with patient regarding addressing self esteem and self image. Patient is open to this and will learn coping skills.     Allowed patient to freely discuss issues without interruption or judgment. Provided safe, confidential environment to facilitate the development of positive therapeutic relationship and encourage open, honest communication.If risk factors which would indicate the need for higher level of care including thoughts to harm self or others and/or self-harming behavior then patient is encouraged to contact this office, call 911, or present to the nearest emergency room should any of these events occur. Did not directly ask for assessment during contact but no suicidal or homicidal ideation or perceptual disturbance were noted.     Assessment:   Assessment   Patient appears to maintain relative stability as compared to their baseline.  However, patient continues to  struggle with anxiety and depressive symptoms.  which continues to cause impairment in important areas of functioning.  A result, they can be reasonably expected to continue to benefit from treatment and would likely be at increased risk for decompensation otherwise.    Mental Status Exam:   Hygiene:   good  Cooperation:  Cooperative  Eye Contact:  Good  Psychomotor Behavior:  Appropriate  Affect:  Full range and Appropriate  Mood: sad and depressed  Speech:  Normal  Thought Process:  Goal directed  Thought Content:  Normal  Suicidal:  not specifically asked but not noted during contact  Homicidal:  not specifically asked but not noted during contact   Hallucinations:  None  Delusion:  None  Memory:  Intact  Orientation:  Person, Place, Time and Situation  Reliability:  good  Insight:  Good and Fair  Judgement:  Good and Fair  Impulse Control:  Good  Physical/Medical Issues:  No        Patient's Support Network Includes:  Friends    Functional Status: Moderate impairment     Progress toward goal: Not at goal    Prognosis: Good with Ongoing Treatment          Plan:  Patient will continue in individual outpatient therapy with focus on improved functioning and coping skills, maintaining stability, and avoiding decompensation and the need for higher level of care.    Patient will adhere to medication regimen as prescribed and report any side effects. Patient will contact this office, call 911 or present to the nearest emergency room should suicidal or homicidal ideations occur. Provide Cognitive Behavioral Therapy and Solution Focused Therapy to improve functioning, maintain stability, and avoid decompensation and the need for higher level of care.     Patient and therapist did not scheduling on-going appointments during contact but discussed being worked in to be seen sooner and for hour long appointments.            VISIT DIAGNOSIS:     ICD-10-CM ICD-9-CM   1. Mild episode of recurrent major depressive disorder (HCC)   F33.0 296.31   2. Generalized anxiety disorder  F41.1 300.02             This document has been electronically signed by ELA Jang  January 12, 2023 12:05 EST      Part of this note may be an electronic transcription/translation of spoken language to printed text using the Dragon Dictation System.

## 2023-01-13 ENCOUNTER — TELEMEDICINE (OUTPATIENT)
Dept: INTERNAL MEDICINE | Facility: CLINIC | Age: 43
End: 2023-01-13
Payer: COMMERCIAL

## 2023-01-13 DIAGNOSIS — R22.31 MASS OF RIGHT WRIST: Primary | ICD-10-CM

## 2023-01-13 PROCEDURE — 99213 OFFICE O/P EST LOW 20 MIN: CPT | Performed by: NURSE PRACTITIONER

## 2023-01-13 NOTE — PROGRESS NOTES
Subjective   Chief Complaint   Patient presents with   • right wrist pain      This is video visit.  You have chosen to receive care through a video visit today. Do you consent to use a video visit for your medical care today? Yes    Lesly Bill is a 42 y.o. female here today for a mass in her right wrist.  She states this recently popped up. The area is very tender.   Starting to have some tingling in her fingers.  She feels like this is a cyst.  She is wanting to have the cyst drained because she doesn't want surgery.    I have reviewed the following portions of the patient's history and confirmed they are accurate: allergies, current medications, past family history, past medical history, past social history, past surgical history and problem list    I have personally completed the patient's review of systems.     Review of Systems   Constitutional: Negative for activity change, appetite change and fatigue.   HENT: Negative for congestion.    Respiratory: Negative for cough and shortness of breath.    Cardiovascular: Negative for chest pain and leg swelling.   Gastrointestinal: Negative for abdominal pain.   Musculoskeletal: Positive for arthralgias (right wrist).   Neurological: Negative for dizziness, weakness and confusion.   Psychiatric/Behavioral: Negative for behavioral problems and decreased concentration.       Current Outpatient Medications on File Prior to Visit   Medication Sig   • cetirizine (zyrTEC) 10 MG tablet Take 10 mg by mouth Daily.   • clobetasol propionate (CLOBEX) 0.05 % shampoo APPLY TOPICALLY TO THE SCALP AND LET SIT FOR 5-10 MINUTES BEFORE RINSING AS NEEDED.   • colestipol (COLESTID) 1 g tablet Take 2 tablets by mouth Daily.   • desonide (DESOWEN) 0.05 % cream apply to the affected areas on scalp and feet twice daily x 2 wks. take week break then use as needed for flares   • Desvenlafaxine Succinate ER 25 MG tablet sustained-release 24 hour Take 1 tablet by mouth Daily.   •  Drospirenone (Slynd) 4 MG tablet Take 4 mg by mouth Daily.   • fluticasone (FLONASE) 50 MCG/ACT nasal spray 2 sprays into the nostril(s) as directed by provider Daily.   • hydrOXYzine (ATARAX) 10 MG tablet Take 1 tablet by mouth 3 (Three) Times a Day As Needed for Anxiety.   • lisinopril (PRINIVIL,ZESTRIL) 20 MG tablet Take 1 tablet by mouth Daily.   • omeprazole (priLOSEC) 40 MG capsule Take 1 capsule by mouth Daily.   • Otezla 30 MG tablet    • traZODone (DESYREL) 50 MG tablet Take 0.5-1 tablets by mouth At Night As Needed for Sleep.     No current facility-administered medications on file prior to visit.       Objective   There were no vitals filed for this visit.  There is no height or weight on file to calculate BMI.    Physical Exam  Constitutional:       Appearance: Normal appearance.   Pulmonary:      Effort: No respiratory distress ( Speaking full sentences without difficulty).   Musculoskeletal:      Comments: Unable to visualize mass due to video visit   Neurological:      General: No focal deficit present.      Mental Status: She is alert and oriented to person, place, and time. Mental status is at baseline.   Psychiatric:         Mood and Affect: Mood normal.         Assessment & Plan   Problem List Items Addressed This Visit    None  Visit Diagnoses     Mass of right wrist    -  Primary    Relevant Orders    Ambulatory Referral to Orthopedic Surgery         MyChart video visit  Provider in office setting, pt in [place]  Barely able to visualize mass due to video nature of visit  Refer to ortho per pt request - she wants the cyst drained       Current Outpatient Medications:   •  cetirizine (zyrTEC) 10 MG tablet, Take 10 mg by mouth Daily., Disp: , Rfl:   •  clobetasol propionate (CLOBEX) 0.05 % shampoo, APPLY TOPICALLY TO THE SCALP AND LET SIT FOR 5-10 MINUTES BEFORE RINSING AS NEEDED., Disp: , Rfl:   •  colestipol (COLESTID) 1 g tablet, Take 2 tablets by mouth Daily., Disp: 60 tablet, Rfl: 5  •  desonide  (DESOWEN) 0.05 % cream, apply to the affected areas on scalp and feet twice daily x 2 wks. take week break then use as needed for flares, Disp: , Rfl:   •  Desvenlafaxine Succinate ER 25 MG tablet sustained-release 24 hour, Take 1 tablet by mouth Daily., Disp: 30 tablet, Rfl: 0  •  Drospirenone (Slynd) 4 MG tablet, Take 4 mg by mouth Daily., Disp: 28 tablet, Rfl: 11  •  fluticasone (FLONASE) 50 MCG/ACT nasal spray, 2 sprays into the nostril(s) as directed by provider Daily., Disp: 9.9 mL, Rfl: 1  •  hydrOXYzine (ATARAX) 10 MG tablet, Take 1 tablet by mouth 3 (Three) Times a Day As Needed for Anxiety., Disp: 30 tablet, Rfl: 0  •  lisinopril (PRINIVIL,ZESTRIL) 20 MG tablet, Take 1 tablet by mouth Daily., Disp: 30 tablet, Rfl: 1  •  omeprazole (priLOSEC) 40 MG capsule, Take 1 capsule by mouth Daily., Disp: 90 capsule, Rfl: 3  •  Otezla 30 MG tablet, , Disp: , Rfl:   •  traZODone (DESYREL) 50 MG tablet, Take 0.5-1 tablets by mouth At Night As Needed for Sleep., Disp: 30 tablet, Rfl: 0       Plan of care reviewed with the patient at the conclusion of today's visit.  Education was provided regarding diagnosis, management, and any prescribed or recommended OTC medications.  Patient verbalized understanding of and agreement with management plan.     Return if symptoms worsen or fail to improve.      Cm Coffey, APRN

## 2023-01-24 ENCOUNTER — OFFICE VISIT (OUTPATIENT)
Dept: ORTHOPEDIC SURGERY | Facility: CLINIC | Age: 43
End: 2023-01-24
Payer: COMMERCIAL

## 2023-01-24 VITALS
BODY MASS INDEX: 29.65 KG/M2 | WEIGHT: 188.93 LBS | SYSTOLIC BLOOD PRESSURE: 120 MMHG | HEIGHT: 67 IN | DIASTOLIC BLOOD PRESSURE: 78 MMHG

## 2023-01-24 DIAGNOSIS — M67.439 DORSAL WRIST GANGLION: ICD-10-CM

## 2023-01-24 DIAGNOSIS — M25.531 RIGHT WRIST PAIN: Primary | ICD-10-CM

## 2023-01-24 PROCEDURE — 20612 ASPIRATE/INJ GANGLION CYST: CPT | Performed by: PHYSICIAN ASSISTANT

## 2023-01-24 PROCEDURE — 99213 OFFICE O/P EST LOW 20 MIN: CPT | Performed by: PHYSICIAN ASSISTANT

## 2023-01-24 RX ORDER — CLARITHROMYCIN 500 MG/1
TABLET, COATED ORAL
COMMUNITY
Start: 2023-01-16

## 2023-01-24 RX ORDER — PREDNISONE 20 MG/1
TABLET ORAL
COMMUNITY
Start: 2023-01-16

## 2023-01-24 NOTE — PROGRESS NOTES
[facility] Note    Subjective     Chief Complaint   Patient presents with   • Right Wrist - Pain        HPI  Lesly Bill is a 42 y.o. female.  Right-hand-dominant.  New patient presents for evaluation of mass on right dorsum wrist.  Symptoms/pain have been ongoing for about a month.  ALINA: No history of injury or trauma.    Pain scale: 3/10.  Severity of the pain mild to moderate.  Quality of the pain shooting.  Associated symptoms popping.  Activity related to pain leisure and certain movements of the wrist.  Pain eased by resting.  No reported numbness or tingling.  Prior treatments aspirin.  Reports history of prior left wrist surgery 2015.    Denies fever, chills, night sweats or other constitutional symptoms.      Past Medical History:   Diagnosis Date   • Acid reflux    • Anxiety    • Depression    • Fracture    • Fracture, foot 4 years ago    In boot fracture of 5th metatarsal.   • Hypertension    • IBS (irritable bowel syndrome)    • Psoriasis     [facility]    • Visual impairment 2019    Vitreal detachment floaters      Past Surgical History:   Procedure Laterality Date   • BREAST BIOPSY  2008   • WRIST SURGERY Left 2015      Family History   Problem Relation Age of Onset   • Hypertension Mother    • Cancer Father         mesothelioma   • Hypertension Father    • Anxiety disorder Maternal Grandmother    • Diabetes Maternal Grandmother         Type 2 diabetes   • Ovarian cancer Paternal Grandmother         unknown   • Breast cancer Paternal Aunt         60's   • Cancer Paternal Aunt         Breast cancer   • Diabetes Maternal Uncle         Type 2 diabetes   • Uterine cancer Neg Hx    • Colon cancer Neg Hx    • Osteoporosis Neg Hx      Social History     Socioeconomic History   • Marital status: Single   • Number of children: 0   Tobacco Use   • Smoking status: Never   • Smokeless tobacco: Never   Vaping Use   • Vaping Use: Never used   Substance and Sexual  Activity   • Alcohol use: Yes     Alcohol/week: 1.0 standard drink     Types: 1 Glasses of wine per week     Comment: Every once in a while socially I will have wine with dinner.   • Drug use: Never   • Sexual activity: Not Currently     Partners: Male     Birth control/protection: Pill, Birth control pill      Current Outpatient Medications on File Prior to Visit   Medication Sig Dispense Refill   • cetirizine (zyrTEC) 10 MG tablet Take 10 mg by mouth Daily.     • clarithromycin (BIAXIN) 500 MG tablet TAKE 1 TABLET BY MOUTH EVERY TWELVE HOURS FOR 4 WEEKS     • clobetasol propionate (CLOBEX) 0.05 % shampoo APPLY TOPICALLY TO THE SCALP AND LET SIT FOR 5-10 MINUTES BEFORE RINSING AS NEEDED.     • colestipol (COLESTID) 1 g tablet Take 2 tablets by mouth Daily. 60 tablet 5   • desonide (DESOWEN) 0.05 % cream apply to the affected areas on scalp and feet twice daily x 2 wks. take week break then use as needed for flares     • Desvenlafaxine Succinate ER 25 MG tablet sustained-release 24 hour Take 1 tablet by mouth Daily. 30 tablet 0   • Drospirenone (Slynd) 4 MG tablet Take 4 mg by mouth Daily. 28 tablet 11   • fluticasone (FLONASE) 50 MCG/ACT nasal spray 2 sprays into the nostril(s) as directed by provider Daily. 9.9 mL 1   • hydrOXYzine (ATARAX) 10 MG tablet Take 1 tablet by mouth 3 (Three) Times a Day As Needed for Anxiety. 30 tablet 0   • lisinopril (PRINIVIL,ZESTRIL) 20 MG tablet Take 1 tablet by mouth Daily. 30 tablet 1   • omeprazole (priLOSEC) 40 MG capsule Take 1 capsule by mouth Daily. 90 capsule 3   • Otezla 30 MG tablet      • predniSONE (DELTASONE) 20 MG tablet TAKE 1 TABLET BY MOUTH THREE TIMES DAILY FOR 3 DAYS, THEN 1 TABLET TWICE DAILY FOR 3 DAYS, THEN 1 TABLET ONCE DAILY FOR 2 DAYS AS DIRECTED     • traZODone (DESYREL) 50 MG tablet Take 0.5-1 tablets by mouth At Night As Needed for Sleep. 30 tablet 0     No current facility-administered medications on file prior to visit.      Allergies   Allergen  "Reactions   • Sulfacetamide Hives and Unknown - Low Severity   • Sulfa Antibiotics Hives   • Clove [Syzygium Aromaticum] Rash and GI Intolerance        The following portions of the patient's history were reviewed and updated as appropriate: allergies, current medications, past family history, past medical history, past social history, past surgical history and problem list.    Review of Systems   Constitutional: Negative.    HENT: Negative.    Eyes: Negative.    Respiratory: Negative.    Cardiovascular: Negative.    Gastrointestinal: Negative.    Endocrine: Negative.    Genitourinary: Negative.    Musculoskeletal: Positive for arthralgias.   Skin: Negative.    Allergic/Immunologic: Negative.    Neurological: Negative.    Hematological: Negative.    Psychiatric/Behavioral: Negative.         Objective      Physical Exam  /78   Ht 170.2 cm (67.01\")   Wt 85.7 kg (188 lb 15 oz)   BMI 29.58 kg/m²     Body mass index is 29.58 kg/m².    GENERAL APPEARANCE: awake, alert & oriented x 3, in no acute distress and well developed, well nourished  PSYCH: normal mood and affect  LUNGS:  breathing nonlabored, no wheezing  EYES: sclera anicteric, pupils equal  CARDIOVASCULAR: palpable pulses. Capillary refill less than 2 seconds  INTEGUMENTARY: skin intact, no clubbing, cyanosis  NEUROLOGIC:  Normal Sensation         Ortho Exam  Right hand  Skin: Grossly intact without redness, warmth, rashes or lesions.  (+) palp mass just under skin, not adherent to skin.  Tenderness: Positive with palpation of cyst and end ranges of motion wrist  ROM: FROM wrist and digits  Motor: grossly intact R/U/M/AIN/PIN  Sensory: grossly intact R/U/M  Vascular: 2+ radial pulse with brisk CR into each digit      Imaging/Studies  Ordered right wrist plain films.  Imaging read/interpreted by Dr. Barclay.    Imaging Results (Last 7 Days)     Procedure Component Value Units Date/Time    XR Wrist 3+ View Right [[id number]] Resulted: 01/24/23 1251     " Updated: 01/24/23 1251    Narrative:      Right Wrist X-Ray    Indication: Pain    Views:  AP, Lateral, and Oblique     Comparison: None    Findings:  No fracture  No bony lesion  Normal soft tissues  Normal joint spaces    Impression:   Negative right wrist x-ray for acute bony abnormality          Assessment/Plan        ICD-10-CM ICD-9-CM   1. Right wrist pain  M25.531 719.43   2. Dorsal wrist ganglion  M67.439 727.41       Orders Placed This Encounter   Procedures   • XR Wrist 3+ View Right        -Right wrist pain due to dorsal wrist ganglion.  -Reviewed imaging.  -Discussed treatment options to include observation, bracing, aspiration and surgical intervention. At this time patient does not want any surgery.    -She would like to proceed with aspiration.  She understands that with aspiration there is a 50-50 chance of cyst returning.  -Aspiration was attempted today.  -Provided cock-up wrist splint.  -Recommend OTC NSAIDs/pain medication as needed.  -Follow up as needed.  Again 50-50 chance of cyst returning.  -Questions and concerns answered.    After discussing the risks, benefits, indications of aspiration, the patient gave consent to proceed.  Her right wrist, dorsal ganglion was confirmed as the correct site to be aspirated with a timeout.  It was then prepped using Hibiclens and using a 21-gauge needle to aspirate.  Dorsal approach was used with patient in seated position.  Unfortunately cyst was decompressed but no fluid obtained.  Area was cleaned, hemostasis was achieved and a Band-Aid was applied over the injection site.  The patient tolerated procedure well.  I instructed the patient on signs and symptoms of infection.  They should report to the emergency department or return to clinic if any of these develop, for further evaluation and treatment.  Recommended modifying activity for the next 48 hours to include rest, ice, elevation and oral pain medication as needed.        Medical Decision  Making  Management Options : over-the-counter medicine  Data/Risk: radiology tests    Wendi Nair PA-C  01/24/23  12:52 EST               EMR Dragon/Transcription disclaimer:  Much of this encounter note is an electronic transcription of spoken language to printed text. Electronic transcription of spoken language may permit erroneous, or at times, nonsensical words or phrases to be inadvertently transcribed. Although I have reviewed the note for such errors, some may still exist.

## 2023-01-24 NOTE — PROGRESS NOTES
Procedure   Medium Joint Arthrocentesis  Date/Time: 1/24/2023 12:52 PM  Consent given by: patient  Site marked: site marked  Timeout: Immediately prior to procedure a time out was called to verify the correct patient, procedure, equipment, support staff and site/side marked as required   Supporting Documentation  Indications: pain   Procedure Details  Location: wrist - Wrist joint: Right wrist aspiration.  Preparation: Patient was prepped and draped in the usual sterile fashion  Needle gauge: 21 G.  Approach: posterior  Aspirate amount: 0 mL  Patient tolerance: patient tolerated the procedure well with no immediate complications

## 2023-02-08 ENCOUNTER — OFFICE VISIT (OUTPATIENT)
Dept: INTERNAL MEDICINE | Facility: CLINIC | Age: 43
End: 2023-02-08
Payer: COMMERCIAL

## 2023-02-08 VITALS
OXYGEN SATURATION: 98 % | DIASTOLIC BLOOD PRESSURE: 80 MMHG | TEMPERATURE: 97 F | SYSTOLIC BLOOD PRESSURE: 124 MMHG | WEIGHT: 189 LBS | HEART RATE: 80 BPM | BODY MASS INDEX: 29.66 KG/M2 | HEIGHT: 67 IN

## 2023-02-08 DIAGNOSIS — T14.8XXA BRUISING: Primary | ICD-10-CM

## 2023-02-08 DIAGNOSIS — M79.602 ARM PAIN, MEDIAL, LEFT: ICD-10-CM

## 2023-02-08 DIAGNOSIS — K62.5 BRIGHT RED RECTAL BLEEDING: ICD-10-CM

## 2023-02-08 PROCEDURE — 99214 OFFICE O/P EST MOD 30 MIN: CPT | Performed by: NURSE PRACTITIONER

## 2023-02-08 RX ORDER — HYDROCORTISONE ACETATE 25 MG/1
25 SUPPOSITORY RECTAL 2 TIMES DAILY
Qty: 14 SUPPOSITORY | Refills: 0 | Status: SHIPPED | OUTPATIENT
Start: 2023-02-08

## 2023-02-08 NOTE — PROGRESS NOTES
"Chief Complaint  Rectal Bleeding (Blood in stool.) and Bleeding/Bruising    Subjective        Lesly Bill presents to [facility] PRIMARY [facility]ARE  History of Present Illness  The patient reports episodes of rectal bleeding that developed one year ago. The rectal bleeding coincides with her menstrual cycle. It has occurred three different times over the course of the year for several days. She sees bright red bleeding in the toilet each time after a bowel movement but also has blood that drips from the rectum. She uses tampons during her menstrual cycle and does not have break through bleeding from the vagina. The patient has a history of rectal fissure, confirmed during colonoscopy. She has a bruise on her right arm that starts under the axilla and radiates to the right inner, lateral elbow. She does not know how it was bruised.The bruising areas are tender with pain in the axilla. The patient had a recent procedure, cyst aspiration of the wrist on the same arm. She used to take aspirin regularly but only takes it now occasionally for a headache. She denies bruising easily in the past.   Review of Systems   Gastrointestinal: Positive for anal bleeding and blood in stool. Negative for constipation, diarrhea, nausea, rectal pain and vomiting.   Hematological: Bruises/bleeds easily.     Objective   Vital Signs:  /80   Pulse 80   Temp 97 °F (36.1 °C)   Ht 170.2 cm (67.01\")   Wt 85.7 kg (189 lb)   SpO2 98%   BMI 29.59 kg/m²   Estimated body mass index is 29.59 kg/m² as calculated from the following:    Height as of this encounter: 170.2 cm (67.01\").    Weight as of this encounter: 85.7 kg (189 lb).             Physical Exam  Constitutional:       Appearance: Normal appearance.   HENT:      Head: Normocephalic.   Cardiovascular:      Rate and Rhythm: Normal rate and regular rhythm.      Pulses: Normal pulses.      Heart sounds: Normal heart sounds.   Pulmonary:      Effort: " Pulmonary effort is normal.      Breath sounds: Normal breath sounds.   Abdominal:      General: Abdomen is flat. Bowel sounds are normal. There is no distension.      Palpations: Abdomen is soft. There is no mass.      Tenderness: There is no abdominal tenderness. There is no guarding or rebound.      Hernia: No hernia is present.   Musculoskeletal:      Cervical back: Neck supple.   Skin:     Findings: Bruising present.      Comments: Brown/yellowish bruise on the right antecubital. Additionally, two bruises on the inside of the right upper extremity close to the axilla, tender to touch with two nodules under the bruises.   Neurological:      General: No focal deficit present.      Mental Status: She is alert.   Psychiatric:         Mood and Affect: Mood normal.         Behavior: Behavior normal.         Thought Content: Thought content normal.         Judgment: Judgment normal.        Result Review :                   Assessment and Plan   Diagnoses and all orders for this visit:    1. Bruising (Primary)  -     Protime-INR; Future  -     CBC & Differential; Future  -     Duplex Venous Upper Extremity - Right CAR    2. Arm pain, medial, left  -     Duplex Venous Upper Extremity - Right CAR    3. Bright red rectal bleeding  -     hydrocortisone (ANUSOL-HC) 25 MG suppository; Insert 1 suppository into the rectum 2 (Two) Times a Day.  Dispense: 14 suppository; Refill: 0    Discussed exam results and treatment options with patient. She will get a lab draw today. Refer for Ultrasound of the right upper extremity. Recommend Hydrocortisone suppositories. Return for persistent symptoms.        Follow Up   Return if symptoms worsen or fail to improve.  Patient was given instructions and counseling regarding her condition or for health maintenance advice. Please see specific information pulled into the AVS if appropriate.       Answers for HPI/ROS submitted by the patient on 2/8/2023  Please describe your symptoms.:  Bleeding when using the restroom, lots of blood in toilet not period bleeding, strange bruising.  Have you had these symptoms before?: Yes  How long have you been having these symptoms?: 1-4 days  Please describe any probable cause for these symptoms. : This has happened before.  What is the primary reason for your visit?: Other

## 2023-02-09 ENCOUNTER — HOSPITAL ENCOUNTER (EMERGENCY)
Facility: HOSPITAL | Age: 43
Discharge: HOME | End: 2023-02-09
Attending: EMERGENCY MEDICINE | Admitting: EMERGENCY MEDICINE
Payer: COMMERCIAL

## 2023-02-09 ENCOUNTER — APPOINTMENT (OUTPATIENT)
Dept: CARDIOLOGY | Facility: HOSPITAL | Age: 43
End: 2023-02-09
Payer: COMMERCIAL

## 2023-02-09 ENCOUNTER — TELEPHONE (OUTPATIENT)
Dept: INTERNAL MEDICINE | Facility: CLINIC | Age: 43
End: 2023-02-09
Payer: COMMERCIAL

## 2023-02-09 VITALS
BODY MASS INDEX: 29.66 KG/M2 | HEIGHT: 67 IN | WEIGHT: 189 LBS | TEMPERATURE: 98.6 F | RESPIRATION RATE: 16 BRPM | DIASTOLIC BLOOD PRESSURE: 75 MMHG | OXYGEN SATURATION: 95 % | HEART RATE: 90 BPM | SYSTOLIC BLOOD PRESSURE: 120 MMHG

## 2023-02-09 DIAGNOSIS — S40.021A CONTUSION OF RIGHT UPPER EXTREMITY, INITIAL ENCOUNTER: Primary | ICD-10-CM

## 2023-02-09 LAB
ALBUMIN SERPL-MCNC: 4.5 G/DL (ref 3.5–5.2)
ALBUMIN/GLOB SERPL: 1.6 G/DL
ALP SERPL-CCNC: 93 U/L (ref 39–117)
ALT SERPL W P-5'-P-CCNC: 23 U/L (ref 1–33)
ANION GAP SERPL CALCULATED.3IONS-SCNC: 10 MMOL/L (ref 5–15)
AST SERPL-CCNC: 20 U/L (ref 1–32)
BASOPHILS # BLD AUTO: 0.05 10*3/MM3 (ref 0–0.2)
BASOPHILS NFR BLD AUTO: 0.3 % (ref 0–1.5)
BH CV UPPER VENOUS LEFT SUBCLAVIAN AUGMENT: NORMAL
BH CV UPPER VENOUS LEFT SUBCLAVIAN COMPRESS: NORMAL
BH CV UPPER VENOUS LEFT SUBCLAVIAN PHASIC: NORMAL
BH CV UPPER VENOUS LEFT SUBCLAVIAN SPONT: NORMAL
BH CV UPPER VENOUS RIGHT AXILLARY AUGMENT: NORMAL
BH CV UPPER VENOUS RIGHT AXILLARY COMPRESS: NORMAL
BH CV UPPER VENOUS RIGHT AXILLARY PHASIC: NORMAL
BH CV UPPER VENOUS RIGHT AXILLARY SPONT: NORMAL
BH CV UPPER VENOUS RIGHT BASILIC FOREARM COMPRESS: NORMAL
BH CV UPPER VENOUS RIGHT BASILIC UPPER COMPRESS: NORMAL
BH CV UPPER VENOUS RIGHT BRACHIAL COMPRESS: NORMAL
BH CV UPPER VENOUS RIGHT CEPHALIC FOREARM COMPRESS: NORMAL
BH CV UPPER VENOUS RIGHT CEPHALIC UPPER COMPRESS: NORMAL
BH CV UPPER VENOUS RIGHT INTERNAL JUGULAR AUGMENT: NORMAL
BH CV UPPER VENOUS RIGHT INTERNAL JUGULAR COMPRESS: NORMAL
BH CV UPPER VENOUS RIGHT INTERNAL JUGULAR PHASIC: NORMAL
BH CV UPPER VENOUS RIGHT INTERNAL JUGULAR SPONT: NORMAL
BH CV UPPER VENOUS RIGHT RADIAL COMPRESS: NORMAL
BH CV UPPER VENOUS RIGHT SUBCLAVIAN AUGMENT: NORMAL
BH CV UPPER VENOUS RIGHT SUBCLAVIAN COMPRESS: NORMAL
BH CV UPPER VENOUS RIGHT SUBCLAVIAN PHASIC: NORMAL
BH CV UPPER VENOUS RIGHT SUBCLAVIAN SPONT: NORMAL
BH CV UPPER VENOUS RIGHT ULNAR COMPRESS: NORMAL
BILIRUB SERPL-MCNC: 0.2 MG/DL (ref 0–1.2)
BUN SERPL-MCNC: 11 MG/DL (ref 6–20)
BUN/CREAT SERPL: 12.2 (ref 7–25)
CALCIUM SPEC-SCNC: 8.8 MG/DL (ref 8.6–10.5)
CHLORIDE SERPL-SCNC: 105 MMOL/L (ref 98–107)
CO2 SERPL-SCNC: 22 MMOL/L (ref 22–29)
CREAT SERPL-MCNC: 0.9 MG/DL (ref 0.57–1)
DEPRECATED RDW RBC AUTO: 41.6 FL (ref 37–54)
EGFRCR SERPLBLD CKD-EPI 2021: 81.5 ML/MIN/1.73
EOSINOPHIL # BLD AUTO: 0 10*3/MM3 (ref 0–0.4)
EOSINOPHIL NFR BLD AUTO: 0 % (ref 0.3–6.2)
ERYTHROCYTE [DISTWIDTH] IN BLOOD BY AUTOMATED COUNT: 12.7 % (ref 12.3–15.4)
GLOBULIN UR ELPH-MCNC: 2.8 GM/DL
GLUCOSE SERPL-MCNC: 125 MG/DL (ref 65–99)
HCT VFR BLD AUTO: 41.4 % (ref 34–46.6)
HGB BLD-MCNC: 13.5 G/DL (ref 12–15.9)
HOLD SPECIMEN: NORMAL
IMM GRANULOCYTES # BLD AUTO: 0.05 10*3/MM3 (ref 0–0.05)
IMM GRANULOCYTES NFR BLD AUTO: 0.3 % (ref 0–0.5)
LYMPHOCYTES # BLD AUTO: 0.67 10*3/MM3 (ref 0.7–3.1)
LYMPHOCYTES NFR BLD AUTO: 4.6 % (ref 19.6–45.3)
MAXIMAL PREDICTED HEART RATE: 177 BPM
MCH RBC QN AUTO: 29.8 PG (ref 26.6–33)
MCHC RBC AUTO-ENTMCNC: 32.6 G/DL (ref 31.5–35.7)
MCV RBC AUTO: 91.4 FL (ref 79–97)
MONOCYTES # BLD AUTO: 0.2 10*3/MM3 (ref 0.1–0.9)
MONOCYTES NFR BLD AUTO: 1.4 % (ref 5–12)
NEUTROPHILS NFR BLD AUTO: 13.67 10*3/MM3 (ref 1.7–7)
NEUTROPHILS NFR BLD AUTO: 93.4 % (ref 42.7–76)
NRBC BLD AUTO-RTO: 0 /100 WBC (ref 0–0.2)
PLATELET # BLD AUTO: 306 10*3/MM3 (ref 140–450)
PMV BLD AUTO: 11.3 FL (ref 6–12)
POTASSIUM SERPL-SCNC: 4.5 MMOL/L (ref 3.5–5.2)
PROT SERPL-MCNC: 7.3 G/DL (ref 6–8.5)
RBC # BLD AUTO: 4.53 10*6/MM3 (ref 3.77–5.28)
SODIUM SERPL-SCNC: 137 MMOL/L (ref 136–145)
STRESS TARGET HR: 150 BPM
WBC NRBC COR # BLD: 14.64 10*3/MM3 (ref 3.4–10.8)
WHOLE BLOOD HOLD COAG: NORMAL
WHOLE BLOOD HOLD SPECIMEN: NORMAL

## 2023-02-09 PROCEDURE — 93971 EXTREMITY STUDY: CPT

## 2023-02-09 PROCEDURE — 99282 EMERGENCY DEPT VISIT SF MDM: CPT

## 2023-02-09 PROCEDURE — 93005 ELECTROCARDIOGRAM TRACING: CPT

## 2023-02-09 PROCEDURE — 93971 EXTREMITY STUDY: CPT | Performed by: INTERNAL MEDICINE

## 2023-02-09 PROCEDURE — 80053 COMPREHEN METABOLIC PANEL: CPT | Performed by: EMERGENCY MEDICINE

## 2023-02-09 PROCEDURE — 85025 COMPLETE CBC W/AUTO DIFF WBC: CPT

## 2023-02-09 PROCEDURE — 36415 COLL VENOUS BLD VENIPUNCTURE: CPT

## 2023-02-09 PROCEDURE — 93005 ELECTROCARDIOGRAM TRACING: CPT | Performed by: EMERGENCY MEDICINE

## 2023-02-09 RX ORDER — SODIUM CHLORIDE 0.9 % (FLUSH) 0.9 %
10 SYRINGE (ML) INJECTION AS NEEDED
Status: DISCONTINUED | OUTPATIENT
Start: 2023-02-09 | End: 2023-02-09 | Stop reason: HOSPADM

## 2023-02-09 NOTE — TELEPHONE ENCOUNTER
Caller: Sharee Bill    Relationship: Self    Best call back number: [phone]    What is the best time to reach you: ANYTIME    Who are you requesting to speak with (clinical staff, provider,  specific staff member): CLINICAL    Do you know the name of the person who called: SHAREE    What was the call regarding: CLORITHROMYCIN    Do you require a callback: YES

## 2023-02-10 NOTE — ED PROVIDER NOTES
"Subjective   History of Present Illness  42-year-old female sent to the emergency department to be evaluated for \"possible blood clot.\"  The patient states that 2 weeks ago she had a ganglion cyst removed from her right wrist.  Over the past several days she has had unexplained bruising tracking of her right forearm and upper arm.  She saw her primary care physician regarding her bruising today and was sent to the emergency department to be evaluated for potential DVT.  She is right-handed.  She denies any injury or trauma to her right arm.  She notes that the bruising is mildly painful.  She denies any recent medication changes.  No similar episodes before in the past.  No paresthesias.        Review of Systems   Musculoskeletal:        Right arm pain   Skin:        Right arm bruising   All other systems reviewed and are negative.      Past Medical History:   Diagnosis Date   • Acid reflux    • Anxiety    • Depression    • Fracture    • Fracture, foot 4 years ago    In boot fracture of 5th metatarsal.   • Hypertension    • IBS (irritable bowel syndrome)    • Psoriasis     [facility]    • Visual impairment 2019    Vitreal detachment floaters       Allergies   Allergen Reactions   • Sulfacetamide Hives and Unknown - Low Severity   • Clove Oil Rash   • Clove [Syzygium Aromaticum] Rash and GI Intolerance   • Menthol Rash   • Sulfa Antibiotics Hives and Rash       Past Surgical History:   Procedure Laterality Date   • BREAST BIOPSY  2008   • COLONOSCOPY      came out normal   • WRIST SURGERY Left 2015       Family History   Problem Relation Age of Onset   • Hypertension Mother    • Cancer Father         mesothelioma   • Hypertension Father    • Anxiety disorder Maternal Grandmother    • Diabetes Maternal Grandmother         Type 2 diabetes   • Ovarian cancer Paternal Grandmother         unknown   • Breast cancer Paternal Aunt         60's   • Cancer Paternal Aunt         Breast cancer   • Diabetes Maternal " Uncle         Type 2 diabetes   • Anxiety disorder Sister    • Uterine cancer Neg Hx    • Colon cancer Neg Hx    • Osteoporosis Neg Hx        Social History     Socioeconomic History   • Marital status: Single   • Number of children: 0   Tobacco Use   • Smoking status: Never   • Smokeless tobacco: Never   Vaping Use   • Vaping Use: Never used   Substance and Sexual Activity   • Alcohol use: Yes     Alcohol/week: 1.0 standard drink     Types: 1 Glasses of wine per week     Comment: Every once in a while socially I will have wine with dinner.   • Drug use: Never   • Sexual activity: Not Currently     Partners: Male     Birth control/protection: Pill, Birth control pill           Objective   Physical Exam  Vitals and nursing note reviewed.   Constitutional:       General: She is not in acute distress.     Appearance: She is well-developed. She is not diaphoretic.      Comments: Nontoxic-appearing female   HENT:      Head: Normocephalic and atraumatic.      Comments: Oropharynx is clear, no mucous membrane lesions present  Cardiovascular:      Rate and Rhythm: Normal rate and regular rhythm.      Heart sounds: Normal heart sounds. No murmur heard.    No friction rub. No gallop.   Pulmonary:      Effort: Pulmonary effort is normal. No respiratory distress.      Breath sounds: Normal breath sounds. No wheezing or rales.   Musculoskeletal:         General: No deformity. Normal range of motion.   Skin:     Comments: Superficial bruising noted in a bandlike pattern to the flexor surface of her proximal right forearm extending to her distal right upper arms medial surface, no palpable cord   Neurological:      Mental Status: She is alert and oriented to person, place, and time.      Comments: Right upper extremity is neurovascularly intact distally with bounding distal pulses and normal sensation, normal  strength noted   Psychiatric:         Mood and Affect: Mood normal.         Thought Content: Thought content normal.    "      Judgment: Judgment normal.         Procedures           ED Course  ED Course as of 02/09/23 2233   Thu Feb 09, 2023   1629 42-year-old female sent to the emergency department to be evaluated for \"possible blood clot.\"  The patient states that 2 weeks ago she had a ganglion cyst removed from her right wrist.  Over the past several days she has had unexplained bruising tracking up her right forearm and upper arm.  She saw her primary care physician who was concerned about a DVT and sent her to the emergency department to be evaluated.  On arrival, the patient is nontoxic-appearing.  Benign exam.  She is right-handed.  She has superficial bruising noted in a bandlike pattern to the flexor surface of her proximal right forearm extending to her distal right upper arm's medial surface.  She is neurovascularly intact.  She denies any preceding injury or trauma.  Labs are unrevealing.  Platelet count is normal.  We will obtain a Doppler ultrasound, and we will reassess following initial interventions. [DD]   2232 Doppler ultrasound negative for DVT.  Labs remarkable for mild leukocytosis.  Platelet count is normal.  Patient reassured and counseled regarding symptomatic management.  Encouraged warm compresses and NSAIDs.  She will follow-up with her primary care physician within the next week.  Agreeable with plan and given appropriate strict return precautions. [DD]      ED Course User Index  [DD] Jose Huertas MD                                       Recent Results (from the past 24 hour(s))   ECG 12 Lead Chest Pain    Collection Time: 02/09/23  3:37 PM   Result Value Ref Range    QT Interval 376 ms    QTC Interval 454 ms   Comprehensive Metabolic Panel    Collection Time: 02/09/23  3:50 PM    Specimen: Blood   Result Value Ref Range    Glucose 125 (H) 65 - 99 mg/dL    BUN 11 6 - 20 mg/dL    Creatinine 0.90 0.57 - 1.00 mg/dL    Sodium 137 136 - 145 mmol/L    Potassium 4.5 3.5 - 5.2 mmol/L    Chloride 105 98 - " 107 mmol/L    CO2 22.0 22.0 - 29.0 mmol/L    Calcium 8.8 8.6 - 10.5 mg/dL    Total Protein 7.3 6.0 - 8.5 g/dL    Albumin 4.5 3.5 - 5.2 g/dL    ALT (SGPT) 23 1 - 33 U/L    AST (SGOT) 20 1 - 32 U/L    Alkaline Phosphatase 93 39 - 117 U/L    Total Bilirubin 0.2 0.0 - 1.2 mg/dL    Globulin 2.8 gm/dL    A/G Ratio 1.6 g/dL    BUN/Creatinine Ratio 12.2 7.0 - 25.0    Anion Gap 10.0 5.0 - 15.0 mmol/L    eGFR 81.5 >60.0 mL/min/1.73   Green Top (Gel)    Collection Time: 02/09/23  3:50 PM   Result Value Ref Range    Extra Tube Hold for add-ons.    Lavender Top    Collection Time: 02/09/23  3:50 PM   Result Value Ref Range    Extra Tube hold for add-on    Gold Top - SST    Collection Time: 02/09/23  3:50 PM   Result Value Ref Range    Extra Tube Hold for add-ons.    Gray Top    Collection Time: 02/09/23  3:50 PM   Result Value Ref Range    Extra Tube Hold for add-ons.    Light Blue Top    Collection Time: 02/09/23  3:50 PM   Result Value Ref Range    Extra Tube Hold for add-ons.    CBC Auto Differential    Collection Time: 02/09/23  3:50 PM    Specimen: Blood   Result Value Ref Range    WBC 14.64 (H) 3.40 - 10.80 10*3/mm3    RBC 4.53 3.77 - 5.28 10*6/mm3    Hemoglobin 13.5 12.0 - 15.9 g/dL    Hematocrit 41.4 34.0 - 46.6 %    MCV 91.4 79.0 - 97.0 fL    MCH 29.8 26.6 - 33.0 pg    MCHC 32.6 31.5 - 35.7 g/dL    RDW 12.7 12.3 - 15.4 %    RDW-SD 41.6 37.0 - 54.0 fl    MPV 11.3 6.0 - 12.0 fL    Platelets 306 140 - 450 10*3/mm3    Neutrophil % 93.4 (H) 42.7 - 76.0 %    Lymphocyte % 4.6 (L) 19.6 - 45.3 %    Monocyte % 1.4 (L) 5.0 - 12.0 %    Eosinophil % 0.0 (L) 0.3 - 6.2 %    Basophil % 0.3 0.0 - 1.5 %    Immature Grans % 0.3 0.0 - 0.5 %    Neutrophils, Absolute 13.67 (H) 1.70 - 7.00 10*3/mm3    Lymphocytes, Absolute 0.67 (L) 0.70 - 3.10 10*3/mm3    Monocytes, Absolute 0.20 0.10 - 0.90 10*3/mm3    Eosinophils, Absolute 0.00 0.00 - 0.40 10*3/mm3    Basophils, Absolute 0.05 0.00 - 0.20 10*3/mm3    Immature Grans, Absolute 0.05 0.00 - 0.05  "10*3/mm3    nRBC 0.0 0.0 - 0.2 /100 WBC   Duplex Venous Upper Extremity - Right    Collection Time: 02/09/23  5:04 PM   Result Value Ref Range    Target HR (85%) 150 bpm    Max. Pred. HR (100%) 177 bpm    Right Internal Jugular Spont Y     Right Internal Jugular Phasic Y     Right Internal Jugular Compress C     Right Internal Jugular Augment Y     Right Subclavian Spont Y     Right Subclavian Phasic Y     Right Subclavian Compress C     Right Subclavian Augment Y     Right Axillary Spont Y     Right Axillary Phasic Y     Right Axillary Compress C     Right Axillary Augment Y     Right Brachial Compress C     Right Radial Compress C     Right Ulnar Compress C     Right Basilic Upper Compress C     Right Basilic Forearm Compress C     Right Cephalic Upper Compress C     Right Cephalic Forearm Compress C     Left Subclavian Spont Y     Left Subclavian Phasic Y     Left Subclavian Compress C     Left Subclavian Augment Y      Note: In addition to lab results from this visit, the labs listed above may include labs taken at another facility or during a different encounter within the last 24 hours. Please correlate lab times with ED admission and discharge times for further clarification of the services performed during this visit.    No orders to display     Vitals:    02/09/23 1519   BP: 120/75   BP Location: Left arm   Patient Position: Sitting   Pulse: 90   Resp: 16   Temp: 98.6 °F (37 °C)   TempSrc: Oral   SpO2: 95%   Weight: 85.7 kg (189 lb)   Height: 170.2 cm (67\")     Medications - No data to display  ECG/EMG Results (last 24 hours)     Procedure Component Value Units Date/Time    ECG 12 Lead Chest Pain [[id number]] Collected: 02/09/23 1537     Updated: 02/09/23 1538     QT Interval 376 ms      QTC Interval 454 ms     Narrative:      Test Reason : Chest Pain  Blood Pressure :   */*   mmHG  Vent. Rate :  88 BPM     Atrial Rate :  88 BPM     P-R Int : 132 ms          QRS Dur :  74 ms      QT Int : 376 ms       P-R-T " Axes :  59  43  29 degrees     QTc Int : 454 ms    Normal sinus rhythm  Normal ECG  When compared with ECG of 05-AUG-2021 21:39,  No significant change was found    Referred By: ED MD           Confirmed By:         ECG 12 Lead Chest Pain   Preliminary Result   Test Reason : Chest Pain   Blood Pressure :   */*   mmHG   Vent. Rate :  88 BPM     Atrial Rate :  88 BPM      P-R Int : 132 ms          QRS Dur :  74 ms       QT Int : 376 ms       P-R-T Axes :  59  43  29 degrees      QTc Int : 454 ms      Normal sinus rhythm   Normal ECG   When compared with ECG of 05-AUG-2021 21:39,   No significant change was found      Referred By: ED MD           Confirmed By:                  MDM    Final diagnoses:   Contusion of right upper extremity, initial encounter       ED Disposition  ED Disposition     ED Disposition   Discharge    Condition   Stable    Comment   --             Oksana Majano MD  [address] [place] [address] [phone]    In 1 week           Medication List      No changes were made to your prescriptions during this visit.          Jose Huertas MD  02/09/23 9047

## 2023-02-13 NOTE — TELEPHONE ENCOUNTER
Called and spoke with patient she advised she was just letting Christine know which abx she had been taking.

## 2023-02-14 LAB
QT INTERVAL: 376 MS
QTC INTERVAL: 454 MS

## 2023-05-18 ENCOUNTER — OFFICE VISIT (OUTPATIENT)
Dept: INTERNAL MEDICINE | Facility: CLINIC | Age: 43
End: 2023-05-18
Payer: COMMERCIAL

## 2023-05-18 VITALS
WEIGHT: 190.8 LBS | BODY MASS INDEX: 29.88 KG/M2 | DIASTOLIC BLOOD PRESSURE: 76 MMHG | TEMPERATURE: 97.1 F | OXYGEN SATURATION: 98 % | HEART RATE: 84 BPM | SYSTOLIC BLOOD PRESSURE: 106 MMHG

## 2023-05-18 DIAGNOSIS — N20.0 RENAL CALCULI: Primary | ICD-10-CM

## 2023-05-18 DIAGNOSIS — R10.11 RUQ PAIN: ICD-10-CM

## 2023-05-18 PROCEDURE — 99213 OFFICE O/P EST LOW 20 MIN: CPT | Performed by: PHYSICIAN ASSISTANT

## 2023-05-18 RX ORDER — LEVOFLOXACIN 500 MG/1
1 TABLET, FILM COATED ORAL DAILY
COMMUNITY
Start: 2023-05-13

## 2023-05-18 NOTE — PROGRESS NOTES
[facility]  [address]  [facility] [address]  Dept: [phone]  Dept Fax: [phone]  Loc: [phone]  Loc Fax: [phone]    Lesly Bill  1980    Follow Up Office Visit Note    History of Present Illness:  Patient is a 42-year-old female in today for kidney stone and right upper quadrant pain.  Patient started having right upper quadrant pain that has been worse lately.  Patient describes pain dull and achy at times sharp at others.  Also has flatulence and reports symptoms are worse after she eats fatty food like fried chicken.  Still has her gallbladder.  Does not have a history of having any biological children.    Patient recently seen in [facility] emergency room for kidney stone.  Was found on diagnostic imaging.  Has not passed the stone.  Was recommended to see PCP for urgent referral to urology for further evaluation and treatment.  Patient still in some pain but overall better.  Currently on 2 antibiotics.      The following portions of the patient's history were reviewed and updated as appropriate: allergies, current medications, past family history, past medical history, past social history, past surgical history, and problem list.    Medications:    Current Outpatient Medications:   •  cetirizine (zyrTEC) 10 MG tablet, Take 1 tablet by mouth Daily., Disp: , Rfl:   •  clarithromycin (BIAXIN) 500 MG tablet, TAKE 1 TABLET BY MOUTH EVERY TWELVE HOURS FOR 4 WEEKS, Disp: , Rfl:   •  clobetasol propionate (CLOBEX) 0.05 % shampoo, APPLY TOPICALLY TO THE SCALP AND LET SIT FOR 5-10 MINUTES BEFORE RINSING AS NEEDED., Disp: , Rfl:   •  colestipol (COLESTID) 1 g tablet, Take 2 tablets by mouth Daily., Disp: 60 tablet, Rfl: 5  •  desonide (DESOWEN) 0.05 % cream, apply to the affected areas on scalp and feet twice daily x 2 wks. take week break then use as needed for flares, Disp: , Rfl:   •  Desvenlafaxine Succinate ER 25 MG  tablet sustained-release 24 hour, Take 1 tablet by mouth Daily., Disp: 30 tablet, Rfl: 0  •  Drospirenone (Slynd) 4 MG tablet, Take 4 mg by mouth Daily., Disp: 28 tablet, Rfl: 11  •  fluticasone (FLONASE) 50 MCG/ACT nasal spray, 2 sprays into the nostril(s) as directed by provider Daily., Disp: 9.9 mL, Rfl: 1  •  hydrocortisone (ANUSOL-HC) 25 MG suppository, Insert 1 suppository into the rectum 2 (Two) Times a Day., Disp: 14 suppository, Rfl: 0  •  hydrOXYzine (ATARAX) 10 MG tablet, Take 1 tablet by mouth 3 (Three) Times a Day As Needed for Anxiety., Disp: 30 tablet, Rfl: 0  •  lisinopril (PRINIVIL,ZESTRIL) 20 MG tablet, Take 1 tablet by mouth Daily., Disp: 30 tablet, Rfl: 1  •  omeprazole (priLOSEC) 40 MG capsule, Take 1 capsule by mouth Daily., Disp: 90 capsule, Rfl: 3  •  Otezla 30 MG tablet, , Disp: , Rfl:   •  predniSONE (DELTASONE) 20 MG tablet, TAKE 1 TABLET BY MOUTH THREE TIMES DAILY FOR 3 DAYS, THEN 1 TABLET TWICE DAILY FOR 3 DAYS, THEN 1 TABLET ONCE DAILY FOR 2 DAYS AS DIRECTED, Disp: , Rfl:   •  traZODone (DESYREL) 50 MG tablet, Take 0.5-1 tablets by mouth At Night As Needed for Sleep., Disp: 30 tablet, Rfl: 0  •  levoFLOXacin (LEVAQUIN) 500 MG tablet, Take 1 tablet by mouth Daily. (Patient not taking: Reported on 5/18/2023), Disp: , Rfl:     Subjective  Allergies   Allergen Reactions   • Sulfacetamide Hives and Unknown - Low Severity   • Clove Oil Rash   • Clove [Syzygium Aromaticum] Rash and GI Intolerance   • Menthol Rash   • Sulfa Antibiotics Hives and Rash        Past Medical History:   Diagnosis Date   • Acid reflux    • Anxiety    • Depression    • Fracture    • Fracture, foot 4 years ago    In boot fracture of 5th metatarsal.   • Hypertension    • IBS (irritable bowel syndrome)    • Psoriasis     [facility]    • Visual impairment 2019    Vitreal detachment floaters       Past Surgical History:   Procedure Laterality Date   • BREAST BIOPSY  2008   • COLONOSCOPY      came out normal   •  WRIST SURGERY Left 2015       Family History   Problem Relation Age of Onset   • Hypertension Mother    • Cancer Father         mesothelioma   • Hypertension Father    • Anxiety disorder Maternal Grandmother    • Diabetes Maternal Grandmother         Type 2 diabetes   • Ovarian cancer Paternal Grandmother         unknown   • Breast cancer Paternal Aunt         60's   • Cancer Paternal Aunt         Breast cancer   • Diabetes Maternal Uncle         Type 2 diabetes   • Anxiety disorder Sister    • Uterine cancer Neg Hx    • Colon cancer Neg Hx    • Osteoporosis Neg Hx         Social History     Socioeconomic History   • Marital status: Single   • Number of children: 0   Tobacco Use   • Smoking status: Never   • Smokeless tobacco: Never   Vaping Use   • Vaping Use: Never used   Substance and Sexual Activity   • Alcohol use: Yes     Alcohol/week: 1.0 standard drink     Types: 1 Glasses of wine per week     Comment: Every once in a while socially I will have wine with dinner.   • Drug use: Never   • Sexual activity: Not Currently     Partners: Male     Birth control/protection: Pill, Birth control pill       Review of Systems   Constitutional: Negative for activity change, chills, fatigue, fever and unexpected weight change.   HENT: Negative for congestion, ear pain, postnasal drip, sinus pressure and sore throat.    Eyes: Negative for pain, discharge and redness.   Respiratory: Negative for cough, shortness of breath and wheezing.    Cardiovascular: Negative for chest pain, palpitations and leg swelling.   Gastrointestinal: Positive for abdominal distention, abdominal pain and nausea. Negative for diarrhea and vomiting.   Endocrine: Negative for cold intolerance and heat intolerance.   Genitourinary: Positive for flank pain. Negative for decreased urine volume and dysuria.   Musculoskeletal: Positive for back pain. Negative for arthralgias and myalgias.   Skin: Negative for rash and wound.   Neurological: Negative for  dizziness, light-headedness and headaches.   Hematological: Does not bruise/bleed easily.   Psychiatric/Behavioral: Negative for confusion, dysphoric mood and sleep disturbance. The patient is not nervous/anxious.          Objective  Vitals:    05/18/23 1522   BP: 106/76   BP Location: Right arm   Patient Position: Sitting   Cuff Size: Adult   Pulse: 84   Temp: 97.1 °F (36.2 °C)   TempSrc: Temporal   SpO2: 98%   Weight: 86.5 kg (190 lb 12.8 oz)     Body mass index is 29.88 kg/m².     Physical Exam  Physical Exam  Vitals and nursing note reviewed.   Constitutional:       General: She is not in acute distress.     Appearance: She is not ill-appearing.   HENT:      Head: Normocephalic.      Right Ear: Tympanic membrane, ear canal and external ear normal. There is no impacted cerumen.      Left Ear: Tympanic membrane, ear canal and external ear normal. There is no impacted cerumen.      Nose: No congestion or rhinorrhea.      Mouth/Throat:      Mouth: Mucous membranes are moist.      Pharynx: Oropharynx is clear. No oropharyngeal exudate or posterior oropharyngeal erythema.   Eyes:      General:         Right eye: No discharge.         Left eye: No discharge.      Extraocular Movements: Extraocular movements intact.      Conjunctiva/sclera: Conjunctivae normal.      Pupils: Pupils are equal, round, and reactive to light.   Cardiovascular:      Rate and Rhythm: Normal rate and regular rhythm.      Heart sounds: Normal heart sounds. No murmur heard.    No friction rub. No gallop.   Pulmonary:      Effort: Pulmonary effort is normal. No respiratory distress.      Breath sounds: Normal breath sounds. No wheezing.   Abdominal:      General: Bowel sounds are normal. There is no distension.      Palpations: Abdomen is soft. There is no mass.      Tenderness: There is abdominal tenderness (Positive Joe sign on exam). There is no guarding or rebound.   Musculoskeletal:         General: No swelling. Normal range of motion.       Cervical back: Normal range of motion. No tenderness.      Right lower leg: No edema.      Left lower leg: No edema.   Lymphadenopathy:      Cervical: No cervical adenopathy.   Skin:     Findings: No bruising, erythema or rash.   Neurological:      Mental Status: She is oriented to person, place, and time.      Gait: Gait normal.   Psychiatric:         Mood and Affect: Mood normal.         Behavior: Behavior normal.         Thought Content: Thought content normal.         Judgment: Judgment normal.         Diagnostic Data  Procedures    Assessment  Diagnoses and all orders for this visit:    1. Renal calculi (Primary)  -     Cancel: Ambulatory Referral to Urology  -     Ambulatory Referral to Urology    2. RUQ pain  -     US Abdomen Complete; Future  -     NM HIDA SCAN WITH PHARMACOLOGICAL INTERVENTION; Future        Plan    1. Renal calculi (Primary)- referred urgently to urology.    2. RUQ pain- worse, obtain ultrasound abdomen and HIDA scan.      Return in about 6 weeks (around 6/29/2023) for Recheck.    Des Connell PA-C  05/18/2023

## 2023-05-22 RX ORDER — LISINOPRIL 20 MG/1
20 TABLET ORAL DAILY
Qty: 30 TABLET | Refills: 1 | Status: SHIPPED | OUTPATIENT
Start: 2023-05-22

## 2023-05-22 RX ORDER — OMEPRAZOLE 40 MG/1
40 CAPSULE, DELAYED RELEASE ORAL DAILY
Qty: 90 CAPSULE | Refills: 3 | Status: SHIPPED | OUTPATIENT
Start: 2023-05-22

## 2023-05-22 NOTE — TELEPHONE ENCOUNTER
Rx Refill Note  Requested Prescriptions     Pending Prescriptions Disp Refills   • omeprazole (priLOSEC) 40 MG capsule 90 capsule 3     Sig: Take 1 capsule by mouth Daily.      Last office visit with prescribing clinician: 7/27/2022   Last telemedicine visit with prescribing clinician: Visit date not found   Next office visit with prescribing clinician: Visit date not found                         Would you like a call back once the refill request has been completed: [] Yes [] No    If the office needs to give you a call back, can they leave a voicemail: [] Yes [] No    Meera Ingram LPN  05/22/23, 08:06 EDT

## 2023-06-01 ENCOUNTER — HOSPITAL ENCOUNTER (OUTPATIENT)
Dept: ULTRASOUND IMAGING | Facility: HOSPITAL | Age: 43
Discharge: HOME | End: 2023-06-01
Payer: COMMERCIAL

## 2023-06-01 DIAGNOSIS — R10.11 RUQ PAIN: ICD-10-CM

## 2023-06-05 ENCOUNTER — HOSPITAL ENCOUNTER (OUTPATIENT)
Dept: ULTRASOUND IMAGING | Facility: HOSPITAL | Age: 43
Discharge: HOME | End: 2023-06-05
Payer: COMMERCIAL

## 2023-06-05 PROCEDURE — 76705 ECHO EXAM OF ABDOMEN: CPT

## 2023-06-06 ENCOUNTER — TELEPHONE (OUTPATIENT)
Dept: INTERNAL MEDICINE | Facility: CLINIC | Age: 43
End: 2023-06-06
Payer: COMMERCIAL

## 2023-06-06 NOTE — TELEPHONE ENCOUNTER
Patient viewed results on [employer]        ----- Message from Des Connell PA-C sent at 6/6/2023  8:09 AM EDT -----  Fatty liver, otherwise normal. This can be treated with diet, exercise, and weight loss.

## 2023-07-03 PROBLEM — E03.9 HYPOTHYROIDISM: Status: ACTIVE | Noted: 2023-07-03

## 2023-07-20 PROCEDURE — 87086 URINE CULTURE/COLONY COUNT: CPT | Performed by: STUDENT IN AN ORGANIZED HEALTH CARE EDUCATION/TRAINING PROGRAM

## 2023-07-20 PROCEDURE — 81001 URINALYSIS AUTO W/SCOPE: CPT | Performed by: STUDENT IN AN ORGANIZED HEALTH CARE EDUCATION/TRAINING PROGRAM

## 2023-07-25 ENCOUNTER — TELEPHONE (OUTPATIENT)
Dept: UROLOGY | Facility: CLINIC | Age: 43
End: 2023-07-25
Payer: COMMERCIAL

## 2023-07-25 DIAGNOSIS — N30.00 ACUTE CYSTITIS WITHOUT HEMATURIA: Primary | ICD-10-CM

## 2023-07-25 RX ORDER — CEFUROXIME AXETIL 500 MG/1
500 TABLET ORAL 2 TIMES DAILY
Qty: 10 TABLET | Refills: 0 | Status: SHIPPED | OUTPATIENT
Start: 2023-07-25 | End: 2023-07-30

## 2023-07-25 NOTE — TELEPHONE ENCOUNTER
Informed pt of Urine culture results.  Patient having frequency and dysuria.      Dr. Valdez, please advise.  Thank you

## 2023-07-25 NOTE — TELEPHONE ENCOUNTER
----- Message from León Valdez MD sent at 7/24/2023  4:25 PM EDT -----  Please call patient and see if she is having any symptoms, her urine culture came back contaminated but if she is having any urinary symptoms it would be reasonable to treat her with a 5-day course of Macrobid or cefuroxime.  Thanks, attempted to call her but unable to reach her.    León Valdez MD

## 2023-08-16 ENCOUNTER — LAB (OUTPATIENT)
Dept: INTERNAL MEDICINE | Facility: CLINIC | Age: 43
End: 2023-08-16
Payer: COMMERCIAL

## 2023-08-16 ENCOUNTER — OFFICE VISIT (OUTPATIENT)
Dept: INTERNAL MEDICINE | Facility: CLINIC | Age: 43
End: 2023-08-16
Payer: COMMERCIAL

## 2023-08-16 VITALS
TEMPERATURE: 96.8 F | DIASTOLIC BLOOD PRESSURE: 84 MMHG | WEIGHT: 190.2 LBS | SYSTOLIC BLOOD PRESSURE: 110 MMHG | HEART RATE: 80 BPM | BODY MASS INDEX: 29.79 KG/M2 | OXYGEN SATURATION: 99 %

## 2023-08-16 DIAGNOSIS — E06.3 HYPOTHYROIDISM DUE TO HASHIMOTO'S THYROIDITIS: Primary | ICD-10-CM

## 2023-08-16 DIAGNOSIS — N39.0 ACUTE UTI: ICD-10-CM

## 2023-08-16 DIAGNOSIS — N39.0 RECURRENT UTI: ICD-10-CM

## 2023-08-16 DIAGNOSIS — E03.8 HYPOTHYROIDISM DUE TO HASHIMOTO'S THYROIDITIS: Primary | ICD-10-CM

## 2023-08-16 DIAGNOSIS — K21.9 GASTROESOPHAGEAL REFLUX DISEASE, UNSPECIFIED WHETHER ESOPHAGITIS PRESENT: ICD-10-CM

## 2023-08-16 DIAGNOSIS — E03.9 HYPOTHYROIDISM, UNSPECIFIED TYPE: Primary | ICD-10-CM

## 2023-08-16 DIAGNOSIS — I10 ESSENTIAL HYPERTENSION: ICD-10-CM

## 2023-08-16 LAB
BILIRUB BLD-MCNC: NEGATIVE MG/DL
CLARITY, POC: CLEAR
COLOR UR: ABNORMAL
EXPIRATION DATE: ABNORMAL
GLUCOSE UR STRIP-MCNC: NEGATIVE MG/DL
KETONES UR QL: NEGATIVE
LEUKOCYTE EST, POC: ABNORMAL
Lab: ABNORMAL
NITRITE UR-MCNC: NEGATIVE MG/ML
PH UR: 5.5 [PH] (ref 5–8)
PROT UR STRIP-MCNC: NEGATIVE MG/DL
RBC # UR STRIP: ABNORMAL /UL
SP GR UR: 1.03 (ref 1–1.03)
UROBILINOGEN UR QL: NORMAL

## 2023-08-16 PROCEDURE — 84443 ASSAY THYROID STIM HORMONE: CPT | Performed by: PHYSICIAN ASSISTANT

## 2023-08-16 PROCEDURE — 36415 COLL VENOUS BLD VENIPUNCTURE: CPT | Performed by: PHYSICIAN ASSISTANT

## 2023-08-16 PROCEDURE — 99214 OFFICE O/P EST MOD 30 MIN: CPT | Performed by: PHYSICIAN ASSISTANT

## 2023-08-16 PROCEDURE — 84439 ASSAY OF FREE THYROXINE: CPT | Performed by: PHYSICIAN ASSISTANT

## 2023-08-16 PROCEDURE — 87086 URINE CULTURE/COLONY COUNT: CPT | Performed by: PHYSICIAN ASSISTANT

## 2023-08-16 RX ORDER — CIPROFLOXACIN 500 MG/1
500 TABLET, FILM COATED ORAL 2 TIMES DAILY
Qty: 14 TABLET | Refills: 0 | Status: SHIPPED | OUTPATIENT
Start: 2023-08-16 | End: 2023-08-23

## 2023-08-16 NOTE — PROGRESS NOTES
[facility]  [address]  [place] [address]  Dept: [phone]  Dept Fax: [phone]  Loc: [phone]  Loc Fax: [phone]    Lesly Aroradridge  1980    Follow Up Office Visit Note    History of Present Illness:  Patient is a 42-year-old female in today to follow-up for hypothyroidism, hypertension, GERD, and for urinary tract symptoms.  Patient went to see urology and was checked for UTI initially was found to be negative.  Then had culture.  Was started on antibiotic.  Took this as directed but still having symptoms.  Would like urinalysis repeated today.    Patient on Synthroid 25 mcg daily.  Taking as directed without any problems or side effects.  Overall feeling much better.  Needing thyroid levels rechecked.    On lisinopril 20 mg daily for high blood pressure.  Taking as directed with any problems or side effects.  Blood pressure normal at home.    On omeprazole 40 mg daily for acid reflux.  Taking as directed with any problems or side effects.  Reports good symptomatic control.      The following portions of the patient's history were reviewed and updated as appropriate: allergies, current medications, past family history, past medical history, past social history, past surgical history, and problem list.    Medications:    Current Outpatient Medications:     cetirizine (zyrTEC) 10 MG tablet, Take 1 tablet by mouth Daily., Disp: 90 tablet, Rfl: 2    clarithromycin (BIAXIN) 500 MG tablet, TAKE 1 TABLET BY MOUTH EVERY TWELVE HOURS FOR 4 WEEKS, Disp: , Rfl:     clobetasol propionate (CLOBEX) 0.05 % shampoo, APPLY TOPICALLY TO THE SCALP AND LET SIT FOR 5-10 MINUTES BEFORE RINSING AS NEEDED., Disp: , Rfl:     desonide (DESOWEN) 0.05 % cream, apply to the affected areas on scalp and feet twice daily x 2 wks. take week break then use as needed for flares, Disp: , Rfl:     Desvenlafaxine Succinate ER 25 MG tablet sustained-release  24 hour, Take 1 tablet by mouth Daily., Disp: 30 tablet, Rfl: 0    Drospirenone (Slynd) 4 MG tablet, Take 4 mg by mouth Daily., Disp: 28 tablet, Rfl: 11    fluticasone (FLONASE) 50 MCG/ACT nasal spray, 2 sprays into the nostril(s) as directed by provider Daily., Disp: 9.9 mL, Rfl: 1    hydrocortisone (ANUSOL-HC) 25 MG suppository, Insert 1 suppository into the rectum 2 (Two) Times a Day., Disp: 14 suppository, Rfl: 0    hydrOXYzine (ATARAX) 10 MG tablet, Take 1 tablet by mouth 3 (Three) Times a Day As Needed for Anxiety., Disp: 30 tablet, Rfl: 0    levothyroxine (SYNTHROID, LEVOTHROID) 25 MCG tablet, Take 1 tablet by mouth Daily., Disp: 60 tablet, Rfl: 1    lisinopril (PRINIVIL,ZESTRIL) 20 MG tablet, Take 1 tablet by mouth Daily., Disp: 90 tablet, Rfl: 1    omeprazole (priLOSEC) 40 MG capsule, Take 1 capsule by mouth Daily., Disp: 90 capsule, Rfl: 3    Otezla 30 MG tablet, , Disp: , Rfl:     traZODone (DESYREL) 50 MG tablet, Take 0.5-1 tablets by mouth At Night As Needed for Sleep., Disp: 30 tablet, Rfl: 0    ciprofloxacin (Cipro) 500 MG tablet, Take 1 tablet by mouth 2 (Two) Times a Day for 7 days., Disp: 14 tablet, Rfl: 0    Subjective  Allergies   Allergen Reactions    Sulfacetamide Hives and Unknown - Low Severity    Clove Oil Rash    Clove [Syzygium Aromaticum] Rash and GI Intolerance    Menthol Rash    Sulfa Antibiotics Hives and Rash        Past Medical History:   Diagnosis Date    Acid reflux     Anxiety     Depression     Fracture     Fracture, foot 4 years ago    In boot fracture of 5th metatarsal.    Hypertension     IBS (irritable bowel syndrome)     Kidney stone     Kidney deformity - 2 ureters left kidney    Psoriasis     [facility]     Urinary tract infection     Visual impairment 2019    Vitreal detachment floaters       Past Surgical History:   Procedure Laterality Date    BREAST BIOPSY  2008    COLONOSCOPY      came out normal    WRIST SURGERY Left 2015       Family History   Problem  Relation Age of Onset    Hypertension Mother     Cancer Father         mesothelioma    Hypertension Father     Anxiety disorder Maternal Grandmother     Diabetes Maternal Grandmother         Type 2 diabetes    Ovarian cancer Paternal Grandmother         unknown    Breast cancer Paternal Aunt         60's    Cancer Paternal Aunt         Breast cancer    Diabetes Maternal Uncle         Type 2 diabetes    Anxiety disorder Sister     Uterine cancer Neg Hx     Colon cancer Neg Hx     Osteoporosis Neg Hx         Social History     Socioeconomic History    Marital status: Single    Number of children: 0   Tobacco Use    Smoking status: Never    Smokeless tobacco: Never   Vaping Use    Vaping Use: Never used   Substance and Sexual Activity    Alcohol use: Yes     Alcohol/week: 1.0 standard drink     Types: 1 Glasses of wine per week     Comment: Every once in a while socially I will have wine with dinner.    Drug use: Never    Sexual activity: Not Currently     Partners: Male     Birth control/protection: Pill, Birth control pill       Review of Systems   Constitutional:  Negative for activity change, chills, fatigue, fever and unexpected weight change.   HENT:  Negative for congestion, ear pain, postnasal drip, sinus pressure and sore throat.    Eyes:  Negative for pain, discharge and redness.   Respiratory:  Negative for cough, shortness of breath and wheezing.    Cardiovascular:  Negative for chest pain, palpitations and leg swelling.   Gastrointestinal:  Negative for diarrhea, nausea and vomiting.   Endocrine: Negative for cold intolerance and heat intolerance.   Genitourinary:  Positive for dysuria, frequency and urgency. Negative for decreased urine volume.   Musculoskeletal:  Negative for arthralgias and myalgias.   Skin:  Negative for rash and wound.   Neurological:  Negative for dizziness, light-headedness and headaches.   Hematological:  Does not bruise/bleed easily.   Psychiatric/Behavioral:  Negative for  confusion, dysphoric mood and sleep disturbance. The patient is not nervous/anxious.        Objective  Vitals:    08/16/23 0831   BP: 110/84   BP Location: Left arm   Patient Position: Sitting   Cuff Size: Adult   Pulse: 80   Temp: 96.8 øF (36 øC)   TempSrc: Temporal   SpO2: 99%   Weight: 86.3 kg (190 lb 3.2 oz)     Body mass index is 29.79 kg/mý.     Physical Exam  Physical Exam  Vitals and nursing note reviewed.   Constitutional:       General: She is not in acute distress.     Appearance: She is not ill-appearing.   HENT:      Head: Normocephalic.      Right Ear: Tympanic membrane, ear canal and external ear normal. There is no impacted cerumen.      Left Ear: Tympanic membrane, ear canal and external ear normal. There is no impacted cerumen.      Nose: No congestion or rhinorrhea.      Mouth/Throat:      Mouth: Mucous membranes are moist.      Pharynx: Oropharynx is clear. No oropharyngeal exudate or posterior oropharyngeal erythema.   Eyes:      General:         Right eye: No discharge.         Left eye: No discharge.      Extraocular Movements: Extraocular movements intact.      Conjunctiva/sclera: Conjunctivae normal.      Pupils: Pupils are equal, round, and reactive to light.   Cardiovascular:      Rate and Rhythm: Normal rate and regular rhythm.      Heart sounds: Normal heart sounds. No murmur heard.    No friction rub. No gallop.   Pulmonary:      Effort: Pulmonary effort is normal. No respiratory distress.      Breath sounds: Normal breath sounds. No wheezing.   Abdominal:      General: Bowel sounds are normal. There is no distension.      Palpations: Abdomen is soft. There is no mass.      Tenderness: There is no abdominal tenderness.   Musculoskeletal:         General: No swelling. Normal range of motion.      Cervical back: Normal range of motion. No tenderness.      Right lower leg: No edema.      Left lower leg: No edema.   Lymphadenopathy:      Cervical: No cervical adenopathy.   Skin:      Findings: No bruising, erythema or rash.   Neurological:      Mental Status: She is oriented to person, place, and time.      Gait: Gait normal.   Psychiatric:         Mood and Affect: Mood normal.         Behavior: Behavior normal.         Thought Content: Thought content normal.         Judgment: Judgment normal.       Diagnostic Data  Procedures    Assessment  Diagnoses and all orders for this visit:    1. Hypothyroidism, unspecified type (Primary)    2. Essential hypertension    3. Gastroesophageal reflux disease, unspecified whether esophagitis present    4. Acute UTI  -     Urine Culture - Urine, Urine, Clean Catch; Future    5. Recurrent UTI  -     Ambulatory Referral to Urology    Other orders  -     ciprofloxacin (Cipro) 500 MG tablet; Take 1 tablet by mouth 2 (Two) Times a Day for 7 days.  Dispense: 14 tablet; Refill: 0        Plan    1. Hypothyroidism, unspecified type (Primary)- on Synthroid 25 mcg daily.  Repeat TSH.    2. Essential hypertension- well controlled on lisinopril.  Keep same.  Continue to monitor.    3. Gastroesophageal reflux disease, unspecified whether esophagitis present- well controlled on omeprazole.  Keep same.  Continue to monitor.    4. Acute UTI- UA was positive for leuks in office today.  Sent in Cipro 500 mg twice daily x7 days.  Obtain culture and sensitivity.    5. Recurrent UTI- referred to a different urologist for second opinion.      Return in about 3 months (around 11/16/2023) for Recheck.    Des Connell PA-C  08/16/2023Answers submitted by the patient for this visit:  Other (Submitted on 8/14/2023)  Please describe your symptoms.: Follow up regarding thyroid and medication. Have more energy, fewer stomach issues.  Have you had these symptoms before?: No  How long have you been having these symptoms?: Greater than 2 weeks  Please list any medications you are currently taking for this condition.: Synthroid  Please describe any probable cause for these symptoms. :  Following up regarding new medication.  Primary Reason for Visit (Submitted on 8/14/2023)  What is the primary reason for your visit?: Other

## 2023-08-17 ENCOUNTER — TELEPHONE (OUTPATIENT)
Dept: INTERNAL MEDICINE | Facility: CLINIC | Age: 43
End: 2023-08-17
Payer: COMMERCIAL

## 2023-08-17 DIAGNOSIS — E03.8 HYPOTHYROIDISM DUE TO HASHIMOTO'S THYROIDITIS: Primary | ICD-10-CM

## 2023-08-17 DIAGNOSIS — E06.3 HYPOTHYROIDISM DUE TO HASHIMOTO'S THYROIDITIS: Primary | ICD-10-CM

## 2023-08-17 LAB — BACTERIA SPEC AEROBE CULT: NO GROWTH

## 2023-08-17 RX ORDER — LEVOTHYROXINE SODIUM 0.05 MG/1
50 TABLET ORAL DAILY
Qty: 60 TABLET | Refills: 1 | Status: SHIPPED | OUTPATIENT
Start: 2023-08-17

## 2023-08-17 NOTE — TELEPHONE ENCOUNTER
Result message sent to patients [facility]      ----- Message from Des Connell PA-C sent at 8/17/2023  2:18 PM EDT -----  Negative culture.

## 2023-08-17 NOTE — TELEPHONE ENCOUNTER
Called patient to inform that her urine culture was negative. No answer, left voicmail requesting call back to the office.     [facility]  ----- Message from Des Connell PA-C sent at 8/17/2023  2:18 PM EDT -----  Negative culture.

## 2023-09-13 ENCOUNTER — LAB (OUTPATIENT)
Dept: INTERNAL MEDICINE | Facility: CLINIC | Age: 43
End: 2023-09-13
Payer: COMMERCIAL

## 2023-09-13 ENCOUNTER — OFFICE VISIT (OUTPATIENT)
Dept: INTERNAL MEDICINE | Facility: CLINIC | Age: 43
End: 2023-09-13
Payer: COMMERCIAL

## 2023-09-13 VITALS
BODY MASS INDEX: 29.51 KG/M2 | WEIGHT: 188 LBS | TEMPERATURE: 98.1 F | DIASTOLIC BLOOD PRESSURE: 78 MMHG | HEIGHT: 67 IN | SYSTOLIC BLOOD PRESSURE: 108 MMHG | HEART RATE: 87 BPM | OXYGEN SATURATION: 99 %

## 2023-09-13 DIAGNOSIS — E06.3 HYPOTHYROIDISM DUE TO HASHIMOTO'S THYROIDITIS: Primary | ICD-10-CM

## 2023-09-13 DIAGNOSIS — H66.003 NON-RECURRENT ACUTE SUPPURATIVE OTITIS MEDIA OF BOTH EARS WITHOUT SPONTANEOUS RUPTURE OF TYMPANIC MEMBRANES: ICD-10-CM

## 2023-09-13 DIAGNOSIS — E03.8 HYPOTHYROIDISM DUE TO HASHIMOTO'S THYROIDITIS: Primary | ICD-10-CM

## 2023-09-13 DIAGNOSIS — S03.00XA TMJ (DISLOCATION OF TEMPOROMANDIBULAR JOINT), INITIAL ENCOUNTER: Primary | ICD-10-CM

## 2023-09-13 DIAGNOSIS — N30.01 ACUTE CYSTITIS WITH HEMATURIA: ICD-10-CM

## 2023-09-13 DIAGNOSIS — B00.1 FEVER BLISTER: ICD-10-CM

## 2023-09-13 DIAGNOSIS — N39.0 FREQUENT UTI: ICD-10-CM

## 2023-09-13 LAB
BILIRUB BLD-MCNC: NEGATIVE MG/DL
CLARITY, POC: CLEAR
COLOR UR: YELLOW
EXPIRATION DATE: ABNORMAL
GLUCOSE UR STRIP-MCNC: NEGATIVE MG/DL
KETONES UR QL: NEGATIVE
LEUKOCYTE EST, POC: ABNORMAL
Lab: ABNORMAL
NITRITE UR-MCNC: NEGATIVE MG/ML
PH UR: 6 [PH] (ref 5–8)
PROT UR STRIP-MCNC: NEGATIVE MG/DL
RBC # UR STRIP: ABNORMAL /UL
SP GR UR: 1.02 (ref 1–1.03)
TSH SERPL DL<=0.05 MIU/L-ACNC: 1.9 UIU/ML (ref 0.27–4.2)
UROBILINOGEN UR QL: NORMAL

## 2023-09-13 PROCEDURE — 84443 ASSAY THYROID STIM HORMONE: CPT | Performed by: PHYSICIAN ASSISTANT

## 2023-09-13 PROCEDURE — 36415 COLL VENOUS BLD VENIPUNCTURE: CPT | Performed by: PHYSICIAN ASSISTANT

## 2023-09-13 PROCEDURE — 87086 URINE CULTURE/COLONY COUNT: CPT | Performed by: NURSE PRACTITIONER

## 2023-09-13 RX ORDER — IBUPROFEN 800 MG/1
800 TABLET ORAL EVERY 8 HOURS PRN
COMMUNITY
Start: 2023-09-12

## 2023-09-13 RX ORDER — CEFDINIR 300 MG/1
300 CAPSULE ORAL 2 TIMES DAILY
Qty: 14 CAPSULE | Refills: 0 | Status: SHIPPED | OUTPATIENT
Start: 2023-09-13 | End: 2023-09-20

## 2023-09-13 RX ORDER — TIZANIDINE HYDROCHLORIDE 4 MG/1
4 CAPSULE, GELATIN COATED ORAL 3 TIMES DAILY PRN
Qty: 60 CAPSULE | Refills: 1 | Status: SHIPPED | OUTPATIENT
Start: 2023-09-13

## 2023-09-13 RX ORDER — VALACYCLOVIR HYDROCHLORIDE 500 MG/1
500 TABLET, FILM COATED ORAL 2 TIMES DAILY
Qty: 6 TABLET | Refills: 5 | Status: SHIPPED | OUTPATIENT
Start: 2023-09-13

## 2023-09-13 NOTE — PROGRESS NOTES
"Chief Complaint  Jaw Pain (Started 09/12/23. )    Subjective      History of Present Illness  Lesly is a 43 y.o. female who presents to the clinic today for right jaw pain that started about a week ago. Yesterday at work, had to talk for a couple of hours and the left side started to hurt.  She complains of pain at the TMJ joint with clicking when opening closing her mouth.  She also says her mouth feels like it pulls to the left when she opens it.  Yesterday she went to [facility].  She was told she did not have an ear infection but she was given antibiotics.  She says they told her they thought it was TMJ but they could not do anything about it.  She also has complaints of frequent urination.  She denies flank pain or abdominal pain.  She says she feels a tingling sensation on her lip.  She says she gets frequent fever blisters.    Objective   Vital Signs:  /78 (BP Location: Left arm, Patient Position: Sitting)   Pulse 87   Temp 98.1 °F (36.7 °C)   Ht 170.2 cm (67\")   Wt 85.3 kg (188 lb)   SpO2 99%   BMI 29.44 kg/m²   Estimated body mass index is 29.44 kg/m² as calculated from the following:    Height as of this encounter: 170.2 cm (67\").    Weight as of this encounter: 85.3 kg (188 lb).          Physical Exam  Vitals reviewed.   Constitutional:       General: She is not in acute distress.  HENT:      Head: Normocephalic and atraumatic.      Right Ear: Swelling and tenderness present. A middle ear effusion is present. Tympanic membrane is erythematous and bulging.      Left Ear: Drainage, swelling and tenderness present. A middle ear effusion is present. Tympanic membrane is erythematous and bulging.      Nose: Nose normal.      Mouth/Throat:      Lips: Lesions present.      Mouth: Mucous membranes are moist. Mucous membranes are pale.   Eyes:      Conjunctiva/sclera: Conjunctivae normal.   Cardiovascular:      Rate and Rhythm: Normal rate and regular rhythm.      Pulses: Normal pulses.      " Heart sounds: Normal heart sounds.   Pulmonary:      Effort: Pulmonary effort is normal.      Breath sounds: Normal breath sounds.   Abdominal:      Tenderness: There is no right CVA tenderness or left CVA tenderness.   Musculoskeletal:      Cervical back: Normal range of motion and neck supple.   Skin:     General: Skin is warm and dry.   Neurological:      General: No focal deficit present.      Mental Status: She is alert.   Psychiatric:         Mood and Affect: Mood normal.         Behavior: Behavior normal.         Thought Content: Thought content normal.         Judgment: Judgment normal.        Result Review                    Assessment and Plan  Diagnoses and all orders for this visit:    1. TMJ (dislocation of temporomandibular joint), initial encounter (Primary)  Assessment & Plan:  Joint rest x2 weeks.  Avoid excessive talking, chewing gum, and biting nails.  Soft diet also recommended.  Jaw range of motion exercises, copy in after visit summary.  Bite guard while sleeping.  Muscle relaxants per order.  Follow-up as needed.    Orders:  -     TiZANidine (ZANAFLEX) 4 MG capsule; Take 1 capsule by mouth 3 (Three) Times a Day As Needed for Muscle Spasms.  Dispense: 60 capsule; Refill: 1    2. Non-recurrent acute suppurative otitis media of both ears without spontaneous rupture of tympanic membranes  Assessment & Plan:  Treatment:  cefdinir .  OTC analgesia as needed.  Fluids, rest, avoid carbonated/alcoholic and caffeinated beverages.   Follow up in 1 week if not improving.             Orders:  -     cefdinir (OMNICEF) 300 MG capsule; Take 1 capsule by mouth 2 (Two) Times a Day for 7 days.  Dispense: 14 capsule; Refill: 0    3. Frequent UTI  Assessment & Plan:  Results for orders placed or performed in visit on 09/13/23   POCT urinalysis dipstick, automated    Specimen: Urine   Result Value Ref Range    Color Yellow Yellow, Straw, Dark Yellow, Dora    Clarity, UA Clear Clear    Specific Gravity  1.025 1.005  - 1.030    pH, Urine 6.0 5.0 - 8.0    Leukocytes Small (1+) (A) Negative    Nitrite, UA Negative Negative    Protein, POC Negative Negative mg/dL    Glucose, UA Negative Negative mg/dL    Ketones, UA Negative Negative    Urobilinogen, UA Normal Normal, 0.2 E.U./dL    Bilirubin Negative Negative    Blood, UA 1+ (A) Negative    Lot Number [id number]     Expiration Date 10/25/2024         Orders:  -     POCT urinalysis dipstick, automated  -     Urine Culture - Urine, Urine, Clean Catch; Future  -     Urine Culture - Urine, Urine, Clean Catch    4. Acute cystitis with hematuria  Assessment & Plan:  1. Medications:  cefdinir  2. Maintain adequate hydration  3. Follow up if symptoms not improving, and prn.     Orders:  -     cefdinir (OMNICEF) 300 MG capsule; Take 1 capsule by mouth 2 (Two) Times a Day for 7 days.  Dispense: 14 capsule; Refill: 0    5. Fever blister  Assessment & Plan:  Oral antivirals  Follow-up as needed.           Orders:  -     valACYclovir (Valtrex) 500 MG tablet; Take 1 tablet by mouth 2 (Two) Times a Day.  Dispense: 6 tablet; Refill: 5               Follow Up  Return if symptoms worsen or fail to improve.  Patient was given instructions and counseling regarding her condition or for health maintenance advice. Please see specific information pulled into the AVS if appropriate.    Part of this note may be an electronic transcription/translation of spoken language to printed text using the Dragon Dictation System.

## 2023-09-13 NOTE — LETTER
September 13, 2023     Patient: Lesly Bill   YOB: 1980   Date of Visit: 9/13/2023       To Whom It May Concern:    It is my medical opinion that Lesly Bill should remain out of work until 9/14/23 .           Sincerely,        RAF Gibson    CC:   No Recipients

## 2023-09-13 NOTE — ASSESSMENT & PLAN NOTE
1. Medications:  cefdinir  2. Maintain adequate hydration  3. Follow up if symptoms not improving, and prn.

## 2023-09-13 NOTE — ASSESSMENT & PLAN NOTE
Joint rest x2 weeks.  Avoid excessive talking, chewing gum, and biting nails.  Soft diet also recommended.  Jaw range of motion exercises, copy in after visit summary.  Bite guard while sleeping.  Muscle relaxants per order.  Follow-up as needed.

## 2023-09-13 NOTE — ASSESSMENT & PLAN NOTE
Treatment:  cefdinir .  OTC analgesia as needed.  Fluids, rest, avoid carbonated/alcoholic and caffeinated beverages.   Follow up in 1 week if not improving.

## 2023-09-13 NOTE — ASSESSMENT & PLAN NOTE
Results for orders placed or performed in visit on 09/13/23   POCT urinalysis dipstick, automated    Specimen: Urine   Result Value Ref Range    Color Yellow Yellow, Straw, Dark Yellow, Dora    Clarity, UA Clear Clear    Specific Gravity  1.025 1.005 - 1.030    pH, Urine 6.0 5.0 - 8.0    Leukocytes Small (1+) (A) Negative    Nitrite, UA Negative Negative    Protein, POC Negative Negative mg/dL    Glucose, UA Negative Negative mg/dL    Ketones, UA Negative Negative    Urobilinogen, UA Normal Normal, 0.2 E.U./dL    Bilirubin Negative Negative    Blood, UA 1+ (A) Negative    Lot Number [id number]     Expiration Date 10/25/2024

## 2023-09-14 LAB — BACTERIA SPEC AEROBE CULT: NORMAL

## 2023-09-21 ENCOUNTER — OFFICE VISIT (OUTPATIENT)
Dept: ORTHOPEDIC SURGERY | Facility: CLINIC | Age: 43
End: 2023-09-21
Payer: COMMERCIAL

## 2023-09-21 VITALS
SYSTOLIC BLOOD PRESSURE: 120 MMHG | WEIGHT: 184.4 LBS | HEIGHT: 67 IN | DIASTOLIC BLOOD PRESSURE: 78 MMHG | BODY MASS INDEX: 28.94 KG/M2

## 2023-09-21 DIAGNOSIS — M25.531 RIGHT WRIST PAIN: ICD-10-CM

## 2023-09-21 DIAGNOSIS — M67.439 DORSAL WRIST GANGLION: Primary | ICD-10-CM

## 2023-09-21 PROCEDURE — 99213 OFFICE O/P EST LOW 20 MIN: CPT | Performed by: PHYSICIAN ASSISTANT

## 2023-09-21 NOTE — PROGRESS NOTES
Procedure   - Small Joint Arthrocentesis (Right wrist ganglion cyst aspirtation) on 9/21/2023 3:26 PM  Indications: pain  Details: 18 G needle, dorsal approach  Aspirate: 0 mL  Outcome: tolerated well, no immediate complications  Consent was given by the patient. Patient was prepped and draped in the usual sterile fashion.

## 2023-09-21 NOTE — PROGRESS NOTES
[facility] Note        Subjective     CC: Follow-up (8 month follow up --Right wrist pain-Dorsal wrist ganglion)      JAVIER Bill is a 43 y.o. female.  Patient returns today for follow-up evaluation of right dorsal ganglion cyst.  She was last seen for this on 1/24/2023 and aspiration was attempted but no fluid was obtained.  The cyst was decompressed at that time.  She states that the cyst has returned and is again causing pain especially with flexion type activities.  She also notes popping and stiffness to the wrist.  Its worse when she is at work using a mouse or typing.  She also notes with that with flexion extension of the fingers and wrist tendons to the dorsum of the hand sublux back-and-forth over the ganglion causing increased pain.    Pain scale: 3/10.  No reported numbness or tingling.    Overall, patient's symptoms are worsening/increasing again.    ROS:    Constiutional:Pt denies fever, chills, nausea, or vomiting.  MSK:as above        Objective      Past Medical History  Past Medical History:   Diagnosis Date    Acid reflux     Anxiety     Depression     Fracture     Fracture, foot 4 years ago    In boot fracture of 5th metatarsal.    Hypertension     IBS (irritable bowel syndrome)     Kidney stone     Kidney deformity - 2 ureters left kidney    Psoriasis     [facility]     Urinary tract infection     Visual impairment 2019    Vitreal detachment floaters     Social History     Socioeconomic History    Marital status: Single    Number of children: 0   Tobacco Use    Smoking status: Never    Smokeless tobacco: Never   Vaping Use    Vaping Use: Never used   Substance and Sexual Activity    Alcohol use: Yes     Alcohol/week: 1.0 standard drink     Types: 1 Glasses of wine per week     Comment: Every once in a while socially I will have wine with dinner.    Drug use: Never    Sexual activity: Not Currently     Partners: Male     Birth  "control/protection: Pill, Birth control pill          Physical Exam  /78   Ht 170.2 cm (67\")   Wt 83.6 kg (184 lb 6.4 oz)   BMI 28.88 kg/m²     Body mass index is 28.88 kg/m².    Patient is well nourished and well developed.        Ortho Exam  Right hand  Skin: Grossly intact without redness, warmth, rashes or lesions.  Positive palpable mass/ganglion under the skin but not adherent to the skin.  Mass does have more of a firmness noted today.  Tenderness: Positive with palpation of mass/cyst and end ranges of motion wrist  ROM: FROM wrist and digits--with range of motion visible subluxation of index finger extensor tendon over ganglion.  Motor: grossly intact R/U/M/AIN/PIN  Sensory: grossly intact R/U/M  Vascular: 2+ radial pulse with brisk CR into each digit.      Imaging/Labs/EMG Reviewed:  No new imaging today.      Assessment:  1. Dorsal wrist ganglion    2. Right wrist pain        Plan:  Dorsal wrist ganglion causing right wrist pain.  Treatment options were discussed with the patient to include observation, repeat aspiration or proceeding on with surgical intervention.  Patient would like to try repeat aspiration at this time.  Aspiration was attempted but no fluid obtained.  Patient was also introduced to Dr. Meraz (our hand surgeon).  Patient will continue with OTC NSAIDs/pain medication as needed.  Follow-up with Dr. Meraz on 11/1/2023 for preoperative evaluation.  Questions and concerns answered.    After discussing the risks, benefits, indications of aspiration, the patient gave consent to proceed.  Her right wrist, dorsal ganglion was confirmed as the correct site to be aspirated with a timeout.  It was then prepped using Hibiclens and using a 21-gauge needle to aspirate.  Dorsal approach was used with patient in seated position.  Unfortunately no fluid obtained.  Area was cleaned, hemostasis was achieved and a Band-Aid was applied over the injection site.  The patient tolerated " procedure well.  I instructed the patient on signs and symptoms of infection.  They should report to the emergency department or return to clinic if any of these develop, for further evaluation and treatment.  Recommended modifying activity for the next 48 hours to include rest, ice, elevation and oral pain medication as needed.       Wendi Nair PA-C  09/21/23  21:24 EDT      Dictated Utilizing Dragon Dictation.

## 2023-10-30 ENCOUNTER — HOSPITAL ENCOUNTER (OUTPATIENT)
Dept: GENERAL RADIOLOGY | Facility: HOSPITAL | Age: 43
Discharge: HOME | End: 2023-10-30
Admitting: PHYSICIAN ASSISTANT
Payer: COMMERCIAL

## 2023-10-30 ENCOUNTER — OFFICE VISIT (OUTPATIENT)
Dept: INTERNAL MEDICINE | Facility: CLINIC | Age: 43
End: 2023-10-30
Payer: COMMERCIAL

## 2023-10-30 VITALS
SYSTOLIC BLOOD PRESSURE: 110 MMHG | HEART RATE: 66 BPM | HEIGHT: 67 IN | TEMPERATURE: 97.1 F | DIASTOLIC BLOOD PRESSURE: 84 MMHG | OXYGEN SATURATION: 99 % | BODY MASS INDEX: 28.69 KG/M2 | WEIGHT: 182.8 LBS

## 2023-10-30 DIAGNOSIS — R05.3 CHRONIC COUGH: ICD-10-CM

## 2023-10-30 DIAGNOSIS — R05.3 CHRONIC COUGH: Primary | ICD-10-CM

## 2023-10-30 PROCEDURE — 71046 X-RAY EXAM CHEST 2 VIEWS: CPT

## 2023-10-30 PROCEDURE — 99213 OFFICE O/P EST LOW 20 MIN: CPT | Performed by: PHYSICIAN ASSISTANT

## 2023-10-30 RX ORDER — BENZONATATE 100 MG/1
100 CAPSULE ORAL 3 TIMES DAILY PRN
Qty: 90 CAPSULE | Refills: 1 | Status: SHIPPED | OUTPATIENT
Start: 2023-10-30

## 2023-10-30 NOTE — PROGRESS NOTES
[facility]  [address]  [place] [address]  Dept: [phone]  Dept Fax: [phone]  Loc: [phone]  Loc Fax: [phone]    Lesly Bill  1980    Follow Up Office Visit Note    History of Present Illness:  Patient is a 43-year-old female in today for chronic cough.  Has been going on since she was sick last month.  Recovered from her acute illness but still has a residual cough.  Would like further evaluation treatment for this.  Denies any shortness of breath necessarily but when she has a coughing fit she feels like she gets short of breath.  Cough is dry for the most part.  Has been on 2 rounds of antibiotics without any relief of symptoms.        The following portions of the patient's history were reviewed and updated as appropriate: allergies, current medications, past family history, past medical history, past social history, past surgical history, and problem list.    Medications:    Current Outpatient Medications:     cetirizine (zyrTEC) 10 MG tablet, Take 1 tablet by mouth Daily., Disp: 90 tablet, Rfl: 2    clobetasol propionate (CLOBEX) 0.05 % shampoo, APPLY TOPICALLY TO THE SCALP AND LET SIT FOR 5-10 MINUTES BEFORE RINSING AS NEEDED., Disp: , Rfl:     desonide (DESOWEN) 0.05 % cream, apply to the affected areas on scalp and feet twice daily x 2 wks. take week break then use as needed for flares, Disp: , Rfl:     Desvenlafaxine Succinate ER 25 MG tablet sustained-release 24 hour, Take 1 tablet by mouth Daily., Disp: 30 tablet, Rfl: 0    Drospirenone (Slynd) 4 MG tablet, Take 4 mg by mouth Daily., Disp: 28 tablet, Rfl: 11    fluticasone (FLONASE) 50 MCG/ACT nasal spray, 2 sprays into the nostril(s) as directed by provider Daily., Disp: 9.9 mL, Rfl: 1    hydrocortisone (ANUSOL-HC) 25 MG suppository, Insert 1 suppository into the rectum 2 (Two) Times a Day., Disp: 14 suppository, Rfl: 0    hydrOXYzine (ATARAX) 10 MG  tablet, Take 1 tablet by mouth 3 (Three) Times a Day As Needed for Anxiety., Disp: 30 tablet, Rfl: 0    ibuprofen (ADVIL,MOTRIN) 800 MG tablet, Take 1 tablet by mouth Every 8 (Eight) Hours As Needed for Mild Pain or Moderate Pain., Disp: , Rfl:     levothyroxine (SYNTHROID, LEVOTHROID) 50 MCG tablet, Take 1 tablet by mouth Daily., Disp: 60 tablet, Rfl: 1    lisinopril (PRINIVIL,ZESTRIL) 20 MG tablet, Take 1 tablet by mouth Daily., Disp: 90 tablet, Rfl: 1    omeprazole (priLOSEC) 40 MG capsule, Take 1 capsule by mouth Daily., Disp: 90 capsule, Rfl: 3    Otezla 30 MG tablet, , Disp: , Rfl:     TiZANidine (ZANAFLEX) 4 MG capsule, Take 1 capsule by mouth 3 (Three) Times a Day As Needed for Muscle Spasms., Disp: 60 capsule, Rfl: 1    traZODone (DESYREL) 50 MG tablet, Take 0.5-1 tablets by mouth At Night As Needed for Sleep., Disp: 30 tablet, Rfl: 0    valACYclovir (Valtrex) 500 MG tablet, Take 1 tablet by mouth 2 (Two) Times a Day., Disp: 6 tablet, Rfl: 5    benzonatate (Tessalon Perles) 100 MG capsule, Take 1 capsule by mouth 3 (Three) Times a Day As Needed for Cough., Disp: 90 capsule, Rfl: 1    Subjective  Allergies   Allergen Reactions    Sulfacetamide Hives and Unknown - Low Severity    Clove Oil Rash    Clove [Cloves (Syzygium Aromaticum)] Rash and GI Intolerance    Menthol Rash    Sulfa Antibiotics Hives and Rash        Past Medical History:   Diagnosis Date    Acid reflux     Anxiety     Depression     Fracture     Fracture, foot 4 years ago    In boot fracture of 5th metatarsal.    Hypertension     IBS (irritable bowel syndrome)     Kidney stone     Kidney deformity - 2 ureters left kidney    Psoriasis     [facility]     Urinary tract infection     Visual impairment 2019    Vitreal detachment floaters       Past Surgical History:   Procedure Laterality Date    BREAST BIOPSY  2008    COLONOSCOPY      came out normal    WRIST SURGERY Left 2015       Family History   Problem Relation Age of Onset     Hypertension Mother     Cancer Father         mesothelioma    Hypertension Father     Anxiety disorder Maternal Grandmother     Diabetes Maternal Grandmother         Type 2 diabetes    Ovarian cancer Paternal Grandmother         unknown    Breast cancer Paternal Aunt         60's    Cancer Paternal Aunt         Breast cancer    Diabetes Maternal Uncle         Type 2 diabetes    Anxiety disorder Sister     Uterine cancer Neg Hx     Colon cancer Neg Hx     Osteoporosis Neg Hx         Social History     Socioeconomic History    Marital status: Single    Number of children: 0   Tobacco Use    Smoking status: Never    Smokeless tobacco: Never   Vaping Use    Vaping Use: Never used   Substance and Sexual Activity    Alcohol use: Yes     Alcohol/week: 1.0 standard drink of alcohol     Types: 1 Glasses of wine per week     Comment: Every once in a while socially I will have wine with dinner.    Drug use: Never    Sexual activity: Not Currently     Partners: Male     Birth control/protection: Pill, Birth control pill       Review of Systems   Constitutional:  Negative for activity change, chills, fatigue, fever and unexpected weight change.   HENT:  Negative for congestion, ear pain, postnasal drip, sinus pressure and sore throat.    Eyes:  Negative for pain, discharge and redness.   Respiratory:  Positive for cough. Negative for shortness of breath and wheezing.    Cardiovascular:  Negative for chest pain, palpitations and leg swelling.   Gastrointestinal:  Negative for diarrhea, nausea and vomiting.   Endocrine: Negative for cold intolerance and heat intolerance.   Genitourinary:  Negative for decreased urine volume and dysuria.   Musculoskeletal:  Negative for arthralgias and myalgias.   Skin:  Negative for rash and wound.   Neurological:  Negative for dizziness, light-headedness and headaches.   Hematological:  Does not bruise/bleed easily.   Psychiatric/Behavioral:  Negative for confusion, dysphoric mood and sleep  "disturbance. The patient is not nervous/anxious.          Objective  Vitals:    10/30/23 1620   BP: 110/84   BP Location: Left arm   Patient Position: Sitting   Cuff Size: Large Adult   Pulse: 66   Temp: 97.1 °F (36.2 °C)   TempSrc: Temporal   SpO2: 99%   Weight: 82.9 kg (182 lb 12.8 oz)   Height: 170.2 cm (67.01\")     Body mass index is 28.62 kg/m².     Physical Exam  Physical Exam  Vitals and nursing note reviewed.   Constitutional:       General: She is not in acute distress.     Appearance: She is not ill-appearing.   HENT:      Head: Normocephalic.      Right Ear: Tympanic membrane, ear canal and external ear normal. There is no impacted cerumen.      Left Ear: Tympanic membrane, ear canal and external ear normal. There is no impacted cerumen.      Nose: No congestion or rhinorrhea.      Mouth/Throat:      Mouth: Mucous membranes are moist.      Pharynx: Oropharynx is clear. No oropharyngeal exudate or posterior oropharyngeal erythema.   Eyes:      General:         Right eye: No discharge.         Left eye: No discharge.      Extraocular Movements: Extraocular movements intact.      Conjunctiva/sclera: Conjunctivae normal.      Pupils: Pupils are equal, round, and reactive to light.   Cardiovascular:      Rate and Rhythm: Normal rate and regular rhythm.      Heart sounds: Normal heart sounds. No murmur heard.     No friction rub. No gallop.   Pulmonary:      Effort: Pulmonary effort is normal. No respiratory distress.      Breath sounds: Normal breath sounds. No wheezing.   Abdominal:      General: Bowel sounds are normal. There is no distension.      Palpations: Abdomen is soft. There is no mass.      Tenderness: There is no abdominal tenderness.   Musculoskeletal:         General: No swelling. Normal range of motion.      Cervical back: Normal range of motion. No tenderness.      Right lower leg: No edema.      Left lower leg: No edema.   Lymphadenopathy:      Cervical: No cervical adenopathy.   Skin:     " Findings: No bruising, erythema or rash.   Neurological:      Mental Status: She is oriented to person, place, and time.      Gait: Gait normal.   Psychiatric:         Mood and Affect: Mood normal.         Behavior: Behavior normal.         Thought Content: Thought content normal.         Judgment: Judgment normal.         Diagnostic Data  Procedures    Assessment  Diagnoses and all orders for this visit:    1. Chronic cough (Primary)  -     XR Chest PA & Lateral; Future  -     Complete PFT - Pre & Post Bronchodilator; Future    Other orders  -     benzonatate (Tessalon Perles) 100 MG capsule; Take 1 capsule by mouth 3 (Three) Times a Day As Needed for Cough.  Dispense: 90 capsule; Refill: 1        Plan    1. Chronic cough (Primary)- Tessalon Perles as directed as needed.  Obtain chest x-ray and PFTs.      Return in about 3 weeks (around 11/20/2023) for Recheck.    Des Connell PA-C  10/30/2023

## 2023-10-31 ENCOUNTER — OFFICE VISIT (OUTPATIENT)
Age: 43
End: 2023-10-31
Payer: COMMERCIAL

## 2023-10-31 ENCOUNTER — TELEPHONE (OUTPATIENT)
Dept: INTERNAL MEDICINE | Facility: CLINIC | Age: 43
End: 2023-10-31

## 2023-10-31 VITALS
HEIGHT: 67 IN | WEIGHT: 182.76 LBS | BODY MASS INDEX: 28.69 KG/M2 | SYSTOLIC BLOOD PRESSURE: 112 MMHG | DIASTOLIC BLOOD PRESSURE: 84 MMHG

## 2023-10-31 DIAGNOSIS — M67.431 GANGLION OF RIGHT WRIST: Primary | ICD-10-CM

## 2023-10-31 DIAGNOSIS — M67.439 DORSAL WRIST GANGLION: Primary | ICD-10-CM

## 2023-10-31 RX ORDER — CIPROFLOXACIN 500 MG/1
500 TABLET, FILM COATED ORAL
COMMUNITY
Start: 2023-10-16

## 2023-10-31 NOTE — TELEPHONE ENCOUNTER
Caller: Lesly Bill    Relationship: Self    Best call back number:  [phone]     Caller requesting test results:  PATIENT     What test was performed: CHEST XRAY     When was the test performed: 10-30-23    Where was the test performed: [facility]     Additional notes: PATIENT STATES HER RIBS HURT MORE TODAY THAN YESTERDAY   MORE ON THE LEFT SIDE

## 2023-10-31 NOTE — PROGRESS NOTES
[facility]     Office Visit       Date: 10/31/2023   Patient Name: Lesly Bill  MRN: [id number]  YOB: 1980    Referring Physician: No ref. provider found     Chief Complaint:   Chief Complaint   Patient presents with    Right Wrist - Pain     Dorsal ganglion - Wendi Nair PA-C referral        History of Present Illness:   Lesly Bill is a 43 y.o. female right-hand-dominant presented to clinic with complaints of right dorsal ganglion cyst.  This is been present for several months.  She was previously treated by one of the PAs with aspiration x2.  This resulted in cyst recurrence twice.  She has attempted bracing and anti-inflammatories with minimal improvements.  She reports the mass has not grown in size, however is painful with wrist extension.  She also reports that the extensor tendons of the hand do not glide appropriately over the mass and can cause pain.  She denies numbness or tingling.  No other complaints or concerns.    Subjective   Review of Systems:   Review of Systems   Constitutional:  Negative for chills, fever, unexpected weight gain and unexpected weight loss.   HENT:  Negative for congestion, postnasal drip and rhinorrhea.    Eyes:  Negative for blurred vision.   Respiratory:  Negative for shortness of breath.    Cardiovascular:  Negative for leg swelling.   Gastrointestinal:  Negative for abdominal pain, nausea and vomiting.   Genitourinary:  Negative for difficulty urinating.   Musculoskeletal:  Positive for arthralgias. Negative for gait problem, joint swelling and myalgias.   Skin:  Negative for skin lesions and wound.   Neurological:  Negative for dizziness, weakness, light-headedness and numbness.   Hematological:  Does not bruise/bleed easily.   Psychiatric/Behavioral:  Negative for depressed mood.    All other systems reviewed and are negative.       Pertinent  "review of systems per HPI    I reviewed the patient's chief complaint, history of present illness, review of systems, past medical history, surgical history, family history, social history, medications and allergy list in the EMR on 10/31/2023 and agree with the findings above.    Objective    Quality Measures:   ACP:   ACP discussion was declined by the patient.      Tobacco:   Lesly Bill  reports that she has never smoked. She has never used smokeless tobacco..     Vital Signs:   Vitals:    10/31/23 1546   BP: 112/84   Weight: 82.9 kg (182 lb 12.2 oz)   Height: 170.2 cm (67.01\")     BMI:      General: No acute distress. Alert and oriented.     Ortho Exam:  Examination of right upper extremity demonstrates no deformity.  No skin lesions or abrasions.  There is a 1.5 x 1.5 cm mass located along the dorsum of the hand just proximal to the SL interval.  This is nontender to palpation.  Somewhat mobile.  With extension of the fingers there is evidence of EDC subluxation over the mass.  Patient able to make a composite fist.  Sensation tact light touch throughout the hand.  Warm well-perfused distally.    Imaging / Studies:    Imaging Results (Last 24 Hours)       ** No results found for the last 24 hours. **        Right wrist x-rays obtained on 1/24/2020 are personally reviewed.  No evidence of SL interval widening.  No significant degenerative changes.      Assessment / Plan    Assessment/Plan:   Lesly Billis a 43 y.o. female with right dorsal ganglion cyst.    I discussed with the patient their clinical and radiographic findings demonstrate a right doral ganglion cyst  We had a lengthy discussion regarding the pathophysiology of their diagnosis. The benign nature of a ganglion cyst was stressed.  It was also discussed that while these cysts may cause some local mass effect which may result in minor symptoms of discomfort, these cysts are typically painless.  Both conservative and " surgical options were discussed.  Conservative treatments in the form of: observation, aspiration (which is a simple office procedure but has high recurrence rates, typically greater than 50%), and bracing were presented.  Operative treatments in the form of surgical excision, which has a much lower recurrence rate but does require a trip to operating/procedure room, was presented. Common reasons for surgery were also explained to be due to local discomfort and functional deficits, or to obtain tissue for a definitive histologic diagnosis. After expressing understanding of all options, the patient elects to proceed with surgical excision.  She has failed 2 prior aspirations and feels that the cyst is creating a mass effect and pain with wrist extension.      The risks and benefits of the procedure were discussed with the patient and/or appropriate guardian, which include but are not limited to: the risk of bleeding, pain, infection, wound complications, neurovascular damage, post-operative stiffness, tendon and/or ligament injury, persistent pain, need for additional surgeries in the future, and general risks from anesthesia.  Ganglion cyst excision specific risk include recurrence of cyst, damage to underlying scapholunate ligament, damage to sensory nerves to the dorsum of the hand and noncosmetic scar. We also discussed the post-operative rehabilitation, length of immobilization, the need for therapy, and the overall expected outcomes from the procedure. Proper time was given to answer the patient's questions regarding the procedure. The patient expressed understanding. Knowing what the risks are and what the conservative treatment is, the patient elected to forgo any further conservative treatment options and proceed with the surgical intervention. They were agreeable with the plan.  All questions and concerns were addressed.        ICD-10-CM ICD-9-CM   1. Ganglion of right wrist  M67.431 727.41     Follow Up:    Return for Follow Up- After surgery.      Kina Meraz MD  [facility] Sur[facility]

## 2023-10-31 NOTE — TELEPHONE ENCOUNTER
Name: Fly Leslyjulian Borden    Relationship: Self    Best Callback Number: [phone]    HUB PROVIDED THE RELAY MESSAGE FROM THE OFFICE   PATIENT IS AWARE OF RESULTS HOWEVER  PATIENT WANTS TO KNOW WHERE TO GO FROM HERE SINCE X-RAY IS NORMAL AND PATIENT IS STILL COUGHING FOR OVER A MONTH AND STILL HAS A SHARP PAIN. PLEASE ADVISE

## 2023-11-01 NOTE — TELEPHONE ENCOUNTER
Spoke with patient, she states that the medication does help but it causes her to be dizzy.   She has not been called about PFT yet.

## 2023-11-09 RX ORDER — LEVOTHYROXINE SODIUM 0.05 MG/1
50 TABLET ORAL DAILY
Qty: 60 TABLET | Refills: 0 | Status: SHIPPED | OUTPATIENT
Start: 2023-11-09

## 2023-11-09 RX ORDER — LIDOCAINE 50 MG/G
1 PATCH TOPICAL EVERY 24 HOURS
Qty: 90 EACH | Refills: 1 | Status: SHIPPED | OUTPATIENT
Start: 2023-11-09

## 2023-12-05 ENCOUNTER — TELEPHONE (OUTPATIENT)
Dept: ORTHOPEDIC SURGERY | Facility: CLINIC | Age: 43
End: 2023-12-05
Payer: COMMERCIAL

## 2023-12-05 NOTE — TELEPHONE ENCOUNTER
I CALLED PATIENT AND LEFT MESSAGE VOICEMAILS X4. DR ROBB WILL BE UNAVAILABLE FOR SX ON 12/14/23 AND WE NEED TO RESCHD

## 2023-12-20 ENCOUNTER — OUTSIDE FACILITY SERVICE (OUTPATIENT)
Dept: ORTHOPEDIC SURGERY | Facility: CLINIC | Age: 43
End: 2023-12-20
Payer: COMMERCIAL

## 2023-12-20 ENCOUNTER — DOCUMENTATION (OUTPATIENT)
Age: 43
End: 2023-12-20
Payer: COMMERCIAL

## 2023-12-20 DIAGNOSIS — M67.439 DORSAL WRIST GANGLION: Primary | ICD-10-CM

## 2023-12-20 RX ORDER — OXYCODONE HYDROCHLORIDE 5 MG/1
5 TABLET ORAL EVERY 8 HOURS PRN
Qty: 8 TABLET | Refills: 0 | Status: SHIPPED | OUTPATIENT
Start: 2023-12-20

## 2023-12-20 NOTE — PROGRESS NOTES
ORTHOPEDIC OPERATIVE REPORT    PATIENT NAME: Lesly Bill     MRN: [id number]     DATE OF SURGERY: 12/20/23     LOCATION: [facility]    PREOPERATIVE DIAGNOSIS:   Right wrist dorsal ganglion cyst    POSTOPERATIVE DIAGNOSIS:  Right wrist dorsal ganglion cyst    PROCEDURE PERFORMED:  Right wrist dorsal ganglion cyst excision    CPT CODE:  14309    SURGEON: Kina Meraz MD     ASSISTANT: None     ANESTHESIA:  Monitored anesthesia with local 50:50 mixture of 0.5% Marcaine mixed with 1% lidocaine 1:100,000 with epinephrine    SPECIMENS: 1 specimen sent for pathology    TOURNIQUET TIME: 15 minutes    ESTIMATED BLOOD LOSS: Minimal    COMPLICATIONS: None    IMPLANTS: None    INDICATIONS FOR PROCEDURE:  Lesly Bill presented to clinic with complaints of pain and cyst formation to the right dorsal wrist. Clinical examination was consistent with a dorsal ganglion cyst. Radiographs of the affected wrist demonstrated no acute pathology.The benign nature of a ganglion cyst was stressed.  It was also discussed that while these cysts may cause some local mass effect which may result in minor symptoms of discomfort, these cysts are typically painless. Conservative treatments in the form of observation, aspiration and bracing were presented.  The patient previously underwent 2 aspirations with recurrence of the cyst.  The patient expressed understanding of their options and elected for cyst removal as it was deemed bothersome enough. The patient understands that surgical intervention does not eliminate recurrence, but has the lowest risk of recurrence. The patient expressed understanding.      The risks and benefits of the procedure were discussed with the patient and/or appropriate guardian, which include but are not limited to: the risk of bleeding, pain, infection, wound complications, neurovascular damage, post-operative stiffness, tendon and/or ligament injury, persistent pain, need for additional  surgeries in the future, and general risks from anesthesia. Specific risks include, cyst recurrence, injury to branches of the dorsal radial sensory nerves, and injury to the scapholunate ligament. We also discussed the post-operative rehabilitation, length of immobilization, the need for therapy, and the overall expected outcomes from the procedure. Proper time was given to answer the patient's questions regarding the procedure. The patient expressed understanding. Knowing what the risks are and what the conservative treatment is, the patient elected to forgo any further conservative treatment options and proceed with the surgical intervention.    DESCRIPTION OF PROCEDURE:   The patient was identified in the preoperative holding area utilizing two patient identifiers. The cyst margins were marked out for later identification. They were taken back to the operating room and placed supine on the operative table. A tourniquet was placed to the operative extremity which was prepped and draped in a standard sterile fashion. Monitored anesthesia was induced smoothly per the anesthesia team. A surgical time out was performed that confirmed the correct site, procedure and laterality. Local anesthetic consisting of a 50:50 mixture of 0.5% Marcaine mixed with 1% lidocaine 1:100,000 with epinephrine was injected in the surrounding area of the cyst.      A dorsal incision was marked longitudinally overlying the cyst. A 15 blade was used to incise through the skin and subcutaneous tissue. Littler scissors were used to bluntly dissect around the cyst. The extensor tendons were encountered and retracted.  There was a moderate amount of tenosynovitis localized around the EDC tendons was removed during her dissection.  The cyst was encountered and freed of soft tissue attachments circumferentially. The stalk was identified coming out of a rent in the dorsal wrist capsule overlying the scapholunate ligament. This was excised and sent  to pathology and measured approximately 1 cm x 2 cm. Clinically, the appearance was that of a ganglion cyst. The wound was inspected and the small capsulotomy that was present due to the stalk was cauterized with bipolar. The wound was gently irrigated with normal saline and inspected to find no evidence of additional cyst contents or damage to the underling dorsal carpal ligaments. The skin was closed with 4-0 monocryl. Steri strips, 4x4 dressings and webroll were applied, followed by a well padded volar resting plaster splint.  The patient was awoken from anesthesia and brought to the PACU in good and stable condition.  All counts were correct at the end of the case.    POSTOPERATIVE PLAN:  Weightbearing: Non weightbearing righty upper extremity.   2.  A prescription was provided for oxycodone 5mg. Additionally, over the counter Tylenol and/or Advil/Aleve/Motrin for pain control.   3.  Splint: Do not remove or allow to become wet. Encouraged digit range of motion as allowed by the splint.   4.  Follow up in 10-14 days as scheduled.    Kina Meraz MD  [facility]

## 2024-01-02 ENCOUNTER — OFFICE VISIT (OUTPATIENT)
Age: 44
End: 2024-01-02
Payer: COMMERCIAL

## 2024-01-02 VITALS — TEMPERATURE: 98.1 F

## 2024-01-02 DIAGNOSIS — M67.431 GANGLION OF RIGHT WRIST: Primary | ICD-10-CM

## 2024-01-02 PROCEDURE — 99024 POSTOP FOLLOW-UP VISIT: CPT | Performed by: STUDENT IN AN ORGANIZED HEALTH CARE EDUCATION/TRAINING PROGRAM

## 2024-01-02 RX ORDER — AZELASTINE 1 MG/ML
SPRAY, METERED NASAL AS NEEDED
COMMUNITY
Start: 2023-12-14

## 2024-01-02 NOTE — PROGRESS NOTES
[facility]     Post Operative Office Visit      Date: 01/02/2024   Patient Name: Lesly Bill  MRN: [id number]  YOB: 1980    Chief Complaint:   Chief Complaint   Patient presents with    Post-op     13 days status post Right wrist dorsal ganglion cyst excision 12/20/23     History of Present Illness:   Lesly Bill is a 43 y.o. female status post right dorsal ganglion cyst excision, DOS 12/20/2023.  The patient has been doing well since surgery.  She is maintained her splint without difficulty.  Her pain and swelling are slowly improving with time.  She denies any wound complications.  Her pain is also improving with time.  No other complaints or concerns.    Subjective   Review of Systems:   Review of Systems   Constitutional: Negative.    HENT: Negative.     Eyes: Negative.    Respiratory: Negative.     Cardiovascular: Negative.    Gastrointestinal: Negative.    Endocrine: Negative.    Genitourinary: Negative.    Musculoskeletal:  Positive for arthralgias.   Skin: Negative.    Allergic/Immunologic: Negative.    Neurological: Negative.    Hematological: Negative.    Psychiatric/Behavioral: Negative.          I reviewed the patient's chief complaint, history of present illness, review of systems, past medical history, surgical history, family history, social history, medications and allergy list in the EMR on 01/02/2024 and agree with the findings above.    Objective    Vital Signs:   Vitals:    01/02/24 0837   Temp: 98.1 °F (36.7 °C)       General Appearance: No acute distress. Alert and oriented.     Ortho Exam:  Examination of the right upper extremity demonstrates a dorsal surgical incision with Steri-Strips in place.  No evidence of underlying wound dehiscence, drainage, or erythema.  There is swelling noted at the MCP joints and fingers.  She is able to make a composite fist, however notes  tightness along the dorsal aspect of the fingers.  She is nontender along the surgical incision.  Sensation tact light touch throughout the dorsal and volar aspect of the hand.  Warm and well-perfused distally.    Imaging / Studies:    Imaging Results (Last 24 Hours)       ** No results found for the last 24 hours. **        Pathology reports from the day of surgery demonstrate diagnosis consistent with a ganglion cyst.    Assessment / Plan    Assessment/Plan:   Lesly Bill is a 43 y.o. female status post right dorsal ganglion cyst excision, DOS 12/20/2023.    Patient is doing well after surgery.  She does have some residual swelling noted throughout the dorsum of the hand and fingers.  I encouraged her on digit and wrist range of motion.  She has no restrictions and may return to activities as she tolerates.  She may allow the Steri-Strips to fall off.  She may use her removable wrist splint as needed for comfort.  Patient expressed understanding.  I will see her back in 4 weeks for reevaluation.  She will remain off of work until 1/8/2024.  All questions and concerns were addressed.      ICD-10-CM ICD-9-CM   1. Ganglion of right wrist  M67.431 727.41     Follow Up:   Return in about 4 weeks (around 1/30/2024) for Follow Up.      Kina Meraz MD  [facility]

## 2024-01-04 ENCOUNTER — TELEPHONE (OUTPATIENT)
Dept: ORTHOPEDIC SURGERY | Facility: CLINIC | Age: 44
End: 2024-01-04
Payer: COMMERCIAL

## 2024-01-04 NOTE — TELEPHONE ENCOUNTER
Dr. Meraz-please see message below and advise providing patient with some kind of work note for when she returns Monday.    Patient called back to discuss her message; She states that she has noticed that doing even her normal day-to-day activities her wrist is more sore/tender/stiff and has some swelling. 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 I told her I could check with Dr. Meraz to see if she would be agreeable to providing her this type of note. She verbalized understanding.    Norma TEMPLE CMA ([facility]), ROT

## 2024-01-04 NOTE — TELEPHONE ENCOUNTER
Patient was in office 1/2/24, we trimmed her stitches and removed her cast from surgery. She's been trying to gently use her hand around the house, her hand is swollen/puffy and feels tight when moving. She's concerned about going back to Monday with no restrictions. She'd like a call to advise on how to handle this and if she should have restrictions when going back to work on Monday.     Dr Meraz or team please advise.

## 2024-01-04 NOTE — TELEPHONE ENCOUNTER
LVM with pt requesting she give me a call back so we can discuss her previous message further. Provided her with our [employer] back-line # [phone].    Norma TEMPLE CMA ([facility]), ROT

## 2024-01-05 NOTE — TELEPHONE ENCOUNTER
Typed note for patient advising she may takes breaks as needed. Sent via [employer] - called patient to notify

## 2024-01-25 ENCOUNTER — TELEPHONE (OUTPATIENT)
Dept: ORTHOPEDIC SURGERY | Facility: CLINIC | Age: 44
End: 2024-01-25
Payer: COMMERCIAL

## 2024-01-25 DIAGNOSIS — M67.431 GANGLION OF RIGHT WRIST: Primary | ICD-10-CM

## 2024-01-25 NOTE — TELEPHONE ENCOUNTER
PATIENT CALLED IN WONDERING IF SHE SHOULD DO PHYSICAL THERAPY. SHE HAD SX WITH DR. ROBB ON 12/20/23 ON HER RIGHT WRIST. SHE STATES SHE IS STILL HAVE MOBILITY ISSUES, AND WAS WONDERING IF THERAPY WOULD HELP. SHE STATES SHE IS STRUGGLING A LITTLE AT WORK, BECAUSE TYPING AND WRITING IS BOTHERING HER.     IF DR. ROBB COULD PLEASE ADVISE.     CALL BACK # [phone]

## 2024-01-26 RX ORDER — LEVOTHYROXINE SODIUM 0.05 MG/1
50 TABLET ORAL DAILY
Qty: 60 TABLET | Refills: 0 | Status: SHIPPED | OUTPATIENT
Start: 2024-01-26

## 2024-01-26 NOTE — PROGRESS NOTES
Subjective   Chief Complaint   Patient presents with    Annual Exam     Well woman exam     Lesly Bill is a 43 y.o. year old  presenting to be seen for her annual exam.  Her last Pap was 2021 with normal cytology and negative HPV cotesting.  She had a BI-RADS 1 mammogram 2022. She reports bloody stool at the end of her period. She denies bloody stool at other times of the month. She reports when she has bleeding it is bright red. She has had normal colonoscopy.     SEXUAL Hx:  She is not currently sexually active.  In the past year there there has been NO new sexual partners.    Condoms are not needed because she is not sexually active.  She would not like to be screened for STD's at today's exam.  Current birth control method: OCP (progestin only). delmer has hypertension so was changed to progesterone only method at her last visit with Dr. Banerjee in 2022. She has not taken in about 3-4 months, would like to restart.   She is happy with her current method of contraception and does not want to discuss alternative methods of contraception.  MENSTRUAL Hx:  Patient's last menstrual period was 2023 (approximate).  In the past 6 months her cycles have been regular, predictable and occur monthly.  Her menstrual flow is typically moderately heavy.   Each month on average there are roughly 0 day(s) of very heavy flow.    Intermenstrual bleeding is absent.    Post-coital bleeding is absent.  Dysmenorrhea: is not affecting her activities of daily living  PMS: is not affecting her activities of daily living  Her cycles are not a source of concern for her that she wishes to discuss today.  HEALTH Hx:  She exercises regularly: yes.  She wears her seat belt: yes.  She has concerns about domestic violence: no.  OTHER THINGS SHE WANTS TO DISCUSS TODAY:  Nothing else    The following portions of the patient's history were reviewed and updated as appropriate:problem list, current  "medications, allergies, past family history, past medical history, past social history, and past surgical history.    Social History    Tobacco Use      Smoking status: Never      Smokeless tobacco: Never    Review of Systems  Constitutional POS: nothing reported    NEG: anorexia or night sweats   Genitourinary POS: nothing reported    NEG: dysuria or hematuria      Gastointestinal POS: see HPI    NEG: bloating, change in bowel habits, melena, or reflux symptoms   Integument POS: nothing reported    NEG: moles that are changing in size, shape, color or rashes   Breast POS: nothing reported    NEG: persistent breast lump, skin dimpling, or nipple discharge        Objective   /82 (BP Location: Left arm, Patient Position: Sitting, Cuff Size: Adult)   Ht 170.2 cm (67\")   Wt 80.4 kg (177 lb 3.2 oz)   LMP 12/29/2023 (Approximate)   BMI 27.75 kg/m²     General:  well developed; well nourished  no acute distress   Skin:  No suspicious lesions seen   Thyroid: normal to inspection and palpation   Breasts:  Examined in supine position  Symmetric without masses or skin dimpling  Nipples normal without inversion, lesions or discharge  There are no palpable axillary nodes   Abdomen: soft, non-tender; no masses  no umbilical or inguinal hernias are present  no hepato-splenomegaly   Pelvis: Clinical staff was present for exam  :  urethral meatus normal;  Vaginal:  normal pink mucosa without prolapse or lesions.  Cervix:  normal appearance.  Uterus:  normal size, shape and consistency.  Adnexa:  normal bimanual exam of the adnexa.  Rectal:  digital rectal exam not performed; anus visually normal appearing.        Assessment   Well woman with routine gynecological exam  Due for screening mammogram  Rectal bleeding with menses     Plan     Pap was done today.  If she does not receive the results of the Pap within 2 weeks  time, she was instructed to call to find out the results.  I explained to Lesly that the " recommendations for Pap smear interval in a low risk patient's has lengthened to 3 years time.  I encouraged her to be seen yearly for a full physical exam including breast and pelvic exam even during the off years when PAP's will not be performed.  Discussed rectal bleeding with patient. Consulted with Dr Reaves, most likely vascular in nature, With normal colonoscopy and no tarry stools, limited to bright red bleeding with menses suspect internal hemorrhoids.   Rx for Slynd sent to [facility] pharmacy  The importance of keeping all planned follow-up and taking all medications as prescribed was emphasized.  Today I discussed with Lesly the total recommended calcium intake for a premenopausal female is 1000 mg.  Ideally this should be from dietary sources.  I reviewed calcium content in various foods including milk, fortified orange juice and yogurt.  If she cannot get sufficient calcium through dietary means, it is recommended to supplement with either a multivitamin or calcium to reach her daily goal.  I also reviewed the difference in the bioavailability of calcium carbonate and calcium citrate containing supplements and the importance of taking calcium carbonate containing products with food.  Finally, vitamin D's role in calcium absorption was reviewed and a total daily vitamin D intake of 800 units was recommended  Follow up for annual exam 1 year    No orders of the defined types were placed in this encounter.         This note was electronically signed.    Bernie Tabor, RAF  January 29, 2024

## 2024-01-26 NOTE — TELEPHONE ENCOUNTER
I called patient, she is going to go to the [facility] on [address] here in [place].  Karla Lenz RT (R), ROT

## 2024-01-29 ENCOUNTER — OFFICE VISIT (OUTPATIENT)
Dept: OBSTETRICS AND GYNECOLOGY | Facility: CLINIC | Age: 44
End: 2024-01-29
Payer: COMMERCIAL

## 2024-01-29 VITALS
SYSTOLIC BLOOD PRESSURE: 120 MMHG | HEIGHT: 67 IN | DIASTOLIC BLOOD PRESSURE: 82 MMHG | BODY MASS INDEX: 27.81 KG/M2 | WEIGHT: 177.2 LBS

## 2024-01-29 DIAGNOSIS — Z01.419 WELL WOMAN EXAM WITH ROUTINE GYNECOLOGICAL EXAM: Primary | ICD-10-CM

## 2024-01-29 DIAGNOSIS — Z12.31 SCREENING MAMMOGRAM FOR BREAST CANCER: ICD-10-CM

## 2024-01-29 PROCEDURE — 99396 PREV VISIT EST AGE 40-64: CPT | Performed by: NURSE PRACTITIONER

## 2024-01-29 PROCEDURE — 99459 PELVIC EXAMINATION: CPT | Performed by: NURSE PRACTITIONER

## 2024-01-29 RX ORDER — DROSPIRENONE 4 MG/1
4 TABLET, FILM COATED ORAL DAILY
Qty: 84 TABLET | Refills: 3 | Status: SHIPPED | OUTPATIENT
Start: 2024-01-29 | End: 2024-01-29 | Stop reason: SDUPTHER

## 2024-01-29 RX ORDER — DROSPIRENONE 4 MG/1
4 TABLET, FILM COATED ORAL DAILY
Qty: 84 TABLET | Refills: 3 | Status: SHIPPED | OUTPATIENT
Start: 2024-01-29

## 2024-01-30 ENCOUNTER — OFFICE VISIT (OUTPATIENT)
Age: 44
End: 2024-01-30
Payer: COMMERCIAL

## 2024-01-30 DIAGNOSIS — M67.431 GANGLION OF RIGHT WRIST: Primary | ICD-10-CM

## 2024-01-30 PROCEDURE — 99024 POSTOP FOLLOW-UP VISIT: CPT | Performed by: STUDENT IN AN ORGANIZED HEALTH CARE EDUCATION/TRAINING PROGRAM

## 2024-01-30 NOTE — PROGRESS NOTES
[facility]     Post Operative Office Visit      Date: 01/30/2024   Patient Name: Lesly Bill  MRN: [id number]  YOB: 1980    Chief Complaint:   Chief Complaint   Patient presents with    Post-op     4 week follow up; 6 weeks status post Right wrist dorsal ganglion cyst excision 12/20/23     History of Present Illness:   Lesly Bill is a 43 y.o. female status post right dorsal ganglion cyst excision, DOS 12/20/2023.  She is been doing well after surgery but reports a decreased wrist flexion primarily.  Due to the stiffness she feels that her wrist becomes sore after a long day of typing.  She has no pain at rest.  No numbness or tingling.  Otherwise no other complaints or concerns.    Subjective   Review of Systems:   Review of Systems   Constitutional: Negative.    HENT: Negative.     Eyes: Negative.    Respiratory: Negative.     Cardiovascular: Negative.    Gastrointestinal: Negative.    Endocrine: Negative.    Genitourinary: Negative.    Musculoskeletal:  Positive for arthralgias.   Skin: Negative.    Allergic/Immunologic: Negative.    Neurological: Negative.    Hematological: Negative.    Psychiatric/Behavioral: Negative.          I reviewed the patient's chief complaint, history of present illness, review of systems, past medical history, surgical history, family history, social history, medications and allergy list in the EMR on 01/30/2024 and agree with the findings above.    Objective    Vital Signs: There were no vitals filed for this visit.    General Appearance: No acute distress. Alert and oriented.     Ortho Exam:  Examination of the right upper extremity mistreats a well-healed dorsal incision overlying the radiocarpal joint.  This is nontender to palpation.  No evidence of warmth, erythema, or drainage.  Patient achieves 45 degrees of wrist extension and 15 threes of wrist flexion.   She has full pronation and supination.  She did make composite fist and oppose thumb to small finger.  Sensation is intact to light touch at the hand.  Warm and well-perfused distally.    Imaging / Studies:    Imaging Results (Last 24 Hours)       ** No results found for the last 24 hours. **          Assessment / Plan    Assessment/Plan:   Lesly Bill is a 43 y.o. female status post right dorsal ganglion cyst excision, DOS 12/20/2023.    The patient has been doing well postoperatively but does have some stiffness to the wrist, especially with flexion.  I prescribed her a course of hand therapy and sent her prescription last week.  She is set to see them this week.  I think when she works through the stiffness of her wrist she may find that her pain is significantly improved.  I also counseled her on scar massage.  The patient expressed understanding.  I will see her back in 6 weeks for reevaluation of motion.      ICD-10-CM ICD-9-CM   1. Ganglion of right wrist  M67.431 727.41     Follow Up:   Return in about 6 weeks (around 3/12/2024) for Follow Up.      Kina Meraz MD  [facility]

## 2024-02-01 ENCOUNTER — TREATMENT (OUTPATIENT)
Dept: PHYSICAL THERAPY | Facility: CLINIC | Age: 44
End: 2024-02-01
Payer: COMMERCIAL

## 2024-02-01 ENCOUNTER — TELEPHONE (OUTPATIENT)
Dept: INTERNAL MEDICINE | Facility: CLINIC | Age: 44
End: 2024-02-01
Payer: COMMERCIAL

## 2024-02-01 DIAGNOSIS — Z98.890 STATUS POST SURGICAL REMOVAL OF GANGLION CYST: ICD-10-CM

## 2024-02-01 DIAGNOSIS — M25.631 STIFFNESS OF RIGHT WRIST JOINT: Primary | ICD-10-CM

## 2024-02-01 LAB — REF LAB TEST METHOD: NORMAL

## 2024-02-01 PROCEDURE — 97161 PT EVAL LOW COMPLEX 20 MIN: CPT | Performed by: PHYSICAL THERAPIST

## 2024-02-01 PROCEDURE — 97140 MANUAL THERAPY 1/> REGIONS: CPT | Performed by: PHYSICAL THERAPIST

## 2024-02-01 PROCEDURE — 97110 THERAPEUTIC EXERCISES: CPT | Performed by: PHYSICAL THERAPIST

## 2024-02-01 PROCEDURE — 97035 APP MDLTY 1+ULTRASOUND EA 15: CPT | Performed by: PHYSICAL THERAPIST

## 2024-02-01 RX ORDER — LEVOTHYROXINE SODIUM 0.05 MG/1
50 TABLET ORAL DAILY
Qty: 90 TABLET | Refills: 0 | Status: SHIPPED | OUTPATIENT
Start: 2024-02-01

## 2024-02-01 NOTE — PROGRESS NOTES
Physical Therapy Initial Evaluation and Plan of Care      Patient: Lesly Bill   : 1980  Diagnosis/ICD-10 Code:  Stiffness of right wrist joint [M25.631]  Referring practitioner: Kina Meraz MD    Subjective Evaluation    History of Present Illness  Date of surgery: 2023  Mechanism of injury: Wrist stiffness following ganglion cyst removal. Wrist can get painful at end of day from work.     Wants to return to yoga, ballroom dancing, weight lifting.       Patient Occupation: desk work Pain  Current pain ratin  At worst pain ratin  Location: right wrist  Quality: dull ache and tight  Aggravating factors: movement, lifting, repetitive movement and keyboarding    Hand dominance: right    Patient Goals  Patient goals for therapy: decreased edema, decreased pain, increased motion, return to sport/leisure activities, independence with ADLs/IADLs and increased strength           Objective          Observations     Right Wrist/Hand   Positive for adhesive scar.     Additional Wrist/Hand Observation Details  Dorsal wrist line     Active Range of Motion     Left Wrist   Wrist flexion: 83 degrees   Wrist extension: 82 degrees   Radial deviation: 18 degrees   Ulnar deviation: 43 degrees     Right Wrist   Wrist flexion: 20 (increased restriction with fist closed vs fist open) degrees   Wrist extension: 50 degrees   Radial deviation: 13 degrees   Ulnar deviation: 34 degrees     Right Digits   Flexion   Index     MCP: 70 degrees    PIP: 105 degrees  Middle     MCP: 75 degrees    PIP: 110 degrees  Extension   Index     MCP: 90 degrees  Middle     MCP: 90 degrees    Passive Range of Motion     Right Wrist   Wrist flexion: 25 degrees     Strength/Myotome Testing     Left Wrist/Hand      (2nd hand position)   Left  strength (2nd hand position) 45 lbs    Right Wrist/Hand   Wrist extension: 2  Wrist flexion: 2  Radial deviation: 3  Ulnar deviation: 3     (2nd hand position)    Right  strength (2nd hand position) 35 lbs          Assessment & Plan       Assessment  Impairments: abnormal coordination, abnormal muscle firing, abnormal muscle tone, abnormal or restricted ROM, activity intolerance, impaired physical strength, lacks appropriate home exercise program, pain with function and weight-bearing intolerance   Functional limitations: carrying objects, lifting, pulling, pushing, uncomfortable because of pain and unable to perform repetitive tasks   Assessment details: Patient is a 43 year old female presenting with right wrist and hand stiffness following dorsal ganglion cyst removal on 12/20/23. She demonstrates both capsular tightness and extensor tendon restriction, adhesive scar, and  and wrist weakness. She is appropriate for physical therapy to restore these deficits to return to comfortable work, writing, ballroom dancing, yoga, and weight lifting.   Prognosis: good  Prognosis details:           SHORT TERM GOALS:  4 weeks  1.  Pt reports 2/10 pain with wrist movement.   2.  Pt has  strength of at least 45 lbs on the right  3.  Pt demonstrates full composite fist.   4.  Pt has an increase in wrist flexion to 40 degrees  5.  Pt has in increase in wrist extension to 60 degrees        LONG TERM GOALS:    12 weeks   1.  Pt reports 0/10 pain with wrist movement.   2.  Pt is able to  at least 50 lbs without wrist pain.  3.  Pt is able to complete ADLs independently.   4.  Pt does not experience any numbness in her right hand.   5.  Pt AROM is within normal limits for wrist flex/ext, ulnar and radial deviation.         Plan  Therapy options: will be seen for skilled therapy services  Planned modality interventions: ultrasound, thermotherapy (paraffin bath), thermotherapy (hydrocollator packs), TENS, high voltage pulsed current (spasm management), high voltage pulsed current (pain management), cryotherapy and contrast bath immersion  Planned therapy interventions:  stretching, therapeutic activities, strengthening, spinal/joint mobilization, soft tissue mobilization, postural training, neuromuscular re-education, motor coordination training, manual therapy, orthotic fitting/training, joint mobilization, IADL retraining, home exercise program, functional ROM exercises, flexibility, fine motor coordination training, abdominal trunk stabilization, ADL retraining, balance/weight-bearing training and body mechanics training  Frequency: 2x week  Duration in weeks: 12  Treatment plan discussed with: patient  Access Code: [email or web address]  URL: [email or web address]  Date: 02/01/2024  Prepared by: Tita Domingo    Exercises  - Standing Wrist Flexion Stretch  - 3-5 x daily - 7 x weekly - 3 reps - 30 hold  - Standing Wrist Extension Stretch  - 3-5 x daily - 7 x weekly - 3 reps - 30 hold  - Wrist Prayer Stretch  - 3-5 x daily - 7 x weekly - 3 reps - 30 hold  - Seated Wrist Flexion PROM Stretch  - 3-5 x daily - 7 x weekly - 3 reps - 30 hold  - Seated Wrist Extension with Dumbbell  - 1 x daily - 7 x weekly - 2 sets - 10 reps  - Seated Wrist Flexion with Dumbbell  - 1 x daily - 7 x weekly - 2 sets - 10 reps  - Putty Squeezes  - 3 x daily - 7 x weekly - 2 min  - Quadruped Rocking Backward  - 1 x daily - 7 x weekly - 10 reps    Manual Therapy:    15     mins  23625;  Therapeutic Exercise:    23     mins  28811;     Neuromuscular Janine:        mins  55401;    Therapeutic Activity:          mins  44669;     Gait Training:           mins  50529;     Ultrasound:     13    mins  32045;    Electrical Stimulation:         mins  06040 ( );  Dry Needling          mins self-pay    Timed Treatment:   51   mins   Total Treatment:     69   mins    PT SIGNATURE: Tita Domingo, PT   DATE TREATMENT INITIATED: 2/1/2024    Initial Certification  Certification Period: 5/1/2024  I certify that the therapy services are furnished while this patient is under my care.  The services outlined above are required by  this patient, and will be reviewed every 90 days.     PHYSICIAN: Kina Meraz MD      DATE:     Please sign and return via fax to [phone].. Thank you, [facility].

## 2024-02-01 NOTE — TELEPHONE ENCOUNTER
----- Message from Lesly Bill sent at 2/1/2024  3:00 PM EST -----  Regarding: Thyroid Medicine  Contact: [phone]  I have one refill left that is getting processed today. It is being sent in a 60 day supply. Would it be possible to get 90 days at a time? That would be cheaper with my insurance if that is alright? Thank you!

## 2024-02-12 DIAGNOSIS — J01.00 ACUTE MAXILLARY SINUSITIS, RECURRENCE NOT SPECIFIED: ICD-10-CM

## 2024-02-12 RX ORDER — LISINOPRIL 20 MG/1
20 TABLET ORAL DAILY
Qty: 90 TABLET | Refills: 1 | Status: SHIPPED | OUTPATIENT
Start: 2024-02-12

## 2024-02-12 RX ORDER — FLUTICASONE PROPIONATE 50 MCG
2 SPRAY, SUSPENSION (ML) NASAL DAILY
Qty: 9.9 ML | Refills: 1 | Status: SHIPPED | OUTPATIENT
Start: 2024-02-12

## 2024-02-23 ENCOUNTER — TELEPHONE (OUTPATIENT)
Dept: INTERNAL MEDICINE | Facility: CLINIC | Age: 44
End: 2024-02-23
Payer: COMMERCIAL

## 2024-02-23 NOTE — TELEPHONE ENCOUNTER
Patient states she went to [facility] clinic and was told her sugar is 127 fasting with nothing to eat for 12 hours. Patient wanted to know what she should do? Patient is scheduled to see you on Monday

## 2024-02-26 ENCOUNTER — LAB (OUTPATIENT)
Dept: INTERNAL MEDICINE | Facility: CLINIC | Age: 44
End: 2024-02-26
Payer: COMMERCIAL

## 2024-02-26 ENCOUNTER — TELEPHONE (OUTPATIENT)
Dept: INTERNAL MEDICINE | Facility: CLINIC | Age: 44
End: 2024-02-26

## 2024-02-26 ENCOUNTER — OFFICE VISIT (OUTPATIENT)
Dept: INTERNAL MEDICINE | Facility: CLINIC | Age: 44
End: 2024-02-26
Payer: COMMERCIAL

## 2024-02-26 VITALS
HEART RATE: 72 BPM | TEMPERATURE: 97.5 F | SYSTOLIC BLOOD PRESSURE: 100 MMHG | BODY MASS INDEX: 27.65 KG/M2 | DIASTOLIC BLOOD PRESSURE: 70 MMHG | WEIGHT: 176.2 LBS | OXYGEN SATURATION: 98 % | HEIGHT: 67 IN

## 2024-02-26 DIAGNOSIS — Z00.00 ANNUAL PHYSICAL EXAM: ICD-10-CM

## 2024-02-26 DIAGNOSIS — R73.01 ELEVATED FASTING GLUCOSE: ICD-10-CM

## 2024-02-26 DIAGNOSIS — Z00.00 ANNUAL PHYSICAL EXAM: Primary | ICD-10-CM

## 2024-02-26 DIAGNOSIS — G43.909 MIGRAINE SYNDROME: ICD-10-CM

## 2024-02-26 LAB
ALBUMIN SERPL-MCNC: 4.3 G/DL (ref 3.5–5.2)
ALBUMIN/GLOB SERPL: 1.7 G/DL
ALP SERPL-CCNC: 90 U/L (ref 39–117)
ALT SERPL W P-5'-P-CCNC: 17 U/L (ref 1–33)
ANION GAP SERPL CALCULATED.3IONS-SCNC: 10.4 MMOL/L (ref 5–15)
AST SERPL-CCNC: 7 U/L (ref 1–32)
BILIRUB SERPL-MCNC: 0.4 MG/DL (ref 0–1.2)
BUN SERPL-MCNC: 16 MG/DL (ref 6–20)
BUN/CREAT SERPL: 12.4 (ref 7–25)
CALCIUM SPEC-SCNC: 9 MG/DL (ref 8.6–10.5)
CHLORIDE SERPL-SCNC: 106 MMOL/L (ref 98–107)
CHOLEST SERPL-MCNC: 171 MG/DL (ref 0–200)
CO2 SERPL-SCNC: 20.6 MMOL/L (ref 22–29)
CREAT SERPL-MCNC: 1.29 MG/DL (ref 0.57–1)
DEPRECATED RDW RBC AUTO: 42.4 FL (ref 37–54)
EGFRCR SERPLBLD CKD-EPI 2021: 52.6 ML/MIN/1.73
ERYTHROCYTE [DISTWIDTH] IN BLOOD BY AUTOMATED COUNT: 13.7 % (ref 12.3–15.4)
GLOBULIN UR ELPH-MCNC: 2.6 GM/DL
GLUCOSE SERPL-MCNC: 105 MG/DL (ref 65–99)
HBA1C MFR BLD: 5.6 % (ref 4.8–5.6)
HCT VFR BLD AUTO: 39.6 % (ref 34–46.6)
HDLC SERPL-MCNC: 40 MG/DL (ref 40–60)
HGB BLD-MCNC: 13 G/DL (ref 12–15.9)
LDLC SERPL CALC-MCNC: 113 MG/DL (ref 0–100)
LDLC/HDLC SERPL: 2.78 {RATIO}
MCH RBC QN AUTO: 28 PG (ref 26.6–33)
MCHC RBC AUTO-ENTMCNC: 32.8 G/DL (ref 31.5–35.7)
MCV RBC AUTO: 85.3 FL (ref 79–97)
PLATELET # BLD AUTO: 273 10*3/MM3 (ref 140–450)
PMV BLD AUTO: 11.5 FL (ref 6–12)
POTASSIUM SERPL-SCNC: 4.8 MMOL/L (ref 3.5–5.2)
PROT SERPL-MCNC: 6.9 G/DL (ref 6–8.5)
RBC # BLD AUTO: 4.64 10*6/MM3 (ref 3.77–5.28)
SODIUM SERPL-SCNC: 137 MMOL/L (ref 136–145)
TRIGL SERPL-MCNC: 99 MG/DL (ref 0–150)
TSH SERPL DL<=0.05 MIU/L-ACNC: 2.95 UIU/ML (ref 0.27–4.2)
VLDLC SERPL-MCNC: 18 MG/DL (ref 5–40)
WBC NRBC COR # BLD AUTO: 7.27 10*3/MM3 (ref 3.4–10.8)

## 2024-02-26 PROCEDURE — 36415 COLL VENOUS BLD VENIPUNCTURE: CPT | Performed by: PHYSICIAN ASSISTANT

## 2024-02-26 PROCEDURE — 83036 HEMOGLOBIN GLYCOSYLATED A1C: CPT | Performed by: PHYSICIAN ASSISTANT

## 2024-02-26 PROCEDURE — 80061 LIPID PANEL: CPT | Performed by: PHYSICIAN ASSISTANT

## 2024-02-26 PROCEDURE — 80050 GENERAL HEALTH PANEL: CPT | Performed by: PHYSICIAN ASSISTANT

## 2024-02-26 RX ORDER — SUMATRIPTAN 100 MG/1
TABLET, FILM COATED ORAL
Qty: 9 TABLET | Refills: 5 | Status: SHIPPED | OUTPATIENT
Start: 2024-02-26

## 2024-02-26 RX ORDER — TOPIRAMATE 25 MG/1
25 TABLET ORAL
Qty: 30 TABLET | Refills: 1 | Status: SHIPPED | OUTPATIENT
Start: 2024-02-26

## 2024-02-26 NOTE — TELEPHONE ENCOUNTER
----- Message from Des Connell PA-C sent at 2/26/2024  2:52 PM EST -----  Attempted to contact patient regarding results, no answer, left voicemail to call back.  If patient calls back tell her kidney function slightly decreased, increase water hydration and come back in a couple weeks to recheck this.  Looks like she has had an issue with this in the past.  Also, LDL better but still slightly elevated.  Continue to cut back red meats, eggs butter, etc.  Also A1c normal.  Patient not diabetic.  Thank you.

## 2024-02-26 NOTE — PROGRESS NOTES
[facility]  [address]  [place] [facility] [place] [address]  Dept: [phone]  Dept Fax: [phone]  Loc: [phone]  Loc Fax: [phone]    Lesly Aroradridge  1980    Follow Up Office Visit Note    History of Present Illness:  Patient a 43-year-old female in today for annual physical.  Patient had health screening at work recently and was found to have a fasting glucose of 127.  Needing A1c rechecked.    Patient suffers from migraines.  Headaches getting worse.  Has auras.  Headaches seem to be worse over the last few weeks.  Never treated specifically for migraines before.        The following portions of the patient's history were reviewed and updated as appropriate: allergies, current medications, past family history, past medical history, past social history, past surgical history, and problem list.    Medications:    Current Outpatient Medications:     azelastine (ASTELIN) 0.1 % nasal spray, As Needed., Disp: , Rfl:     cetirizine (zyrTEC) 10 MG tablet, Take 1 tablet by mouth Daily., Disp: 90 tablet, Rfl: 2    clobetasol propionate (CLOBEX) 0.05 % shampoo, APPLY TOPICALLY TO THE SCALP AND LET SIT FOR 5-10 MINUTES BEFORE RINSING AS NEEDED., Disp: , Rfl:     desonide (DESOWEN) 0.05 % cream, apply to the affected areas on scalp and feet twice daily x 2 wks. take week break then use as needed for flares, Disp: , Rfl:     Desvenlafaxine Succinate ER 25 MG tablet sustained-release 24 hour, Take 1 tablet by mouth Daily., Disp: 30 tablet, Rfl: 0    Drospirenone (Slynd) 4 MG tablet, Take 1 tablet by mouth Daily., Disp: 84 tablet, Rfl: 3    fluticasone (FLONASE) 50 MCG/ACT nasal spray, 2 sprays into the nostril(s) as directed by provider Daily., Disp: 9.9 mL, Rfl: 1    hydrocortisone (ANUSOL-HC) 25 MG suppository, Insert 1 suppository into the rectum 2 (Two) Times a Day., Disp: 14 suppository, Rfl: 0    hydrOXYzine (ATARAX) 10 MG tablet, Take 1 tablet  by mouth 3 (Three) Times a Day As Needed for Anxiety., Disp: 30 tablet, Rfl: 0    ibuprofen (ADVIL,MOTRIN) 800 MG tablet, Take 1 tablet by mouth Every 8 (Eight) Hours As Needed for Mild Pain or Moderate Pain., Disp: , Rfl:     levothyroxine (SYNTHROID, LEVOTHROID) 50 MCG tablet, Take 1 tablet by mouth Daily., Disp: 90 tablet, Rfl: 0    lidocaine (LIDODERM) 5 %, Place 1 patch on the skin as directed by provider Daily. Remove & Discard patch within 12 hours or as directed by MD, Disp: 90 each, Rfl: 1    lisinopril (PRINIVIL,ZESTRIL) 20 MG tablet, Take 1 tablet by mouth Daily., Disp: 90 tablet, Rfl: 1    omeprazole (priLOSEC) 40 MG capsule, Take 1 capsule by mouth Daily., Disp: 90 capsule, Rfl: 3    Otezla 30 MG tablet, , Disp: , Rfl:     TiZANidine (ZANAFLEX) 4 MG capsule, Take 1 capsule by mouth 3 (Three) Times a Day As Needed for Muscle Spasms., Disp: 60 capsule, Rfl: 1    traZODone (DESYREL) 50 MG tablet, Take 0.5-1 tablets by mouth At Night As Needed for Sleep., Disp: 30 tablet, Rfl: 0    valACYclovir (Valtrex) 500 MG tablet, Take 1 tablet by mouth 2 (Two) Times a Day., Disp: 6 tablet, Rfl: 5    SUMAtriptan (IMITREX) 100 MG tablet, Take 1/2 to 1 tablet at onset of headache. May repeat dose one time in 2 hours as needed. Do not exceed 200 mg in 24 hours., Disp: 9 tablet, Rfl: 5    topiramate (TOPAMAX) 25 MG tablet, Take 1 tablet by mouth every night at bedtime., Disp: 30 tablet, Rfl: 1    Subjective  Allergies   Allergen Reactions    Sulfacetamide Hives and Unknown - Low Severity    Clove Oil Rash    Clove [Cloves (Syzygium Aromaticum)] Rash and GI Intolerance    Menthol Rash    Sertraline Unknown (See Comments)    Sulfa Antibiotics Hives and Rash        Past Medical History:   Diagnosis Date    Acid reflux     Anxiety     Depression     Fracture     Fracture, foot 4 years ago    In boot fracture of 5th metatarsal.    Hypertension     IBS (irritable bowel syndrome)     Kidney stone     Kidney deformity - 2 ureters  left kidney    Psoriasis     [facility]     Urinary tract infection     Visual impairment 2019    Vitreal detachment floaters       Past Surgical History:   Procedure Laterality Date    BREAST BIOPSY  2008    COLONOSCOPY      came out normal    GANGLION CYST EXCISION Right 12/20/2023    Right wrist dorsal ganglion cyst excision [facility]    WRIST SURGERY Left 2015       Family History   Problem Relation Age of Onset    Hypertension Mother     Cancer Father         mesothelioma    Hypertension Father     Anxiety disorder Maternal Grandmother     Diabetes Maternal Grandmother         Type 2 diabetes    Ovarian cancer Paternal Grandmother         unknown    Breast cancer Paternal Aunt         60's    Cancer Paternal Aunt         Breast cancer    Diabetes Maternal Uncle         Type 2 diabetes    Anxiety disorder Sister     Uterine cancer Neg Hx     Colon cancer Neg Hx     Osteoporosis Neg Hx         Social History     Socioeconomic History    Marital status: Single    Number of children: 0    Highest education level: Master's degree (e.g., MA, MS, Tarik, MEd, MSW, OSMIN)   Tobacco Use    Smoking status: Never    Smokeless tobacco: Never   Vaping Use    Vaping Use: Never used   Substance and Sexual Activity    Alcohol use: Yes     Alcohol/week: 1.0 standard drink of alcohol     Types: 1 Glasses of wine per week     Comment: Every once in a while socially I will have wine with dinner.    Drug use: Never    Sexual activity: Not Currently     Partners: Male     Birth control/protection: Birth control pill, Pill       Review of Systems   Constitutional:  Negative for activity change, chills, fatigue, fever and unexpected weight change.   HENT:  Negative for congestion, ear pain, postnasal drip, sinus pressure and sore throat.    Eyes:  Negative for pain, discharge and redness.   Respiratory:  Negative for cough, shortness of breath and wheezing.    Cardiovascular:  Negative for chest pain, palpitations and  "leg swelling.   Gastrointestinal:  Negative for diarrhea, nausea and vomiting.   Endocrine: Negative for cold intolerance and heat intolerance.   Genitourinary:  Negative for decreased urine volume and dysuria.   Musculoskeletal:  Negative for arthralgias and myalgias.   Skin:  Negative for rash and wound.   Neurological:  Positive for headaches. Negative for dizziness and light-headedness.   Hematological:  Does not bruise/bleed easily.   Psychiatric/Behavioral:  Negative for confusion, dysphoric mood and sleep disturbance. The patient is not nervous/anxious.          Objective  Vitals:    02/26/24 0824   BP: 100/70   BP Location: Left arm   Patient Position: Sitting   Cuff Size: Adult   Pulse: 72   Temp: 97.5 °F (36.4 °C)   TempSrc: Temporal   SpO2: 98%   Weight: 79.9 kg (176 lb 3.2 oz)   Height: 170.2 cm (67.01\")     Body mass index is 27.59 kg/m².     Physical Exam  Physical Exam  Vitals and nursing note reviewed.   Constitutional:       General: She is not in acute distress.     Appearance: She is not ill-appearing.   HENT:      Head: Normocephalic.      Right Ear: Tympanic membrane, ear canal and external ear normal. There is no impacted cerumen.      Left Ear: Tympanic membrane, ear canal and external ear normal. There is no impacted cerumen.      Nose: No congestion or rhinorrhea.      Mouth/Throat:      Mouth: Mucous membranes are moist.      Pharynx: Oropharynx is clear. No oropharyngeal exudate or posterior oropharyngeal erythema.   Eyes:      General:         Right eye: No discharge.         Left eye: No discharge.      Extraocular Movements: Extraocular movements intact.      Conjunctiva/sclera: Conjunctivae normal.      Pupils: Pupils are equal, round, and reactive to light.   Cardiovascular:      Rate and Rhythm: Normal rate and regular rhythm.      Heart sounds: Normal heart sounds. No murmur heard.     No friction rub. No gallop.   Pulmonary:      Effort: Pulmonary effort is normal. No respiratory " distress.      Breath sounds: Normal breath sounds. No wheezing.   Abdominal:      General: Bowel sounds are normal. There is no distension.      Palpations: Abdomen is soft. There is no mass.      Tenderness: There is no abdominal tenderness.   Musculoskeletal:         General: No swelling. Normal range of motion.      Cervical back: Normal range of motion. No tenderness.      Right lower leg: No edema.      Left lower leg: No edema.   Lymphadenopathy:      Cervical: No cervical adenopathy.   Skin:     Findings: No bruising, erythema or rash.   Neurological:      Mental Status: She is oriented to person, place, and time.      Gait: Gait normal.   Psychiatric:         Mood and Affect: Mood normal.         Behavior: Behavior normal.         Thought Content: Thought content normal.         Judgment: Judgment normal.         Diagnostic Data  Procedures    Assessment  Diagnoses and all orders for this visit:    1. Annual physical exam (Primary)  -     CBC (No Diff)  -     Comprehensive Metabolic Panel  -     TSH Rfx On Abnormal To Free T4  -     Hemoglobin A1c; Future  -     Lipid Panel    2. Migraine syndrome    3. Elevated fasting glucose    Other orders  -     topiramate (TOPAMAX) 25 MG tablet; Take 1 tablet by mouth every night at bedtime.  Dispense: 30 tablet; Refill: 1  -     SUMAtriptan (IMITREX) 100 MG tablet; Take 1/2 to 1 tablet at onset of headache. May repeat dose one time in 2 hours as needed. Do not exceed 200 mg in 24 hours.  Dispense: 9 tablet; Refill: 5        Plan    1. Annual physical exam (Primary)- obtain fasting labs and follow-up with these.  Advised on nutrition and exercise and follow-up with this.    2. Migraine syndrome- trial of Topamax 25 mg nightly.  If successful titrate up.  Imitrex as directed as needed for abortive treatment.    3. Elevated fasting glucose- ordered A1c.      Return in about 4 weeks (around 3/25/2024) for Recheck.    Des Connell PA-C  02/26/2024

## 2024-02-26 NOTE — TELEPHONE ENCOUNTER
Hub to relay message below from Des Connell      ----- Message from Des Connell PA-C sent at 2/26/2024  2:52 PM EST -----  Attempted to contact patient regarding results, no answer, left voicemail to call back.  If patient calls back tell her kidney function slightly decreased, increase water hydration and come back in a couple weeks to recheck this.  Looks like she has had an issue with this in the past.  Also, LDL better but still slightly elevated.  Continue to cut back red meats, eggs butter, etc.  Also A1c normal.  Patient not diabetic.  Thank you.

## 2024-02-27 DIAGNOSIS — Z71.3 DIETARY COUNSELING: Primary | ICD-10-CM

## 2024-03-20 ENCOUNTER — TELEMEDICINE (OUTPATIENT)
Dept: INTERNAL MEDICINE | Facility: CLINIC | Age: 44
End: 2024-03-20
Payer: COMMERCIAL

## 2024-03-20 DIAGNOSIS — R11.2 NAUSEA AND VOMITING, UNSPECIFIED VOMITING TYPE: Primary | ICD-10-CM

## 2024-03-20 DIAGNOSIS — R19.7 DIARRHEA OF PRESUMED INFECTIOUS ORIGIN: ICD-10-CM

## 2024-03-20 PROCEDURE — 99213 OFFICE O/P EST LOW 20 MIN: CPT | Performed by: INTERNAL MEDICINE

## 2024-03-20 RX ORDER — ONDANSETRON 4 MG/1
4 TABLET, ORALLY DISINTEGRATING ORAL EVERY 8 HOURS PRN
Qty: 30 TABLET | Refills: 3 | Status: SHIPPED | OUTPATIENT
Start: 2024-03-20

## 2024-03-20 RX ORDER — DIPHENOXYLATE HYDROCHLORIDE AND ATROPINE SULFATE 2.5; .025 MG/1; MG/1
1 TABLET ORAL 4 TIMES DAILY PRN
Qty: 60 TABLET | Refills: 0 | Status: SHIPPED | OUTPATIENT
Start: 2024-03-20

## 2024-03-20 NOTE — PROGRESS NOTES
Telehealth E-Visit      Patient Name: Lesly Bill  : 1980   MRN: [id number]     Chief Complaint:    Chief Complaint   Patient presents with    GI Problem       I have reviewed the E-Visit questionnaire and the patient's answers, my assessment and plan are listed below.     This provider is located at the [facility] (through [facility]), [address] [place] [address] using a secure [employer] Video Visit through [employer]. Patient is being seen remotely via telehealth at their home address in [place], and stated they are in a secure environment for this session. The patient's condition being diagnosed/treated is appropriate for telemedicine. The provider identified herself as well as her credentials. The patient, and/or patients guardian, consent to be seen remotely, and when consent is given they understand that the consent allows for patient identifiable information to be sent to a third party as needed. They may refuse to be seen remotely at any time. The electronic data is encrypted and password protected, and the patient and/or guardian has been advised of the potential risks to privacy not withstanding such measures.    You have chosen to receive care through a telehealth visit. Do you consent to use a video/audio connection for your medical care today? Yes    History of Present Illness: Lesly Bill is a 43 y.o. female who is here today to follow up with nausea and vomiting with diarrhea.  Woke up 0430 this am with sx. Can't keep anything down.  Diarrhea started first.  15 or more times per day.        Subjective        I have reviewed and the following portions of the patient's history were updated as appropriate: past family history, past medical history, past social history, past surgical history and problem list.    Medications:     Current Outpatient Medications:     azelastine (ASTELIN) 0.1 % nasal spray, As Needed.,  Disp: , Rfl:     cetirizine (zyrTEC) 10 MG tablet, Take 1 tablet by mouth Daily., Disp: 90 tablet, Rfl: 2    clobetasol propionate (CLOBEX) 0.05 % shampoo, APPLY TOPICALLY TO THE SCALP AND LET SIT FOR 5-10 MINUTES BEFORE RINSING AS NEEDED., Disp: , Rfl:     desonide (DESOWEN) 0.05 % cream, apply to the affected areas on scalp and feet twice daily x 2 wks. take week break then use as needed for flares, Disp: , Rfl:     Desvenlafaxine Succinate ER 25 MG tablet sustained-release 24 hour, Take 1 tablet by mouth Daily., Disp: 30 tablet, Rfl: 0    diphenoxylate-atropine (Lomotil) 2.5-0.025 MG per tablet, Take 1 tablet by mouth 4 (Four) Times a Day As Needed for Diarrhea., Disp: 60 tablet, Rfl: 0    Drospirenone (Slynd) 4 MG tablet, Take 1 tablet by mouth Daily., Disp: 84 tablet, Rfl: 3    fluticasone (FLONASE) 50 MCG/ACT nasal spray, 2 sprays into the nostril(s) as directed by provider Daily., Disp: 9.9 mL, Rfl: 1    hydrocortisone (ANUSOL-HC) 25 MG suppository, Insert 1 suppository into the rectum 2 (Two) Times a Day., Disp: 14 suppository, Rfl: 0    hydrOXYzine (ATARAX) 10 MG tablet, Take 1 tablet by mouth 3 (Three) Times a Day As Needed for Anxiety., Disp: 30 tablet, Rfl: 0    ibuprofen (ADVIL,MOTRIN) 800 MG tablet, Take 1 tablet by mouth Every 8 (Eight) Hours As Needed for Mild Pain or Moderate Pain., Disp: , Rfl:     levothyroxine (SYNTHROID, LEVOTHROID) 50 MCG tablet, Take 1 tablet by mouth Daily., Disp: 90 tablet, Rfl: 0    lidocaine (LIDODERM) 5 %, Place 1 patch on the skin as directed by provider Daily. Remove & Discard patch within 12 hours or as directed by MD, Disp: 90 each, Rfl: 1    lisinopril (PRINIVIL,ZESTRIL) 20 MG tablet, Take 1 tablet by mouth Daily., Disp: 90 tablet, Rfl: 1    omeprazole (priLOSEC) 40 MG capsule, Take 1 capsule by mouth Daily., Disp: 90 capsule, Rfl: 3    ondansetron ODT (ZOFRAN-ODT) 4 MG disintegrating tablet, Place 1 tablet on the tongue Every 8 (Eight) Hours As Needed for Nausea or  Vomiting., Disp: 30 tablet, Rfl: 3    Otezla 30 MG tablet, , Disp: , Rfl:     SUMAtriptan (IMITREX) 100 MG tablet, Take 1/2 to 1 tablet at onset of headache. May repeat dose one time in 2 hours as needed. Do not exceed 200 mg in 24 hours., Disp: 9 tablet, Rfl: 5    TiZANidine (ZANAFLEX) 4 MG capsule, Take 1 capsule by mouth 3 (Three) Times a Day As Needed for Muscle Spasms., Disp: 60 capsule, Rfl: 1    topiramate (TOPAMAX) 25 MG tablet, Take 1 tablet by mouth every night at bedtime., Disp: 30 tablet, Rfl: 1    traZODone (DESYREL) 50 MG tablet, Take 0.5-1 tablets by mouth At Night As Needed for Sleep., Disp: 30 tablet, Rfl: 0    valACYclovir (Valtrex) 500 MG tablet, Take 1 tablet by mouth 2 (Two) Times a Day., Disp: 6 tablet, Rfl: 5    Allergies:   Allergies   Allergen Reactions    Sulfacetamide Hives and Unknown - Low Severity    Clove Oil Rash    Clove [Cloves (Syzygium Aromaticum)] Rash and GI Intolerance    Menthol Rash    Sertraline Unknown (See Comments)    Sulfa Antibiotics Hives and Rash       Objective     Physical Exam:  Vital Signs: There were no vitals filed for this visit.  There is no height or weight on file to calculate BMI.    Physical Exam  Constitutional:       Appearance: Normal appearance. She is ill-appearing.   HENT:      Head: Normocephalic.   Eyes:      Conjunctiva/sclera: Conjunctivae normal.   Pulmonary:      Effort: Pulmonary effort is normal. No respiratory distress.   Skin:     Coloration: Skin is not jaundiced or pale.   Neurological:      Mental Status: She is alert and oriented to person, place, and time. Mental status is at baseline.   Psychiatric:         Mood and Affect: Mood normal.         Behavior: Behavior normal.         Thought Content: Thought content normal.           Assessment / Plan      Assessment/Plan:   Diagnoses and all orders for this visit:    1. Nausea and vomiting, unspecified vomiting type (Primary)  -     ondansetron ODT (ZOFRAN-ODT) 4 MG disintegrating tablet;  Place 1 tablet on the tongue Every 8 (Eight) Hours As Needed for Nausea or Vomiting.  Dispense: 30 tablet; Refill: 3    2. Diarrhea of presumed infectious origin  -     diphenoxylate-atropine (Lomotil) 2.5-0.025 MG per tablet; Take 1 tablet by mouth 4 (Four) Times a Day As Needed for Diarrhea.  Dispense: 60 tablet; Refill: 0         Suspect viral gastroenteritis.  Sx control.  If unable to keep fluids down with zofran, advised to go to ER.  Pt verbalized understanding.     Follow Up:   No follow-ups on file.    Any medications prescribed have been sent electronically to   [id number] - [place], [place] - [address] - [phone] PH - [phone] FX  [address] [address]  Phone: [phone] Fax: [phone]    [facility] #[id number] - [place], [place] - [address] - [phone]  - [phone]   [address] [facility] [address] [facility] [address]  Phone: [phone] Fax: [phone]    [facility] - [place], [place] - [address] [address] - [phone]  - [phone] FX  [id number] [place] [address]  Phone: [phone] Fax: [phone]          DO BRIDGET Brian PC TUTU  03/20/24  14:56 EDT

## 2024-03-27 ENCOUNTER — HOSPITAL ENCOUNTER (OUTPATIENT)
Dept: MAMMOGRAPHY | Facility: HOSPITAL | Age: 44
Discharge: HOME | End: 2024-03-27
Admitting: NURSE PRACTITIONER
Payer: COMMERCIAL

## 2024-03-27 DIAGNOSIS — Z12.31 SCREENING MAMMOGRAM FOR BREAST CANCER: ICD-10-CM

## 2024-03-27 PROCEDURE — 77063 BREAST TOMOSYNTHESIS BI: CPT

## 2024-03-27 PROCEDURE — 77067 SCR MAMMO BI INCL CAD: CPT

## 2024-03-29 ENCOUNTER — OFFICE VISIT (OUTPATIENT)
Dept: INTERNAL MEDICINE | Facility: CLINIC | Age: 44
End: 2024-03-29
Payer: COMMERCIAL

## 2024-03-29 VITALS
DIASTOLIC BLOOD PRESSURE: 84 MMHG | SYSTOLIC BLOOD PRESSURE: 108 MMHG | HEIGHT: 67 IN | BODY MASS INDEX: 26.53 KG/M2 | TEMPERATURE: 97.1 F | HEART RATE: 72 BPM | WEIGHT: 169 LBS | OXYGEN SATURATION: 99 %

## 2024-03-29 DIAGNOSIS — R94.4 DECREASED GFR: ICD-10-CM

## 2024-03-29 DIAGNOSIS — B00.1 FEVER BLISTER: ICD-10-CM

## 2024-03-29 DIAGNOSIS — E03.9 HYPOTHYROIDISM, UNSPECIFIED TYPE: Primary | ICD-10-CM

## 2024-03-29 DIAGNOSIS — I10 ESSENTIAL HYPERTENSION: ICD-10-CM

## 2024-03-29 DIAGNOSIS — G43.909 MIGRAINE SYNDROME: ICD-10-CM

## 2024-03-29 PROCEDURE — 99214 OFFICE O/P EST MOD 30 MIN: CPT | Performed by: PHYSICIAN ASSISTANT

## 2024-03-29 RX ORDER — LISINOPRIL 20 MG/1
20 TABLET ORAL DAILY
Qty: 90 TABLET | Refills: 1 | Status: SHIPPED | OUTPATIENT
Start: 2024-03-29

## 2024-03-29 RX ORDER — TOPIRAMATE 25 MG/1
25 TABLET ORAL
Qty: 90 TABLET | Refills: 1 | Status: SHIPPED | OUTPATIENT
Start: 2024-03-29

## 2024-03-29 RX ORDER — VALACYCLOVIR HYDROCHLORIDE 500 MG/1
500 TABLET, FILM COATED ORAL 2 TIMES DAILY
Qty: 6 TABLET | Refills: 5 | Status: SHIPPED | OUTPATIENT
Start: 2024-03-29

## 2024-03-29 RX ORDER — SUMATRIPTAN 100 MG/1
TABLET, FILM COATED ORAL
Qty: 9 TABLET | Refills: 5 | Status: SHIPPED | OUTPATIENT
Start: 2024-03-29

## 2024-03-29 NOTE — PROGRESS NOTES
[facility]  [address]  [facility] [address]  Dept: [phone]  Dept Fax: [phone]  Loc: [phone]  Loc Fax: [phone]    Lesly Bill  1980    Follow Up Office Visit Note    History of Present Illness:  Patient 43-year-old female in today to follow-up for migraines.  Overall headaches well-controlled on Topamax 25 mg nightly and Imitrex abortive.  Recent lab work showing decreased GFR reviewed with patient in office today.  Patient would like to not repeat the blood work to recheck her kidney function because she is recovering from a stomach virus and concerned she is slightly dehydrated.  Would like to come back next week for this.  Overall otherwise doing well and feeling well.        The following portions of the patient's history were reviewed and updated as appropriate: allergies, current medications, past family history, past medical history, past social history, past surgical history, and problem list.    Medications:    Current Outpatient Medications:     azelastine (ASTELIN) 0.1 % nasal spray, As Needed., Disp: , Rfl:     cetirizine (zyrTEC) 10 MG tablet, Take 1 tablet by mouth Daily., Disp: 90 tablet, Rfl: 2    clobetasol propionate (CLOBEX) 0.05 % shampoo, APPLY TOPICALLY TO THE SCALP AND LET SIT FOR 5-10 MINUTES BEFORE RINSING AS NEEDED., Disp: , Rfl:     desonide (DESOWEN) 0.05 % cream, apply to the affected areas on scalp and feet twice daily x 2 wks. take week break then use as needed for flares, Disp: , Rfl:     Desvenlafaxine Succinate ER 25 MG tablet sustained-release 24 hour, Take 1 tablet by mouth Daily., Disp: 30 tablet, Rfl: 0    diphenoxylate-atropine (Lomotil) 2.5-0.025 MG per tablet, Take 1 tablet by mouth 4 (Four) Times a Day As Needed for Diarrhea., Disp: 60 tablet, Rfl: 0    Drospirenone (Slynd) 4 MG tablet, Take 1 tablet by mouth Daily., Disp: 84 tablet, Rfl: 3    fluticasone (FLONASE) 50  MCG/ACT nasal spray, 2 sprays into the nostril(s) as directed by provider Daily., Disp: 9.9 mL, Rfl: 1    hydrocortisone (ANUSOL-HC) 25 MG suppository, Insert 1 suppository into the rectum 2 (Two) Times a Day., Disp: 14 suppository, Rfl: 0    hydrOXYzine (ATARAX) 10 MG tablet, Take 1 tablet by mouth 3 (Three) Times a Day As Needed for Anxiety., Disp: 30 tablet, Rfl: 0    levothyroxine (SYNTHROID, LEVOTHROID) 50 MCG tablet, Take 1 tablet by mouth Daily., Disp: 90 tablet, Rfl: 0    lidocaine (LIDODERM) 5 %, Place 1 patch on the skin as directed by provider Daily. Remove & Discard patch within 12 hours or as directed by MD, Disp: 90 each, Rfl: 1    lisinopril (PRINIVIL,ZESTRIL) 20 MG tablet, Take 1 tablet by mouth Daily., Disp: 90 tablet, Rfl: 1    omeprazole (priLOSEC) 40 MG capsule, Take 1 capsule by mouth Daily., Disp: 90 capsule, Rfl: 3    ondansetron ODT (ZOFRAN-ODT) 4 MG disintegrating tablet, Place 1 tablet on the tongue Every 8 (Eight) Hours As Needed for Nausea or Vomiting., Disp: 30 tablet, Rfl: 3    Otezla 30 MG tablet, , Disp: , Rfl:     SUMAtriptan (IMITREX) 100 MG tablet, Take 1/2 to 1 tablet at onset of headache. May repeat dose one time in 2 hours as needed. Do not exceed 200 mg in 24 hours., Disp: 9 tablet, Rfl: 5    topiramate (TOPAMAX) 25 MG tablet, Take 1 tablet by mouth every night at bedtime., Disp: 90 tablet, Rfl: 1    traZODone (DESYREL) 50 MG tablet, Take 0.5-1 tablets by mouth At Night As Needed for Sleep., Disp: 30 tablet, Rfl: 0    valACYclovir (Valtrex) 500 MG tablet, Take 1 tablet by mouth 2 (Two) Times a Day., Disp: 6 tablet, Rfl: 5    Subjective  Allergies   Allergen Reactions    Sulfacetamide Hives and Unknown - Low Severity    Clove Oil Rash    Clove [Cloves (Syzygium Aromaticum)] Rash and GI Intolerance    Menthol Rash    Sertraline Unknown (See Comments)    Sulfa Antibiotics Hives and Rash        Past Medical History:   Diagnosis Date    Acid reflux     Anxiety     Depression      Fracture     Fracture, foot 4 years ago    In boot fracture of 5th metatarsal.    Hypertension     IBS (irritable bowel syndrome)     Kidney stone     Kidney deformity - 2 ureters left kidney    Psoriasis     [facility]     Urinary tract infection     Visual impairment 2019    Vitreal detachment floaters       Past Surgical History:   Procedure Laterality Date    BREAST BIOPSY  2008    COLONOSCOPY      came out normal    GANGLION CYST EXCISION Right 12/20/2023    Right wrist dorsal ganglion cyst excision Dr. Meraz [place]    WRIST SURGERY Left 2015       Family History   Problem Relation Age of Onset    Hypertension Mother     Cancer Father         mesothelioma    Hypertension Father     Anxiety disorder Maternal Grandmother     Diabetes Maternal Grandmother         Type 2 diabetes    Ovarian cancer Paternal Grandmother         unknown    Breast cancer Paternal Aunt         60's    Cancer Paternal Aunt         Breast cancer    Diabetes Maternal Uncle         Type 2 diabetes    Anxiety disorder Sister     Uterine cancer Neg Hx     Colon cancer Neg Hx     Osteoporosis Neg Hx         Social History     Socioeconomic History    Marital status: Single    Number of children: 0    Highest education level: Master's degree (e.g., MA, MS, Tarik, MEd, MSW, OSMIN)   Tobacco Use    Smoking status: Never    Smokeless tobacco: Never   Vaping Use    Vaping status: Never Used   Substance and Sexual Activity    Alcohol use: Yes     Alcohol/week: 1.0 standard drink of alcohol     Types: 1 Glasses of wine per week     Comment: Every once in a while socially I will have wine with dinner.    Drug use: Never    Sexual activity: Not Currently     Partners: Male     Birth control/protection: Birth control pill, Pill       Review of Systems   Constitutional:  Negative for activity change, chills, fatigue, fever and unexpected weight change.   HENT:  Negative for congestion, ear pain, postnasal drip, sinus pressure and sore throat.   "  Eyes:  Negative for pain, discharge and redness.   Respiratory:  Negative for cough, shortness of breath and wheezing.    Cardiovascular:  Negative for chest pain, palpitations and leg swelling.   Gastrointestinal:  Negative for diarrhea, nausea and vomiting.   Endocrine: Negative for cold intolerance and heat intolerance.   Genitourinary:  Negative for decreased urine volume and dysuria.   Musculoskeletal:  Negative for arthralgias and myalgias.   Skin:  Negative for rash and wound.   Neurological:  Negative for dizziness, light-headedness and headaches.   Hematological:  Does not bruise/bleed easily.   Psychiatric/Behavioral:  Negative for confusion, dysphoric mood and sleep disturbance. The patient is not nervous/anxious.          Objective  Vitals:    03/29/24 0822   BP: 108/84   BP Location: Left arm   Patient Position: Sitting   Cuff Size: Adult   Pulse: 72   Temp: 97.1 °F (36.2 °C)   TempSrc: Temporal   SpO2: 99%   Weight: 76.7 kg (169 lb)   Height: 170.2 cm (67.01\")     Body mass index is 26.46 kg/m².     Physical Exam  Physical Exam  Vitals and nursing note reviewed.   Constitutional:       General: She is not in acute distress.     Appearance: She is not ill-appearing.   HENT:      Head: Normocephalic.      Right Ear: Tympanic membrane, ear canal and external ear normal. There is no impacted cerumen.      Left Ear: Tympanic membrane, ear canal and external ear normal. There is no impacted cerumen.      Nose: No congestion or rhinorrhea.      Mouth/Throat:      Mouth: Mucous membranes are moist.      Pharynx: Oropharynx is clear. No oropharyngeal exudate or posterior oropharyngeal erythema.   Eyes:      General:         Right eye: No discharge.         Left eye: No discharge.      Extraocular Movements: Extraocular movements intact.      Conjunctiva/sclera: Conjunctivae normal.      Pupils: Pupils are equal, round, and reactive to light.   Cardiovascular:      Rate and Rhythm: Normal rate and regular " rhythm.      Heart sounds: Normal heart sounds. No murmur heard.     No friction rub. No gallop.   Pulmonary:      Effort: Pulmonary effort is normal. No respiratory distress.      Breath sounds: Normal breath sounds. No wheezing.   Abdominal:      General: Bowel sounds are normal. There is no distension.      Palpations: Abdomen is soft. There is no mass.      Tenderness: There is no abdominal tenderness.   Musculoskeletal:         General: No swelling. Normal range of motion.      Cervical back: Normal range of motion. No tenderness.      Right lower leg: No edema.      Left lower leg: No edema.   Lymphadenopathy:      Cervical: No cervical adenopathy.   Skin:     Findings: No bruising, erythema or rash.   Neurological:      Mental Status: She is oriented to person, place, and time.      Gait: Gait normal.   Psychiatric:         Mood and Affect: Mood normal.         Behavior: Behavior normal.         Thought Content: Thought content normal.         Judgment: Judgment normal.         Diagnostic Data  Procedures    Assessment  Diagnoses and all orders for this visit:    1. Hypothyroidism, unspecified type (Primary)    2. Essential hypertension    3. Migraine syndrome    4. Fever blister  -     valACYclovir (Valtrex) 500 MG tablet; Take 1 tablet by mouth 2 (Two) Times a Day.  Dispense: 6 tablet; Refill: 5    5. Decreased GFR  -     Basic metabolic panel; Future    Other orders  -     SUMAtriptan (IMITREX) 100 MG tablet; Take 1/2 to 1 tablet at onset of headache. May repeat dose one time in 2 hours as needed. Do not exceed 200 mg in 24 hours.  Dispense: 9 tablet; Refill: 5  -     topiramate (TOPAMAX) 25 MG tablet; Take 1 tablet by mouth every night at bedtime.  Dispense: 90 tablet; Refill: 1  -     lisinopril (PRINIVIL,ZESTRIL) 20 MG tablet; Take 1 tablet by mouth Daily.  Dispense: 90 tablet; Refill: 1        Plan    1. Hypothyroidism, unspecified type (Primary)- on Synthroid.    2. Essential hypertension- on  lisinopril.    3. Migraine syndrome- well-controlled on Topamax 25 mg nightly and Imitrex for abortive.    4. Fever blister- refilled valacyclovir.    5. Decreased GFR- patient to come back next week to repeat BMP.      Return in about 3 months (around 6/29/2024) for Recheck.    Des Connell PA-C  03/29/2024

## 2024-04-11 ENCOUNTER — LAB (OUTPATIENT)
Dept: INTERNAL MEDICINE | Facility: CLINIC | Age: 44
End: 2024-04-11
Payer: COMMERCIAL

## 2024-04-11 DIAGNOSIS — R94.4 DECREASED GFR: ICD-10-CM

## 2024-04-11 LAB
ANION GAP SERPL CALCULATED.3IONS-SCNC: 10.2 MMOL/L (ref 5–15)
BUN SERPL-MCNC: 14 MG/DL (ref 6–20)
BUN/CREAT SERPL: 13.9 (ref 7–25)
CALCIUM SPEC-SCNC: 8.9 MG/DL (ref 8.6–10.5)
CHLORIDE SERPL-SCNC: 107 MMOL/L (ref 98–107)
CO2 SERPL-SCNC: 20.8 MMOL/L (ref 22–29)
CREAT SERPL-MCNC: 1.01 MG/DL (ref 0.57–1)
EGFRCR SERPLBLD CKD-EPI 2021: 70.5 ML/MIN/1.73
GLUCOSE SERPL-MCNC: 86 MG/DL (ref 65–99)
POTASSIUM SERPL-SCNC: 3.9 MMOL/L (ref 3.5–5.2)
SODIUM SERPL-SCNC: 138 MMOL/L (ref 136–145)

## 2024-04-11 PROCEDURE — 36415 COLL VENOUS BLD VENIPUNCTURE: CPT | Performed by: PHYSICIAN ASSISTANT

## 2024-04-11 PROCEDURE — 80048 BASIC METABOLIC PNL TOTAL CA: CPT | Performed by: PHYSICIAN ASSISTANT

## 2024-04-29 ENCOUNTER — OFFICE VISIT (OUTPATIENT)
Dept: INTERNAL MEDICINE | Facility: CLINIC | Age: 44
End: 2024-04-29
Payer: COMMERCIAL

## 2024-04-29 VITALS
HEART RATE: 78 BPM | BODY MASS INDEX: 26.68 KG/M2 | OXYGEN SATURATION: 99 % | WEIGHT: 170 LBS | TEMPERATURE: 98 F | HEIGHT: 67 IN | DIASTOLIC BLOOD PRESSURE: 78 MMHG | SYSTOLIC BLOOD PRESSURE: 110 MMHG

## 2024-04-29 DIAGNOSIS — R30.0 DYSURIA: ICD-10-CM

## 2024-04-29 DIAGNOSIS — N30.01 ACUTE CYSTITIS WITH HEMATURIA: Primary | ICD-10-CM

## 2024-04-29 DIAGNOSIS — N89.8 VAGINAL ITCHING: ICD-10-CM

## 2024-04-29 LAB
BILIRUB BLD-MCNC: NEGATIVE MG/DL
CLARITY, POC: ABNORMAL
COLOR UR: YELLOW
EXPIRATION DATE: ABNORMAL
GLUCOSE UR STRIP-MCNC: NEGATIVE MG/DL
KETONES UR QL: NEGATIVE
LEUKOCYTE EST, POC: ABNORMAL
Lab: ABNORMAL
NITRITE UR-MCNC: NEGATIVE MG/ML
PH UR: 6 [PH] (ref 5–8)
PROT UR STRIP-MCNC: NEGATIVE MG/DL
RBC # UR STRIP: ABNORMAL /UL
SP GR UR: 1.02 (ref 1–1.03)
UROBILINOGEN UR QL: NORMAL

## 2024-04-29 PROCEDURE — 87086 URINE CULTURE/COLONY COUNT: CPT | Performed by: NURSE PRACTITIONER

## 2024-04-29 PROCEDURE — 87798 DETECT AGENT NOS DNA AMP: CPT | Performed by: NURSE PRACTITIONER

## 2024-04-29 PROCEDURE — 87529 HSV DNA AMP PROBE: CPT | Performed by: NURSE PRACTITIONER

## 2024-04-29 PROCEDURE — 81003 URINALYSIS AUTO W/O SCOPE: CPT | Performed by: NURSE PRACTITIONER

## 2024-04-29 PROCEDURE — 87591 N.GONORRHOEAE DNA AMP PROB: CPT | Performed by: NURSE PRACTITIONER

## 2024-04-29 PROCEDURE — 87661 TRICHOMONAS VAGINALIS AMPLIF: CPT | Performed by: NURSE PRACTITIONER

## 2024-04-29 PROCEDURE — 87491 CHLMYD TRACH DNA AMP PROBE: CPT | Performed by: NURSE PRACTITIONER

## 2024-04-29 PROCEDURE — 99214 OFFICE O/P EST MOD 30 MIN: CPT | Performed by: NURSE PRACTITIONER

## 2024-04-29 PROCEDURE — 87801 DETECT AGNT MULT DNA AMPLI: CPT | Performed by: NURSE PRACTITIONER

## 2024-04-29 RX ORDER — NITROFURANTOIN 25; 75 MG/1; MG/1
100 CAPSULE ORAL 2 TIMES DAILY
Qty: 10 CAPSULE | Refills: 0 | Status: SHIPPED | OUTPATIENT
Start: 2024-04-29 | End: 2024-05-04

## 2024-04-29 NOTE — PROGRESS NOTES
"Chief Complaint   Patient presents with    Vaginal Itching     Has  vaginal swelling on outside.burning itching        HPI  Lsely Bill is a 43 y.o. female presents for vaginal itching.  She denies any vaginal discharge.  She also feels like her vagina is swollen some.  Hx of UTI's due to reflux.    The following portions of the patient's history were reviewed and updated as appropriate: allergies, current medications, past family history, past medical history, past social history, past surgical history, and problem list.    Subjective  Review of Systems   Constitutional:  Negative for activity change, appetite change, fatigue and fever.   HENT:  Negative for congestion.    Respiratory:  Negative for cough and shortness of breath.    Cardiovascular:  Negative for chest pain and leg swelling.   Gastrointestinal:  Negative for abdominal pain and nausea.   Genitourinary:  Positive for dysuria. Negative for flank pain, frequency, hematuria, vaginal discharge and vaginal pain.   Neurological:  Negative for dizziness, weakness and confusion.   Psychiatric/Behavioral:  Negative for behavioral problems and decreased concentration.        Objective  Visit Vitals  /78 (BP Location: Left arm, Patient Position: Sitting)   Pulse 78   Temp 98 °F (36.7 °C)   Ht 170.2 cm (67\")   Wt 77.1 kg (170 lb)   SpO2 99%   BMI 26.63 kg/m²        Physical Exam  Vitals and nursing note reviewed.   HENT:      Head: Normocephalic.   Eyes:      Pupils: Pupils are equal, round, and reactive to light.   Pulmonary:      Effort: Pulmonary effort is normal. No respiratory distress.   Skin:     General: Skin is warm and dry.      Capillary Refill: Capillary refill takes less than 2 seconds.   Neurological:      General: No focal deficit present.      Mental Status: She is alert and oriented to person, place, and time.      Gait: Gait is intact.   Psychiatric:         Attention and Perception: Attention normal.         Mood and Affect: " Mood normal.         Behavior: Behavior normal.          Procedures         Results for orders placed or performed in visit on 04/29/24   POC Urinalysis Dipstick, Automated    Specimen: Urine   Result Value Ref Range    Color Yellow Yellow, Straw, Dark Yellow, Dora    Clarity, UA Cloudy (A) Clear    Specific Gravity  1.020 1.005 - 1.030    pH, Urine 6.0 5.0 - 8.0    Leukocytes Moderate (2+) (A) Negative    Nitrite, UA Negative Negative    Protein, POC Negative Negative mg/dL    Glucose, UA Negative Negative mg/dL    Ketones, UA Negative Negative    Urobilinogen, UA Normal Normal, 0.2 E.U./dL    Bilirubin Negative Negative    Blood, UA 3+ (A) Negative    Lot Number [id number]     Expiration Date 09/14/2025         Assessment and Plan  Diagnoses and all orders for this visit:    1. Acute cystitis with hematuria (Primary)  -     Urine Culture - Urine, Urine, Clean Catch; Future  -     nitrofurantoin, macrocrystal-monohydrate, (Macrobid) 100 MG capsule; Take 1 capsule by mouth 2 (Two) Times a Day for 5 days.  Dispense: 10 capsule; Refill: 0    2. Vaginal itching  -     NuSwab VG+ & HSV; Future    3. Dysuria  -     POC Urinalysis Dipstick, Automated        Send urine culture  Start Macrobid  Vag swab r/o yeast    Return if symptoms worsen or fail to improve.      Cm Coffey APRN

## 2024-04-30 LAB — BACTERIA SPEC AEROBE CULT: NORMAL

## 2024-05-01 ENCOUNTER — TELEPHONE (OUTPATIENT)
Dept: INTERNAL MEDICINE | Facility: CLINIC | Age: 44
End: 2024-05-01

## 2024-05-01 NOTE — TELEPHONE ENCOUNTER
Caller: Sharee Bill    Relationship: Self    Best call back number: [phone]     What is the best time to reach you: ANYTIME    Who are you requesting to speak with (clinical staff, provider,  specific staff member): PCP/MA    Do you know the name of the person who called: SHAREE    What was the call regarding: REQUEST CALLBACK WITH LAB RESULTS -DONE ON 4/29/24    Is it okay if the provider responds through MyChart: CALLBACK TODAY

## 2024-05-07 DIAGNOSIS — N76.0 BACTERIAL VAGINITIS: Primary | ICD-10-CM

## 2024-05-07 DIAGNOSIS — B96.89 BACTERIAL VAGINITIS: Primary | ICD-10-CM

## 2024-05-07 DIAGNOSIS — B37.31 YEAST VAGINITIS: ICD-10-CM

## 2024-05-07 LAB
A VAGINAE DNA VAG QL NAA+PROBE: ABNORMAL SCORE
BVAB2 DNA VAG QL NAA+PROBE: ABNORMAL SCORE
C ALBICANS DNA VAG QL NAA+PROBE: POSITIVE
C GLABRATA DNA VAG QL NAA+PROBE: NEGATIVE
C TRACH DNA VAG QL NAA+PROBE: NEGATIVE
HSV1 DNA SPEC QL NAA+PROBE: NEGATIVE
HSV2 DNA SPEC QL NAA+PROBE: NEGATIVE
MEGA1 DNA VAG QL NAA+PROBE: ABNORMAL SCORE
N GONORRHOEA DNA VAG QL NAA+PROBE: NEGATIVE
T VAGINALIS DNA VAG QL NAA+PROBE: NEGATIVE

## 2024-05-07 RX ORDER — METRONIDAZOLE 500 MG/1
500 TABLET ORAL 2 TIMES DAILY
Qty: 14 TABLET | Refills: 0 | Status: SHIPPED | OUTPATIENT
Start: 2024-05-07 | End: 2024-05-14

## 2024-05-07 RX ORDER — FLUCONAZOLE 150 MG/1
150 TABLET ORAL ONCE
Qty: 1 TABLET | Refills: 0 | Status: SHIPPED | OUTPATIENT
Start: 2024-05-07 | End: 2024-05-07

## 2024-05-13 ENCOUNTER — TELEPHONE (OUTPATIENT)
Dept: UROLOGY | Facility: CLINIC | Age: 44
End: 2024-05-13

## 2024-05-13 NOTE — TELEPHONE ENCOUNTER
Caller: SHAREE MCMILLAN    Relationship to patient: SELF    Best call back number: [phone]    Chief complaint: PATIENT SAW DR HARDING IN THE PAST. SHE WOULD LIKE TO MAKE ANOTHER APPOINTMENT FOR SOME LEFT SIDED KIDNEY PAIN (KIDNEY DEFORMITY - PREVIOUS KIDNEY STONE IN THAT KIDNEY) BUT NOT WITH DR HARDING. SHE FELT HE DID NOT LISTEN TO HER NEEDS. PLEASE REACH OUT .    WAS ALSO ASKING ABOUT FILMS IF TRULY NEEDED, PLEASE LET HER KNOW.       Type of visit: FOLLOW UP     HER BEST NUMBER [phone] IT IS OKAY TO LEAVE A MESSAGE ON THIS NUMBER IF SHE DOES NOT ANSWER.

## 2024-05-21 ENCOUNTER — TELEPHONE (OUTPATIENT)
Dept: UROLOGY | Facility: CLINIC | Age: 44
End: 2024-05-21
Payer: COMMERCIAL

## 2024-05-21 ENCOUNTER — TELEPHONE (OUTPATIENT)
Dept: OBSTETRICS AND GYNECOLOGY | Facility: CLINIC | Age: 44
End: 2024-05-21
Payer: COMMERCIAL

## 2024-05-21 ENCOUNTER — TELEPHONE (OUTPATIENT)
Dept: INTERNAL MEDICINE | Facility: CLINIC | Age: 44
End: 2024-05-21

## 2024-05-21 NOTE — TELEPHONE ENCOUNTER
Caller: Sharee Bill    Relationship: Self    Best call back number: [phone]     What is the best time to reach you: ANYTIME    Who are you requesting to speak with (clinical staff, provider,  specific staff member): PCP/MA    Do you know the name of the person who called: SHAREE    What was the call regarding: PATIENT WAS SEEN IN OFFICE ON 4/29/24 WITH MARISA MCGREGOR. PATIENT STATED BALBIR SAID IF THE SYMPTOMS COME BACK TO CALL IN. PATIENT HAS FINISHED THE ANTIBIOTICS A FEW DAYS AGO. SHE STATED THE ITCHING,BURNING IS BACK.    Is it okay if the provider responds through MyChart: CALLBACK

## 2024-05-21 NOTE — TELEPHONE ENCOUNTER
SHAREE MCMILLAN    [phone]    PT HAS BEEN @ PCP DX D[facility] 4/29/24. (NOTES IN CHART)    PT WAS TREATED W/ ANTIBIOTIC FOR 2wks. SYMPTOMS WENT AWAY BUT NOW COMING BACK     PCP ADVISE TO CONTACT GYN. NOTHING AVAILABLE UNTIL JUNE.     PLEASE ADVISE.

## 2024-05-21 NOTE — TELEPHONE ENCOUNTER
PT told me she wanted to switch providers and is needing to schedule an appt. She said she has blood in her urine and is having pain on her left side.

## 2024-05-21 NOTE — TELEPHONE ENCOUNTER
Records do appear to be on file.     Called and spoke with patient.  She states symptoms were originally assessed on 4/29.  She was then treated with a round of Macrobid right away, and then when results came back in, she was called in Flagyl and Diflucan - she has finished both courses.  Symptoms that are returning at this time are really just vaginal itching/irritation.  No vaginal discharge or odor that she is aware of yet, no burning when urinating, not aware of an increase in urinary frequency or urgency.     Patient has not been seen for this issue, scheduled her for appt with Dr. Orozco for assessment (due to scheduling/availability).

## 2024-05-21 NOTE — PROGRESS NOTES
Subjective   Chief Complaint   Patient presents with    Follow-up     Pt c/o vaginal itching and burning X few days.  Pt tested positive for yeast and BV.  Pt states symptoms got better but now they are back.  Pt c/o blood in her stool every time she has her period.     Lesly Bill is a 43 y.o. year old .  Patient's last menstrual period was 2024 (within days).  She presents due to vaginal itching and burning for the last few days. She was treated for BV and a yeast infection at the end of last month (did not have a period that month), which improved her symptoms, however they have since returned. She states he symptoms were worse yesterday than today. Denies changes in soaps, laundry detergents, etc. along with change in tampon/pad brand. She is currently still taking the Slynd, and has not missed any doses over the last 2-3 months. She has blood in her stools with every cycle for many years (even when a tampon is in place), and has had a normal colonoscopy (many years ago). She has not seen GI in at least 2 years but has been having these symptoms since she saw them in . She has IBS-D.     The following portions of the patient's history were reviewed and updated as appropriate:current medications, allergies, and past medical history    Social History    Tobacco Use      Smoking status: Never      Smokeless tobacco: Never         Objective   /68 (BP Location: Left arm, Patient Position: Sitting, Cuff Size: Adult)   Pulse 72   Wt 77.6 kg (171 lb)   LMP 2024 (Within Days)   Breastfeeding No   BMI 26.78 kg/m²     General:  well developed; well nourished  no acute distress   Lungs:  breathing is unlabored   Heart:  Not performed.   Pelvis: Clinical staff was present for exam  External genitalia:  normal appearance of the external genitalia including Bartholin's and [place]sloy's glands. Erythema on bilateral labia majora appreciated.   :  urethral meatus normal;  Vaginal:   normal pink mucosa without prolapse or lesions. blood present -  small amount;  Cervix:  normal appearance.  VALERIA not performed however no fissures or external hemorrhoids appreciated      Lab Review   One swab from 4/29/24    Imaging   No data reviewed        Assessment   Vaginal itching and burning   Melena during her menstrual cycle      Plan   Leukorrhea swab collected due to vaginal itching and burning.  On exam, will empirically treat for yeast infection with 2 doses of Diflucan.  Will adjust treatment based on swab results.  Referral to new GI placed today for second opinion on melena during her cycles and possible repeat colonoscopy.  The importance of keeping all planned follow-up and taking all medications as prescribed was emphasized.  Follow up for scheduled annual exam or sooner PRN     New Medications Ordered This Visit   Medications    fluconazole (Diflucan) 200 MG tablet     Sig: Take 1 tablet by mouth Daily for 2 days.     Dispense:  2 tablet     Refill:  0          This note was electronically signed.    Alexia Orozco MD  May 22, 2024

## 2024-05-22 ENCOUNTER — OFFICE VISIT (OUTPATIENT)
Dept: OBSTETRICS AND GYNECOLOGY | Facility: CLINIC | Age: 44
End: 2024-05-22
Payer: COMMERCIAL

## 2024-05-22 VITALS
DIASTOLIC BLOOD PRESSURE: 68 MMHG | SYSTOLIC BLOOD PRESSURE: 110 MMHG | HEART RATE: 72 BPM | WEIGHT: 171 LBS | BODY MASS INDEX: 26.78 KG/M2

## 2024-05-22 DIAGNOSIS — N89.8 VAGINAL ITCHING: Primary | ICD-10-CM

## 2024-05-22 DIAGNOSIS — K92.1 BLOODY STOOL: ICD-10-CM

## 2024-05-22 RX ORDER — CRANBERRY FRUIT EXTRACT 200 MG
CAPSULE ORAL
COMMUNITY

## 2024-05-22 RX ORDER — PSEUDOEPHEDRINE HCL 120 MG/1
120 TABLET, FILM COATED, EXTENDED RELEASE ORAL EVERY 12 HOURS
COMMUNITY
Start: 2024-04-03

## 2024-05-22 RX ORDER — FLUCONAZOLE 200 MG/1
200 TABLET ORAL DAILY
Qty: 2 TABLET | Refills: 0 | Status: SHIPPED | OUTPATIENT
Start: 2024-05-22 | End: 2024-05-24

## 2024-05-24 ENCOUNTER — HOSPITAL ENCOUNTER (OUTPATIENT)
Dept: CT IMAGING | Facility: HOSPITAL | Age: 44
Discharge: HOME | End: 2024-05-24
Admitting: STUDENT IN AN ORGANIZED HEALTH CARE EDUCATION/TRAINING PROGRAM
Payer: COMMERCIAL

## 2024-05-24 DIAGNOSIS — N20.0 KIDNEY STONE: ICD-10-CM

## 2024-05-24 DIAGNOSIS — Q63.8 DUPLEX KIDNEY: ICD-10-CM

## 2024-05-24 DIAGNOSIS — R31.29 MICROHEMATURIA: ICD-10-CM

## 2024-05-24 LAB — CREAT BLDA-MCNC: 1.2 MG/DL (ref 0.6–1.3)

## 2024-05-24 PROCEDURE — 74178 CT ABD&PLV WO CNTR FLWD CNTR: CPT

## 2024-05-24 PROCEDURE — 82565 ASSAY OF CREATININE: CPT

## 2024-05-24 PROCEDURE — 25510000001 IOPAMIDOL 61 % SOLUTION: Performed by: STUDENT IN AN ORGANIZED HEALTH CARE EDUCATION/TRAINING PROGRAM

## 2024-05-24 RX ADMIN — IOPAMIDOL 85 ML: 612 INJECTION, SOLUTION INTRAVENOUS at 16:26

## 2024-05-28 ENCOUNTER — TELEPHONE (OUTPATIENT)
Dept: OBSTETRICS AND GYNECOLOGY | Facility: CLINIC | Age: 44
End: 2024-05-28
Payer: COMMERCIAL

## 2024-05-28 ENCOUNTER — OFFICE VISIT (OUTPATIENT)
Dept: UROLOGY | Facility: CLINIC | Age: 44
End: 2024-05-28
Payer: COMMERCIAL

## 2024-05-28 VITALS — BODY MASS INDEX: 26.68 KG/M2 | HEIGHT: 67 IN | WEIGHT: 170 LBS

## 2024-05-28 DIAGNOSIS — R30.0 DYSURIA: ICD-10-CM

## 2024-05-28 DIAGNOSIS — R31.29 MICROSCOPIC HEMATURIA: Primary | ICD-10-CM

## 2024-05-28 DIAGNOSIS — R10.9 LEFT FLANK PAIN: ICD-10-CM

## 2024-05-28 DIAGNOSIS — Z87.898 HISTORY OF GROSS HEMATURIA: ICD-10-CM

## 2024-05-28 LAB
BACTERIA UR QL AUTO: ABNORMAL /HPF
BILIRUB BLD-MCNC: NEGATIVE MG/DL
BILIRUB UR QL STRIP: NEGATIVE
CLARITY UR: CLEAR
CLARITY, POC: ABNORMAL
COLOR UR: YELLOW
COLOR UR: YELLOW
EXPIRATION DATE: ABNORMAL
GLUCOSE UR STRIP-MCNC: NEGATIVE MG/DL
GLUCOSE UR STRIP-MCNC: NEGATIVE MG/DL
HGB UR QL STRIP.AUTO: NEGATIVE
HYALINE CASTS UR QL AUTO: ABNORMAL /LPF
KETONES UR QL STRIP: NEGATIVE
KETONES UR QL: NEGATIVE
LEUKOCYTE EST, POC: ABNORMAL
LEUKOCYTE ESTERASE UR QL STRIP.AUTO: ABNORMAL
Lab: ABNORMAL
NITRITE UR QL STRIP: NEGATIVE
NITRITE UR-MCNC: NEGATIVE MG/ML
PH UR STRIP.AUTO: 7.5 [PH] (ref 5–8)
PH UR: 7 [PH] (ref 5–8)
PROT UR QL STRIP: NEGATIVE
PROT UR STRIP-MCNC: NEGATIVE MG/DL
RBC # UR STRIP: ABNORMAL /HPF
RBC # UR STRIP: ABNORMAL /UL
REF LAB TEST METHOD: ABNORMAL
SP GR UR STRIP: 1.01 (ref 1–1.03)
SP GR UR: 1.01 (ref 1–1.03)
SQUAMOUS #/AREA URNS HPF: ABNORMAL /HPF
UROBILINOGEN UR QL STRIP: ABNORMAL
UROBILINOGEN UR QL: NORMAL
WBC # UR STRIP: ABNORMAL /HPF

## 2024-05-28 PROCEDURE — 81003 URINALYSIS AUTO W/O SCOPE: CPT | Performed by: NURSE PRACTITIONER

## 2024-05-28 PROCEDURE — 81001 URINALYSIS AUTO W/SCOPE: CPT | Performed by: NURSE PRACTITIONER

## 2024-05-28 PROCEDURE — 99214 OFFICE O/P EST MOD 30 MIN: CPT | Performed by: NURSE PRACTITIONER

## 2024-05-28 RX ORDER — METRONIDAZOLE 500 MG/1
500 TABLET ORAL 2 TIMES DAILY
Qty: 14 TABLET | Refills: 0 | Status: SHIPPED | OUTPATIENT
Start: 2024-05-28 | End: 2024-06-04

## 2024-05-28 NOTE — PROGRESS NOTES
"     Follow Up Office Visit      Patient Name: Lesly Bill  : 1980   MRN: [id number]     Chief Complaint:    Chief Complaint   Patient presents with    Nephrolithiasis       Referring Provider: No ref. provider found    History of Present Illness: Lesly Bill is a 43 y.o. female who presents today for follow up of left flank pain. She reports the pain has been intermittent over the last few weeks. She reports history of intermittent dysuria and occasional urinary frequency. She has been trying to increase her water intake.     Of note; She previously saw Dr. Valdez in 2023 for history of nephrolithiasis. A CT Urogram was ordered to assess anatomy. She underwent CT Urogram on 24 and presents to discuss findings. CT Urogram personally reviewed by me. Left duplicated collecting system, no nephrolithiasis or hydronephrosis. Cortical scarring of left kidney. Right kidney is unremarkable. She reports a history of \"ureteral reflux\" as a child.      She reports history of intermittent gross hematuria. She is a non smoker, never a smoker. She denies family history of bladder or kidney cancer.     UA with 1+ leuks and trace blood.   Subjective      Review of System: Review of Systems   Genitourinary:  Positive for dysuria, flank pain, frequency and hematuria. Negative for urgency.   All other systems reviewed and are negative.     I have reviewed the ROS documented by my clinical staff, I have updated appropriately and I agree. RAF Echevarria    I have reviewed and the following portions of the patient's history were updated as appropriate: past family history, past medical history, past social history, past surgical history and problem list.    Medications:     Current Outpatient Medications:     azelastine (ASTELIN) 0.1 % nasal spray, As Needed., Disp: , Rfl:     cetirizine (zyrTEC) 10 MG tablet, Take 1 tablet by mouth Daily., Disp: 90 tablet, Rfl: 2    clobetasol " propionate (CLOBEX) 0.05 % shampoo, APPLY TOPICALLY TO THE SCALP AND LET SIT FOR 5-10 MINUTES BEFORE RINSING AS NEEDED., Disp: , Rfl:     desonide (DESOWEN) 0.05 % cream, apply to the affected areas on scalp and feet twice daily x 2 wks. take week break then use as needed for flares, Disp: , Rfl:     diphenoxylate-atropine (Lomotil) 2.5-0.025 MG per tablet, Take 1 tablet by mouth 4 (Four) Times a Day As Needed for Diarrhea., Disp: 60 tablet, Rfl: 0    Drospirenone (Slynd) 4 MG tablet, Take 1 tablet by mouth Daily., Disp: 84 tablet, Rfl: 3    fluticasone (FLONASE) 50 MCG/ACT nasal spray, 2 sprays into the nostril(s) as directed by provider Daily., Disp: 9.9 mL, Rfl: 1    hydrOXYzine (ATARAX) 10 MG tablet, Take 1 tablet by mouth 3 (Three) Times a Day As Needed for Anxiety., Disp: 30 tablet, Rfl: 0    levothyroxine (SYNTHROID, LEVOTHROID) 50 MCG tablet, Take 1 tablet by mouth Daily., Disp: 90 tablet, Rfl: 0    lisinopril (PRINIVIL,ZESTRIL) 20 MG tablet, Take 1 tablet by mouth Daily., Disp: 90 tablet, Rfl: 1    omeprazole (priLOSEC) 40 MG capsule, Take 1 capsule by mouth Daily., Disp: 90 capsule, Rfl: 3    ondansetron ODT (ZOFRAN-ODT) 4 MG disintegrating tablet, Place 1 tablet on the tongue Every 8 (Eight) Hours As Needed for Nausea or Vomiting., Disp: 30 tablet, Rfl: 3    Otezla 30 MG tablet, , Disp: , Rfl:     pseudoephedrine (Sudafed Sinus Congestion 12HR) 120 MG 12 hr tablet, Take 1 tablet by mouth Every 12 (Twelve) Hours., Disp: , Rfl:     Red Yeast Rice Extract 600 MG capsule, Take  by mouth., Disp: , Rfl:     SUMAtriptan (IMITREX) 100 MG tablet, Take 1/2 to 1 tablet at onset of headache. May repeat dose one time in 2 hours as needed. Do not exceed 200 mg in 24 hours., Disp: 9 tablet, Rfl: 5    topiramate (TOPAMAX) 25 MG tablet, Take 1 tablet by mouth every night at bedtime., Disp: 90 tablet, Rfl: 1    traZODone (DESYREL) 50 MG tablet, Take 0.5-1 tablets by mouth At Night As Needed for Sleep., Disp: 30 tablet,  "Rfl: 0    valACYclovir (Valtrex) 500 MG tablet, Take 1 tablet by mouth 2 (Two) Times a Day., Disp: 6 tablet, Rfl: 5    Desvenlafaxine Succinate ER 25 MG tablet sustained-release 24 hour, Take 1 tablet by mouth Daily. (Patient not taking: Reported on 5/28/2024), Disp: 30 tablet, Rfl: 0    Allergies:   Allergies   Allergen Reactions    Sulfacetamide Hives and Unknown - Low Severity    Clove Oil Rash    Clove [Cloves (Syzygium Aromaticum)] Rash and GI Intolerance    Menthol Rash    Sertraline Unknown (See Comments)    Sulfa Antibiotics Hives and Rash     Bladder & Bowel Symptom Questionnaire    How often do you usually urinate during the day ?   2 - About every 2-3 hours    2.   How many timed do you urinate at night?   0 - 0-1 time at night   3.   What is the reason that you usually urinate?   2 - Moderate urge (can delay 10-60 min)   4.   Once you get the urge to go, how long can you     comfortably delay?   1 - 30-60 min   5.   How often do you get a sudden urge that makes you rush to the bathroom?   1 - Rarely   6.   How often does a sudden urge to urinate result in you leaking urine or wetting pads?   1 - Rarely   7.  In your opinion, how good is your bladder control?   1 - Very Good   8.  Do you have accidental bowel leakage?   no   9.  Do you have difficulty fully emptying your bladder?   no   10.  Do you experience accidental leakage when performing some physical activity such as coughing, sneezing, laughing or exercise?   yes   11. Have you tried medications to help improve your symptoms?   no   12. Would you be interested in learning about a long-lasting option that may help you with your symptoms?   no                                                                             Total Score   8     0-7 (Mild) 8-16 (Moderate) 17-28 (Severe)       Objective     Physical Exam:   Vital Signs:   Vitals:    05/28/24 1003   Weight: 77.1 kg (170 lb)   Height: 170.2 cm (67\")   PainSc: 0-No pain   PainLoc: Back     Body " mass index is 26.63 kg/m².     Physical Exam  Vitals and nursing note reviewed.   Constitutional:       Appearance: Normal appearance.   HENT:      Head: Normocephalic and atraumatic.      Nose: Nose normal.      Mouth/Throat:      Mouth: Mucous membranes are moist.   Eyes:      Pupils: Pupils are equal, round, and reactive to light.   Pulmonary:      Effort: Pulmonary effort is normal.   Abdominal:      General: Abdomen is flat.      Palpations: Abdomen is soft.   Musculoskeletal:         General: Normal range of motion.      Cervical back: Normal range of motion.   Skin:     General: Skin is warm and dry.      Capillary Refill: Capillary refill takes less than 2 seconds.   Neurological:      General: No focal deficit present.      Mental Status: She is alert.   Psychiatric:         Mood and Affect: Mood normal.       Labs:   Brief Urine Lab Results  (Last result in the past 365 days)        Color   Clarity   Blood   Leuk Est   Nitrite   Protein   CREAT   Urine HCG        05/28/24 1008 Yellow   Slightly Cloudy   Trace   Small (1+)   Negative   Negative                   Urine Culture          8/16/2023    09:18 9/13/2023    11:17 4/29/2024    15:51   Urine Culture   Urine Culture No growth  >100,000 CFU/mL Normal Urogenital Cheri  25,000 CFU/mL Normal Urogenital Cheri         Lab Results   Component Value Date    GLUCOSE 86 04/11/2024    CALCIUM 8.9 04/11/2024     04/11/2024    K 3.9 04/11/2024    CO2 20.8 (L) 04/11/2024     04/11/2024    BUN 14 04/11/2024    CREATININE 1.20 05/24/2024    EGFRIFNONA 52 (L) 08/12/2021    BCR 13.9 04/11/2024    ANIONGAP 10.2 04/11/2024       Lab Results   Component Value Date    WBC 7.27 02/26/2024    HGB 13.0 02/26/2024    HCT 39.6 02/26/2024    MCV 85.3 02/26/2024     02/26/2024       Images:   Mammo Screening Digital Tomosynthesis Bilateral With CAD    Result Date: 3/28/2024  Negative bilateral mammogram.  RECOMMENDATION:  Continue annual screening mammography.   BI-RADS CATEGORY 1, NEGATIVE.   CAD was utilized.  The standard false-negative rate of mammography is between 10% and 25%. Complex patterns or increased breast density will markedly elevate the false-negative rate of mammography.   A letter, in lay terminology, with the results of this exam will be mailed to the patient.   This report was finalized on 3/28/2024 12:23 PM by Dr. Mark Harkins MD.        Measures:   Tobacco:   Lesly Bill  reports that she has never smoked. She has never used smokeless tobacco.         Urine Incontinence: Patient reports that she is not currently experiencing any symptoms of urinary incontinence.      Assessment / Plan      Assessment/Plan:   43 y.o. female who presented today for follow up of left flank pain and microscopic hematuria. We reviewed CT Urogram today. No findings of nephrolithiasis or hydronephrosis.     We will send urine for guidance test to assess for uropathogens given left flank pain and dysuria. Her last several urine cultures have resulted with normal urogenital aniya. We will notify with results.     We will plan for flexible cystoscopy in 4 weeks for comprehensive gross hematuria workup. She is agreeable.     Diagnoses and all orders for this visit:    1. Microscopic hematuria (Primary)  -     POC Urinalysis Dipstick, Automated  -     Urinalysis With Microscopic - Urine, Clean Catch; Future  -     Urinalysis With Microscopic - Urine, Clean Catch    2. History of gross hematuria    3. Dysuria    4. Left flank pain         Follow Up:   Return in about 4 weeks (around 6/25/2024) for Cystoscopy .    I spent approximately 40 minutes providing clinical care for this patient; including review of patient's chart and provider documentation, face to face time spent with patient in examination room (obtaining history, performing physical exam, discussing diagnosis and management options), placing orders, and completing patient documentation.     Evon Bauer,  RAF  [facility] [place] [place] [facility] [place]

## 2024-05-30 RX ORDER — SODIUM PICOSULFATE, MAGNESIUM OXIDE, AND ANHYDROUS CITRIC ACID 10; 3.5; 12 MG/160ML; G/160ML; G/160ML
350 LIQUID ORAL TAKE AS DIRECTED
Qty: 350 ML | Refills: 0 | Status: SHIPPED | OUTPATIENT
Start: 2024-05-30

## 2024-05-30 RX ORDER — BORIC ACID
600 POWDER (GRAM) MISCELLANEOUS NIGHTLY
Qty: 21 SUPPOSITORY | Refills: 0 | Status: SHIPPED | OUTPATIENT
Start: 2024-05-30

## 2024-06-04 DIAGNOSIS — B96.89 BACTERIAL VAGINOSIS: ICD-10-CM

## 2024-06-04 DIAGNOSIS — R31.9 URINARY TRACT INFECTION WITH HEMATURIA, SITE UNSPECIFIED: Primary | ICD-10-CM

## 2024-06-04 DIAGNOSIS — N39.0 URINARY TRACT INFECTION WITH HEMATURIA, SITE UNSPECIFIED: Primary | ICD-10-CM

## 2024-06-04 DIAGNOSIS — N76.0 BACTERIAL VAGINOSIS: ICD-10-CM

## 2024-06-04 RX ORDER — CIPROFLOXACIN 500 MG/1
500 TABLET, FILM COATED ORAL 2 TIMES DAILY
Qty: 10 TABLET | Refills: 0 | Status: SHIPPED | OUTPATIENT
Start: 2024-06-04 | End: 2024-06-09

## 2024-06-04 RX ORDER — METRONIDAZOLE 7.5 MG/G
GEL VAGINAL
Qty: 70 G | Refills: 0 | Status: SHIPPED | OUTPATIENT
Start: 2024-06-04

## 2024-06-05 ENCOUNTER — OUTSIDE FACILITY SERVICE (OUTPATIENT)
Dept: GASTROENTEROLOGY | Facility: CLINIC | Age: 44
End: 2024-06-05
Payer: COMMERCIAL

## 2024-06-05 PROCEDURE — 88305 TISSUE EXAM BY PATHOLOGIST: CPT | Performed by: INTERNAL MEDICINE

## 2024-06-05 PROCEDURE — 45380 COLONOSCOPY AND BIOPSY: CPT | Performed by: INTERNAL MEDICINE

## 2024-06-06 ENCOUNTER — TELEPHONE (OUTPATIENT)
Dept: UROLOGY | Facility: CLINIC | Age: 44
End: 2024-06-06
Payer: COMMERCIAL

## 2024-06-06 ENCOUNTER — LAB REQUISITION (OUTPATIENT)
Dept: LAB | Facility: HOSPITAL | Age: 44
End: 2024-06-06
Payer: COMMERCIAL

## 2024-06-06 ENCOUNTER — TELEPHONE (OUTPATIENT)
Dept: OBSTETRICS AND GYNECOLOGY | Facility: CLINIC | Age: 44
End: 2024-06-06
Payer: COMMERCIAL

## 2024-06-06 DIAGNOSIS — K92.1 MELENA: ICD-10-CM

## 2024-06-06 DIAGNOSIS — K64.8 OTHER HEMORRHOIDS: ICD-10-CM

## 2024-06-06 DIAGNOSIS — Z12.11 ENCOUNTER FOR SCREENING FOR MALIGNANT NEOPLASM OF COLON: ICD-10-CM

## 2024-06-06 DIAGNOSIS — K57.30 DIVERTICULOSIS OF LARGE INTESTINE WITHOUT PERFORATION OR ABSCESS WITHOUT BLEEDING: ICD-10-CM

## 2024-06-06 NOTE — TELEPHONE ENCOUNTER
Provider: FALGUNI PEACOCK    Caller: SHAREE MCMILLAN    Relationship to Patient: SELF    Reason for Call: PATIENT IS CONCERNED ABOUT HER MOST RECENT UA AND DOES SHE NEED AN ANTIBIOTIC . IF SOMEONE COULD REVIEW AND REACH OUT TO THE PATIENT SOON.     BEST NUMBER [phone] YOU MAY LEAVE A MESSAGE IF SHE DOES NOT ANSWER    TEXT MESSAGE IS FINE AS WELL OR REACH OUT ON MY CHART     HUB AGENT UNABLE TO WARM TRANSFER

## 2024-06-06 NOTE — TELEPHONE ENCOUNTER
Called pharmacy, they stated that her insurance will not cover this as it is an OTC option. I then called patient and informed her accordingly.  She verified understanding.  Also sending patient a Seert message so that she has medication name/dose/instructions in an accessible location for when she goes to get it OTC.

## 2024-06-06 NOTE — TELEPHONE ENCOUNTER
HIGH    Pt called and that her pharmacy has not received her Boric Acid suppository Suppository prescription and she was asking if we could resend it. Please advise

## 2024-06-06 NOTE — TELEPHONE ENCOUNTER
Called and spoke with pt about urine test results and let her know about the medications Evon Bauer called in to her pharmacy

## 2024-06-07 LAB
CYTO UR: NORMAL
LAB AP CASE REPORT: NORMAL
LAB AP CLINICAL INFORMATION: NORMAL
PATH REPORT.FINAL DX SPEC: NORMAL
PATH REPORT.GROSS SPEC: NORMAL

## 2024-06-18 ENCOUNTER — OFFICE VISIT (OUTPATIENT)
Dept: INTERNAL MEDICINE | Facility: CLINIC | Age: 44
End: 2024-06-18
Payer: COMMERCIAL

## 2024-06-18 ENCOUNTER — HOSPITAL ENCOUNTER (OUTPATIENT)
Facility: HOSPITAL | Age: 44
Discharge: HOME | End: 2024-06-18
Admitting: NURSE PRACTITIONER
Payer: COMMERCIAL

## 2024-06-18 ENCOUNTER — TELEPHONE (OUTPATIENT)
Dept: GASTROENTEROLOGY | Facility: CLINIC | Age: 44
End: 2024-06-18
Payer: COMMERCIAL

## 2024-06-18 VITALS
HEART RATE: 78 BPM | SYSTOLIC BLOOD PRESSURE: 128 MMHG | WEIGHT: 172.6 LBS | DIASTOLIC BLOOD PRESSURE: 80 MMHG | OXYGEN SATURATION: 100 % | TEMPERATURE: 98.1 F | RESPIRATION RATE: 20 BRPM | HEIGHT: 67 IN | BODY MASS INDEX: 27.09 KG/M2

## 2024-06-18 DIAGNOSIS — R11.0 NAUSEA: ICD-10-CM

## 2024-06-18 DIAGNOSIS — R14.0 ABDOMINAL DISTENTION: ICD-10-CM

## 2024-06-18 DIAGNOSIS — R10.84 GENERALIZED ABDOMINAL PAIN: Primary | ICD-10-CM

## 2024-06-18 DIAGNOSIS — R12 HEARTBURN: ICD-10-CM

## 2024-06-18 DIAGNOSIS — R10.84 GENERALIZED ABDOMINAL PAIN: ICD-10-CM

## 2024-06-18 DIAGNOSIS — J90 PLEURAL EFFUSION: Primary | ICD-10-CM

## 2024-06-18 PROCEDURE — 74177 CT ABD & PELVIS W/CONTRAST: CPT

## 2024-06-18 PROCEDURE — 99214 OFFICE O/P EST MOD 30 MIN: CPT | Performed by: NURSE PRACTITIONER

## 2024-06-18 PROCEDURE — 25510000001 IOPAMIDOL 61 % SOLUTION: Performed by: NURSE PRACTITIONER

## 2024-06-18 RX ORDER — FUROSEMIDE 20 MG/1
20 TABLET ORAL DAILY
Qty: 14 TABLET | Refills: 0 | Status: SHIPPED | OUTPATIENT
Start: 2024-06-18 | End: 2024-07-02

## 2024-06-18 RX ORDER — DICYCLOMINE HYDROCHLORIDE 10 MG/1
10 CAPSULE ORAL
Qty: 120 CAPSULE | Refills: 0 | Status: SHIPPED | OUTPATIENT
Start: 2024-06-18

## 2024-06-18 RX ORDER — ONDANSETRON 8 MG/1
TABLET, ORALLY DISINTEGRATING ORAL
Qty: 30 TABLET | Refills: 1 | Status: SHIPPED | OUTPATIENT
Start: 2024-06-18

## 2024-06-18 RX ORDER — FAMOTIDINE 20 MG/1
40 TABLET, FILM COATED ORAL DAILY
Qty: 30 TABLET | Refills: 0 | Status: SHIPPED | OUTPATIENT
Start: 2024-06-18

## 2024-06-18 RX ADMIN — IOPAMIDOL 85 ML: 612 INJECTION, SOLUTION INTRAVENOUS at 14:36

## 2024-06-18 NOTE — PROGRESS NOTES
"Chief Complaint   Patient presents with    Abdominal Pain    Diarrhea    Bloated       HPI  Lesly Bill is a 43 y.o. female presents with abdominal pain.  She states the pain started today.  She states she had a colonoscopy 2 weeks and she has had diarrhea and bloating since.  She also complains of nausea.  She has only drank water today.  The pain has been continuous today.   She has a history of IBS but has never had a \"flare-up\" like this. She has been having heartburn as well.  She states that sitting still is even uncomfortable.  She has tried 2 Rolaids.  Barely passing any gas.    The following portions of the patient's history were reviewed and updated as appropriate: allergies, current medications, past family history, past medical history, past social history, past surgical history, and problem list.    Subjective  Review of Systems   Constitutional:  Negative for activity change, appetite change, fatigue and fever.   HENT:  Negative for congestion.    Respiratory:  Negative for cough and shortness of breath.    Cardiovascular:  Negative for chest pain and leg swelling.   Gastrointestinal:  Positive for abdominal distention, abdominal pain and nausea.   Neurological:  Negative for dizziness, weakness and confusion.   Psychiatric/Behavioral:  Negative for behavioral problems and decreased concentration.        Objective  Visit Vitals  /80   Pulse 78   Temp 98.1 °F (36.7 °C)   Resp 20   Ht 170.2 cm (67.01\")   Wt 78.3 kg (172 lb 9.6 oz)   LMP 05/20/2024 (Within Days)   SpO2 100%   BMI 27.03 kg/m²        Physical Exam  Vitals and nursing note reviewed.   HENT:      Head: Normocephalic.   Eyes:      Pupils: Pupils are equal, round, and reactive to light.   Cardiovascular:      Rate and Rhythm: Normal rate and regular rhythm.      Pulses: Normal pulses.      Heart sounds: Normal heart sounds.   Pulmonary:      Effort: Pulmonary effort is normal. No respiratory distress.      Breath sounds: " Normal breath sounds.   Abdominal:      General: Bowel sounds are normal. There is no distension.      Palpations: Abdomen is soft.      Tenderness: There is abdominal tenderness (mid abdomen).      Hernia: No hernia is present.   Skin:     General: Skin is warm and dry.      Capillary Refill: Capillary refill takes less than 2 seconds.   Neurological:      General: No focal deficit present.      Mental Status: She is alert and oriented to person, place, and time.      Gait: Gait is intact.   Psychiatric:         Attention and Perception: Attention normal.         Mood and Affect: Mood normal.         Behavior: Behavior normal.          Procedures     Assessment and Plan  Diagnoses and all orders for this visit:    1. Generalized abdominal pain (Primary)  -     CT Abdomen Pelvis With Contrast; Future  -     dicyclomine (BENTYL) 10 MG capsule; Take 1 capsule by mouth 4 (Four) Times a Day Before Meals & at Bedtime.  Dispense: 120 capsule; Refill: 0    2. Nausea  -     CT Abdomen Pelvis With Contrast; Future  -     ondansetron ODT (ZOFRAN-ODT) 8 MG disintegrating tablet; Take 1/2 to 1 tablet by mouth (dissolve under tongue or swallow) every 8 hours as needed for nausea.  Dispense: 30 tablet; Refill: 1    3. Heartburn  -     famotidine (Pepcid) 20 MG tablet; Take 2 tablets by mouth Daily.  Dispense: 30 tablet; Refill: 0    4. Abdominal distention  -     CT Abdomen Pelvis With Contrast; Future    Recent colonoscopy, concern for bowel knicking  Stat CT of abd  - will call with results  Start Pepcid  Dicyclomine for pain    Return for Next scheduled follow up.        RAF Lovelace

## 2024-06-18 NOTE — TELEPHONE ENCOUNTER
Recommend scheduling office visit with Dr. Van or next available DARIO to discuss sx. Looks like CSY was performed for rectal bleeding. Pt will need office visit to discuss new / ongoing sx.

## 2024-06-18 NOTE — TELEPHONE ENCOUNTER
Patient contacted the office, she is still having symptoms post procedure.      Patient complains of stomach pain (middle area).  Patient complains of increased gas and bloating.   No improvement with Prilosec    Patient states this is not normal for her usual IBS problems, and this is completely different.    Please advise.

## 2024-06-26 ENCOUNTER — OFFICE VISIT (OUTPATIENT)
Dept: GASTROENTEROLOGY | Facility: CLINIC | Age: 44
End: 2024-06-26
Payer: COMMERCIAL

## 2024-06-26 VITALS
BODY MASS INDEX: 26.74 KG/M2 | WEIGHT: 170.4 LBS | HEIGHT: 67 IN | HEART RATE: 60 BPM | DIASTOLIC BLOOD PRESSURE: 60 MMHG | OXYGEN SATURATION: 99 % | TEMPERATURE: 97.3 F | SYSTOLIC BLOOD PRESSURE: 90 MMHG

## 2024-06-26 DIAGNOSIS — K58.0 IRRITABLE BOWEL SYNDROME WITH DIARRHEA: Primary | ICD-10-CM

## 2024-06-26 DIAGNOSIS — R93.89 ABNORMAL FINDING ON CT SCAN: ICD-10-CM

## 2024-06-26 NOTE — PROGRESS NOTES
PCP: Des Connell PA-C    Chief Complaint   Patient presents with    Follow-up       History of Present Illness:   Lesly Bill is a 43 y.o. female who presents to GI clinic as a follow up for ibs-d, gerd. Previously established GI patient at  for many  years. Diagnosed with ibs-d. She reports epigastric crampy discomfort what is mild, chronic. However, 8-9 days ago experienced severe epigastric discomfort. Ct a/p with iv contrast showed enteritis with ascites and pleural effusion.  Now feeling more normal. No gib loss. No fever. Reflux symptoms have worsened.    Past Medical History:   Diagnosis Date    Acid reflux     Anxiety     Depression     Fracture     Fracture, foot 4 years ago    In boot fracture of 5th metatarsal.    Hypertension     IBS (irritable bowel syndrome)     Kidney stone     Kidney deformity - 2 ureters left kidney    Psoriasis     [facility]     Urinary tract infection     Visual impairment 2019    Vitreal detachment floaters       Past Surgical History:   Procedure Laterality Date    BREAST BIOPSY  2008    COLONOSCOPY      came out normal    GANGLION CYST EXCISION Right 12/20/2023    Right wrist dorsal ganglion cyst excision Dr. Meraz [facility]    WRIST SURGERY Left 2015         Current Outpatient Medications:     azelastine (ASTELIN) 0.1 % nasal spray, As Needed., Disp: , Rfl:     cetirizine (zyrTEC) 10 MG tablet, Take 1 tablet by mouth Daily., Disp: 90 tablet, Rfl: 2    clobetasol propionate (CLOBEX) 0.05 % shampoo, APPLY TOPICALLY TO THE SCALP AND LET SIT FOR 5-10 MINUTES BEFORE RINSING AS NEEDED., Disp: , Rfl:     desonide (DESOWEN) 0.05 % cream, apply to the affected areas on scalp and feet twice daily x 2 wks. take week break then use as needed for flares, Disp: , Rfl:     Desvenlafaxine Succinate ER 25 MG tablet sustained-release 24 hour, Take 1 tablet by mouth Daily., Disp: 30 tablet, Rfl: 0    dicyclomine (BENTYL) 10 MG capsule, Take 1 capsule by  mouth 4 (Four) Times a Day Before Meals & at Bedtime., Disp: 120 capsule, Rfl: 0    diphenoxylate-atropine (Lomotil) 2.5-0.025 MG per tablet, Take 1 tablet by mouth 4 (Four) Times a Day As Needed for Diarrhea., Disp: 60 tablet, Rfl: 0    Drospirenone (Slynd) 4 MG tablet, Take 1 tablet by mouth Daily., Disp: 84 tablet, Rfl: 3    famotidine (Pepcid) 20 MG tablet, Take 2 tablets by mouth Daily., Disp: 30 tablet, Rfl: 0    fluticasone (FLONASE) 50 MCG/ACT nasal spray, 2 sprays into the nostril(s) as directed by provider Daily., Disp: 9.9 mL, Rfl: 1    furosemide (Lasix) 20 MG tablet, Take 1 tablet by mouth Daily for 14 days., Disp: 14 tablet, Rfl: 0    hydrOXYzine (ATARAX) 10 MG tablet, Take 1 tablet by mouth 3 (Three) Times a Day As Needed for Anxiety., Disp: 30 tablet, Rfl: 0    levothyroxine (SYNTHROID, LEVOTHROID) 50 MCG tablet, Take 1 tablet by mouth Daily., Disp: 90 tablet, Rfl: 0    lisinopril (PRINIVIL,ZESTRIL) 20 MG tablet, Take 1 tablet by mouth Daily., Disp: 90 tablet, Rfl: 1    omeprazole (priLOSEC) 40 MG capsule, Take 1 capsule by mouth Daily., Disp: 90 capsule, Rfl: 3    ondansetron ODT (ZOFRAN-ODT) 8 MG disintegrating tablet, Take 1/2 to 1 tablet by mouth (dissolve under tongue or swallow) every 8 hours as needed for nausea., Disp: 30 tablet, Rfl: 1    Otezla 30 MG tablet, , Disp: , Rfl:     Red Yeast Rice Extract 600 MG capsule, Take  by mouth., Disp: , Rfl:     SUMAtriptan (IMITREX) 100 MG tablet, Take 1/2 to 1 tablet at onset of headache. May repeat dose one time in 2 hours as needed. Do not exceed 200 mg in 24 hours., Disp: 9 tablet, Rfl: 5    topiramate (TOPAMAX) 25 MG tablet, Take 1 tablet by mouth every night at bedtime., Disp: 90 tablet, Rfl: 1    traZODone (DESYREL) 50 MG tablet, Take 0.5-1 tablets by mouth At Night As Needed for Sleep., Disp: 30 tablet, Rfl: 0    Allergies   Allergen Reactions    Sulfacetamide Hives and Unknown - Low Severity    Clove Oil Rash    Clove [Cloves (Syzygium  Aromaticum)] Rash and GI Intolerance    Menthol Rash    Sertraline Unknown (See Comments)    Sulfa Antibiotics Hives and Rash       Family History   Problem Relation Age of Onset    Hypertension Mother     Cancer Father         mesothelioma    Hypertension Father     Anxiety disorder Maternal Grandmother     Diabetes Maternal Grandmother         Type 2 diabetes    Ovarian cancer Paternal Grandmother         unknown    Breast cancer Paternal Aunt         60's    Cancer Paternal Aunt         Breast cancer    Diabetes Maternal Uncle         Type 2 diabetes    Anxiety disorder Sister     Uterine cancer Neg Hx     Colon cancer Neg Hx     Osteoporosis Neg Hx        Social History     Socioeconomic History    Marital status: Single    Number of children: 0    Highest education level: Master's degree (e.g., MA, MS, Tarik, MEd, MSW, OSMIN)   Tobacco Use    Smoking status: Never    Smokeless tobacco: Never   Vaping Use    Vaping status: Never Used   Substance and Sexual Activity    Alcohol use: Yes     Alcohol/week: 1.0 standard drink of alcohol     Types: 1 Glasses of wine per week     Comment: Every once in a while socially I will have wine with dinner.    Drug use: Never    Sexual activity: Not Currently     Partners: Male     Birth control/protection: Birth control pill, Pill       Review of Systems  A complete 12 point ros was asked and is negative except for that mentioned above.  In particular:  No fever  No rash  No increased arthralgias  No worsening edema  No cough  No dyspnea  No chest pain      Vitals:    06/26/24 1514   BP: 90/60   Pulse: 60   Temp: 97.3 °F (36.3 °C)   SpO2: 99%       Physical Exam  General: well developed, well nourished  A+O x 3 NAD  HEENT: NCAT, pupils equal appearing, sclera appear white  NECK: full ROM  Respiratory: symmetric chest rise, normal effort, normal work of breathing, no overt rales  Abdomen: non-distended  Skin: normal color, no jaundice  Neuro: no tremor, no facial droop  Psych:  normal mood and affect      Assessment/Plan    Workup: colonoscopy 2020 [facility]  Colonoscopy 6/5/24: adequate prep, diverticulosis in sigmoid colon, repeat 5-10 years    Ct a/p with iv contrast: 6/18/24:   Abnormal small bowel loops on luq, enteritis, small ascites.    1.) Abnormal finding on radiologic exam, 6/18/24  Suspect viral illness. She continues to follow up with ob gyn.  Would recommend repeat CT a/p with iv contrast in 6 weeks to ensure resolution. This is not explained by her chronic ibs.    2.) IBS-D  Managed previously at   - past trials: prn bentyl, low fodmap diet  - will start align 4 mg po q day. If no improvement by 1 month, will rx course of rifaximin    3.) GERD  Continue ppi  Future consideration: egd  Rahul Van MD  6/26/2024

## 2024-07-02 ENCOUNTER — OFFICE VISIT (OUTPATIENT)
Dept: INTERNAL MEDICINE | Facility: CLINIC | Age: 44
End: 2024-07-02
Payer: COMMERCIAL

## 2024-07-02 ENCOUNTER — LAB (OUTPATIENT)
Dept: INTERNAL MEDICINE | Facility: CLINIC | Age: 44
End: 2024-07-02
Payer: COMMERCIAL

## 2024-07-02 VITALS
WEIGHT: 172 LBS | HEART RATE: 74 BPM | OXYGEN SATURATION: 98 % | TEMPERATURE: 96.9 F | HEIGHT: 67 IN | BODY MASS INDEX: 27 KG/M2 | DIASTOLIC BLOOD PRESSURE: 78 MMHG | SYSTOLIC BLOOD PRESSURE: 102 MMHG

## 2024-07-02 DIAGNOSIS — E03.9 HYPOTHYROIDISM, UNSPECIFIED TYPE: ICD-10-CM

## 2024-07-02 DIAGNOSIS — R93.89 ABNORMAL CT SCAN: ICD-10-CM

## 2024-07-02 DIAGNOSIS — R10.84 GENERALIZED ABDOMINAL PAIN: ICD-10-CM

## 2024-07-02 DIAGNOSIS — Z00.00 HEALTH CARE MAINTENANCE: ICD-10-CM

## 2024-07-02 DIAGNOSIS — J90 PLEURAL EFFUSION: Primary | ICD-10-CM

## 2024-07-02 DIAGNOSIS — I10 ESSENTIAL HYPERTENSION: ICD-10-CM

## 2024-07-02 LAB
ALBUMIN SERPL-MCNC: 4 G/DL (ref 3.5–5.2)
ALBUMIN/GLOB SERPL: 1.6 G/DL
ALP SERPL-CCNC: 76 U/L (ref 39–117)
ALT SERPL W P-5'-P-CCNC: 18 U/L (ref 1–33)
ANION GAP SERPL CALCULATED.3IONS-SCNC: 8.1 MMOL/L (ref 5–15)
AST SERPL-CCNC: 18 U/L (ref 1–32)
BILIRUB SERPL-MCNC: 0.2 MG/DL (ref 0–1.2)
BUN SERPL-MCNC: 14 MG/DL (ref 6–20)
BUN/CREAT SERPL: 14.3 (ref 7–25)
CALCIUM SPEC-SCNC: 8.9 MG/DL (ref 8.6–10.5)
CHLORIDE SERPL-SCNC: 104 MMOL/L (ref 98–107)
CHOLEST SERPL-MCNC: 171 MG/DL (ref 0–200)
CO2 SERPL-SCNC: 24.9 MMOL/L (ref 22–29)
CREAT SERPL-MCNC: 0.98 MG/DL (ref 0.57–1)
DEPRECATED RDW RBC AUTO: 39.9 FL (ref 37–54)
EGFRCR SERPLBLD CKD-EPI 2021: 73.1 ML/MIN/1.73
ERYTHROCYTE [DISTWIDTH] IN BLOOD BY AUTOMATED COUNT: 12.9 % (ref 12.3–15.4)
GLOBULIN UR ELPH-MCNC: 2.5 GM/DL
GLUCOSE SERPL-MCNC: 104 MG/DL (ref 65–99)
HBA1C MFR BLD: 5.4 % (ref 4.8–5.6)
HCT VFR BLD AUTO: 38.5 % (ref 34–46.6)
HDLC SERPL-MCNC: 41 MG/DL (ref 40–60)
HGB BLD-MCNC: 12.5 G/DL (ref 12–15.9)
LDLC SERPL CALC-MCNC: 107 MG/DL (ref 0–100)
LDLC/HDLC SERPL: 2.54 {RATIO}
MCH RBC QN AUTO: 27.8 PG (ref 26.6–33)
MCHC RBC AUTO-ENTMCNC: 32.5 G/DL (ref 31.5–35.7)
MCV RBC AUTO: 85.6 FL (ref 79–97)
PLATELET # BLD AUTO: 260 10*3/MM3 (ref 140–450)
PMV BLD AUTO: 11.9 FL (ref 6–12)
POTASSIUM SERPL-SCNC: 4.5 MMOL/L (ref 3.5–5.2)
PROT SERPL-MCNC: 6.5 G/DL (ref 6–8.5)
RBC # BLD AUTO: 4.5 10*6/MM3 (ref 3.77–5.28)
SODIUM SERPL-SCNC: 137 MMOL/L (ref 136–145)
TRIGL SERPL-MCNC: 129 MG/DL (ref 0–150)
TSH SERPL DL<=0.05 MIU/L-ACNC: 2.1 UIU/ML (ref 0.27–4.2)
VLDLC SERPL-MCNC: 23 MG/DL (ref 5–40)
WBC NRBC COR # BLD AUTO: 6.58 10*3/MM3 (ref 3.4–10.8)

## 2024-07-02 PROCEDURE — 80061 LIPID PANEL: CPT | Performed by: PHYSICIAN ASSISTANT

## 2024-07-02 PROCEDURE — 99214 OFFICE O/P EST MOD 30 MIN: CPT | Performed by: PHYSICIAN ASSISTANT

## 2024-07-02 PROCEDURE — 80050 GENERAL HEALTH PANEL: CPT | Performed by: PHYSICIAN ASSISTANT

## 2024-07-02 PROCEDURE — 36415 COLL VENOUS BLD VENIPUNCTURE: CPT | Performed by: PHYSICIAN ASSISTANT

## 2024-07-02 PROCEDURE — 83036 HEMOGLOBIN GLYCOSYLATED A1C: CPT | Performed by: PHYSICIAN ASSISTANT

## 2024-07-02 NOTE — PROGRESS NOTES
[facility]  [address]  [place] [address]  Dept: [phone]  Dept Fax: [phone]  Loc: [phone]  Loc Fax: [phone]    Lesly Nayloridge  1980    Follow Up Office Visit Note    History of Present Illness:  Patient 43-year-old female in today to follow-up for abnormal CT scan.  CT scan recently showed bilateral pleural effusions.  Patient needing reevaluation of this.  Patient was short of breath before but reports that her symptoms have now resolved.  Was originally seen for abdominal pain for which a CT abdomen was ordered.  Abnormal bowel loops were seen.  Patient saw Dr. Van in gastroenterology and recently had colonoscopy which showed sigmoid diverticulosis.  Overall diet is changed and symptoms with abdomen pain are better.  Patient denies any unexpected weight loss or night sweats.  Patient does report a family history of ovarian cancer for which her grandmother .        The following portions of the patient's history were reviewed and updated as appropriate: allergies, current medications, past family history, past medical history, past social history, past surgical history, and problem list.    Medications:    Current Outpatient Medications:     azelastine (ASTELIN) 0.1 % nasal spray, As Needed., Disp: , Rfl:     cetirizine (zyrTEC) 10 MG tablet, Take 1 tablet by mouth Daily., Disp: 90 tablet, Rfl: 2    clobetasol propionate (CLOBEX) 0.05 % shampoo, APPLY TOPICALLY TO THE SCALP AND LET SIT FOR 5-10 MINUTES BEFORE RINSING AS NEEDED., Disp: , Rfl:     desonide (DESOWEN) 0.05 % cream, apply to the affected areas on scalp and feet twice daily x 2 wks. take week break then use as needed for flares, Disp: , Rfl:     Desvenlafaxine Succinate ER 25 MG tablet sustained-release 24 hour, Take 1 tablet by mouth Daily., Disp: 30 tablet, Rfl: 0    dicyclomine (BENTYL) 10 MG capsule, Take 1 capsule by mouth 4 (Four) Times a  Day Before Meals & at Bedtime., Disp: 120 capsule, Rfl: 0    diphenoxylate-atropine (Lomotil) 2.5-0.025 MG per tablet, Take 1 tablet by mouth 4 (Four) Times a Day As Needed for Diarrhea., Disp: 60 tablet, Rfl: 0    Drospirenone (Slynd) 4 MG tablet, Take 1 tablet by mouth Daily., Disp: 84 tablet, Rfl: 3    famotidine (Pepcid) 20 MG tablet, Take 2 tablets by mouth Daily., Disp: 30 tablet, Rfl: 0    fluticasone (FLONASE) 50 MCG/ACT nasal spray, 2 sprays into the nostril(s) as directed by provider Daily., Disp: 9.9 mL, Rfl: 1    furosemide (Lasix) 20 MG tablet, Take 1 tablet by mouth Daily for 14 days., Disp: 14 tablet, Rfl: 0    hydrOXYzine (ATARAX) 10 MG tablet, Take 1 tablet by mouth 3 (Three) Times a Day As Needed for Anxiety., Disp: 30 tablet, Rfl: 0    levothyroxine (SYNTHROID, LEVOTHROID) 50 MCG tablet, Take 1 tablet by mouth Daily., Disp: 90 tablet, Rfl: 0    lisinopril (PRINIVIL,ZESTRIL) 20 MG tablet, Take 1 tablet by mouth Daily., Disp: 90 tablet, Rfl: 1    omeprazole (priLOSEC) 40 MG capsule, Take 1 capsule by mouth Daily., Disp: 90 capsule, Rfl: 3    ondansetron ODT (ZOFRAN-ODT) 8 MG disintegrating tablet, Take 1/2 to 1 tablet by mouth (dissolve under tongue or swallow) every 8 hours as needed for nausea., Disp: 30 tablet, Rfl: 1    Otezla 30 MG tablet, , Disp: , Rfl:     Red Yeast Rice Extract 600 MG capsule, Take  by mouth., Disp: , Rfl:     SUMAtriptan (IMITREX) 100 MG tablet, Take 1/2 to 1 tablet at onset of headache. May repeat dose one time in 2 hours as needed. Do not exceed 200 mg in 24 hours., Disp: 9 tablet, Rfl: 5    topiramate (TOPAMAX) 25 MG tablet, Take 1 tablet by mouth every night at bedtime., Disp: 90 tablet, Rfl: 1    traZODone (DESYREL) 50 MG tablet, Take 0.5-1 tablets by mouth At Night As Needed for Sleep., Disp: 30 tablet, Rfl: 0    Subjective  Allergies   Allergen Reactions    Sulfacetamide Hives and Unknown - Low Severity    Clove Oil Rash    Clove [Cloves (Syzygium Aromaticum)] Rash  and GI Intolerance    Menthol Rash    Sertraline Unknown (See Comments)    Sulfa Antibiotics Hives and Rash        Past Medical History:   Diagnosis Date    Acid reflux     Anxiety     Depression     Fracture     Fracture, foot 4 years ago    In boot fracture of 5th metatarsal.    Hypertension     IBS (irritable bowel syndrome)     Kidney stone     Kidney deformity - 2 ureters left kidney    Psoriasis     [facility]     Urinary tract infection     Visual impairment 2019    Vitreal detachment floaters       Past Surgical History:   Procedure Laterality Date    BREAST BIOPSY  2008    COLONOSCOPY      came out normal    GANGLION CYST EXCISION Right 12/20/2023    Right wrist dorsal ganglion cyst excision Dr. Meraz [facility]    WRIST SURGERY Left 2015       Family History   Problem Relation Age of Onset    Hypertension Mother     Cancer Father         mesothelioma    Hypertension Father     Anxiety disorder Maternal Grandmother     Diabetes Maternal Grandmother         Type 2 diabetes    Ovarian cancer Paternal Grandmother         unknown    Breast cancer Paternal Aunt         60's    Cancer Paternal Aunt         Breast cancer    Diabetes Maternal Uncle         Type 2 diabetes    Anxiety disorder Sister     Uterine cancer Neg Hx     Colon cancer Neg Hx     Osteoporosis Neg Hx         Social History     Socioeconomic History    Marital status: Single    Number of children: 0    Highest education level: Master's degree (e.g., MA, MS, Tarik, MEd, MSW, OSMIN)   Tobacco Use    Smoking status: Never    Smokeless tobacco: Never   Vaping Use    Vaping status: Never Used   Substance and Sexual Activity    Alcohol use: Yes     Alcohol/week: 1.0 standard drink of alcohol     Types: 1 Glasses of wine per week     Comment: Every once in a while socially I will have wine with dinner.    Drug use: Never    Sexual activity: Not Currently     Partners: Male     Birth control/protection: Birth control pill, Pill       Review of  "Systems   Constitutional:  Negative for activity change, chills, fatigue, fever and unexpected weight change.   HENT:  Negative for congestion, ear pain, postnasal drip, sinus pressure and sore throat.    Eyes:  Negative for pain, discharge and redness.   Respiratory:  Negative for cough, shortness of breath and wheezing.    Cardiovascular:  Negative for chest pain, palpitations and leg swelling.   Gastrointestinal:  Negative for diarrhea, nausea and vomiting.   Endocrine: Negative for cold intolerance and heat intolerance.   Genitourinary:  Negative for decreased urine volume and dysuria.   Musculoskeletal:  Negative for arthralgias and myalgias.   Skin:  Negative for rash and wound.   Neurological:  Negative for dizziness, light-headedness and headaches.   Hematological:  Does not bruise/bleed easily.   Psychiatric/Behavioral:  Negative for confusion, dysphoric mood and sleep disturbance. The patient is not nervous/anxious.          Objective  Vitals:    07/02/24 0819   BP: 102/78   BP Location: Left arm   Patient Position: Sitting   Cuff Size: Adult   Pulse: 74   Temp: 96.9 °F (36.1 °C)   TempSrc: Temporal   SpO2: 98%   Weight: 78 kg (172 lb)   Height: 170.2 cm (67.01\")     Body mass index is 26.93 kg/m².     Physical Exam  Physical Exam  Vitals and nursing note reviewed.   Constitutional:       General: She is not in acute distress.     Appearance: She is not ill-appearing.   HENT:      Head: Normocephalic.      Right Ear: Tympanic membrane, ear canal and external ear normal. There is no impacted cerumen.      Left Ear: Tympanic membrane, ear canal and external ear normal. There is no impacted cerumen.      Nose: No congestion or rhinorrhea.      Mouth/Throat:      Mouth: Mucous membranes are moist.      Pharynx: Oropharynx is clear. No oropharyngeal exudate or posterior oropharyngeal erythema.   Eyes:      General:         Right eye: No discharge.         Left eye: No discharge.      Extraocular Movements: " Extraocular movements intact.      Conjunctiva/sclera: Conjunctivae normal.      Pupils: Pupils are equal, round, and reactive to light.   Cardiovascular:      Rate and Rhythm: Normal rate and regular rhythm.      Heart sounds: Normal heart sounds. No murmur heard.     No friction rub. No gallop.   Pulmonary:      Effort: Pulmonary effort is normal. No respiratory distress.      Breath sounds: Normal breath sounds. No wheezing.   Abdominal:      General: Bowel sounds are normal. There is no distension.      Palpations: Abdomen is soft. There is no mass.      Tenderness: There is no abdominal tenderness.   Musculoskeletal:         General: No swelling. Normal range of motion.      Cervical back: Normal range of motion. No tenderness.      Right lower leg: No edema.      Left lower leg: No edema.   Lymphadenopathy:      Cervical: No cervical adenopathy.   Skin:     Findings: No bruising, erythema or rash.   Neurological:      Mental Status: She is oriented to person, place, and time.      Gait: Gait normal.   Psychiatric:         Mood and Affect: Mood normal.         Behavior: Behavior normal.         Thought Content: Thought content normal.         Judgment: Judgment normal.         Diagnostic Data  Procedures    Assessment  Diagnoses and all orders for this visit:    1. Pleural effusion (Primary)    2. Generalized abdominal pain    3. Hypothyroidism, unspecified type    4. Essential hypertension    5. Abnormal CT scan  -     US Non-ob Transvaginal; Future  -     MRI abdomen w wo contrast; Future  -     MRI chest w wo contrast; Future    6. Health care maintenance  -     CBC (No Diff)  -     Comprehensive Metabolic Panel  -     TSH Rfx On Abnormal To Free T4  -     Hemoglobin A1c; Future  -     Lipid Panel        Plan    1. Pleural effusion (Primary)- ordered MRI thorax.    2. Generalized abdominal pain- resolved.  Continue to follow with GI.    3. Hypothyroidism, unspecified type- on Synthroid.    4. Essential  hypertension- on lisinopril.    5. Abnormal CT scan- obtain transvaginal ultrasound and ordered MRI chest and abdomen.    6. Health care maintenance- ordered fasting labs.      Return in about 4 weeks (around 7/30/2024) for Recheck.    Des Connell PA-C  07/02/2024

## 2024-07-18 ENCOUNTER — HOSPITAL ENCOUNTER (OUTPATIENT)
Facility: HOSPITAL | Age: 44
Discharge: HOME | End: 2024-07-18
Payer: COMMERCIAL

## 2024-07-18 ENCOUNTER — HOSPITAL ENCOUNTER (OUTPATIENT)
Facility: HOSPITAL | Age: 44
End: 2024-07-18
Payer: COMMERCIAL

## 2024-07-18 ENCOUNTER — APPOINTMENT (OUTPATIENT)
Dept: MRI IMAGING | Facility: HOSPITAL | Age: 44
End: 2024-07-18
Payer: COMMERCIAL

## 2024-07-18 DIAGNOSIS — R93.89 ABNORMAL CT SCAN: ICD-10-CM

## 2024-07-18 PROCEDURE — A9577 INJ MULTIHANCE: HCPCS | Performed by: PHYSICIAN ASSISTANT

## 2024-07-18 PROCEDURE — 76830 TRANSVAGINAL US NON-OB: CPT

## 2024-07-18 PROCEDURE — 74183 MRI ABD W/O CNTR FLWD CNTR: CPT

## 2024-07-18 PROCEDURE — 0 GADOBENATE DIMEGLUMINE 529 MG/ML SOLUTION: Performed by: PHYSICIAN ASSISTANT

## 2024-07-18 RX ADMIN — GADOBENATE DIMEGLUMINE 16 ML: 529 INJECTION, SOLUTION INTRAVENOUS at 10:28

## 2024-07-22 ENCOUNTER — TELEPHONE (OUTPATIENT)
Dept: INTERNAL MEDICINE | Facility: CLINIC | Age: 44
End: 2024-07-22
Payer: COMMERCIAL

## 2024-07-22 RX ORDER — LEVOTHYROXINE SODIUM 0.05 MG/1
50 TABLET ORAL DAILY
Qty: 90 TABLET | Refills: 0 | Status: SHIPPED | OUTPATIENT
Start: 2024-07-22

## 2024-07-22 NOTE — TELEPHONE ENCOUNTER
Patient viewed results on [employer]  ----- Message from Des Connell sent at 7/22/2024  1:17 PM EDT -----  Normal ultrasound.

## 2024-07-22 NOTE — TELEPHONE ENCOUNTER
Hub to relay message below from Des Connell      ----- Message from Des Connell sent at 7/22/2024 11:48 AM EDT -----  Attempted to contact patient, no answer, left voicemail to call back.  Please continue to try to reach patient.  Please advise MRI looks better.  Follow-up as scheduled.  Thank you.

## 2024-07-25 DIAGNOSIS — R12 HEARTBURN: ICD-10-CM

## 2024-07-25 RX ORDER — FAMOTIDINE 20 MG/1
40 TABLET, FILM COATED ORAL DAILY
Qty: 30 TABLET | Refills: 2 | Status: SHIPPED | OUTPATIENT
Start: 2024-07-25

## 2024-07-26 ENCOUNTER — PROCEDURE VISIT (OUTPATIENT)
Dept: UROLOGY | Facility: CLINIC | Age: 44
End: 2024-07-26
Payer: COMMERCIAL

## 2024-07-26 VITALS — HEART RATE: 74 BPM | OXYGEN SATURATION: 98 % | SYSTOLIC BLOOD PRESSURE: 114 MMHG | DIASTOLIC BLOOD PRESSURE: 80 MMHG

## 2024-07-26 DIAGNOSIS — R31.29 MICROSCOPIC HEMATURIA: Primary | ICD-10-CM

## 2024-07-26 LAB
BILIRUB BLD-MCNC: NEGATIVE MG/DL
CLARITY, POC: CLEAR
COLOR UR: YELLOW
EXPIRATION DATE: ABNORMAL
GLUCOSE UR STRIP-MCNC: NEGATIVE MG/DL
KETONES UR QL: NEGATIVE
LEUKOCYTE EST, POC: NEGATIVE
Lab: ABNORMAL
NITRITE UR-MCNC: NEGATIVE MG/ML
PH UR: 7.5 [PH] (ref 5–8)
PROT UR STRIP-MCNC: NEGATIVE MG/DL
RBC # UR STRIP: ABNORMAL /UL
SP GR UR: 1.01 (ref 1–1.03)
UROBILINOGEN UR QL: NORMAL

## 2024-07-26 PROCEDURE — 81003 URINALYSIS AUTO W/O SCOPE: CPT | Performed by: UROLOGY

## 2024-07-26 PROCEDURE — 52000 CYSTOURETHROSCOPY: CPT | Performed by: UROLOGY

## 2024-07-26 RX ORDER — VALACYCLOVIR HYDROCHLORIDE 500 MG/1
500 TABLET, FILM COATED ORAL DAILY
COMMUNITY
Start: 2024-07-24

## 2024-07-29 NOTE — PROGRESS NOTES
Preprocedure diagnosis  Hematuria    Postprocedure diagnosis  Same    Procedure  Flexible Cystourethroscopy    Attending surgeon  Ryan Bernabe MD    Anesthesia  2% lidocaine jelly intraurethrally    Complications  None    Indications  43 y.o. female undergoing a flexible cystoscopy for the above mentioned indications.      Informed consent was obtained prior to the procedure start.       Findings  Cystoscopy revealed normal bladder mucosa with NO tumors, masses, stones or trabeculations noted.      Procedure  The patient was placed in supine position and prepped and draped in sterile fashion with lidocaine jelly instilled 5 minutes pre-procedure start.  A brief timeout including available nursing staff and awake patient was performed.  The 16 Fr digital flexible cystoscope was lubricated and gently placed into the urethral meatus. The proximal and distal portions of the urethra appeared well vascularized and normal in appearance. The bladder neck was visualized and appeared well vascularized without mucosal lesions or abnormal appearance. The bladder was then entered and  completely visualized including the trigone. There were bilateral orthotopic ureteral orifices which appeared patent and effluxed clear yellow urine. The posterior wall, lateral walls, anterior wall, and dome were visualized. The cystoscope was then retroflexed and the bladder neck was further visualized and appeared normal.  The scope was gently withdrawn and the procedure terminated.  The patient tolerated the procedure well.

## 2024-08-06 ENCOUNTER — OFFICE VISIT (OUTPATIENT)
Dept: INTERNAL MEDICINE | Facility: CLINIC | Age: 44
End: 2024-08-06
Payer: COMMERCIAL

## 2024-08-06 ENCOUNTER — TELEPHONE (OUTPATIENT)
Dept: INTERNAL MEDICINE | Facility: CLINIC | Age: 44
End: 2024-08-06

## 2024-08-06 VITALS
HEIGHT: 67 IN | DIASTOLIC BLOOD PRESSURE: 72 MMHG | TEMPERATURE: 98.6 F | WEIGHT: 177.2 LBS | SYSTOLIC BLOOD PRESSURE: 102 MMHG | OXYGEN SATURATION: 95 % | HEART RATE: 68 BPM | BODY MASS INDEX: 27.81 KG/M2

## 2024-08-06 DIAGNOSIS — J90 PLEURAL EFFUSION: ICD-10-CM

## 2024-08-06 DIAGNOSIS — G43.909 MIGRAINE SYNDROME: ICD-10-CM

## 2024-08-06 DIAGNOSIS — I10 ESSENTIAL HYPERTENSION: ICD-10-CM

## 2024-08-06 DIAGNOSIS — E03.9 HYPOTHYROIDISM, UNSPECIFIED TYPE: Primary | ICD-10-CM

## 2024-08-06 DIAGNOSIS — H60.331 ACUTE SWIMMER'S EAR OF RIGHT SIDE: ICD-10-CM

## 2024-08-06 PROCEDURE — 99214 OFFICE O/P EST MOD 30 MIN: CPT | Performed by: PHYSICIAN ASSISTANT

## 2024-08-06 RX ORDER — CIPROFLOXACIN AND DEXAMETHASONE 3; 1 MG/ML; MG/ML
4 SUSPENSION/ DROPS AURICULAR (OTIC) 2 TIMES DAILY
Qty: 7.5 ML | Refills: 0 | Status: SHIPPED | OUTPATIENT
Start: 2024-08-06

## 2024-08-06 RX ORDER — METRONIDAZOLE 7.5 MG/G
1 GEL VAGINAL NIGHTLY
Qty: 1 EACH | Refills: 2 | Status: SHIPPED | OUTPATIENT
Start: 2024-08-06

## 2024-08-06 RX ORDER — COLISTIN SULFATE, NEOMYCIN SULFATE, THONZONIUM BROMIDE AND HYDROCORTISONE ACETATE 3; 3.3; .5; 1 MG/ML; MG/ML; MG/ML; MG/ML
3 SUSPENSION AURICULAR (OTIC) 4 TIMES DAILY
Qty: 10 ML | Refills: 0 | Status: SHIPPED | OUTPATIENT
Start: 2024-08-06 | End: 2024-08-06

## 2024-08-06 NOTE — PROGRESS NOTES
[facility] PRIMARY CARE  [address]  [facility] [address]  Dept: [phone]  Dept Fax: [phone]  Loc: [phone]  Loc Fax: [phone]    Lesly Bill  1980    Follow Up Office Visit Note    History of Present Illness:  Patient 43-year-old female in today to follow-up for pleural effusion.  Recent MRI shows resolution of this.  Patient does present today with acute right ear pain.        The following portions of the patient's history were reviewed and updated as appropriate: allergies, current medications, past family history, past medical history, past social history, past surgical history, and problem list.    Medications:    Current Outpatient Medications:     azelastine (ASTELIN) 0.1 % nasal spray, As Needed., Disp: , Rfl:     cetirizine (zyrTEC) 10 MG tablet, Take 1 tablet by mouth Daily., Disp: 90 tablet, Rfl: 2    clobetasol propionate (CLOBEX) 0.05 % shampoo, APPLY TOPICALLY TO THE SCALP AND LET SIT FOR 5-10 MINUTES BEFORE RINSING AS NEEDED., Disp: , Rfl:     desonide (DESOWEN) 0.05 % cream, apply to the affected areas on scalp and feet twice daily x 2 wks. take week break then use as needed for flares, Disp: , Rfl:     Desvenlafaxine Succinate ER 25 MG tablet sustained-release 24 hour, Take 1 tablet by mouth Daily., Disp: 30 tablet, Rfl: 0    dicyclomine (BENTYL) 10 MG capsule, Take 1 capsule by mouth 4 (Four) Times a Day Before Meals & at Bedtime., Disp: 120 capsule, Rfl: 0    diphenoxylate-atropine (Lomotil) 2.5-0.025 MG per tablet, Take 1 tablet by mouth 4 (Four) Times a Day As Needed for Diarrhea., Disp: 60 tablet, Rfl: 0    Drospirenone (Slynd) 4 MG tablet, Take 1 tablet by mouth Daily., Disp: 84 tablet, Rfl: 3    famotidine (Pepcid) 20 MG tablet, Take 2 tablets by mouth Daily., Disp: 30 tablet, Rfl: 2    fluticasone (FLONASE) 50 MCG/ACT nasal spray, 2 sprays into the nostril(s) as directed by provider Daily., Disp: 9.9 mL,  Rfl: 1    hydrOXYzine (ATARAX) 10 MG tablet, Take 1 tablet by mouth 3 (Three) Times a Day As Needed for Anxiety., Disp: 30 tablet, Rfl: 0    levothyroxine (SYNTHROID, LEVOTHROID) 50 MCG tablet, TAKE 1 TABLET BY MOUTH EVERY DAY, Disp: 90 tablet, Rfl: 0    lisinopril (PRINIVIL,ZESTRIL) 20 MG tablet, Take 1 tablet by mouth Daily., Disp: 90 tablet, Rfl: 1    omeprazole (priLOSEC) 40 MG capsule, Take 1 capsule by mouth Daily., Disp: 90 capsule, Rfl: 3    ondansetron ODT (ZOFRAN-ODT) 8 MG disintegrating tablet, Take 1/2 to 1 tablet by mouth (dissolve under tongue or swallow) every 8 hours as needed for nausea., Disp: 30 tablet, Rfl: 1    Otezla 30 MG tablet, , Disp: , Rfl:     Red Yeast Rice Extract 600 MG capsule, Take  by mouth., Disp: , Rfl:     SUMAtriptan (IMITREX) 100 MG tablet, Take 1/2 to 1 tablet at onset of headache. May repeat dose one time in 2 hours as needed. Do not exceed 200 mg in 24 hours., Disp: 9 tablet, Rfl: 5    topiramate (TOPAMAX) 25 MG tablet, Take 1 tablet by mouth every night at bedtime., Disp: 90 tablet, Rfl: 1    traZODone (DESYREL) 50 MG tablet, Take 0.5-1 tablets by mouth At Night As Needed for Sleep., Disp: 30 tablet, Rfl: 0    valACYclovir (VALTREX) 500 MG tablet, Take 1 tablet by mouth Daily., Disp: , Rfl:     furosemide (Lasix) 20 MG tablet, Take 1 tablet by mouth Daily for 14 days., Disp: 14 tablet, Rfl: 0    metroNIDAZOLE (METROGEL) 0.75 % vaginal gel, Insert 1 Applicatorful into the vagina Every Night., Disp: 1 each, Rfl: 2    neomycin-colistin-hydrocortisone-thonzonium (Cortisporin-TC) 3.3-3-10-0.5 MG/ML otic suspension, Administer 3 drops into ear(s) as directed by provider 4 (Four) Times a Day., Disp: 10 mL, Rfl: 0    Subjective  Allergies   Allergen Reactions    Sulfacetamide Hives and Unknown - Low Severity    Clove Oil Rash    Clove [Cloves (Syzygium Aromaticum)] Rash and GI Intolerance    Menthol Rash    Sertraline Unknown (See Comments)    Sulfa Antibiotics Hives and Rash         Past Medical History:   Diagnosis Date    Acid reflux     Anxiety     Depression     Fracture     Fracture, foot 4 years ago    In boot fracture of 5th metatarsal.    Hypertension     IBS (irritable bowel syndrome)     Kidney stone     Kidney deformity - 2 ureters left kidney    Psoriasis     [facility]     Urinary tract infection     Visual impairment 2019    Vitreal detachment floaters       Past Surgical History:   Procedure Laterality Date    BREAST BIOPSY  2008    COLONOSCOPY      came out normal    GANGLION CYST EXCISION Right 12/20/2023    Right wrist dorsal ganglion cyst excision Dr. Meraz [facility]    WRIST SURGERY Left 2015       Family History   Problem Relation Age of Onset    Hypertension Mother     Cancer Father         mesothelioma    Hypertension Father     Anxiety disorder Maternal Grandmother     Diabetes Maternal Grandmother         Type 2 diabetes    Ovarian cancer Paternal Grandmother         unknown    Breast cancer Paternal Aunt         60's    Cancer Paternal Aunt         Breast cancer    Diabetes Maternal Uncle         Type 2 diabetes    Anxiety disorder Sister     Uterine cancer Neg Hx     Colon cancer Neg Hx     Osteoporosis Neg Hx         Social History     Socioeconomic History    Marital status: Single    Number of children: 0    Highest education level: Master's degree (e.g., MA, MS, Tarik, MEd, MSW, OSMIN)   Tobacco Use    Smoking status: Never    Smokeless tobacco: Never   Vaping Use    Vaping status: Never Used   Substance and Sexual Activity    Alcohol use: Yes     Alcohol/week: 1.0 standard drink of alcohol     Types: 1 Glasses of wine per week     Comment: Every once in a while socially I will have wine with dinner.    Drug use: Never    Sexual activity: Not Currently     Partners: Male     Birth control/protection: Birth control pill, Pill       Review of Systems   Constitutional:  Negative for activity change, chills, fatigue, fever and unexpected weight change.  "  HENT:  Positive for ear pain. Negative for congestion, postnasal drip, sinus pressure and sore throat.    Eyes:  Negative for pain, discharge and redness.   Respiratory:  Negative for cough, shortness of breath and wheezing.    Cardiovascular:  Negative for chest pain, palpitations and leg swelling.   Gastrointestinal:  Negative for diarrhea, nausea and vomiting.   Endocrine: Negative for cold intolerance and heat intolerance.   Genitourinary:  Negative for decreased urine volume and dysuria.   Musculoskeletal:  Negative for arthralgias and myalgias.   Skin:  Negative for rash and wound.   Neurological:  Negative for dizziness, light-headedness and headaches.   Hematological:  Does not bruise/bleed easily.   Psychiatric/Behavioral:  Negative for confusion, dysphoric mood and sleep disturbance. The patient is not nervous/anxious.          Objective  Vitals:    08/06/24 0819   BP: 102/72   BP Location: Left arm   Patient Position: Sitting   Cuff Size: Adult   Pulse: 68   Temp: 98.6 °F (37 °C)   TempSrc: Temporal   SpO2: 95%   Weight: 80.4 kg (177 lb 3.2 oz)   Height: 170.2 cm (67.01\")     Body mass index is 27.75 kg/m².     Physical Exam  Physical Exam  Vitals and nursing note reviewed.   Constitutional:       General: She is not in acute distress.     Appearance: She is not ill-appearing.   HENT:      Head: Normocephalic.      Right Ear: Tympanic membrane normal. There is no impacted cerumen.      Left Ear: Tympanic membrane, ear canal and external ear normal. There is no impacted cerumen.      Ears:      Comments: Diffuse erythema with injection of right auditory canal on exam.     Nose: No congestion or rhinorrhea.      Mouth/Throat:      Mouth: Mucous membranes are moist.      Pharynx: Oropharynx is clear. No oropharyngeal exudate or posterior oropharyngeal erythema.   Eyes:      General:         Right eye: No discharge.         Left eye: No discharge.      Extraocular Movements: Extraocular movements intact.    "   Conjunctiva/sclera: Conjunctivae normal.      Pupils: Pupils are equal, round, and reactive to light.   Cardiovascular:      Rate and Rhythm: Normal rate and regular rhythm.      Heart sounds: Normal heart sounds. No murmur heard.     No friction rub. No gallop.   Pulmonary:      Effort: Pulmonary effort is normal. No respiratory distress.      Breath sounds: Normal breath sounds. No wheezing.   Abdominal:      General: Bowel sounds are normal. There is no distension.      Palpations: Abdomen is soft. There is no mass.      Tenderness: There is no abdominal tenderness.   Musculoskeletal:         General: No swelling. Normal range of motion.      Cervical back: Normal range of motion. No tenderness.      Right lower leg: No edema.      Left lower leg: No edema.   Lymphadenopathy:      Cervical: No cervical adenopathy.   Skin:     Findings: No bruising, erythema or rash.   Neurological:      Mental Status: She is oriented to person, place, and time.      Gait: Gait normal.   Psychiatric:         Mood and Affect: Mood normal.         Behavior: Behavior normal.         Thought Content: Thought content normal.         Judgment: Judgment normal.         Diagnostic Data  Procedures    Assessment  Diagnoses and all orders for this visit:    1. Hypothyroidism, unspecified type (Primary)    2. Essential hypertension    3. Migraine syndrome    4. Acute swimmer's ear of right side    5. Pleural effusion    Other orders  -     metroNIDAZOLE (METROGEL) 0.75 % vaginal gel; Insert 1 Applicatorful into the vagina Every Night.  Dispense: 1 each; Refill: 2  -     neomycin-colistin-hydrocortisone-thonzonium (Cortisporin-TC) 3.3-3-10-0.5 MG/ML otic suspension; Administer 3 drops into ear(s) as directed by provider 4 (Four) Times a Day.  Dispense: 10 mL; Refill: 0        Plan    1. Hypothyroidism, unspecified type (Primary)- stable on Synthroid.    2. Essential hypertension- well-controlled on lisinopril.    3. Migraine syndrome-  well-controlled on Topamax and Imitrex.    4. Acute swimmer's ear of right side- Cortisporin as directed.    5. Pleural effusion- resolved.      Return in about 2 months (around 10/6/2024) for Recheck.    Des Connell PA-C  08/06/2024

## 2024-08-06 NOTE — TELEPHONE ENCOUNTER
Pt was seen today and is at the pharmacy to  her eardrops and it is not covered by her insurance. The pharmacy told pt that ciprodex is covered by pt insurance. Please send this medication to the pharmacy

## 2024-08-19 RX ORDER — OMEPRAZOLE 40 MG/1
40 CAPSULE, DELAYED RELEASE ORAL DAILY
Qty: 90 CAPSULE | Refills: 0 | Status: SHIPPED | OUTPATIENT
Start: 2024-08-19

## 2024-09-12 ENCOUNTER — OFFICE VISIT (OUTPATIENT)
Dept: GASTROENTEROLOGY | Facility: CLINIC | Age: 44
End: 2024-09-12
Payer: COMMERCIAL

## 2024-09-12 VITALS
OXYGEN SATURATION: 98 % | TEMPERATURE: 97.5 F | SYSTOLIC BLOOD PRESSURE: 100 MMHG | BODY MASS INDEX: 27.62 KG/M2 | HEIGHT: 67 IN | WEIGHT: 176 LBS | HEART RATE: 100 BPM | DIASTOLIC BLOOD PRESSURE: 70 MMHG

## 2024-09-12 DIAGNOSIS — K58.0 IRRITABLE BOWEL SYNDROME WITH DIARRHEA: Primary | ICD-10-CM

## 2024-09-12 DIAGNOSIS — R19.7 DIARRHEA OF PRESUMED INFECTIOUS ORIGIN: ICD-10-CM

## 2024-09-12 PROCEDURE — 99214 OFFICE O/P EST MOD 30 MIN: CPT | Performed by: INTERNAL MEDICINE

## 2024-09-12 RX ORDER — DIPHENOXYLATE HCL/ATROPINE 2.5-.025MG
1 TABLET ORAL 4 TIMES DAILY PRN
Qty: 60 TABLET | Refills: 0 | Status: SHIPPED | OUTPATIENT
Start: 2024-09-12

## 2024-09-12 NOTE — PROGRESS NOTES
PCP: Des Connell PA-C    No chief complaint on file.      History of Present Illness:   Lesly Bill is a 44 y.o. female who presents to GI clinic as a follow up for IBS-D. Underwent mri abdomen 7/2024. No worsening of symptoms. No gib loss fever.    Past Medical History:   Diagnosis Date    Acid reflux     Anxiety     Depression     Fracture     Fracture, foot 4 years ago    In boot fracture of 5th metatarsal.    Hypertension     IBS (irritable bowel syndrome)     Kidney stone     Kidney deformity - 2 ureters left kidney    Psoriasis     [facility]     Urinary tract infection     Visual impairment 2019    Vitreal detachment floaters       Past Surgical History:   Procedure Laterality Date    BREAST BIOPSY  2008    COLONOSCOPY      came out normal    GANGLION CYST EXCISION Right 12/20/2023    Right wrist dorsal ganglion cyst excision Dr. Meraz [facility]    WRIST SURGERY Left 2015         Current Outpatient Medications:     azelastine (ASTELIN) 0.1 % nasal spray, As Needed., Disp: , Rfl:     cetirizine (zyrTEC) 10 MG tablet, Take 1 tablet by mouth Daily., Disp: 90 tablet, Rfl: 2    ciprofloxacin-dexAMETHasone (Ciprodex) 0.3-0.1 % otic suspension, Administer 4 drops into ear(s) as directed by provider 2 (Two) Times a Day., Disp: 7.5 mL, Rfl: 0    clobetasol propionate (CLOBEX) 0.05 % shampoo, APPLY TOPICALLY TO THE SCALP AND LET SIT FOR 5-10 MINUTES BEFORE RINSING AS NEEDED., Disp: , Rfl:     desonide (DESOWEN) 0.05 % cream, apply to the affected areas on scalp and feet twice daily x 2 wks. take week break then use as needed for flares, Disp: , Rfl:     Desvenlafaxine Succinate ER 25 MG tablet sustained-release 24 hour, Take 1 tablet by mouth Daily., Disp: 30 tablet, Rfl: 0    dicyclomine (BENTYL) 10 MG capsule, Take 1 capsule by mouth 4 (Four) Times a Day Before Meals & at Bedtime., Disp: 120 capsule, Rfl: 0    diphenoxylate-atropine (Lomotil) 2.5-0.025 MG per tablet, Take 1 tablet  by mouth 4 (Four) Times a Day As Needed for Diarrhea., Disp: 60 tablet, Rfl: 0    Drospirenone (Slynd) 4 MG tablet, Take 1 tablet by mouth Daily., Disp: 84 tablet, Rfl: 3    famotidine (Pepcid) 20 MG tablet, Take 2 tablets by mouth Daily., Disp: 30 tablet, Rfl: 2    fluticasone (FLONASE) 50 MCG/ACT nasal spray, 2 sprays into the nostril(s) as directed by provider Daily., Disp: 9.9 mL, Rfl: 1    furosemide (Lasix) 20 MG tablet, Take 1 tablet by mouth Daily for 14 days., Disp: 14 tablet, Rfl: 0    hydrOXYzine (ATARAX) 10 MG tablet, Take 1 tablet by mouth 3 (Three) Times a Day As Needed for Anxiety., Disp: 30 tablet, Rfl: 0    levothyroxine (SYNTHROID, LEVOTHROID) 50 MCG tablet, TAKE 1 TABLET BY MOUTH EVERY DAY, Disp: 90 tablet, Rfl: 0    lisinopril (PRINIVIL,ZESTRIL) 20 MG tablet, Take 1 tablet by mouth Daily., Disp: 90 tablet, Rfl: 1    metroNIDAZOLE (METROGEL) 0.75 % vaginal gel, Insert 1 Applicatorful into the vagina Every Night., Disp: 1 each, Rfl: 2    omeprazole (priLOSEC) 40 MG capsule, TAKE 1 CAPSULE BY MOUTH EVERY DAY, Disp: 90 capsule, Rfl: 0    ondansetron ODT (ZOFRAN-ODT) 8 MG disintegrating tablet, Take 1/2 to 1 tablet by mouth (dissolve under tongue or swallow) every 8 hours as needed for nausea., Disp: 30 tablet, Rfl: 1    Otezla 30 MG tablet, , Disp: , Rfl:     Red Yeast Rice Extract 600 MG capsule, Take  by mouth., Disp: , Rfl:     SUMAtriptan (IMITREX) 100 MG tablet, Take 1/2 to 1 tablet at onset of headache. May repeat dose one time in 2 hours as needed. Do not exceed 200 mg in 24 hours., Disp: 9 tablet, Rfl: 5    topiramate (TOPAMAX) 25 MG tablet, Take 1 tablet by mouth every night at bedtime., Disp: 90 tablet, Rfl: 1    traZODone (DESYREL) 50 MG tablet, Take 0.5-1 tablets by mouth At Night As Needed for Sleep., Disp: 30 tablet, Rfl: 0    valACYclovir (VALTREX) 500 MG tablet, Take 1 tablet by mouth Daily., Disp: , Rfl:     Allergies   Allergen Reactions    Sulfacetamide Hives and Unknown - Low  Severity    Clove Oil Rash    Clove [Cloves (Syzygium Aromaticum)] Rash and GI Intolerance    Menthol Rash    Sertraline Unknown (See Comments)    Sulfa Antibiotics Hives and Rash       Family History   Problem Relation Age of Onset    Hypertension Mother     Cancer Father         mesothelioma    Hypertension Father     Anxiety disorder Maternal Grandmother     Diabetes Maternal Grandmother         Type 2 diabetes    Ovarian cancer Paternal Grandmother         unknown    Breast cancer Paternal Aunt         60's    Cancer Paternal Aunt         Breast cancer    Diabetes Maternal Uncle         Type 2 diabetes    Anxiety disorder Sister     Uterine cancer Neg Hx     Colon cancer Neg Hx     Osteoporosis Neg Hx        Social History     Socioeconomic History    Marital status: Single    Number of children: 0    Highest education level: Master's degree (e.g., MA, MS, Tarik, MEd, MSW, OSMIN)   Tobacco Use    Smoking status: Never    Smokeless tobacco: Never   Vaping Use    Vaping status: Never Used   Substance and Sexual Activity    Alcohol use: Yes     Alcohol/week: 1.0 standard drink of alcohol     Types: 1 Glasses of wine per week     Comment: Every once in a while socially I will have wine with dinner.    Drug use: Never    Sexual activity: Not Currently     Partners: Male     Birth control/protection: Birth control pill, Pill       Review of Systems  A complete 12 point ros was asked and is negative except for that mentioned above.  In particular:  No fever  No rash  No increased arthralgias  No worsening edema  No cough  No dyspnea  No chest pain      There were no vitals filed for this visit.    Physical Exam  General: well developed, well nourished  A+O x 3 NAD  HEENT: NCAT, pupils equal appearing, sclera appear white  NECK: full ROM  Respiratory: symmetric chest rise, normal effort, normal work of breathing, no overt rales  Abdomen: non-distended  Skin: normal color, no jaundice  Neuro: no tremor, no facial  droop  Psych: normal mood and affect      Assessment/Plan  Workup: colonoscopy 2020 [facility]  Colonoscopy 6/5/24: adequate prep, diverticulosis in sigmoid colon, repeat 5-10 years    Ct a/p with iv contrast: 6/18/24:   Abnormal small bowel loops on luq, enteritis, small ascites.    1.) Abnormal finding on radiologic exam, 6/18/24  Suspect viral illness. She continues to follow up with ob gyn.  MRI abdomen 7/2024 with complete resolution of enteritis.    2.) IBS-D  Managed previously at   Doing ok on lomotil and prn bentyl  - past trials: prn bentyl, low fodmap diet  - minimal improvement on align, will rx course of rifaximin  - she still has her gallbladder so one option may be to pursue eluxadoline. She was educated about risk of pancreatitis.    3.) GERD  Continue ppi  Future consideration: egd      Rahul Van MD  9/12/2024

## 2024-09-13 ENCOUNTER — PRIOR AUTHORIZATION (OUTPATIENT)
Dept: GASTROENTEROLOGY | Facility: CLINIC | Age: 44
End: 2024-09-13
Payer: COMMERCIAL

## 2024-09-13 ENCOUNTER — HOSPITAL ENCOUNTER (OUTPATIENT)
Dept: GENERAL RADIOLOGY | Facility: HOSPITAL | Age: 44
Discharge: HOME | End: 2024-09-13
Admitting: PHYSICIAN ASSISTANT
Payer: COMMERCIAL

## 2024-09-13 ENCOUNTER — TELEPHONE (OUTPATIENT)
Dept: INTERNAL MEDICINE | Facility: CLINIC | Age: 44
End: 2024-09-13

## 2024-09-13 DIAGNOSIS — S99.922A INJURY OF LEFT FOOT, INITIAL ENCOUNTER: Primary | ICD-10-CM

## 2024-09-13 DIAGNOSIS — S99.922A INJURY OF LEFT FOOT, INITIAL ENCOUNTER: ICD-10-CM

## 2024-09-13 PROCEDURE — 73630 X-RAY EXAM OF FOOT: CPT

## 2024-09-13 NOTE — TELEPHONE ENCOUNTER
Patient called in and spoke with Talia she fell on a toy and hurt her left foot and wanted an order for a xray

## 2024-09-13 NOTE — TELEPHONE ENCOUNTER
SHAREE MCMILLAN (Key: [id number])  Xifaxan 550MG tablets  Form  [employer] Electronic PA Form (2017 [employer])  Created  8 minutes ago  Sent to Plan  2 minutes ago  Plan Response  2 minutes ago  Submit Clinical Questions  less than a minute ago  Determination

## 2024-09-13 NOTE — TELEPHONE ENCOUNTER
Message from Plan  Your PA request has been approved. Additional information will be provided in the approval communication. (Message 1141). Authorization Expiration Date: September 27, 2024.

## 2024-09-16 DIAGNOSIS — S99.922A INJURY OF LEFT FOOT, INITIAL ENCOUNTER: Primary | ICD-10-CM

## 2024-09-16 DIAGNOSIS — M25.50 PAIN IN JOINT, MULTIPLE SITES: Primary | ICD-10-CM

## 2024-09-19 ENCOUNTER — TELEPHONE (OUTPATIENT)
Dept: INTERNAL MEDICINE | Facility: CLINIC | Age: 44
End: 2024-09-19
Payer: COMMERCIAL

## 2024-09-20 DIAGNOSIS — M25.50 PAIN IN JOINT, MULTIPLE SITES: Primary | ICD-10-CM

## 2024-10-07 ENCOUNTER — OFFICE VISIT (OUTPATIENT)
Dept: INTERNAL MEDICINE | Facility: CLINIC | Age: 44
End: 2024-10-07
Payer: COMMERCIAL

## 2024-10-07 ENCOUNTER — LAB (OUTPATIENT)
Dept: INTERNAL MEDICINE | Facility: CLINIC | Age: 44
End: 2024-10-07
Payer: COMMERCIAL

## 2024-10-07 VITALS
HEART RATE: 70 BPM | DIASTOLIC BLOOD PRESSURE: 84 MMHG | SYSTOLIC BLOOD PRESSURE: 122 MMHG | TEMPERATURE: 96.9 F | BODY MASS INDEX: 27.69 KG/M2 | HEIGHT: 67 IN | WEIGHT: 176.4 LBS | OXYGEN SATURATION: 98 %

## 2024-10-07 DIAGNOSIS — E03.9 HYPOTHYROIDISM, UNSPECIFIED TYPE: Primary | ICD-10-CM

## 2024-10-07 DIAGNOSIS — H81.03 MENIERE'S DISEASE OF BOTH EARS: ICD-10-CM

## 2024-10-07 DIAGNOSIS — Z00.00 HEALTH CARE MAINTENANCE: ICD-10-CM

## 2024-10-07 DIAGNOSIS — G43.909 MIGRAINE SYNDROME: ICD-10-CM

## 2024-10-07 DIAGNOSIS — I10 ESSENTIAL HYPERTENSION: ICD-10-CM

## 2024-10-07 DIAGNOSIS — M25.50 PAIN IN JOINT, MULTIPLE SITES: ICD-10-CM

## 2024-10-07 LAB
25(OH)D3 SERPL-MCNC: 23.6 NG/ML (ref 30–100)
ALBUMIN SERPL-MCNC: 4.4 G/DL (ref 3.5–5.2)
ALBUMIN/GLOB SERPL: 1.7 G/DL
ALP SERPL-CCNC: 89 U/L (ref 39–117)
ALT SERPL W P-5'-P-CCNC: 18 U/L (ref 1–33)
ANION GAP SERPL CALCULATED.3IONS-SCNC: 8.8 MMOL/L (ref 5–15)
AST SERPL-CCNC: 14 U/L (ref 1–32)
BILIRUB SERPL-MCNC: <0.2 MG/DL (ref 0–1.2)
BUN SERPL-MCNC: 14 MG/DL (ref 6–20)
BUN/CREAT SERPL: 12.2 (ref 7–25)
CALCIUM SPEC-SCNC: 9.5 MG/DL (ref 8.6–10.5)
CHLORIDE SERPL-SCNC: 109 MMOL/L (ref 98–107)
CHOLEST SERPL-MCNC: 192 MG/DL (ref 0–200)
CHROMATIN AB SERPL-ACNC: <10 IU/ML (ref 0–14)
CO2 SERPL-SCNC: 26.2 MMOL/L (ref 22–29)
CREAT SERPL-MCNC: 1.15 MG/DL (ref 0.57–1)
CRP SERPL-MCNC: 0.4 MG/DL (ref 0–0.5)
DEPRECATED RDW RBC AUTO: 42.9 FL (ref 37–54)
EGFRCR SERPLBLD CKD-EPI 2021: 60 ML/MIN/1.73
ERYTHROCYTE [DISTWIDTH] IN BLOOD BY AUTOMATED COUNT: 13.3 % (ref 12.3–15.4)
ERYTHROCYTE [SEDIMENTATION RATE] IN BLOOD: 14 MM/HR (ref 0–20)
FOLATE SERPL-MCNC: 7.51 NG/ML (ref 4.78–24.2)
GLOBULIN UR ELPH-MCNC: 2.6 GM/DL
GLUCOSE SERPL-MCNC: 121 MG/DL (ref 65–99)
HBA1C MFR BLD: 6 % (ref 4.8–5.6)
HCT VFR BLD AUTO: 42.3 % (ref 34–46.6)
HDLC SERPL-MCNC: 39 MG/DL (ref 40–60)
HGB BLD-MCNC: 13.8 G/DL (ref 12–15.9)
LDLC SERPL CALC-MCNC: 112 MG/DL (ref 0–100)
LDLC/HDLC SERPL: 2.73 {RATIO}
MCH RBC QN AUTO: 28.6 PG (ref 26.6–33)
MCHC RBC AUTO-ENTMCNC: 32.6 G/DL (ref 31.5–35.7)
MCV RBC AUTO: 87.6 FL (ref 79–97)
PLATELET # BLD AUTO: 276 10*3/MM3 (ref 140–450)
PMV BLD AUTO: 11.8 FL (ref 6–12)
POTASSIUM SERPL-SCNC: 4.6 MMOL/L (ref 3.5–5.2)
PROT SERPL-MCNC: 7 G/DL (ref 6–8.5)
RBC # BLD AUTO: 4.83 10*6/MM3 (ref 3.77–5.28)
SODIUM SERPL-SCNC: 144 MMOL/L (ref 136–145)
TRIGL SERPL-MCNC: 233 MG/DL (ref 0–150)
TSH SERPL DL<=0.05 MIU/L-ACNC: 2.94 UIU/ML (ref 0.27–4.2)
VIT B12 BLD-MCNC: 299 PG/ML (ref 211–946)
VLDLC SERPL-MCNC: 41 MG/DL (ref 5–40)
WBC NRBC COR # BLD AUTO: 9.23 10*3/MM3 (ref 3.4–10.8)

## 2024-10-07 PROCEDURE — 82746 ASSAY OF FOLIC ACID SERUM: CPT | Performed by: PHYSICIAN ASSISTANT

## 2024-10-07 PROCEDURE — 86431 RHEUMATOID FACTOR QUANT: CPT | Performed by: PHYSICIAN ASSISTANT

## 2024-10-07 PROCEDURE — 80050 GENERAL HEALTH PANEL: CPT | Performed by: PHYSICIAN ASSISTANT

## 2024-10-07 PROCEDURE — 99214 OFFICE O/P EST MOD 30 MIN: CPT | Performed by: PHYSICIAN ASSISTANT

## 2024-10-07 PROCEDURE — 86140 C-REACTIVE PROTEIN: CPT | Performed by: PHYSICIAN ASSISTANT

## 2024-10-07 PROCEDURE — 85652 RBC SED RATE AUTOMATED: CPT | Performed by: PHYSICIAN ASSISTANT

## 2024-10-07 PROCEDURE — 82607 VITAMIN B-12: CPT | Performed by: PHYSICIAN ASSISTANT

## 2024-10-07 PROCEDURE — 86038 ANTINUCLEAR ANTIBODIES: CPT | Performed by: PHYSICIAN ASSISTANT

## 2024-10-07 PROCEDURE — 36415 COLL VENOUS BLD VENIPUNCTURE: CPT | Performed by: PHYSICIAN ASSISTANT

## 2024-10-07 PROCEDURE — 83036 HEMOGLOBIN GLYCOSYLATED A1C: CPT | Performed by: PHYSICIAN ASSISTANT

## 2024-10-07 PROCEDURE — 82306 VITAMIN D 25 HYDROXY: CPT | Performed by: PHYSICIAN ASSISTANT

## 2024-10-07 PROCEDURE — 80061 LIPID PANEL: CPT | Performed by: PHYSICIAN ASSISTANT

## 2024-10-07 RX ORDER — HYDROCHLOROTHIAZIDE 12.5 MG/1
12.5 TABLET ORAL DAILY
Qty: 30 TABLET | Refills: 5 | Status: SHIPPED | OUTPATIENT
Start: 2024-10-07

## 2024-10-07 NOTE — PROGRESS NOTES
[facility] PRIMARY CARE  [address]  [facility] [address]  Dept: [phone]  Dept Fax: [phone]  Loc: [phone]  Loc Fax: [phone]    Lesly Bill  1980    Follow Up Office Visit Note    History of Present Illness:  Patient a 44-year-old female in today to follow-up for Ménière's disease.  Was recently diagnosed by ENT.  Was counseled on low-salt diet.  Suggested she might do good on a water pill.  Patient would like to try hydrochlorothiazide for which she was on in the past for high blood pressure.  On lisinopril 20 mg daily as directed with any problems or side effects.  BPs running normal.  Patient having symptoms of vertigo frequently.  Also open physical therapy for this.    Patient been trying to watch her diet with regard to cholesterol.        The following portions of the patient's history were reviewed and updated as appropriate: allergies, current medications, past family history, past medical history, past social history, past surgical history, and problem list.    Medications:    Current Outpatient Medications:     azelastine (ASTELIN) 0.1 % nasal spray, As Needed., Disp: , Rfl:     cetirizine (zyrTEC) 10 MG tablet, Take 1 tablet by mouth Daily., Disp: 90 tablet, Rfl: 2    ciprofloxacin-dexAMETHasone (Ciprodex) 0.3-0.1 % otic suspension, Administer 4 drops into ear(s) as directed by provider 2 (Two) Times a Day., Disp: 7.5 mL, Rfl: 0    clobetasol propionate (CLOBEX) 0.05 % shampoo, APPLY TOPICALLY TO THE SCALP AND LET SIT FOR 5-10 MINUTES BEFORE RINSING AS NEEDED., Disp: , Rfl:     desonide (DESOWEN) 0.05 % cream, apply to the affected areas on scalp and feet twice daily x 2 wks. take week break then use as needed for flares, Disp: , Rfl:     Desvenlafaxine Succinate ER 25 MG tablet sustained-release 24 hour, Take 1 tablet by mouth Daily., Disp: 30 tablet, Rfl: 0    dicyclomine (BENTYL) 10 MG capsule, Take 1 capsule by  mouth 4 (Four) Times a Day Before Meals & at Bedtime., Disp: 120 capsule, Rfl: 0    diphenoxylate-atropine (Lomotil) 2.5-0.025 MG per tablet, Take 1 tablet by mouth 4 (Four) Times a Day As Needed for Diarrhea., Disp: 60 tablet, Rfl: 0    Drospirenone (Slynd) 4 MG tablet, Take 1 tablet by mouth Daily., Disp: 84 tablet, Rfl: 3    famotidine (Pepcid) 20 MG tablet, Take 2 tablets by mouth Daily., Disp: 30 tablet, Rfl: 2    fluticasone (FLONASE) 50 MCG/ACT nasal spray, 2 sprays into the nostril(s) as directed by provider Daily., Disp: 9.9 mL, Rfl: 1    hydrOXYzine (ATARAX) 10 MG tablet, Take 1 tablet by mouth 3 (Three) Times a Day As Needed for Anxiety., Disp: 30 tablet, Rfl: 0    levothyroxine (SYNTHROID, LEVOTHROID) 50 MCG tablet, TAKE 1 TABLET BY MOUTH EVERY DAY, Disp: 90 tablet, Rfl: 0    lisinopril (PRINIVIL,ZESTRIL) 20 MG tablet, Take 1 tablet by mouth Daily., Disp: 90 tablet, Rfl: 1    metroNIDAZOLE (METROGEL) 0.75 % vaginal gel, Insert 1 Applicatorful into the vagina Every Night., Disp: 1 each, Rfl: 2    omeprazole (priLOSEC) 40 MG capsule, TAKE 1 CAPSULE BY MOUTH EVERY DAY, Disp: 90 capsule, Rfl: 0    ondansetron ODT (ZOFRAN-ODT) 8 MG disintegrating tablet, Take 1/2 to 1 tablet by mouth (dissolve under tongue or swallow) every 8 hours as needed for nausea., Disp: 30 tablet, Rfl: 1    Otezla 30 MG tablet, , Disp: , Rfl:     Red Yeast Rice Extract 600 MG capsule, Take  by mouth., Disp: , Rfl:     SUMAtriptan (IMITREX) 100 MG tablet, Take 1/2 to 1 tablet at onset of headache. May repeat dose one time in 2 hours as needed. Do not exceed 200 mg in 24 hours., Disp: 9 tablet, Rfl: 5    topiramate (TOPAMAX) 25 MG tablet, Take 1 tablet by mouth every night at bedtime., Disp: 90 tablet, Rfl: 1    traZODone (DESYREL) 50 MG tablet, Take 0.5-1 tablets by mouth At Night As Needed for Sleep., Disp: 30 tablet, Rfl: 0    valACYclovir (VALTREX) 500 MG tablet, Take 1 tablet by mouth Daily., Disp: , Rfl:     furosemide (Lasix) 20  MG tablet, Take 1 tablet by mouth Daily for 14 days., Disp: 14 tablet, Rfl: 0    hydroCHLOROthiazide 12.5 MG tablet, Take 1 tablet by mouth Daily., Disp: 30 tablet, Rfl: 5    Subjective  Allergies   Allergen Reactions    Sulfacetamide Hives and Unknown - Low Severity    Clove Oil Rash    Clove [Cloves (Syzygium Aromaticum)] Rash and GI Intolerance    Menthol Rash    Sertraline Unknown (See Comments)    Sulfa Antibiotics Hives and Rash        Past Medical History:   Diagnosis Date    Acid reflux     Anxiety     Depression     Fracture     Fracture, foot 4 years ago    In boot fracture of 5th metatarsal.    Hypertension     IBS (irritable bowel syndrome)     Kidney stone     Kidney deformity - 2 ureters left kidney    Psoriasis     [facility]     Urinary tract infection     Visual impairment 2019    Vitreal detachment floaters       Past Surgical History:   Procedure Laterality Date    BREAST BIOPSY  2008    COLONOSCOPY      came out normal    GANGLION CYST EXCISION Right 12/20/2023    Right wrist dorsal ganglion cyst excision Dr. [facility]    WRIST SURGERY Left 2015       Family History   Problem Relation Age of Onset    Hypertension Mother     Cancer Father         mesothelioma    Hypertension Father     Anxiety disorder Maternal Grandmother     Diabetes Maternal Grandmother         Type 2 diabetes    Ovarian cancer Paternal Grandmother         unknown    Breast cancer Paternal Aunt         60's    Cancer Paternal Aunt         Breast cancer    Diabetes Maternal Uncle         Type 2 diabetes    Anxiety disorder Sister     Uterine cancer Neg Hx     Colon cancer Neg Hx     Osteoporosis Neg Hx         Social History     Socioeconomic History    Marital status: Single    Number of children: 0    Highest education level: Master's degree (e.g., MA, MS, Tarik, MEd, MSW, OSMIN)   Tobacco Use    Smoking status: Never    Smokeless tobacco: Never   Vaping Use    Vaping status: Never Used   Substance and Sexual  "Activity    Alcohol use: Yes     Alcohol/week: 1.0 standard drink of alcohol     Types: 1 Glasses of wine per week     Comment: Every once in a while socially I will have wine with dinner.    Drug use: Never    Sexual activity: Not Currently     Partners: Male     Birth control/protection: Birth control pill, Pill       Review of Systems   Constitutional:  Negative for activity change, chills, fatigue, fever and unexpected weight change.   HENT:  Negative for congestion, ear pain, postnasal drip, sinus pressure and sore throat.    Eyes:  Negative for pain, discharge and redness.   Respiratory:  Negative for cough, shortness of breath and wheezing.    Cardiovascular:  Negative for chest pain, palpitations and leg swelling.   Gastrointestinal:  Negative for diarrhea, nausea and vomiting.   Endocrine: Negative for cold intolerance and heat intolerance.   Genitourinary:  Negative for decreased urine volume and dysuria.   Musculoskeletal:  Negative for arthralgias and myalgias.   Skin:  Negative for rash and wound.   Neurological:  Negative for dizziness, light-headedness and headaches.   Hematological:  Does not bruise/bleed easily.   Psychiatric/Behavioral:  Negative for confusion, dysphoric mood and sleep disturbance. The patient is not nervous/anxious.          Objective  Vitals:    10/07/24 0807   BP: 122/84   BP Location: Left arm   Patient Position: Sitting   Cuff Size: Adult   Pulse: 70   Temp: 96.9 °F (36.1 °C)   TempSrc: Temporal   SpO2: 98%   Weight: 80 kg (176 lb 6.4 oz)   Height: 170.2 cm (67.01\")     Body mass index is 27.62 kg/m².     Physical Exam  Physical Exam  Vitals and nursing note reviewed.   Constitutional:       General: She is not in acute distress.     Appearance: She is not ill-appearing.   HENT:      Head: Normocephalic.      Right Ear: Tympanic membrane, ear canal and external ear normal. There is no impacted cerumen.      Left Ear: Tympanic membrane, ear canal and external ear normal. There " is no impacted cerumen.      Nose: No congestion or rhinorrhea.      Mouth/Throat:      Mouth: Mucous membranes are moist.      Pharynx: Oropharynx is clear. No oropharyngeal exudate or posterior oropharyngeal erythema.   Eyes:      General:         Right eye: No discharge.         Left eye: No discharge.      Extraocular Movements: Extraocular movements intact.      Conjunctiva/sclera: Conjunctivae normal.      Pupils: Pupils are equal, round, and reactive to light.   Cardiovascular:      Rate and Rhythm: Normal rate and regular rhythm.      Heart sounds: Normal heart sounds. No murmur heard.     No friction rub. No gallop.   Pulmonary:      Effort: Pulmonary effort is normal. No respiratory distress.      Breath sounds: Normal breath sounds. No wheezing.   Abdominal:      General: Bowel sounds are normal. There is no distension.      Palpations: Abdomen is soft. There is no mass.      Tenderness: There is no abdominal tenderness.   Musculoskeletal:         General: No swelling. Normal range of motion.      Cervical back: Normal range of motion. No tenderness.      Right lower leg: No edema.      Left lower leg: No edema.   Lymphadenopathy:      Cervical: No cervical adenopathy.   Skin:     Findings: No bruising, erythema or rash.   Neurological:      Mental Status: She is oriented to person, place, and time.      Gait: Gait normal.   Psychiatric:         Mood and Affect: Mood normal.         Behavior: Behavior normal.         Thought Content: Thought content normal.         Judgment: Judgment normal.         Diagnostic Data  Procedures    Assessment  Diagnoses and all orders for this visit:    1. Hypothyroidism, unspecified type (Primary)    2. Essential hypertension    3. Migraine syndrome    4. Meniere's disease of both ears  -     Ambulatory Referral to Physical Therapy for Evaluation & Treatment    5. Health care maintenance  -     Vitamin B12 & Folate  -     Vitamin D,25-Hydroxy; Future  -     Lipid Panel  -      Hemoglobin A1c; Future  -     TSH Rfx On Abnormal To Free T4  -     Comprehensive Metabolic Panel  -     CBC (No Diff)    Other orders  -     hydroCHLOROthiazide 12.5 MG tablet; Take 1 tablet by mouth Daily.  Dispense: 30 tablet; Refill: 5        Plan    1. Hypothyroidism, unspecified type (Primary)- stable on Synthroid.  Repeat TSH.    2. Essential hypertension- stable on lisinopril.    3. Migraine syndrome- stable on Maxalt.    4. Meniere's disease of both ears- worse, trial of hydrochlorothiazide and refer to physical therapy.    5. Health care maintenance- ordered fasting labs.      Return in about 2 years (around 10/7/2026) for Recheck.    Des Connell PA-C  10/07/2024

## 2024-10-08 LAB — ANA SER QL: NEGATIVE

## 2024-10-10 ENCOUNTER — CLINICAL SUPPORT (OUTPATIENT)
Dept: INTERNAL MEDICINE | Facility: CLINIC | Age: 44
End: 2024-10-10
Payer: COMMERCIAL

## 2024-10-10 DIAGNOSIS — E53.8 B12 DEFICIENCY: Primary | ICD-10-CM

## 2024-10-10 PROCEDURE — 96372 THER/PROPH/DIAG INJ SC/IM: CPT | Performed by: PHYSICIAN ASSISTANT

## 2024-10-10 RX ORDER — OMEGA-3-ACID ETHYL ESTERS 1 G/1
2 CAPSULE, LIQUID FILLED ORAL 2 TIMES DAILY
Qty: 360 CAPSULE | Refills: 1 | Status: SHIPPED | OUTPATIENT
Start: 2024-10-10 | End: 2024-10-10

## 2024-10-10 RX ORDER — ICOSAPENT ETHYL 1 G/1
2 CAPSULE ORAL 2 TIMES DAILY WITH MEALS
Qty: 360 CAPSULE | Refills: 1 | Status: SHIPPED | OUTPATIENT
Start: 2024-10-10

## 2024-10-10 RX ORDER — ATORVASTATIN CALCIUM 10 MG/1
10 TABLET, FILM COATED ORAL NIGHTLY
Qty: 90 TABLET | Refills: 1 | Status: SHIPPED | OUTPATIENT
Start: 2024-10-10

## 2024-10-10 RX ORDER — CYANOCOBALAMIN 1000 UG/ML
1000 INJECTION, SOLUTION INTRAMUSCULAR; SUBCUTANEOUS
Status: SHIPPED | OUTPATIENT
Start: 2024-10-10

## 2024-10-10 RX ADMIN — CYANOCOBALAMIN 1000 MCG: 1000 INJECTION, SOLUTION INTRAMUSCULAR; SUBCUTANEOUS at 16:05

## 2024-10-16 ENCOUNTER — TELEPHONE (OUTPATIENT)
Dept: ORTHOPEDIC SURGERY | Facility: CLINIC | Age: 44
End: 2024-10-16
Payer: COMMERCIAL

## 2024-10-16 NOTE — TELEPHONE ENCOUNTER
Called patient and let her know that she had her sx done at [facility] [phone]. I gave her their phone number and she verbalized understanding.

## 2024-10-16 NOTE — TELEPHONE ENCOUNTER
Caller: Lesly Bill    Relationship to patient: Self    Best call back number: [phone] (home)       Patient is needing: PATIENT HAD SX 12/20/2023  PATIENT DIDNT RECEIVE A BILL. BUT SHE IS MAKING PAYMENTS.  PATIENT ISNT SURE THE SX MADE IT ON THE BILL. SHE HAS SPOKEN TO SOMEONE AT BILLING. THEY ADVISED HER TO CALL THE OFFICE   TO SPEAK WITH PRACTICE MANAGER.     PATIENT REPORTS THAT BILLING TOLD THE PATIENT THEY COULDNT EVEN SEE WHERE SHE HAD SURGERY.

## 2024-10-21 RX ORDER — LEVOTHYROXINE SODIUM 50 UG/1
50 TABLET ORAL DAILY
Qty: 90 TABLET | Refills: 0 | Status: SHIPPED | OUTPATIENT
Start: 2024-10-21

## 2024-10-24 ENCOUNTER — TREATMENT (OUTPATIENT)
Dept: PHYSICAL THERAPY | Facility: CLINIC | Age: 44
End: 2024-10-24
Payer: COMMERCIAL

## 2024-10-24 DIAGNOSIS — R42 VERTIGO: Primary | ICD-10-CM

## 2024-10-24 NOTE — PROGRESS NOTES
"Physical Therapy Initial Evaluation and Plan of Care  [address], [address], [facility] [address]    Patient: Lesly Bill   : 1980  Diagnosis/ICD-10 Code:  Vertigo [R42]  Referring practitioner: NADYA Gregory  Date of Initial Visit: 10/24/2024  Today's Date: 10/24/2024  Patient seen for 1 session         Visit Diagnoses:    ICD-10-CM ICD-9-CM   1. Vertigo  R42 780.4         Subjective Questionnaire: DHI: 44      Subjective Evaluation    History of Present Illness  Date of onset: 10/24/2019 (worsening more lately)  Mechanism of injury: Patient notes recurrent issues out of her inner ear with intermittent dizziness, \"feels drunk\".  Was told she had \"Meniere's\" most recently was reaching into a copier and came up, had severe symptoms lasting for hours.  Notes that usually precipitates with sitting up too quickly or leaning forward.  Notes she can't make eye contact and struggles with computer work.  Feels nauseated at times and occasional emesis.  Difficult identifying easing factors, generally just time. Dancing is a hobby, interferes with that. Typically can still go to dance class when symptoms are present.  Tends to be worse with looking down/up than with rotation or sidebending.  ENT recommendation was to decrease salt intake. On htn medication, unsure of connection to symptoms and stress. Symptoms flucuate in frequency week to week. Has vitreous floaters, known history.       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         Objective Testing:  Vestibular Objective  General Observation: unremarkable  Fall History: none  Use of Assistive Devices: none  Cognition  Orientation Level: Oriented to place, Oriented to time, Oriented to situation, Oriented to person     Symptom Behavior  Type of Dizziness: Unsteady with head/body turns, Funny feeling in head, Lightheadedness  Frequency of Dizziness: 2-3 time a week, Several Times a Day, Once a day  Duration of " Dizziness: Hours  VAS Intensity (0-10): 5  Aggravating Factors: Supine to sit  Occulomotor Exam Fixation Present  Occular ROM: Normal  Smooth Pursuit: Normal  Saccades: Intact  Vestibulo-Occular Reflex (VOR)  VOR 1 Head Only: unremarkable        Positional Testing  Positional Testing: Without infrared goggles  Madill-Hallpike Right: No nystagmus  Madill-Hallpike Left: No nystagmus  Horizontal Roll Test Right: No nystagmus  Horizontal Roll Test Left: No nystagmus                                        Assessment & Plan       Assessment  Impairments: abnormal gait, abnormal or restricted ROM, activity intolerance, impaired balance, impaired physical strength, lacks appropriate home exercise program, safety issue and weight-bearing intolerance   Functional limitations: carrying objects, walking, uncomfortable because of pain, sitting, standing, stooping and unable to perform repetitive tasks   Assessment details: Patient with past medical history of long standing dizziness, migraines, and htn, presents with complaints of dizziness that can persist for hours but seems to be triggered with transitions from supine to sit or forward bending.  While there is a positional piece to her symptoms, the patterning of symptoms is not consistent with BPPV and there was no nystagmus on extensive positional testing.  Source of symptoms are most consistent with central vs vestibular migraine.  She was challenged with dynamic activities looking left or down and this was imitate for HEP.  Extensive education performed regarding condition and likely source.   Prognosis: good    Goals  Plan Goals: SHORT TERM GOALS: To be met in 2 weeks:  1. Patient is independent with HEP.  2. Patient will report at least 30% improvement in overall condition.  LONG TERM GOALS:To be met in 4 weeks:  1. Patient will report decreased disequilibrium/dizziness by at least 90%.  2. Patient will report no loss of balance with ADLs to demonstrate improved functional  balance.  3. Patient denies dizziness with daily activity.  4. DHI score is less than 10.     Plan  Therapy options: will be seen for skilled therapy services  Planned modality interventions: electrical stimulation/Russian stimulation, TENS, ultrasound and traction  Planned therapy interventions: manual therapy, therapeutic activities, postural training, body mechanics training, functional ROM exercises, flexibility, gait training, stretching, strengthening, joint mobilization, neuromuscular re-education, soft tissue mobilization and spinal/joint mobilization  Frequency: 1x week  Duration in visits: 6  Treatment plan discussed with: patient  Plan details: VHE, repeated positional testing and CRT if indicated.           REHAB POTENTIAL: fair                History # of Personal Factors and/or Comorbidities: HIGH (3+)  Examination of Body System(s): # of elements: MODERATE (3)  Clinical Presentation: EVOLVING  Clinical Decision Making: MODERATE      Timed:         Manual Therapy:    0     mins  60364;     Therapeutic Exercise:    0     mins  04692;     Neuromuscular Janine:    10    mins  15513;    Therapeutic Activity:     0     mins  75511;     Gait Trainin     mins  32064;     Ultrasound:     0     mins  39139;    Ionto                               0    mins   20011  Self Care                       10     mins   25055  Canalith Repos    0     mins 30564      Un-Timed:  Electrical Stimulation:    0     mins  64625 ( );  Dry Needling     0     mins self-pay  Traction     0     mins 99780  Low Eval     0     Mins  65302  Mod Eval     32     Mins  19268  High Eval                       0     Mins  14803        Timed Treatment:   20   mins   Total Treatment:     52   mins          PT: León Burns, PT     License Number: [id number]    Electronically signed by León Burns, PT, 10/24/24, 8:16 AM EDT    Certification Period: 10/24/2024 thru 2025  I certify that the therapy services are furnished while  this patient is under my care.  The services outlined above are required by this patient, and will be reviewed every 90 days.         Physician Signature:__________________________________________________    PHYSICIAN: Des Connell PA-C  NPI: [id number]      DATE:     Please sign and return via fax to [email or web address] . Thank you, [facility] Physical Therapy.

## 2024-10-28 ENCOUNTER — OFFICE VISIT (OUTPATIENT)
Dept: INTERNAL MEDICINE | Facility: CLINIC | Age: 44
End: 2024-10-28
Payer: COMMERCIAL

## 2024-10-28 VITALS
TEMPERATURE: 96.9 F | DIASTOLIC BLOOD PRESSURE: 70 MMHG | BODY MASS INDEX: 27.75 KG/M2 | SYSTOLIC BLOOD PRESSURE: 104 MMHG | HEART RATE: 80 BPM | HEIGHT: 67 IN | OXYGEN SATURATION: 98 % | WEIGHT: 176.8 LBS

## 2024-10-28 DIAGNOSIS — E53.8 B12 DEFICIENCY: ICD-10-CM

## 2024-10-28 DIAGNOSIS — H81.03 MENIERE'S DISEASE OF BOTH EARS: Primary | ICD-10-CM

## 2024-10-28 DIAGNOSIS — R94.4 DECREASED GFR: ICD-10-CM

## 2024-10-28 DIAGNOSIS — I10 ESSENTIAL HYPERTENSION: ICD-10-CM

## 2024-10-28 DIAGNOSIS — E03.9 HYPOTHYROIDISM, UNSPECIFIED TYPE: ICD-10-CM

## 2024-10-28 DIAGNOSIS — G43.909 MIGRAINE SYNDROME: ICD-10-CM

## 2024-10-28 PROCEDURE — 99214 OFFICE O/P EST MOD 30 MIN: CPT | Performed by: PHYSICIAN ASSISTANT

## 2024-10-28 RX ORDER — VALACYCLOVIR HYDROCHLORIDE 500 MG/1
500 TABLET, FILM COATED ORAL DAILY
Qty: 90 TABLET | Refills: 1 | Status: SHIPPED | OUTPATIENT
Start: 2024-10-28

## 2024-10-28 RX ORDER — CLOBETASOL PROPIONATE 0.5 MG/G
1 CREAM TOPICAL 2 TIMES DAILY
Qty: 60 G | Refills: 1 | Status: SHIPPED | OUTPATIENT
Start: 2024-10-28

## 2024-10-28 NOTE — PROGRESS NOTES
[facility] PRIMARY CARE  [address]  [facility] [address]  Dept: [phone]  Dept Fax: [phone]  Loc: [phone]  Loc Fax: [phone]    Lesly Bill  1980    Follow Up Office Visit Note    History of Present Illness:  Patient is a 44-year-old female in today to follow-up for B12 deficiency and Ménière's disease.  On hydrochlorothiazide for Ménière's as directed.  Symptoms somewhat getting better.  Physical therapy evaluated but could not treat.    Patient has received one vitamin B12 injection.  Overall starting to feel somewhat less fatigued.    Hypothyroidism  Patient reports no anxiety, cold intolerance, diarrhea, fatigue, heat intolerance or palpitations.       The following portions of the patient's history were reviewed and updated as appropriate: allergies, current medications, past family history, past medical history, past social history, past surgical history, and problem list.    Medications:    Current Outpatient Medications:     atorvastatin (LIPITOR) 10 MG tablet, Take 1 tablet by mouth Every Night., Disp: 90 tablet, Rfl: 1    azelastine (ASTELIN) 0.1 % nasal spray, As Needed., Disp: , Rfl:     cetirizine (zyrTEC) 10 MG tablet, Take 1 tablet by mouth Daily., Disp: 90 tablet, Rfl: 2    ciprofloxacin-dexAMETHasone (Ciprodex) 0.3-0.1 % otic suspension, Administer 4 drops into ear(s) as directed by provider 2 (Two) Times a Day., Disp: 7.5 mL, Rfl: 0    desonide (DESOWEN) 0.05 % cream, apply to the affected areas on scalp and feet twice daily x 2 wks. take week break then use as needed for flares, Disp: , Rfl:     Desvenlafaxine Succinate ER 25 MG tablet sustained-release 24 hour, Take 1 tablet by mouth Daily., Disp: 30 tablet, Rfl: 0    dicyclomine (BENTYL) 10 MG capsule, Take 1 capsule by mouth 4 (Four) Times a Day Before Meals & at Bedtime., Disp: 120 capsule, Rfl: 0    diphenoxylate-atropine (Lomotil) 2.5-0.025 MG per tablet,  Take 1 tablet by mouth 4 (Four) Times a Day As Needed for Diarrhea., Disp: 60 tablet, Rfl: 0    Drospirenone (Slynd) 4 MG tablet, Take 1 tablet by mouth Daily., Disp: 84 tablet, Rfl: 3    famotidine (Pepcid) 20 MG tablet, Take 2 tablets by mouth Daily., Disp: 30 tablet, Rfl: 2    fluticasone (FLONASE) 50 MCG/ACT nasal spray, 2 sprays into the nostril(s) as directed by provider Daily., Disp: 9.9 mL, Rfl: 1    hydroCHLOROthiazide 12.5 MG tablet, Take 1 tablet by mouth Daily., Disp: 30 tablet, Rfl: 5    hydrOXYzine (ATARAX) 10 MG tablet, Take 1 tablet by mouth 3 (Three) Times a Day As Needed for Anxiety., Disp: 30 tablet, Rfl: 0    icosapent ethyl (Vascepa) 1 g capsule capsule, Take 2 g by mouth 2 (Two) Times a Day With Meals., Disp: 360 capsule, Rfl: 1    levothyroxine (SYNTHROID, LEVOTHROID) 50 MCG tablet, TAKE 1 TABLET BY MOUTH EVERY DAY, Disp: 90 tablet, Rfl: 0    lisinopril (PRINIVIL,ZESTRIL) 20 MG tablet, Take 1 tablet by mouth Daily., Disp: 90 tablet, Rfl: 1    metroNIDAZOLE (METROGEL) 0.75 % vaginal gel, Insert 1 Applicatorful into the vagina Every Night., Disp: 1 each, Rfl: 2    omeprazole (priLOSEC) 40 MG capsule, TAKE 1 CAPSULE BY MOUTH EVERY DAY, Disp: 90 capsule, Rfl: 0    ondansetron ODT (ZOFRAN-ODT) 8 MG disintegrating tablet, Take 1/2 to 1 tablet by mouth (dissolve under tongue or swallow) every 8 hours as needed for nausea., Disp: 30 tablet, Rfl: 1    Otezla 30 MG tablet, , Disp: , Rfl:     SUMAtriptan (IMITREX) 100 MG tablet, Take 1/2 to 1 tablet at onset of headache. May repeat dose one time in 2 hours as needed. Do not exceed 200 mg in 24 hours., Disp: 9 tablet, Rfl: 5    topiramate (TOPAMAX) 25 MG tablet, Take 1 tablet by mouth every night at bedtime., Disp: 90 tablet, Rfl: 1    traZODone (DESYREL) 50 MG tablet, Take 0.5-1 tablets by mouth At Night As Needed for Sleep., Disp: 30 tablet, Rfl: 0    valACYclovir (VALTREX) 500 MG tablet, Take 1 tablet by mouth Daily., Disp: 90 tablet, Rfl: 1     clobetasol propionate (TEMOVATE) 0.05 % cream, Apply 1 Application topically to the appropriate area as directed 2 (Two) Times a Day., Disp: 60 g, Rfl: 1    furosemide (Lasix) 20 MG tablet, Take 1 tablet by mouth Daily for 14 days., Disp: 14 tablet, Rfl: 0    Current Facility-Administered Medications:     cyanocobalamin injection 1,000 mcg, 1,000 mcg, Intramuscular, Q28 Days, Des Connell PA-C, 1,000 mcg at 10/10/24 1605    Subjective  Allergies   Allergen Reactions    Sulfacetamide Hives and Unknown - Low Severity    Clove Oil Rash    Clove [Cloves (Syzygium Aromaticum)] Rash and GI Intolerance    Menthol Rash    Sertraline Unknown (See Comments)    Sulfa Antibiotics Hives and Rash        Past Medical History:   Diagnosis Date    Acid reflux     Anxiety     Depression     Fracture     Fracture, foot 4 years ago    In boot fracture of 5th metatarsal.    Hypertension     IBS (irritable bowel syndrome)     Kidney stone     Kidney deformity - 2 ureters left kidney    Psoriasis     [facility]     Urinary tract infection     Visual impairment 2019    Vitreal detachment floaters       Past Surgical History:   Procedure Laterality Date    BREAST BIOPSY  2008    COLONOSCOPY      came out normal    GANGLION CYST EXCISION Right 12/20/2023    Right wrist dorsal ganglion cyst excision Dr. [facility]az [facility]    WRIST SURGERY Left 2015       Family History   Problem Relation Age of Onset    Hypertension Mother     Cancer Father         mesothelioma    Hypertension Father     Anxiety disorder Maternal Grandmother     Diabetes Maternal Grandmother         Type 2 diabetes    Ovarian cancer Paternal Grandmother         unknown    Breast cancer Paternal Aunt         60's    Cancer Paternal Aunt         Breast cancer    Diabetes Maternal Uncle         Type 2 diabetes    Anxiety disorder Sister     Uterine cancer Neg Hx     Colon cancer Neg Hx     Osteoporosis Neg Hx         Social History     Socioeconomic History     "Marital status: Single    Number of children: 0    Highest education level: Master's degree (e.g., MA, MS, Tarik, MEd, MSW, OSMIN)   Tobacco Use    Smoking status: Never    Smokeless tobacco: Never   Vaping Use    Vaping status: Never Used   Substance and Sexual Activity    Alcohol use: Yes     Alcohol/week: 1.0 standard drink of alcohol     Types: 1 Glasses of wine per week     Comment: Every once in a while socially I will have wine with dinner.    Drug use: Never    Sexual activity: Not Currently     Partners: Male     Birth control/protection: Birth control pill, Pill       Review of Systems   Constitutional:  Negative for activity change, chills, fatigue, fever and unexpected weight change.   HENT:  Negative for congestion, ear pain, postnasal drip, sinus pressure and sore throat.    Eyes:  Negative for pain, discharge and redness.   Respiratory:  Negative for cough, shortness of breath and wheezing.    Cardiovascular:  Negative for chest pain, palpitations and leg swelling.   Gastrointestinal:  Negative for diarrhea, nausea and vomiting.   Endocrine: Negative for cold intolerance and heat intolerance.   Genitourinary:  Negative for decreased urine volume and dysuria.   Musculoskeletal:  Negative for arthralgias and myalgias.   Skin:  Negative for rash and wound.   Neurological:  Negative for dizziness, light-headedness and headaches.   Hematological:  Does not bruise/bleed easily.   Psychiatric/Behavioral:  Negative for confusion, dysphoric mood and sleep disturbance. The patient is not nervous/anxious.          Objective  Vitals:    10/28/24 0902   BP: 104/70   BP Location: Left arm   Patient Position: Sitting   Cuff Size: Adult   Pulse: 80   Temp: 96.9 °F (36.1 °C)   TempSrc: Temporal   SpO2: 98%   Weight: 80.2 kg (176 lb 12.8 oz)   Height: 170.2 cm (67.01\")     Body mass index is 27.68 kg/m².     Physical Exam  Physical Exam  Vitals and nursing note reviewed.   Constitutional:       General: She is not in " acute distress.     Appearance: She is not ill-appearing.   HENT:      Head: Normocephalic.      Right Ear: Tympanic membrane, ear canal and external ear normal. There is no impacted cerumen.      Left Ear: Tympanic membrane, ear canal and external ear normal. There is no impacted cerumen.      Nose: No congestion or rhinorrhea.      Mouth/Throat:      Mouth: Mucous membranes are moist.      Pharynx: Oropharynx is clear. No oropharyngeal exudate or posterior oropharyngeal erythema.   Eyes:      General:         Right eye: No discharge.         Left eye: No discharge.      Extraocular Movements: Extraocular movements intact.      Conjunctiva/sclera: Conjunctivae normal.      Pupils: Pupils are equal, round, and reactive to light.   Cardiovascular:      Rate and Rhythm: Normal rate and regular rhythm.      Heart sounds: Normal heart sounds. No murmur heard.     No friction rub. No gallop.   Pulmonary:      Effort: Pulmonary effort is normal. No respiratory distress.      Breath sounds: Normal breath sounds. No wheezing.   Abdominal:      General: Bowel sounds are normal. There is no distension.      Palpations: Abdomen is soft. There is no mass.      Tenderness: There is no abdominal tenderness.   Musculoskeletal:         General: No swelling. Normal range of motion.      Cervical back: Normal range of motion. No tenderness.      Right lower leg: No edema.      Left lower leg: No edema.   Lymphadenopathy:      Cervical: No cervical adenopathy.   Skin:     Findings: No bruising, erythema or rash.   Neurological:      Mental Status: She is oriented to person, place, and time.      Gait: Gait normal.   Psychiatric:         Mood and Affect: Mood normal.         Behavior: Behavior normal.         Thought Content: Thought content normal.         Judgment: Judgment normal.         Diagnostic Data  Procedures    Assessment  Diagnoses and all orders for this visit:    1. Meniere's disease of both ears (Primary)    2.  Hypothyroidism, unspecified type    3. B12 deficiency    4. Essential hypertension    5. Migraine syndrome    6. Decreased GFR  -     Basic metabolic panel; Future    Other orders  -     clobetasol propionate (TEMOVATE) 0.05 % cream; Apply 1 Application topically to the appropriate area as directed 2 (Two) Times a Day.  Dispense: 60 g; Refill: 1  -     valACYclovir (VALTREX) 500 MG tablet; Take 1 tablet by mouth Daily.  Dispense: 90 tablet; Refill: 1        Plan    1. Meniere's disease of both ears (Primary)- stable on hydrochlorothiazide.    2. Hypothyroidism, unspecified type- stable on levothyroxine.    3. B12 deficiency- doing better with monthly injections.    4. Essential hypertension- stable on lisinopril.    5. Migraine syndrome- stable on Topamax and Imitrex.    6. Decreased GFR- repeat BMP.      Return in about 3 months (around 1/28/2025) for Recheck.    Des Connell PA-C  10/28/2024

## 2024-11-05 ENCOUNTER — TREATMENT (OUTPATIENT)
Dept: PHYSICAL THERAPY | Facility: CLINIC | Age: 44
End: 2024-11-05
Payer: COMMERCIAL

## 2024-11-05 DIAGNOSIS — R42 VERTIGO: Primary | ICD-10-CM

## 2024-11-12 ENCOUNTER — LAB (OUTPATIENT)
Dept: INTERNAL MEDICINE | Facility: CLINIC | Age: 44
End: 2024-11-12
Payer: COMMERCIAL

## 2024-11-12 ENCOUNTER — CLINICAL SUPPORT (OUTPATIENT)
Dept: INTERNAL MEDICINE | Facility: CLINIC | Age: 44
End: 2024-11-12
Payer: COMMERCIAL

## 2024-11-12 DIAGNOSIS — Z00.00 HEALTH CARE MAINTENANCE: Primary | ICD-10-CM

## 2024-11-12 DIAGNOSIS — Z00.00 HEALTH CARE MAINTENANCE: ICD-10-CM

## 2024-11-12 DIAGNOSIS — R94.4 DECREASED GFR: ICD-10-CM

## 2024-11-12 LAB
ANION GAP SERPL CALCULATED.3IONS-SCNC: 11.7 MMOL/L (ref 5–15)
BUN SERPL-MCNC: 15 MG/DL (ref 6–20)
BUN/CREAT SERPL: 14.9 (ref 7–25)
CALCIUM SPEC-SCNC: 9.5 MG/DL (ref 8.6–10.5)
CHLORIDE SERPL-SCNC: 101 MMOL/L (ref 98–107)
CO2 SERPL-SCNC: 25.3 MMOL/L (ref 22–29)
CREAT SERPL-MCNC: 1.01 MG/DL (ref 0.57–1)
EGFRCR SERPLBLD CKD-EPI 2021: 70.1 ML/MIN/1.73
GLUCOSE SERPL-MCNC: 84 MG/DL (ref 65–99)
POTASSIUM SERPL-SCNC: 4.6 MMOL/L (ref 3.5–5.2)
SODIUM SERPL-SCNC: 138 MMOL/L (ref 136–145)

## 2024-11-12 PROCEDURE — 36415 COLL VENOUS BLD VENIPUNCTURE: CPT | Performed by: PHYSICIAN ASSISTANT

## 2024-11-12 PROCEDURE — 84466 ASSAY OF TRANSFERRIN: CPT | Performed by: PHYSICIAN ASSISTANT

## 2024-11-12 PROCEDURE — 96372 THER/PROPH/DIAG INJ SC/IM: CPT | Performed by: PHYSICIAN ASSISTANT

## 2024-11-12 PROCEDURE — 80048 BASIC METABOLIC PNL TOTAL CA: CPT | Performed by: PHYSICIAN ASSISTANT

## 2024-11-12 PROCEDURE — 84443 ASSAY THYROID STIM HORMONE: CPT | Performed by: PHYSICIAN ASSISTANT

## 2024-11-12 PROCEDURE — 82306 VITAMIN D 25 HYDROXY: CPT | Performed by: PHYSICIAN ASSISTANT

## 2024-11-12 PROCEDURE — 83540 ASSAY OF IRON: CPT | Performed by: PHYSICIAN ASSISTANT

## 2024-11-12 RX ADMIN — CYANOCOBALAMIN 1000 MCG: 1000 INJECTION, SOLUTION INTRAMUSCULAR; SUBCUTANEOUS at 13:34

## 2024-11-13 LAB
25(OH)D3 SERPL-MCNC: 22.8 NG/ML (ref 30–100)
IRON 24H UR-MRATE: 62 MCG/DL (ref 37–145)
IRON SATN MFR SERPL: 14 % (ref 20–50)
TIBC SERPL-MCNC: 456 MCG/DL (ref 298–536)
TRANSFERRIN SERPL-MCNC: 306 MG/DL (ref 200–360)
TSH SERPL DL<=0.05 MIU/L-ACNC: 1.36 UIU/ML (ref 0.27–4.2)

## 2024-11-14 ENCOUNTER — TELEPHONE (OUTPATIENT)
Dept: INTERNAL MEDICINE | Facility: CLINIC | Age: 44
End: 2024-11-14
Payer: COMMERCIAL

## 2024-11-14 ENCOUNTER — TELEPHONE (OUTPATIENT)
Dept: INTERNAL MEDICINE | Facility: CLINIC | Age: 44
End: 2024-11-14

## 2024-11-14 RX ORDER — OMEPRAZOLE 40 MG/1
40 CAPSULE, DELAYED RELEASE ORAL DAILY
Qty: 90 CAPSULE | Refills: 0 | Status: SHIPPED | OUTPATIENT
Start: 2024-11-14

## 2024-11-14 RX ORDER — FERROUS GLUCONATE 324(38)MG
324 TABLET ORAL
Qty: 90 TABLET | Refills: 1 | Status: SHIPPED | OUTPATIENT
Start: 2024-11-14

## 2024-11-14 RX ORDER — ERGOCALCIFEROL 1.25 MG/1
50000 CAPSULE, LIQUID FILLED ORAL WEEKLY
Qty: 4 CAPSULE | Refills: 2 | Status: SHIPPED | OUTPATIENT
Start: 2024-11-14

## 2024-11-14 NOTE — TELEPHONE ENCOUNTER
Patient viewed results on [employer]      ----- Message from Des Connell sent at 11/13/2024  7:12 PM EST -----  Please advise pt vit d low recommend starting daily therapy if she is not already. If agreeable I will send in Rx. Iron low recommend starting daily therapy for this as well. Thyroid fxn and other labs normal. Thank you.

## 2024-11-14 NOTE — TELEPHONE ENCOUNTER
Caller: Lesly Bill    Relationship: Self    Best call back number: [phone]    Caller requesting test results: YES    What test was performed: LAB    When was the test performed: TUESDAY    Where was the test performed: OFFICE

## 2024-12-12 ENCOUNTER — CLINICAL SUPPORT (OUTPATIENT)
Dept: INTERNAL MEDICINE | Facility: CLINIC | Age: 44
End: 2024-12-12
Payer: COMMERCIAL

## 2024-12-12 PROCEDURE — 96372 THER/PROPH/DIAG INJ SC/IM: CPT | Performed by: PHYSICIAN ASSISTANT

## 2024-12-12 RX ADMIN — CYANOCOBALAMIN 1000 MCG: 1000 INJECTION, SOLUTION INTRAMUSCULAR; SUBCUTANEOUS at 16:02

## 2024-12-26 NOTE — TELEPHONE ENCOUNTER
Caller: Lesly Bill    Relationship to patient: Self    Best call back number: [phone]    Patient is needing: RECEIVED A CALL FROM PT. C/O BLOOD IN URINE AND LEFT FLANK PAIN. LAST OFFICE VISIT NOTE ON 7/20/23 FOR KIDNEY STONE. THERE ARE ACTIVE ORDERS PLACED BY DR. HARDING FOR CT ABD. PT STATED SHE WANTS TO SWITCH PROVIDERS. SHE WOULD LIKE TO SEE  OR DR. RODRIGUEZ. OFFICE PLEASE CALL PT BACK WITH THESE REGARDS. THANK YOU.    Letter sent

## 2025-01-13 ENCOUNTER — HOSPITAL ENCOUNTER (OUTPATIENT)
Dept: GENERAL RADIOLOGY | Facility: HOSPITAL | Age: 45
Discharge: HOME | End: 2025-01-13
Admitting: PHYSICIAN ASSISTANT
Payer: COMMERCIAL

## 2025-01-13 ENCOUNTER — OFFICE VISIT (OUTPATIENT)
Dept: INTERNAL MEDICINE | Facility: CLINIC | Age: 45
End: 2025-01-13
Payer: COMMERCIAL

## 2025-01-13 VITALS
OXYGEN SATURATION: 99 % | WEIGHT: 181.6 LBS | HEART RATE: 88 BPM | SYSTOLIC BLOOD PRESSURE: 98 MMHG | BODY MASS INDEX: 28.5 KG/M2 | DIASTOLIC BLOOD PRESSURE: 60 MMHG | HEIGHT: 67 IN | TEMPERATURE: 97.3 F

## 2025-01-13 DIAGNOSIS — J20.8 ACUTE BACTERIAL BRONCHITIS: ICD-10-CM

## 2025-01-13 DIAGNOSIS — B96.89 ACUTE BACTERIAL BRONCHITIS: ICD-10-CM

## 2025-01-13 DIAGNOSIS — I10 ESSENTIAL HYPERTENSION: ICD-10-CM

## 2025-01-13 DIAGNOSIS — G43.909 MIGRAINE SYNDROME: ICD-10-CM

## 2025-01-13 DIAGNOSIS — B96.89 ACUTE BACTERIAL BRONCHITIS: Primary | ICD-10-CM

## 2025-01-13 DIAGNOSIS — Z00.00 HEALTH CARE MAINTENANCE: ICD-10-CM

## 2025-01-13 DIAGNOSIS — E53.8 B12 DEFICIENCY: ICD-10-CM

## 2025-01-13 DIAGNOSIS — J20.8 ACUTE BACTERIAL BRONCHITIS: Primary | ICD-10-CM

## 2025-01-13 DIAGNOSIS — E03.9 HYPOTHYROIDISM, UNSPECIFIED TYPE: ICD-10-CM

## 2025-01-13 PROCEDURE — 99214 OFFICE O/P EST MOD 30 MIN: CPT | Performed by: PHYSICIAN ASSISTANT

## 2025-01-13 PROCEDURE — 96372 THER/PROPH/DIAG INJ SC/IM: CPT | Performed by: PHYSICIAN ASSISTANT

## 2025-01-13 PROCEDURE — 71046 X-RAY EXAM CHEST 2 VIEWS: CPT

## 2025-01-13 RX ORDER — LEVOTHYROXINE SODIUM 50 UG/1
50 TABLET ORAL DAILY
Qty: 90 TABLET | Refills: 0 | Status: SHIPPED | OUTPATIENT
Start: 2025-01-13

## 2025-01-13 RX ORDER — AZITHROMYCIN 250 MG/1
TABLET, FILM COATED ORAL
Qty: 6 TABLET | Refills: 0 | Status: SHIPPED | OUTPATIENT
Start: 2025-01-13

## 2025-01-13 RX ADMIN — CYANOCOBALAMIN 1000 MCG: 1000 INJECTION, SOLUTION INTRAMUSCULAR; SUBCUTANEOUS at 16:12

## 2025-01-13 NOTE — PROGRESS NOTES
[facility] PRIMARY CARE  [address]  [facility] [address]  Dept: [phone]  Dept Fax: [phone]  Loc: [phone]  Loc Fax: [phone]    Lesly Bill  1980    Follow Up Office Visit Note    History of Present Illness:  Patient is a 44-year-old female in today for cough and chest congestion.  Patient having some wheezing as well.    URI   Associated symptoms include coughing and wheezing. Pertinent negatives include no chest pain, congestion, diarrhea, dysuria, ear pain, headaches, nausea, rash, sore throat or vomiting.       The following portions of the patient's history were reviewed and updated as appropriate: allergies, current medications, past family history, past medical history, past social history, past surgical history, and problem list.    Medications:    Current Outpatient Medications:     atorvastatin (LIPITOR) 10 MG tablet, Take 1 tablet by mouth Every Night., Disp: 90 tablet, Rfl: 1    azelastine (ASTELIN) 0.1 % nasal spray, As Needed., Disp: , Rfl:     cetirizine (zyrTEC) 10 MG tablet, Take 1 tablet by mouth Daily., Disp: 90 tablet, Rfl: 2    ciprofloxacin-dexAMETHasone (Ciprodex) 0.3-0.1 % otic suspension, Administer 4 drops into ear(s) as directed by provider 2 (Two) Times a Day., Disp: 7.5 mL, Rfl: 0    clobetasol propionate (TEMOVATE) 0.05 % cream, Apply 1 Application topically to the appropriate area as directed 2 (Two) Times a Day., Disp: 60 g, Rfl: 1    desonide (DESOWEN) 0.05 % cream, apply to the affected areas on scalp and feet twice daily x 2 wks. take week break then use as needed for flares, Disp: , Rfl:     Desvenlafaxine Succinate ER 25 MG tablet sustained-release 24 hour, Take 1 tablet by mouth Daily., Disp: 30 tablet, Rfl: 0    dicyclomine (BENTYL) 10 MG capsule, Take 1 capsule by mouth 4 (Four) Times a Day Before Meals & at Bedtime., Disp: 120 capsule, Rfl: 0    diphenoxylate-atropine (Lomotil) 2.5-0.025  MG per tablet, Take 1 tablet by mouth 4 (Four) Times a Day As Needed for Diarrhea., Disp: 60 tablet, Rfl: 0    Drospirenone (Slynd) 4 MG tablet, Take 1 tablet by mouth Daily., Disp: 84 tablet, Rfl: 3    famotidine (Pepcid) 20 MG tablet, Take 2 tablets by mouth Daily., Disp: 30 tablet, Rfl: 2    ferrous gluconate (FERGON) 324 MG tablet, Take 1 tablet by mouth Daily With Breakfast., Disp: 90 tablet, Rfl: 1    fluticasone (FLONASE) 50 MCG/ACT nasal spray, 2 sprays into the nostril(s) as directed by provider Daily., Disp: 9.9 mL, Rfl: 1    hydroCHLOROthiazide 12.5 MG tablet, Take 1 tablet by mouth Daily., Disp: 30 tablet, Rfl: 5    hydrOXYzine (ATARAX) 10 MG tablet, Take 1 tablet by mouth 3 (Three) Times a Day As Needed for Anxiety., Disp: 30 tablet, Rfl: 0    icosapent ethyl (Vascepa) 1 g capsule capsule, Take 2 g by mouth 2 (Two) Times a Day With Meals., Disp: 360 capsule, Rfl: 1    levothyroxine (SYNTHROID, LEVOTHROID) 50 MCG tablet, Take 1 tablet by mouth Daily., Disp: 90 tablet, Rfl: 0    lisinopril (PRINIVIL,ZESTRIL) 20 MG tablet, Take 1 tablet by mouth Daily., Disp: 90 tablet, Rfl: 1    metroNIDAZOLE (METROGEL) 0.75 % vaginal gel, Insert 1 Applicatorful into the vagina Every Night., Disp: 1 each, Rfl: 2    omeprazole (priLOSEC) 40 MG capsule, TAKE 1 CAPSULE BY MOUTH EVERY DAY, Disp: 90 capsule, Rfl: 0    ondansetron ODT (ZOFRAN-ODT) 8 MG disintegrating tablet, Take 1/2 to 1 tablet by mouth (dissolve under tongue or swallow) every 8 hours as needed for nausea., Disp: 30 tablet, Rfl: 1    Otezla 30 MG tablet, , Disp: , Rfl:     SUMAtriptan (IMITREX) 100 MG tablet, Take 1/2 to 1 tablet at onset of headache. May repeat dose one time in 2 hours as needed. Do not exceed 200 mg in 24 hours., Disp: 9 tablet, Rfl: 5    topiramate (TOPAMAX) 25 MG tablet, Take 1 tablet by mouth every night at bedtime., Disp: 90 tablet, Rfl: 1    traZODone (DESYREL) 50 MG tablet, Take 0.5-1 tablets by mouth At Night As Needed for Sleep.,  Disp: 30 tablet, Rfl: 0    valACYclovir (VALTREX) 500 MG tablet, Take 1 tablet by mouth Daily., Disp: 90 tablet, Rfl: 1    vitamin D (ERGOCALCIFEROL) 1.25 MG (22380 UT) capsule capsule, Take 1 capsule by mouth 1 (One) Time Per Week., Disp: 4 capsule, Rfl: 2    azithromycin (Zithromax Z-Mayo) 250 MG tablet, Take 2 tablets by mouth on day 1, then 1 tablet daily on days 2-5, Disp: 6 tablet, Rfl: 0    furosemide (Lasix) 20 MG tablet, Take 1 tablet by mouth Daily for 14 days., Disp: 14 tablet, Rfl: 0    Current Facility-Administered Medications:     cyanocobalamin injection 1,000 mcg, 1,000 mcg, Intramuscular, Q28 Days, Des Connell PA-C, 1,000 mcg at 01/13/25 1612    Subjective  Allergies   Allergen Reactions    Sulfacetamide Hives and Unknown - Low Severity    Clove Oil Rash    Clove [Cloves (Syzygium Aromaticum)] Rash and GI Intolerance    Menthol Rash    Sertraline Unknown (See Comments)    Sulfa Antibiotics Hives and Rash        Past Medical History:   Diagnosis Date    Acid reflux     Anxiety     Depression     Fracture     Fracture, foot 4 years ago    In boot fracture of 5th metatarsal.    Hypertension     IBS (irritable bowel syndrome)     Kidney stone     Kidney deformity - 2 ureters left kidney    Psoriasis     [facility]     Urinary tract infection     Visual impairment 2019    Vitreal detachment floaters       Past Surgical History:   Procedure Laterality Date    BREAST BIOPSY  2008    COLONOSCOPY      came out normal    GANGLION CYST EXCISION Right 12/20/2023    Right wrist dorsal ganglion cyst excision Dr. [facility]    WRIST SURGERY Left 2015       Family History   Problem Relation Age of Onset    Hypertension Mother     Cancer Father         mesothelioma    Hypertension Father     Anxiety disorder Maternal Grandmother     Diabetes Maternal Grandmother         Type 2 diabetes    Ovarian cancer Paternal Grandmother         unknown    Breast cancer Paternal Aunt         60's     Cancer Paternal Aunt         Breast cancer    Diabetes Maternal Uncle         Type 2 diabetes    Anxiety disorder Sister     Uterine cancer Neg Hx     Colon cancer Neg Hx     Osteoporosis Neg Hx         Social History     Socioeconomic History    Marital status: Single    Number of children: 0    Highest education level: Master's degree (e.g., MA, MS, Tarik, MEd, MSW, OSMIN)   Tobacco Use    Smoking status: Never    Smokeless tobacco: Never   Vaping Use    Vaping status: Never Used   Substance and Sexual Activity    Alcohol use: Yes     Alcohol/week: 1.0 standard drink of alcohol     Types: 1 Glasses of wine per week     Comment: Every once in a while socially I will have wine with dinner.    Drug use: Never    Sexual activity: Not Currently     Partners: Male     Birth control/protection: Birth control pill, Pill       Review of Systems   Constitutional:  Negative for activity change, chills, fatigue, fever and unexpected weight change.   HENT:  Negative for congestion, ear pain, postnasal drip, sinus pressure and sore throat.    Eyes:  Negative for pain, discharge and redness.   Respiratory:  Positive for cough and wheezing. Negative for shortness of breath.    Cardiovascular:  Negative for chest pain, palpitations and leg swelling.   Gastrointestinal:  Negative for diarrhea, nausea and vomiting.   Endocrine: Negative for cold intolerance and heat intolerance.   Genitourinary:  Negative for decreased urine volume and dysuria.   Musculoskeletal:  Negative for arthralgias and myalgias.   Skin:  Negative for rash and wound.   Neurological:  Negative for dizziness, light-headedness and headaches.   Hematological:  Does not bruise/bleed easily.   Psychiatric/Behavioral:  Negative for confusion, dysphoric mood and sleep disturbance. The patient is not nervous/anxious.          Objective  Vitals:    01/13/25 1559   BP: 98/60   BP Location: Left arm   Patient Position: Sitting   Cuff Size: Large Adult   Pulse: 88   Temp:  "97.3 °F (36.3 °C)   TempSrc: Temporal   SpO2: 99%   Weight: 82.4 kg (181 lb 9.6 oz)   Height: 170.2 cm (67.01\")     Body mass index is 28.44 kg/m².     Physical Exam  Physical Exam  Vitals and nursing note reviewed.   Constitutional:       General: She is not in acute distress.     Appearance: She is not ill-appearing.   HENT:      Head: Normocephalic.      Right Ear: Tympanic membrane, ear canal and external ear normal. There is no impacted cerumen.      Left Ear: Tympanic membrane, ear canal and external ear normal. There is no impacted cerumen.      Nose: No congestion or rhinorrhea.      Mouth/Throat:      Mouth: Mucous membranes are moist.      Pharynx: Oropharynx is clear. No oropharyngeal exudate or posterior oropharyngeal erythema.   Eyes:      General:         Right eye: No discharge.         Left eye: No discharge.      Extraocular Movements: Extraocular movements intact.      Conjunctiva/sclera: Conjunctivae normal.      Pupils: Pupils are equal, round, and reactive to light.   Cardiovascular:      Rate and Rhythm: Normal rate and regular rhythm.      Heart sounds: Normal heart sounds. No murmur heard.     No friction rub. No gallop.   Pulmonary:      Effort: Pulmonary effort is normal. No respiratory distress.      Breath sounds: Wheezing present.   Abdominal:      General: Bowel sounds are normal. There is no distension.      Palpations: Abdomen is soft. There is no mass.      Tenderness: There is no abdominal tenderness.   Musculoskeletal:         General: No swelling. Normal range of motion.      Cervical back: Normal range of motion. No tenderness.      Right lower leg: No edema.      Left lower leg: No edema.   Lymphadenopathy:      Cervical: No cervical adenopathy.   Skin:     Findings: No bruising, erythema or rash.   Neurological:      Mental Status: She is oriented to person, place, and time.      Gait: Gait normal.   Psychiatric:         Mood and Affect: Mood normal.         Behavior: Behavior " normal.         Thought Content: Thought content normal.         Judgment: Judgment normal.         Diagnostic Data  Procedures    Assessment  Diagnoses and all orders for this visit:    1. Acute bacterial bronchitis (Primary)  -     XR Chest PA & Lateral; Future    2. B12 deficiency    3. Health care maintenance  -     Vitamin B12 & Folate  -     Vitamin D,25-Hydroxy; Future  -     Lipid Panel  -     Hemoglobin A1c; Future  -     TSH Rfx On Abnormal To Free T4  -     Comprehensive Metabolic Panel  -     CBC (No Diff)    4. Hypothyroidism, unspecified type    5. Essential hypertension    6. Migraine syndrome    Other orders  -     levothyroxine (SYNTHROID, LEVOTHROID) 50 MCG tablet; Take 1 tablet by mouth Daily.  Dispense: 90 tablet; Refill: 0  -     azithromycin (Zithromax Z-Mayo) 250 MG tablet; Take 2 tablets by mouth on day 1, then 1 tablet daily on days 2-5  Dispense: 6 tablet; Refill: 0        Plan    1. Acute bacterial bronchitis (Primary)- Z-Mayo as directed.  Order chest x-ray.    2. B12 deficiency- administer B12 shot in office today, patient tolerated well.    3. Health care maintenance- ordered fasting labs.    4. Hypothyroidism, unspecified type- stable on Synthroid.  Repeat TSH.    5. Essential hypertension- well-controlled on lisinopril.    6. Migraine syndrome- stable on Topamax and Imitrex.      Return if symptoms worsen or fail to improve.    Des Connell PA-C  01/13/2025

## 2025-01-21 RX ORDER — BENZONATATE 100 MG/1
100 CAPSULE ORAL 3 TIMES DAILY PRN
Qty: 30 CAPSULE | Refills: 1 | Status: SHIPPED | OUTPATIENT
Start: 2025-01-21 | End: 2025-01-27

## 2025-01-27 ENCOUNTER — LAB (OUTPATIENT)
Dept: INTERNAL MEDICINE | Facility: CLINIC | Age: 45
End: 2025-01-27
Payer: COMMERCIAL

## 2025-01-27 ENCOUNTER — OFFICE VISIT (OUTPATIENT)
Dept: INTERNAL MEDICINE | Facility: CLINIC | Age: 45
End: 2025-01-27
Payer: COMMERCIAL

## 2025-01-27 VITALS
DIASTOLIC BLOOD PRESSURE: 64 MMHG | HEIGHT: 67 IN | SYSTOLIC BLOOD PRESSURE: 102 MMHG | TEMPERATURE: 97.7 F | HEART RATE: 76 BPM | BODY MASS INDEX: 28.63 KG/M2 | WEIGHT: 182.4 LBS | OXYGEN SATURATION: 98 %

## 2025-01-27 DIAGNOSIS — R05.2 SUBACUTE COUGH: Primary | ICD-10-CM

## 2025-01-27 DIAGNOSIS — I10 ESSENTIAL HYPERTENSION: ICD-10-CM

## 2025-01-27 DIAGNOSIS — G43.909 MIGRAINE SYNDROME: ICD-10-CM

## 2025-01-27 DIAGNOSIS — E03.9 HYPOTHYROIDISM, UNSPECIFIED TYPE: ICD-10-CM

## 2025-01-27 DIAGNOSIS — Z00.00 HEALTH CARE MAINTENANCE: ICD-10-CM

## 2025-01-27 LAB
25(OH)D3 SERPL-MCNC: 34.1 NG/ML (ref 30–100)
ALBUMIN SERPL-MCNC: 4.1 G/DL (ref 3.5–5.2)
ALBUMIN/GLOB SERPL: 1.5 G/DL
ALP SERPL-CCNC: 98 U/L (ref 39–117)
ALT SERPL W P-5'-P-CCNC: 15 U/L (ref 1–33)
ANION GAP SERPL CALCULATED.3IONS-SCNC: 11 MMOL/L (ref 5–15)
AST SERPL-CCNC: 13 U/L (ref 1–32)
BILIRUB SERPL-MCNC: 0.3 MG/DL (ref 0–1.2)
BUN SERPL-MCNC: 11 MG/DL (ref 6–20)
BUN/CREAT SERPL: 11.5 (ref 7–25)
CALCIUM SPEC-SCNC: 9.1 MG/DL (ref 8.6–10.5)
CHLORIDE SERPL-SCNC: 105 MMOL/L (ref 98–107)
CHOLEST SERPL-MCNC: 175 MG/DL (ref 0–200)
CO2 SERPL-SCNC: 21 MMOL/L (ref 22–29)
CREAT SERPL-MCNC: 0.96 MG/DL (ref 0.57–1)
DEPRECATED RDW RBC AUTO: 38.3 FL (ref 37–54)
EGFRCR SERPLBLD CKD-EPI 2021: 74.5 ML/MIN/1.73
ERYTHROCYTE [DISTWIDTH] IN BLOOD BY AUTOMATED COUNT: 12.4 % (ref 12.3–15.4)
FOLATE SERPL-MCNC: 12 NG/ML (ref 4.78–24.2)
GLOBULIN UR ELPH-MCNC: 2.8 GM/DL
GLUCOSE SERPL-MCNC: 107 MG/DL (ref 65–99)
HBA1C MFR BLD: 5.8 % (ref 4.8–5.6)
HCT VFR BLD AUTO: 41.6 % (ref 34–46.6)
HDLC SERPL-MCNC: 35 MG/DL (ref 40–60)
HGB BLD-MCNC: 13.9 G/DL (ref 12–15.9)
LDLC SERPL CALC-MCNC: 107 MG/DL (ref 0–100)
LDLC/HDLC SERPL: 2.91 {RATIO}
MCH RBC QN AUTO: 28.8 PG (ref 26.6–33)
MCHC RBC AUTO-ENTMCNC: 33.4 G/DL (ref 31.5–35.7)
MCV RBC AUTO: 86.1 FL (ref 79–97)
PLATELET # BLD AUTO: 272 10*3/MM3 (ref 140–450)
PMV BLD AUTO: 11.7 FL (ref 6–12)
POTASSIUM SERPL-SCNC: 4.3 MMOL/L (ref 3.5–5.2)
PROT SERPL-MCNC: 6.9 G/DL (ref 6–8.5)
RBC # BLD AUTO: 4.83 10*6/MM3 (ref 3.77–5.28)
SODIUM SERPL-SCNC: 137 MMOL/L (ref 136–145)
T4 FREE SERPL-MCNC: 1.54 NG/DL (ref 0.92–1.68)
TRIGL SERPL-MCNC: 190 MG/DL (ref 0–150)
TSH SERPL DL<=0.05 MIU/L-ACNC: 4.39 UIU/ML (ref 0.27–4.2)
VIT B12 BLD-MCNC: 912 PG/ML (ref 211–946)
VLDLC SERPL-MCNC: 33 MG/DL (ref 5–40)
WBC NRBC COR # BLD AUTO: 7.82 10*3/MM3 (ref 3.4–10.8)

## 2025-01-27 PROCEDURE — 80061 LIPID PANEL: CPT | Performed by: PHYSICIAN ASSISTANT

## 2025-01-27 PROCEDURE — 80050 GENERAL HEALTH PANEL: CPT | Performed by: PHYSICIAN ASSISTANT

## 2025-01-27 PROCEDURE — 84439 ASSAY OF FREE THYROXINE: CPT | Performed by: PHYSICIAN ASSISTANT

## 2025-01-27 PROCEDURE — 36415 COLL VENOUS BLD VENIPUNCTURE: CPT | Performed by: PHYSICIAN ASSISTANT

## 2025-01-27 PROCEDURE — 82306 VITAMIN D 25 HYDROXY: CPT | Performed by: PHYSICIAN ASSISTANT

## 2025-01-27 PROCEDURE — 82746 ASSAY OF FOLIC ACID SERUM: CPT | Performed by: PHYSICIAN ASSISTANT

## 2025-01-27 PROCEDURE — 99214 OFFICE O/P EST MOD 30 MIN: CPT | Performed by: PHYSICIAN ASSISTANT

## 2025-01-27 PROCEDURE — 82607 VITAMIN B-12: CPT | Performed by: PHYSICIAN ASSISTANT

## 2025-01-27 PROCEDURE — 83036 HEMOGLOBIN GLYCOSYLATED A1C: CPT | Performed by: PHYSICIAN ASSISTANT

## 2025-01-27 RX ORDER — DEXTROMETHORPHAN HYDROBROMIDE AND PROMETHAZINE HYDROCHLORIDE 15; 6.25 MG/5ML; MG/5ML
5 SYRUP ORAL 4 TIMES DAILY PRN
Qty: 240 ML | Refills: 0 | Status: SHIPPED | OUTPATIENT
Start: 2025-01-27

## 2025-01-27 NOTE — PROGRESS NOTES
[facility] PRIMARY [facility]ARE  [address]  [facility] [address]  Dept: [phone]  Dept Fax: [phone]  Loc: [phone]  Loc Fax: [phone]    Lesly Bill  1980    Follow Up Office Visit Note    History of Present Illness:  Patient is a 44-year-old female in today to follow-up for cough, hypertension, hyperlipidemia, and migraines.  On Topamax and Imitrex as directed.  Headaches well-controlled.    Blood pressure doing well on chlorothiazide.  Taking as directed with any problems or symptoms.    On atorvastatin 10 mg nightly.  Needing FLP rechecked.    Patient still continues to struggle with a cough.  Has had this for several weeks now.  Has tried multiple medications without relief of symptoms.    Hypothyroidism  Patient reports no anxiety, cold intolerance, diarrhea, fatigue, heat intolerance or palpitations.       The following portions of the patient's history were reviewed and updated as appropriate: allergies, current medications, past family history, past medical history, past social history, past surgical history, and problem list.    Medications:    Current Outpatient Medications:     atorvastatin (LIPITOR) 10 MG tablet, Take 1 tablet by mouth Every Night., Disp: 90 tablet, Rfl: 1    azelastine (ASTELIN) 0.1 % nasal spray, As Needed., Disp: , Rfl:     cetirizine (zyrTEC) 10 MG tablet, Take 1 tablet by mouth Daily., Disp: 90 tablet, Rfl: 2    ciprofloxacin-dexAMETHasone (Ciprodex) 0.3-0.1 % otic suspension, Administer 4 drops into ear(s) as directed by provider 2 (Two) Times a Day., Disp: 7.5 mL, Rfl: 0    clobetasol propionate (TEMOVATE) 0.05 % cream, Apply 1 Application topically to the appropriate area as directed 2 (Two) Times a Day., Disp: 60 g, Rfl: 1    desonide (DESOWEN) 0.05 % cream, apply to the affected areas on scalp and feet twice daily x 2 wks. take week break then use as needed for flares, Disp: , Rfl:     Desvenlafaxine  Succinate ER 25 MG tablet sustained-release 24 hour, Take 1 tablet by mouth Daily., Disp: 30 tablet, Rfl: 0    dicyclomine (BENTYL) 10 MG capsule, Take 1 capsule by mouth 4 (Four) Times a Day Before Meals & at Bedtime., Disp: 120 capsule, Rfl: 0    diphenoxylate-atropine (Lomotil) 2.5-0.025 MG per tablet, Take 1 tablet by mouth 4 (Four) Times a Day As Needed for Diarrhea., Disp: 60 tablet, Rfl: 0    Drospirenone (Slynd) 4 MG tablet, Take 1 tablet by mouth Daily., Disp: 84 tablet, Rfl: 3    famotidine (Pepcid) 20 MG tablet, Take 2 tablets by mouth Daily., Disp: 30 tablet, Rfl: 2    ferrous gluconate (FERGON) 324 MG tablet, Take 1 tablet by mouth Daily With Breakfast., Disp: 90 tablet, Rfl: 1    fluticasone (FLONASE) 50 MCG/ACT nasal spray, 2 sprays into the nostril(s) as directed by provider Daily., Disp: 9.9 mL, Rfl: 1    hydroCHLOROthiazide 12.5 MG tablet, Take 1 tablet by mouth Daily., Disp: 30 tablet, Rfl: 5    hydrOXYzine (ATARAX) 10 MG tablet, Take 1 tablet by mouth 3 (Three) Times a Day As Needed for Anxiety., Disp: 30 tablet, Rfl: 0    icosapent ethyl (Vascepa) 1 g capsule capsule, Take 2 g by mouth 2 (Two) Times a Day With Meals., Disp: 360 capsule, Rfl: 1    levothyroxine (SYNTHROID, LEVOTHROID) 50 MCG tablet, Take 1 tablet by mouth Daily., Disp: 90 tablet, Rfl: 0    lisinopril (PRINIVIL,ZESTRIL) 20 MG tablet, Take 1 tablet by mouth Daily., Disp: 90 tablet, Rfl: 1    metroNIDAZOLE (METROGEL) 0.75 % vaginal gel, Insert 1 Applicatorful into the vagina Every Night., Disp: 1 each, Rfl: 2    omeprazole (priLOSEC) 40 MG capsule, TAKE 1 CAPSULE BY MOUTH EVERY DAY, Disp: 90 capsule, Rfl: 0    ondansetron ODT (ZOFRAN-ODT) 8 MG disintegrating tablet, Take 1/2 to 1 tablet by mouth (dissolve under tongue or swallow) every 8 hours as needed for nausea., Disp: 30 tablet, Rfl: 1    Otezla 30 MG tablet, , Disp: , Rfl:     SUMAtriptan (IMITREX) 100 MG tablet, Take 1/2 to 1 tablet at onset of headache. May repeat dose one  time in 2 hours as needed. Do not exceed 200 mg in 24 hours., Disp: 9 tablet, Rfl: 5    topiramate (TOPAMAX) 25 MG tablet, Take 1 tablet by mouth every night at bedtime., Disp: 90 tablet, Rfl: 1    traZODone (DESYREL) 50 MG tablet, Take 0.5-1 tablets by mouth At Night As Needed for Sleep., Disp: 30 tablet, Rfl: 0    valACYclovir (VALTREX) 500 MG tablet, Take 1 tablet by mouth Daily., Disp: 90 tablet, Rfl: 1    vitamin D (ERGOCALCIFEROL) 1.25 MG (22710 UT) capsule capsule, Take 1 capsule by mouth 1 (One) Time Per Week., Disp: 4 capsule, Rfl: 2    furosemide (Lasix) 20 MG tablet, Take 1 tablet by mouth Daily for 14 days., Disp: 14 tablet, Rfl: 0    promethazine-dextromethorphan (PROMETHAZINE-DM) 6.25-15 MG/5ML syrup, Take 5 mL by mouth 4 (Four) Times a Day As Needed for Cough., Disp: 240 mL, Rfl: 0    Current Facility-Administered Medications:     cyanocobalamin injection 1,000 mcg, 1,000 mcg, Intramuscular, Q28 Days, Des Connell PA-C, 1,000 mcg at 01/13/25 1612    Subjective  Allergies   Allergen Reactions    Sulfacetamide Hives and Unknown - Low Severity    Clove Oil Rash    Clove [Cloves (Syzygium Aromaticum)] Rash and GI Intolerance    Menthol Rash    Sertraline Unknown (See Comments)    Sulfa Antibiotics Hives and Rash        Past Medical History:   Diagnosis Date    Acid reflux     Anxiety     Depression     Fracture     Fracture, foot 4 years ago    In boot fracture of 5th metatarsal.    Hypertension     IBS (irritable bowel syndrome)     Kidney stone     Kidney deformity - 2 ureters left kidney    Psoriasis     [facility]     Urinary tract infection     Visual impairment 2019    Vitreal detachment floaters       Past Surgical History:   Procedure Laterality Date    BREAST BIOPSY  2008    COLONOSCOPY      came out normal    GANGLION CYST EXCISION Right 12/20/2023    Right wrist dorsal ganglion cyst excision Dr. Mer[facility]    WRIST SURGERY Left 2015       Family History   Problem  Relation Age of Onset    Hypertension Mother     Cancer Father         mesothelioma    Hypertension Father     Anxiety disorder Maternal Grandmother     Diabetes Maternal Grandmother         Type 2 diabetes    Ovarian cancer Paternal Grandmother         unknown    Breast cancer Paternal Aunt         60's    Cancer Paternal Aunt         Breast cancer    Diabetes Maternal Uncle         Type 2 diabetes    Anxiety disorder Sister     Uterine cancer Neg Hx     Colon cancer Neg Hx     Osteoporosis Neg Hx         Social History     Socioeconomic History    Marital status: Single    Number of children: 0    Highest education level: Master's degree (e.g., MA, MS, Tarik, MEd, MSW, OSMIN)   Tobacco Use    Smoking status: Never    Smokeless tobacco: Never   Vaping Use    Vaping status: Never Used   Substance and Sexual Activity    Alcohol use: Yes     Alcohol/week: 1.0 standard drink of alcohol     Types: 1 Glasses of wine per week     Comment: Every once in a while socially I will have wine with dinner.    Drug use: Never    Sexual activity: Not Currently     Partners: Male     Birth control/protection: Birth control pill, Pill       Review of Systems   Constitutional:  Negative for activity change, chills, fatigue, fever and unexpected weight change.   HENT:  Negative for congestion, ear pain, postnasal drip, sinus pressure and sore throat.    Eyes:  Negative for pain, discharge and redness.   Respiratory:  Positive for cough. Negative for shortness of breath and wheezing.    Cardiovascular:  Negative for chest pain, palpitations and leg swelling.   Gastrointestinal:  Negative for diarrhea, nausea and vomiting.   Endocrine: Negative for cold intolerance and heat intolerance.   Genitourinary:  Negative for decreased urine volume and dysuria.   Musculoskeletal:  Negative for arthralgias and myalgias.   Skin:  Negative for rash and wound.   Neurological:  Negative for dizziness, light-headedness and headaches.   Hematological:   "Does not bruise/bleed easily.   Psychiatric/Behavioral:  Negative for confusion, dysphoric mood and sleep disturbance. The patient is not nervous/anxious.          Objective  Vitals:    01/27/25 0824   BP: 102/64   BP Location: Left arm   Patient Position: Sitting   Cuff Size: Adult   Pulse: 76   Temp: 97.7 °F (36.5 °C)   TempSrc: Temporal   SpO2: 98%   Weight: 82.7 kg (182 lb 6.4 oz)   Height: 170.2 cm (67.01\")     Body mass index is 28.56 kg/m².     Physical Exam  Physical Exam  Vitals and nursing note reviewed.   Constitutional:       General: She is not in acute distress.     Appearance: She is not ill-appearing.   HENT:      Head: Normocephalic.      Right Ear: Tympanic membrane, ear canal and external ear normal. There is no impacted cerumen.      Left Ear: Tympanic membrane, ear canal and external ear normal. There is no impacted cerumen.      Nose: No congestion or rhinorrhea.      Mouth/Throat:      Mouth: Mucous membranes are moist.      Pharynx: Oropharynx is clear. No oropharyngeal exudate or posterior oropharyngeal erythema.   Eyes:      General:         Right eye: No discharge.         Left eye: No discharge.      Extraocular Movements: Extraocular movements intact.      Conjunctiva/sclera: Conjunctivae normal.      Pupils: Pupils are equal, round, and reactive to light.   Cardiovascular:      Rate and Rhythm: Normal rate and regular rhythm.      Heart sounds: Normal heart sounds. No murmur heard.     No friction rub. No gallop.   Pulmonary:      Effort: Pulmonary effort is normal. No respiratory distress.      Breath sounds: Normal breath sounds. No wheezing.   Abdominal:      General: Bowel sounds are normal. There is no distension.      Palpations: Abdomen is soft. There is no mass.      Tenderness: There is no abdominal tenderness.   Musculoskeletal:         General: No swelling. Normal range of motion.      Cervical back: Normal range of motion. No tenderness.      Right lower leg: No edema.      " Left lower leg: No edema.   Lymphadenopathy:      Cervical: No cervical adenopathy.   Skin:     Findings: No bruising, erythema or rash.   Neurological:      Mental Status: She is oriented to person, place, and time.      Gait: Gait normal.   Psychiatric:         Mood and Affect: Mood normal.         Behavior: Behavior normal.         Thought Content: Thought content normal.         Judgment: Judgment normal.         Diagnostic Data  Procedures    Assessment  Diagnoses and all orders for this visit:    1. Subacute cough (Primary)    2. Essential hypertension    3. Hypothyroidism, unspecified type    4. Migraine syndrome    5. Health care maintenance    Other orders  -     promethazine-dextromethorphan (PROMETHAZINE-DM) 6.25-15 MG/5ML syrup; Take 5 mL by mouth 4 (Four) Times a Day As Needed for Cough.  Dispense: 240 mL; Refill: 0        Plan    1. Subacute cough (Primary)- trial of Promethazine DM.    2. Essential hypertension- well-controlled on lisinopril hydrochlorothiazide.  Keep same.  Continue to monitor.    3. Hypothyroidism, unspecified type- stable on levothyroxine.  Repeat TSH.    4. Migraine syndrome- well-controlled on Imitrex and Topamax.  Keep same.  Continue to monitor.    5. Health care maintenance- ordered fasting labs.      Return in about 3 months (around 4/27/2025) for Recheck.    Des Connell PA-C  01/27/2025

## 2025-01-28 ENCOUNTER — OFFICE VISIT (OUTPATIENT)
Age: 45
End: 2025-01-28
Payer: COMMERCIAL

## 2025-01-28 DIAGNOSIS — E03.9 HYPOTHYROIDISM, UNSPECIFIED TYPE: Primary | ICD-10-CM

## 2025-01-28 DIAGNOSIS — R31.29 MICROSCOPIC HEMATURIA: Primary | ICD-10-CM

## 2025-01-28 LAB
BILIRUB BLD-MCNC: NEGATIVE MG/DL
CLARITY, POC: CLEAR
COLOR UR: YELLOW
EXPIRATION DATE: ABNORMAL
GLUCOSE UR STRIP-MCNC: NEGATIVE MG/DL
KETONES UR QL: NEGATIVE
LEUKOCYTE EST, POC: NEGATIVE
Lab: ABNORMAL
NITRITE UR-MCNC: NEGATIVE MG/ML
PH UR: 6 [PH] (ref 5–8)
PROT UR STRIP-MCNC: NEGATIVE MG/DL
RBC # UR STRIP: ABNORMAL /UL
SP GR UR: 1.01 (ref 1–1.03)
UROBILINOGEN UR QL: NORMAL

## 2025-01-28 RX ORDER — ATORVASTATIN CALCIUM 20 MG/1
20 TABLET, FILM COATED ORAL NIGHTLY
Qty: 90 TABLET | Refills: 1 | Status: SHIPPED | OUTPATIENT
Start: 2025-01-28

## 2025-01-28 RX ORDER — LEVOTHYROXINE SODIUM 75 UG/1
75 TABLET ORAL DAILY
Qty: 30 TABLET | Refills: 1 | Status: SHIPPED | OUTPATIENT
Start: 2025-01-28

## 2025-01-28 NOTE — PROGRESS NOTES
Microscopic Hematuria Female Office Visit      Patient Name: Lesly Bill  : 1980   MRN: [id number]     Chief Complaint:  Microscopic Hematuria.     Chief Complaint   Patient presents with    Microscopic hematuria   .     Referring Provider: Des Connell, *    History of Present Illness: Ms. Bill is a 44 y.o. female with history of microscopic gross hematuria. She presents today for microhematuria.  She underwent work up previously which was normal.  Se reports no changes since her last visit.  No dysuria or gross hematuria.           Subjective      Review of System:     Review of Systems   I have reviewed the ROS documented by my clinical staff, I have updated appropriately and I agree. Ryan Bernabe MD    Past Medical History:  Past Medical History:   Diagnosis Date    Acid reflux     Anxiety     Depression     Fracture     Fracture, foot 4 years ago    In boot fracture of 5th metatarsal.    Hypertension     IBS (irritable bowel syndrome)     Kidney stone     Kidney deformity - 2 ureters left kidney    Psoriasis     [facility]     Urinary tract infection     Visual impairment 2019    Vitreal detachment floaters       Past Surgical History:  Past Surgical History:   Procedure Laterality Date    BREAST BIOPSY      COLONOSCOPY      came out normal    GANGLION CYST EXCISION Right 2023    Right wrist dorsal ganglion cyst excision Dr. [facility]    WRIST SURGERY Left        Medications:    Current Outpatient Medications:     atorvastatin (LIPITOR) 10 MG tablet, Take 1 tablet by mouth Every Night., Disp: 90 tablet, Rfl: 1    azelastine (ASTELIN) 0.1 % nasal spray, As Needed., Disp: , Rfl:     cetirizine (zyrTEC) 10 MG tablet, Take 1 tablet by mouth Daily., Disp: 90 tablet, Rfl: 2    ciprofloxacin-dexAMETHasone (Ciprodex) 0.3-0.1 % otic suspension, Administer 4 drops into ear(s) as directed by provider 2 (Two) Times a Day., Disp: 7.5 mL, Rfl: 0     clobetasol propionate (TEMOVATE) 0.05 % cream, Apply 1 Application topically to the appropriate area as directed 2 (Two) Times a Day., Disp: 60 g, Rfl: 1    desonide (DESOWEN) 0.05 % cream, apply to the affected areas on scalp and feet twice daily x 2 wks. take week break then use as needed for flares, Disp: , Rfl:     Desvenlafaxine Succinate ER 25 MG tablet sustained-release 24 hour, Take 1 tablet by mouth Daily., Disp: 30 tablet, Rfl: 0    dicyclomine (BENTYL) 10 MG capsule, Take 1 capsule by mouth 4 (Four) Times a Day Before Meals & at Bedtime., Disp: 120 capsule, Rfl: 0    diphenoxylate-atropine (Lomotil) 2.5-0.025 MG per tablet, Take 1 tablet by mouth 4 (Four) Times a Day As Needed for Diarrhea., Disp: 60 tablet, Rfl: 0    Drospirenone (Slynd) 4 MG tablet, Take 1 tablet by mouth Daily., Disp: 84 tablet, Rfl: 3    famotidine (Pepcid) 20 MG tablet, Take 2 tablets by mouth Daily., Disp: 30 tablet, Rfl: 2    ferrous gluconate (FERGON) 324 MG tablet, Take 1 tablet by mouth Daily With Breakfast., Disp: 90 tablet, Rfl: 1    fluticasone (FLONASE) 50 MCG/ACT nasal spray, 2 sprays into the nostril(s) as directed by provider Daily., Disp: 9.9 mL, Rfl: 1    hydroCHLOROthiazide 12.5 MG tablet, Take 1 tablet by mouth Daily., Disp: 30 tablet, Rfl: 5    hydrOXYzine (ATARAX) 10 MG tablet, Take 1 tablet by mouth 3 (Three) Times a Day As Needed for Anxiety., Disp: 30 tablet, Rfl: 0    icosapent ethyl (Vascepa) 1 g capsule capsule, Take 2 g by mouth 2 (Two) Times a Day With Meals., Disp: 360 capsule, Rfl: 1    levothyroxine (SYNTHROID, LEVOTHROID) 50 MCG tablet, Take 1 tablet by mouth Daily., Disp: 90 tablet, Rfl: 0    lisinopril (PRINIVIL,ZESTRIL) 20 MG tablet, Take 1 tablet by mouth Daily., Disp: 90 tablet, Rfl: 1    metroNIDAZOLE (METROGEL) 0.75 % vaginal gel, Insert 1 Applicatorful into the vagina Every Night., Disp: 1 each, Rfl: 2    omeprazole (priLOSEC) 40 MG capsule, TAKE 1 CAPSULE BY MOUTH EVERY DAY, Disp: 90 capsule,  Rfl: 0    ondansetron ODT (ZOFRAN-ODT) 8 MG disintegrating tablet, Take 1/2 to 1 tablet by mouth (dissolve under tongue or swallow) every 8 hours as needed for nausea., Disp: 30 tablet, Rfl: 1    Otezla 30 MG tablet, , Disp: , Rfl:     promethazine-dextromethorphan (PROMETHAZINE-DM) 6.25-15 MG/5ML syrup, Take 5 mL by mouth 4 (Four) Times a Day As Needed for Cough., Disp: 240 mL, Rfl: 0    SUMAtriptan (IMITREX) 100 MG tablet, Take 1/2 to 1 tablet at onset of headache. May repeat dose one time in 2 hours as needed. Do not exceed 200 mg in 24 hours., Disp: 9 tablet, Rfl: 5    topiramate (TOPAMAX) 25 MG tablet, Take 1 tablet by mouth every night at bedtime., Disp: 90 tablet, Rfl: 1    valACYclovir (VALTREX) 500 MG tablet, Take 1 tablet by mouth Daily., Disp: 90 tablet, Rfl: 1    vitamin D (ERGOCALCIFEROL) 1.25 MG (45886 UT) capsule capsule, Take 1 capsule by mouth 1 (One) Time Per Week., Disp: 4 capsule, Rfl: 2    furosemide (Lasix) 20 MG tablet, Take 1 tablet by mouth Daily for 14 days., Disp: 14 tablet, Rfl: 0    Current Facility-Administered Medications:     cyanocobalamin injection 1,000 mcg, 1,000 mcg, Intramuscular, Q28 Days, Des Connell PA-C, 1,000 mcg at 01/13/25 1612    Allergies:  Allergies   Allergen Reactions    Sulfacetamide Hives and Unknown - Low Severity    Clove Oil Rash    Clove [Cloves (Syzygium Aromaticum)] Rash and GI Intolerance    Menthol Rash    Sertraline Unknown (See Comments)    Sulfa Antibiotics Hives and Rash       Social History:  Social History     Socioeconomic History    Marital status: Single    Number of children: 0    Highest education level: Master's degree (e.g., MA, MS, Tarik, MEd, MSW, OSMIN)   Tobacco Use    Smoking status: Never    Smokeless tobacco: Never   Vaping Use    Vaping status: Never Used   Substance and Sexual Activity    Alcohol use: Yes     Alcohol/week: 1.0 standard drink of alcohol     Types: 1 Glasses of wine per week     Comment: Every once in a while  socially I will have wine with dinner.    Drug use: Never    Sexual activity: Not Currently     Partners: Male     Birth control/protection: Birth control pill, Pill       Family History:  Family History   Problem Relation Age of Onset    Hypertension Mother     Cancer Father         mesothelioma    Hypertension Father     Anxiety disorder Maternal Grandmother     Diabetes Maternal Grandmother         Type 2 diabetes    Ovarian cancer Paternal Grandmother         unknown    Breast cancer Paternal Aunt         60's    Cancer Paternal Aunt         Breast cancer    Diabetes Maternal Uncle         Type 2 diabetes    Anxiety disorder Sister     Uterine cancer Neg Hx     Colon cancer Neg Hx     Osteoporosis Neg Hx      Bladder & Bowel Symptom Questionnaire    How often do you usually urinate during the day ?   3 - About every 1-2 hours   2.   How many timed do you urinate at night?   2 - 3 times at night   3.   What is the reason that you usually urinate?   1 - Mild urge (can delay over an hour)   4.   Once you get the urge to go, how long can you     comfortably delay?   1 - 30-60 min   5.   How often do you get a sudden urge that makes you rush to the bathroom?   1 - Rarely   6.   How often does a sudden urge to urinate result in you leaking urine or wetting pads?   1 - Rarely   7.  In your opinion, how good is your bladder control?   1 - Very Good   8.  Do you have accidental bowel leakage?   no   9.  Do you have difficulty fully emptying your bladder?   no   10.  Do you experience accidental leakage when performing some physical activity such as coughing, sneezing, laughing or exercise?   yes   11. Have you tried medications to help improve your symptoms?   no   12. Would you be interested in learning about a long-lasting option that may help you with your symptoms?   no                                                                             Total Score   10     0-7 (Mild) 8-16 (Moderate) 17-28 (Severe)        Objective     Physical Exam:   Vital Signs: There were no vitals filed for this visit.  There is no height or weight on file to calculate BMI.     Physical Exam  Vitals and nursing note reviewed. Exam conducted with a chaperone present.   Constitutional:       General: She is awake. She is not in acute distress.     Appearance: Normal appearance.   HENT:      Head: Normocephalic and atraumatic.      Right Ear: External ear normal.      Left Ear: External ear normal.      Nose: Nose normal.   Eyes:      Conjunctiva/sclera: Conjunctivae normal.   Pulmonary:      Effort: Pulmonary effort is normal. No respiratory distress.   Abdominal:      General: Abdomen is flat. There is no distension.      Palpations: Abdomen is soft. There is no mass.      Tenderness: There is no abdominal tenderness. There is no right CVA tenderness, left CVA tenderness, guarding or rebound.      Hernia: No hernia is present.   Genitourinary:     Exam position: Lithotomy position.   Skin:     General: Skin is warm.   Neurological:      General: No focal deficit present.      Mental Status: She is alert and oriented to person, place, and time.      Gait: Gait normal.   Psychiatric:         Behavior: Behavior normal. Behavior is cooperative.         Thought Content: Thought content normal.         Judgment: Judgment normal.         Labs:   Brief Urine Lab Results  (Last result in the past 365 days)        Color   Clarity   Blood   Leuk Est   Nitrite   Protein   CREAT   Urine HCG        01/28/25 0850 Yellow   Clear   Trace   Negative   Negative   Negative                   Urine Culture          4/29/2024    15:51   Urine Culture   Urine Culture 25,000 CFU/mL Normal Urogenital Cheri         Lab Results   Component Value Date    GLUCOSE 107 (H) 01/27/2025    CALCIUM 9.1 01/27/2025     01/27/2025    K 4.3 01/27/2025    CO2 21.0 (L) 01/27/2025     01/27/2025    BUN 11 01/27/2025    CREATININE 0.96 01/27/2025    EGFRIFNONA 52 (L)  08/12/2021    BCR 11.5 01/27/2025    ANIONGAP 11.0 01/27/2025       Lab Results   Component Value Date    WBC 7.82 01/27/2025    HGB 13.9 01/27/2025    HCT 41.6 01/27/2025    MCV 86.1 01/27/2025     01/27/2025       Images:   XR Chest PA & Lateral    Result Date: 1/14/2025  Impression: No acute cardiopulmonary findings. Electronically Signed: Filiberto Vadlez MD  1/14/2025 10:33 AM EST  Workstation ID: [id number]      Measures:   Tobacco:   Lesly Bill  reports that she has never smoked. She has never used smokeless tobacco.       Urine Incontinence: Patient reports that she is not currently experiencing any symptoms of urinary incontinence.         Assessment / Plan      Assessment/Plan:   Ms. Lesly Bill is a 44 y.o. female who presents today for history of microhematuria.  She has done well and has not had another UTI.  We will see her back in 1 year.  asymptomatic microscopic hematuria           Diagnoses and all orders for this visit:    1. Microscopic hematuria (Primary)  -     POC Urinalysis Dipstick, Automated        Follow Up:   Return in about 1 year (around 1/28/2026) for Recheck.    I spent approximately 30 minutes providing clinical care for this patient; including review of patient's chart and provider documentation, face to face time spent with patient in examination room (obtaining history, performing physical exam, discussing diagnosis and management options), placing orders, and completing patient documentation.     Ryan Bernabe MD  [facility] [place] [facility] [place]

## 2025-01-30 ENCOUNTER — OFFICE VISIT (OUTPATIENT)
Dept: OBSTETRICS AND GYNECOLOGY | Facility: CLINIC | Age: 45
End: 2025-01-30
Payer: COMMERCIAL

## 2025-01-30 VITALS
SYSTOLIC BLOOD PRESSURE: 100 MMHG | HEIGHT: 67 IN | BODY MASS INDEX: 28.72 KG/M2 | WEIGHT: 183 LBS | DIASTOLIC BLOOD PRESSURE: 60 MMHG

## 2025-01-30 DIAGNOSIS — Z30.41 ENCOUNTER FOR SURVEILLANCE OF CONTRACEPTIVE PILLS: ICD-10-CM

## 2025-01-30 DIAGNOSIS — Z01.419 WELL WOMAN EXAM WITH ROUTINE GYNECOLOGICAL EXAM: Primary | ICD-10-CM

## 2025-01-30 RX ORDER — DROSPIRENONE 4 MG/1
4 TABLET, FILM COATED ORAL DAILY
Qty: 84 TABLET | Refills: 3 | Status: SHIPPED | OUTPATIENT
Start: 2025-01-30

## 2025-01-30 NOTE — PROGRESS NOTES
Subjective   Chief Complaint   Patient presents with    Annual Exam     Well woman exam     Lesly Bill is a 44 y.o. year old  presenting to be seen for her annual exam.  Her last Pap was 2024 with normal cytology, hpv pool positive.   She had a birads 1 mammogram 3/2024. She had a screening colonoscopy 2024 with recommendation for 10 year follow up.  She was last evaluated in our office May 2024 for melena with her cycles.  Her gi thought this may be related to fissures, no polyps noted.   SEXUAL Hx:  She is currently sexually active.  In the past year there there has been ONE new sexual partner.    Condoms are always used.  She would not like to be screened for STD's at today's exam.  Current birth control method: OCP (progestin only).  She is happy with her current method of contraception and does not want to discuss alternative methods of contraception.  MENSTRUAL Hx:  Patient's last menstrual period was 10/02/2024 (approximate).  In the past 6 months her cycles have been infrequent lighter with less cramping, continues to have rectal bleeding with her cycles only when she is having vaginal bleeding .  Her menstrual flow is typically light.   Each month on average there are roughly 0 day(s) of very heavy flow.    Intermenstrual bleeding is absent.    Post-coital bleeding is absent.  Dysmenorrhea: is not affecting her activities of daily living  PMS: is not affecting her activities of daily living  Her cycles are not a source of concern for her that she wishes to discuss today.  HEALTH Hx:  She exercises regularly: yes.  She wears her seat belt: yes.  She has concerns about domestic violence: no.  OTHER THINGS SHE WANTS TO DISCUSS TODAY:  Nothing else    The following portions of the patient's history were reviewed and updated as appropriate:problem list, current medications, allergies, past family history, past medical history, past social history, and past surgical  "history.    Social History    Tobacco Use      Smoking status: Never      Smokeless tobacco: Never    Review of Systems  Constitutional POS: nothing reported    NEG: anorexia or night sweats   Genitourinary POS: nothing reported    NEG: dysuria or hematuria      Gastointestinal POS: see HPI    NEG: bloating, change in bowel habits, or reflux symptoms   Integument POS: nothing reported    NEG: moles that are changing in size, shape, color or rashes   Breast POS: nothing reported    NEG: persistent breast lump, skin dimpling, or nipple discharge        Objective   /60 (BP Location: Right arm, Patient Position: Sitting, Cuff Size: Adult)   Ht 170.2 cm (67\")   Wt 83 kg (183 lb)   LMP 10/02/2024 (Approximate)   BMI 28.66 kg/m²     General:  well developed; well nourished  no acute distress  mentation appropriate   Skin:  No suspicious lesions seen   Thyroid: normal to inspection and palpation   Breasts:  Examined in supine position  Symmetric without masses or skin dimpling  Nipples normal without inversion, lesions or discharge  There are no palpable axillary nodes  Breast tissue is extremely dense in bilateral breasts    Abdomen: soft, non-tender; no masses  no umbilical or inguinal hernias are present  no hepato-splenomegaly   Pelvis: Clinical staff was present for exam  :  urethral meatus normal; urethra normal:  Vaginal:  normal pink mucosa without prolapse or lesions.  Cervix:  normal appearance.  Uterus:  normal size, shape and consistency.  Adnexa:  normal bimanual exam of the adnexa.  Rectal:  digital rectal exam not performed; anus visually normal appearing.        Assessment   Well woman with routine gynecological exam   Melena with menstrual cycles, normal colonoscopy   Dysmenorrhea well controlled with slynd.      Plan     Pap was done today.  If she does not receive the results of the Pap within 2 weeks  time, she was instructed to call to find out the results.  I explained to Lesly that the " recommendations for Pap smear interval in a low risk patient's has lengthened to 3 years time.  I encouraged her to be seen yearly for a full physical exam including breast and pelvic exam even during the off years when PAP's will not be performed.  She was encouraged to get yearly mammograms.  She should report any palpable breast lump(s) or skin changes regardless of mammographic findings.  I explained to Lesly that notification regarding her mammogram results will come from the center performing the study.  Our office will not be routinely calling with mammogram results.  It is her responsibility to make sure that the results from the mammogram are communicated to her by the breast center.  If she has any questions about the results, she is welcome to call our office anytime.  Prescription(s) for OCP's were refilled today   The importance of keeping all planned follow-up and taking all medications as prescribed was emphasized.  Today I discussed with Lesly the total recommended calcium intake for a premenopausal female is 1000 mg.  Ideally this should be from dietary sources.  I reviewed calcium content in various foods including milk, fortified orange juice and yogurt.  If she cannot get sufficient calcium through dietary means, it is recommended to supplement with either a multivitamin or calcium to reach her daily goal.  I also reviewed the difference in the bioavailability of calcium carbonate and calcium citrate containing supplements and the importance of taking calcium carbonate containing products with food.  Finally, vitamin D's role in calcium absorption was reviewed and a total daily vitamin D intake of 800 units was recommended.  If any change in her gi symptoms notify provider (if any bleeding other than on menstrual cycle)  I discussed with Lesly that she may be behind on needed vaccinations for TDAP.  She may be able to obtain these vaccinations at her local pharmacy OR speak about obtaining  them with her primary care.  If she does obtain her vaccines, I have asked Lesly to let us know the date each vaccine was obtained so that her medical record could be updated in our system. She had an abscess after tdap vaccination as child, unsure if she wants to receive vaccine.   Follow up for annual exam 1 year    No orders of the defined types were placed in this encounter.         This note was electronically signed.    Bernie Tabor, RAF  January 30, 2025

## 2025-01-31 ENCOUNTER — PRIOR AUTHORIZATION (OUTPATIENT)
Dept: OBSTETRICS AND GYNECOLOGY | Facility: CLINIC | Age: 45
End: 2025-01-31
Payer: COMMERCIAL

## 2025-01-31 LAB — REF LAB TEST METHOD: NORMAL

## 2025-02-20 ENCOUNTER — LAB (OUTPATIENT)
Facility: HOSPITAL | Age: 45
End: 2025-02-20
Payer: COMMERCIAL

## 2025-02-20 ENCOUNTER — OFFICE VISIT (OUTPATIENT)
Age: 45
End: 2025-02-20
Payer: COMMERCIAL

## 2025-02-20 ENCOUNTER — TELEPHONE (OUTPATIENT)
Age: 45
End: 2025-02-20
Payer: COMMERCIAL

## 2025-02-20 VITALS
SYSTOLIC BLOOD PRESSURE: 110 MMHG | TEMPERATURE: 98.2 F | HEART RATE: 96 BPM | DIASTOLIC BLOOD PRESSURE: 70 MMHG | BODY MASS INDEX: 28.25 KG/M2 | WEIGHT: 180 LBS | HEIGHT: 67 IN

## 2025-02-20 DIAGNOSIS — R53.83 FATIGUE, UNSPECIFIED TYPE: ICD-10-CM

## 2025-02-20 DIAGNOSIS — M25.50 ARTHRALGIA, UNSPECIFIED JOINT: ICD-10-CM

## 2025-02-20 DIAGNOSIS — L40.50 PSORIATIC ARTHRITIS: Chronic | ICD-10-CM

## 2025-02-20 DIAGNOSIS — L40.50 PSORIATIC ARTHRITIS: ICD-10-CM

## 2025-02-20 DIAGNOSIS — M25.50 ARTHRALGIA, UNSPECIFIED JOINT: Primary | ICD-10-CM

## 2025-02-20 LAB
BASOPHILS # BLD AUTO: 0.07 10*3/MM3 (ref 0–0.2)
BASOPHILS NFR BLD AUTO: 0.6 % (ref 0–1.5)
DEPRECATED RDW RBC AUTO: 39.5 FL (ref 37–54)
EOSINOPHIL # BLD AUTO: 0 10*3/MM3 (ref 0–0.4)
EOSINOPHIL NFR BLD AUTO: 0 % (ref 0.3–6.2)
ERYTHROCYTE [DISTWIDTH] IN BLOOD BY AUTOMATED COUNT: 12.8 % (ref 12.3–15.4)
ERYTHROCYTE [SEDIMENTATION RATE] IN BLOOD: 15 MM/HR (ref 0–20)
HCT VFR BLD AUTO: 41.2 % (ref 34–46.6)
HGB BLD-MCNC: 13.9 G/DL (ref 12–15.9)
IMM GRANULOCYTES # BLD AUTO: 0.05 10*3/MM3 (ref 0–0.05)
IMM GRANULOCYTES NFR BLD AUTO: 0.4 % (ref 0–0.5)
LYMPHOCYTES # BLD AUTO: 3.08 10*3/MM3 (ref 0.7–3.1)
LYMPHOCYTES NFR BLD AUTO: 27.5 % (ref 19.6–45.3)
MCH RBC QN AUTO: 29 PG (ref 26.6–33)
MCHC RBC AUTO-ENTMCNC: 33.7 G/DL (ref 31.5–35.7)
MCV RBC AUTO: 86 FL (ref 79–97)
MONOCYTES # BLD AUTO: 0.98 10*3/MM3 (ref 0.1–0.9)
MONOCYTES NFR BLD AUTO: 8.8 % (ref 5–12)
NEUTROPHILS NFR BLD AUTO: 62.7 % (ref 42.7–76)
NEUTROPHILS NFR BLD AUTO: 7.02 10*3/MM3 (ref 1.7–7)
NRBC BLD AUTO-RTO: 0 /100 WBC (ref 0–0.2)
PLATELET # BLD AUTO: 312 10*3/MM3 (ref 140–450)
PMV BLD AUTO: 11.6 FL (ref 6–12)
RBC # BLD AUTO: 4.79 10*6/MM3 (ref 3.77–5.28)
WBC NRBC COR # BLD AUTO: 11.2 10*3/MM3 (ref 3.4–10.8)

## 2025-02-20 PROCEDURE — 86431 RHEUMATOID FACTOR QUANT: CPT

## 2025-02-20 PROCEDURE — 86160 COMPLEMENT ANTIGEN: CPT

## 2025-02-20 PROCEDURE — 86038 ANTINUCLEAR ANTIBODIES: CPT

## 2025-02-20 PROCEDURE — 86235 NUCLEAR ANTIGEN ANTIBODY: CPT

## 2025-02-20 PROCEDURE — 36415 COLL VENOUS BLD VENIPUNCTURE: CPT

## 2025-02-20 PROCEDURE — 86225 DNA ANTIBODY NATIVE: CPT

## 2025-02-20 PROCEDURE — 86480 TB TEST CELL IMMUN MEASURE: CPT

## 2025-02-20 PROCEDURE — 83520 IMMUNOASSAY QUANT NOS NONAB: CPT

## 2025-02-20 PROCEDURE — 82550 ASSAY OF CK (CPK): CPT

## 2025-02-20 PROCEDURE — 80053 COMPREHEN METABOLIC PANEL: CPT

## 2025-02-20 PROCEDURE — 86147 CARDIOLIPIN ANTIBODY EA IG: CPT

## 2025-02-20 PROCEDURE — 85652 RBC SED RATE AUTOMATED: CPT

## 2025-02-20 PROCEDURE — 86140 C-REACTIVE PROTEIN: CPT

## 2025-02-20 PROCEDURE — 85025 COMPLETE CBC W/AUTO DIFF WBC: CPT

## 2025-02-20 PROCEDURE — 82085 ASSAY OF ALDOLASE: CPT

## 2025-02-20 PROCEDURE — 81374 HLA I TYPING 1 ANTIGEN LR: CPT

## 2025-02-20 PROCEDURE — 86200 CCP ANTIBODY: CPT

## 2025-02-20 PROCEDURE — 80074 ACUTE HEPATITIS PANEL: CPT

## 2025-02-20 PROCEDURE — 86376 MICROSOMAL ANTIBODY EACH: CPT

## 2025-02-20 NOTE — TELEPHONE ENCOUNTER
----- Message from Jayy Archibald sent at 2/20/2025  3:30 PM EST -----  Regarding: Otezla  She has been on Otezla via her dermatologist. She wants us to take this over. Please PA/refill

## 2025-02-20 NOTE — ASSESSMENT & PLAN NOTE
10/7/24: Vitamin D low at 23.6, CHERYL negative, ESR normal, CRP normal, RF negative  Medication/treatment/interventions tried include: Tylenol, Otezla, Desvenlafaxine, topiramate, Trazodone, physical therapy, she saw a dermatologist, she saw a hand surgeon, She had a ganglion cyst removed, she has seen psychiatry, she is sulfa allergic (Cannot take sulfasalazine), sertraline (allergic/intolerant), aspirin, She tries to limit/avoid oral NSAIDS due to concern about kidney issues  She has seen dermatology   She is on Otezla   Otezla is FDA approved for psoriatic arthritis   She had been getting this via her dermatologist but she wants us to take this over.   We will PA/prescribe the Otezla as requested. Risks and benefits reviewed.   Orders:    XR Hand 2 View Bilateral; Future    XR Foot 3+ View Bilateral; Future    XR Sacroiliac Joints 3+ View    QuantiFERON-TB Gold Plus; Future    Hepatitis Panel, Acute; Future    14.3.3 ETA, Rheum. Arthritis; Future    CHERYL 12 Plus Profile (RDL); Future    CHERYL by IFA, Reflex to Titer and Pattern; Future    CBC Auto Differential; Future    CK; Future    Comprehensive Metabolic Panel; Future    C-reactive Protein; Future    Cyclic Citrul Peptide Antibody, IgG / IgA; Future    HLA-B27 Antigen; Future    RF Isotypes, IgG, IgA, IgM EIA; Future    Sedimentation Rate; Future    Aldolase; Future

## 2025-02-20 NOTE — PROGRESS NOTES
"                 Office Visit       Date: 02/20/2025   Patient Name: Lesly Bill  MRN: [id number]  YOB: 1980    Referring Physician: Des Connell, *     Chief Complaint   Patient presents with    Joint Pain    Psoriatic arthritis        History of Present Illness: Lesly Bill is a 44 y.o. female who is here today at the request of Des Connell PA-C. The referral indicates that she has joint pain. She has a history of psoriasis and is on Otezla.    Today she hurts primarily in her feet and hands. Her neck is stiff. No joint swelling. No muscle pain. Her arms and legs are weak. Her neck is stiff. She reports her hips and thumbs \"come out of joint\" frequently. She is very flexible. She is double jointed.     She is fatigued. She has dry eyes but not dry mouth. Her skin is dry. No rash. No hair loss. No abnormal bruising/bleeding. No shortness of breath or chest pain. No history of uveitis, iritis, or scleritis. No oral, nasal, or genital ulcers. No history of gout or Raynaud's. She has diarrhea. No  issues. She is cold intolerant. She has headaches. No paresthesias. She is cold intolerant. She has environmental and food allergies.     Medication/treatment/interventions tried include: Tylenol, Otezla, Desvenlafaxine, topiramate, Trazodone, physical therapy, she saw a dermatologist, she saw a hand surgeon, She had a ganglion cyst removed, she has seen psychiatry, she is sulfa allergic (Cannot take sulfasalazine), sertraline (allergic/intolerant), aspirin, She tries to limit/avoid oral NSAIDS due to concern about kidney issues  Studies reviewed included:   10/7/24: Vitamin D low at 23.6, CHERYL negative, ESR normal, CRP normal, RF negative     XR FOOT 3+ VW LEFT     Date of Exam: 9/13/2024 4:22 PM EDT     Indication: injury     Comparison: None available.     Findings/  IMPRESSION:  Impression:  Multiple views of the left foot were obtained. No acute fracture or " dislocation is identified. No osseous erosions are seen. There are mild osteoarthritic changes of the hallux metatarsophalangeal joint. Bony alignment is otherwise intact. There is a   small corticate osseous body seen about the dorsal TMT joints on the lateral view, likely a chronic finding. Plantar calcaneal spur is present. Soft tissues demonstrate no acute abnormality.       Subjective     Review of Systems   Constitutional:  Positive for fatigue.   HENT:  Positive for ear pain, sinus pressure and tinnitus.    Eyes:  Positive for blurred vision, itching and visual disturbance (dry eye).   Respiratory: Negative.     Cardiovascular: Negative.    Gastrointestinal:  Positive for blood in stool, diarrhea and indigestion.   Endocrine: Positive for cold intolerance.   Genitourinary: Negative.    Musculoskeletal:  Positive for arthralgias and neck stiffness.   Skin:  Positive for dry skin.   Allergic/Immunologic: Positive for environmental allergies and food allergies.   Neurological:  Positive for weakness and headache.   Hematological: Negative.    Psychiatric/Behavioral:  Positive for sleep disturbance, depressed mood and stress. The patient is nervous/anxious.    All other systems reviewed and are negative.       Past Medical History:   Diagnosis Date    Acid reflux     Anxiety     Depression     Fracture     Fracture, foot 4 years ago    In boot fracture of 5th metatarsal.    Hypertension     IBS (irritable bowel syndrome)     Kidney stone     Kidney deformity - 2 ureters left kidney    Migraine     Psoriasis     [facility]     Urinary tract infection     Visual impairment 2019    Vitreal detachment floaters       Past Surgical History:   Procedure Laterality Date    BREAST BIOPSY  2008    COLONOSCOPY      came out normal    GANGLION CYST EXCISION Right 12/20/2023    Right wrist dorsal ganglion cyst excision Dr. Meraz [facility]    WISDOM TOOTH EXTRACTION  I was 27 yrs old.    WRIST SURGERY Left 2015        Family History   Problem Relation Age of Onset    Hypertension Mother     Cancer Father         mesothelioma    Hypertension Father     Anxiety disorder Maternal Grandmother     Diabetes Maternal Grandmother         Type 2 diabetes    Ovarian cancer Paternal Grandmother         unknown    Breast cancer Paternal Aunt         60's    Cancer Paternal Aunt         Breast cancer    Breast cancer Paternal Aunt     Diabetes Maternal Uncle         Type 2 diabetes    Anxiety disorder Sister     Hypertension Sister     Uterine cancer Neg Hx     Colon cancer Neg Hx     Osteoporosis Neg Hx        Social History     Socioeconomic History    Marital status: Single    Number of children: 0    Highest education level: Master's degree (e.g., MA, MS, Tarik, MEd, MSW, OSMIN)   Tobacco Use    Smoking status: Never    Smokeless tobacco: Never   Vaping Use    Vaping status: Never Used   Substance and Sexual Activity    Alcohol use: Yes     Alcohol/week: 1.0 standard drink of alcohol     Types: 1 Glasses of wine per week     Comment: Every once in a while socially I will have wine with dinner.    Drug use: Never    Sexual activity: Not Currently     Partners: Male     Birth control/protection: Birth control pill         Current Outpatient Medications:     atorvastatin (LIPITOR) 20 MG tablet, Take 1 tablet by mouth Every Night., Disp: 90 tablet, Rfl: 1    azelastine (ASTELIN) 0.1 % nasal spray, As Needed., Disp: , Rfl:     cetirizine (zyrTEC) 10 MG tablet, Take 1 tablet by mouth Daily., Disp: 90 tablet, Rfl: 2    ciprofloxacin-dexAMETHasone (Ciprodex) 0.3-0.1 % otic suspension, Administer 4 drops into ear(s) as directed by provider 2 (Two) Times a Day., Disp: 7.5 mL, Rfl: 0    clobetasol propionate (TEMOVATE) 0.05 % cream, Apply 1 Application topically to the appropriate area as directed 2 (Two) Times a Day., Disp: 60 g, Rfl: 1    desonide (DESOWEN) 0.05 % cream, apply to the affected areas on scalp and feet twice daily x 2 wks. take  week break then use as needed for flares, Disp: , Rfl:     Desvenlafaxine Succinate ER 25 MG tablet sustained-release 24 hour, Take 1 tablet by mouth Daily., Disp: 30 tablet, Rfl: 0    dicyclomine (BENTYL) 10 MG capsule, Take 1 capsule by mouth 4 (Four) Times a Day Before Meals & at Bedtime., Disp: 120 capsule, Rfl: 0    diphenoxylate-atropine (Lomotil) 2.5-0.025 MG per tablet, Take 1 tablet by mouth 4 (Four) Times a Day As Needed for Diarrhea., Disp: 60 tablet, Rfl: 0    Drospirenone (Slynd) 4 MG tablet, Take 1 tablet by mouth Daily., Disp: 84 tablet, Rfl: 3    famotidine (Pepcid) 20 MG tablet, Take 2 tablets by mouth Daily., Disp: 30 tablet, Rfl: 2    ferrous gluconate (FERGON) 324 MG tablet, Take 1 tablet by mouth Daily With Breakfast., Disp: 90 tablet, Rfl: 1    fluticasone (FLONASE) 50 MCG/ACT nasal spray, 2 sprays into the nostril(s) as directed by provider Daily., Disp: 9.9 mL, Rfl: 1    hydroCHLOROthiazide 12.5 MG tablet, Take 1 tablet by mouth Daily., Disp: 30 tablet, Rfl: 5    hydrOXYzine (ATARAX) 10 MG tablet, Take 1 tablet by mouth 3 (Three) Times a Day As Needed for Anxiety., Disp: 30 tablet, Rfl: 0    icosapent ethyl (Vascepa) 1 g capsule capsule, Take 2 g by mouth 2 (Two) Times a Day With Meals., Disp: 360 capsule, Rfl: 1    levothyroxine (SYNTHROID, LEVOTHROID) 75 MCG tablet, Take 1 tablet by mouth Daily., Disp: 30 tablet, Rfl: 1    lisinopril (PRINIVIL,ZESTRIL) 20 MG tablet, Take 1 tablet by mouth Daily., Disp: 90 tablet, Rfl: 1    metroNIDAZOLE (METROGEL) 0.75 % vaginal gel, Insert 1 Applicatorful into the vagina Every Night., Disp: 1 each, Rfl: 2    omeprazole (priLOSEC) 40 MG capsule, TAKE 1 CAPSULE BY MOUTH EVERY DAY, Disp: 90 capsule, Rfl: 0    ondansetron ODT (ZOFRAN-ODT) 8 MG disintegrating tablet, Take 1/2 to 1 tablet by mouth (dissolve under tongue or swallow) every 8 hours as needed for nausea., Disp: 30 tablet, Rfl: 1    Otezla 30 MG tablet, , Disp: , Rfl:      "promethazine-dextromethorphan (PROMETHAZINE-DM) 6.25-15 MG/5ML syrup, Take 5 mL by mouth 4 (Four) Times a Day As Needed for Cough., Disp: 240 mL, Rfl: 0    SUMAtriptan (IMITREX) 100 MG tablet, Take 1/2 to 1 tablet at onset of headache. May repeat dose one time in 2 hours as needed. Do not exceed 200 mg in 24 hours., Disp: 9 tablet, Rfl: 5    topiramate (TOPAMAX) 25 MG tablet, Take 1 tablet by mouth every night at bedtime., Disp: 90 tablet, Rfl: 1    valACYclovir (VALTREX) 500 MG tablet, Take 1 tablet by mouth Daily., Disp: 90 tablet, Rfl: 1    vitamin D (ERGOCALCIFEROL) 1.25 MG (69927 UT) capsule capsule, Take 1 capsule by mouth 1 (One) Time Per Week., Disp: 4 capsule, Rfl: 2    furosemide (Lasix) 20 MG tablet, Take 1 tablet by mouth Daily for 14 days., Disp: 14 tablet, Rfl: 0    Current Facility-Administered Medications:     cyanocobalamin injection 1,000 mcg, 1,000 mcg, Intramuscular, Q28 Days, Des Connell PA-C, 1,000 mcg at 01/13/25 1612    Allergies   Allergen Reactions    Sulfacetamide Hives and Unknown - Low Severity    Clove Oil Rash    Clove [Cloves (Syzygium Aromaticum)] Rash and GI Intolerance    Menthol Rash    Sertraline Unknown (See Comments)    Sulfa Antibiotics Hives and Rash       I reviewed the patient's chief complaint, history of present illness, review of systems, past medical history, surgical history, family history, social history, medications and allergy list.     Objective      Vitals:    02/20/25 1501   BP: 110/70   BP Location: Left arm   Pulse: 96   Temp: 98.2 °F (36.8 °C)   Weight: 81.6 kg (180 lb)   Height: 170.2 cm (67\")   PainSc: 3      Body mass index is 28.19 kg/m².       Physical Exam       General: Well appearing 44 year old  female. Not in distress. She is ambulating unassisted.   SKIN: No rashes. No alopecia. No subcutaneous nodules. No digital pits or ulcers. No sclerodactyly.   HEENT: NCAT. Conjunctiva clear, no photophobia. No oral or nasal ulcers. Hearing " intact.    Pulmonary: Clear to auscultation bilaterally. No wheezing, rales, or rhonchi.  CV: Regular rate and rhythm. No murmurs, rubs, or gallops.   Psych: Normal mood and affect. Alert and oriented x 3.   Extremities: No cyanosis or edema.   Musculoskeletal: No joint swelling or tenderness to palpation. She has bilateral hallux valgus deformities. No warmth or erythema. Normal range of motion of the wrists, ankles, elbows, and knees.   Lymph: No palpable cervical adenopathy      Procedures    Assessment / Plan      Assessment & Plan  Arthralgia, unspecified joint  10/7/24: Vitamin D low at 23.6, CHERYL negative, ESR normal, CRP normal, RF negative  Medication/treatment/interventions tried include: Tylenol, Otezla, Desvenlafaxine, topiramate, Trazodone, physical therapy, she saw a dermatologist, she saw a hand surgeon, She had a ganglion cyst removed, she has seen psychiatry, she is sulfa allergic (Cannot take sulfasalazine), sertraline (allergic/intolerant), aspirin, She tries to limit/avoid oral NSAIDS due to concern about kidney issues  The referral indicates that she has joint pain   She has psoriasis. She could have psoriatic arthritis. See below.   We will evaluate further with labs and x-rays  We gave her a handout on psoriatic arthritis to take home and review.   Fibromyalgia would also be on our differential here.   Orders:    XR Hand 2 View Bilateral; Future    XR Foot 3+ View Bilateral; Future    XR Sacroiliac Joints 3+ View    QuantiFERON-TB Gold Plus; Future    Hepatitis Panel, Acute; Future    14.3.3 ETA, Rheum. Arthritis; Future    CHERYL 12 Plus Profile (RDL); Future    CHERYL by IFA, Reflex to Titer and Pattern; Future    CBC Auto Differential; Future    CK; Future    Comprehensive Metabolic Panel; Future    C-reactive Protein; Future    Cyclic Citrul Peptide Antibody, IgG / IgA; Future    HLA-B27 Antigen; Future    RF Isotypes, IgG, IgA, IgM EIA; Future    Sedimentation Rate; Future    Aldolase;  Future    Fatigue, unspecified type    Labs and x-rays to evaluate for autoimmune conditions. We usually contact patients with these results within 10-14 business days    Orders:    XR Hand 2 View Bilateral; Future    XR Foot 3+ View Bilateral; Future    XR Sacroiliac Joints 3+ View    QuantiFERON-TB Gold Plus; Future    Hepatitis Panel, Acute; Future    14.3.3 ETA, Rheum. Arthritis; Future    CHERYL 12 Plus Profile (RDL); Future    CHERYL by IFA, Reflex to Titer and Pattern; Future    CBC Auto Differential; Future    CK; Future    Comprehensive Metabolic Panel; Future    C-reactive Protein; Future    Cyclic Citrul Peptide Antibody, IgG / IgA; Future    HLA-B27 Antigen; Future    RF Isotypes, IgG, IgA, IgM EIA; Future    Sedimentation Rate; Future    Aldolase; Future    Psoriatic arthritis  10/7/24: Vitamin D low at 23.6, CHERYL negative, ESR normal, CRP normal, RF negative  Medication/treatment/interventions tried include: Tylenol, Otezla, Desvenlafaxine, topiramate, Trazodone, physical therapy, she saw a dermatologist, she saw a hand surgeon, She had a ganglion cyst removed, she has seen psychiatry, she is sulfa allergic (Cannot take sulfasalazine), sertraline (allergic/intolerant), aspirin, She tries to limit/avoid oral NSAIDS due to concern about kidney issues  She has seen dermatology   She is on Otezla   Otezla is FDA approved for psoriatic arthritis   She had been getting this via her dermatologist but she wants us to take this over.   We will PA/prescribe the Otezla as requested. Risks and benefits reviewed.   Orders:    XR Hand 2 View Bilateral; Future    XR Foot 3+ View Bilateral; Future    XR Sacroiliac Joints 3+ View    QuantiFERON-TB Gold Plus; Future    Hepatitis Panel, Acute; Future    14.3.3 ETA, Rheum. Arthritis; Future    CHERYL 12 Plus Profile (RDL); Future    CHERYL by IFA, Reflex to Titer and Pattern; Future    CBC Auto Differential; Future    CK; Future    Comprehensive Metabolic Panel; Future    C-reactive  Protein; Future    Cyclic Citrul Peptide Antibody, IgG / IgA; Future    HLA-B27 Antigen; Future    RF Isotypes, IgG, IgA, IgM EIA; Future    Sedimentation Rate; Future    Aldolase; Future      Follow Up:   Return in about 3 months (around 5/20/2025).    Jayy Archibald DO  [facility]

## 2025-02-21 ENCOUNTER — TELEPHONE (OUTPATIENT)
Dept: INTERNAL MEDICINE | Facility: CLINIC | Age: 45
End: 2025-02-21

## 2025-02-21 LAB
ALBUMIN SERPL-MCNC: 4.2 G/DL (ref 3.5–5.2)
ALBUMIN/GLOB SERPL: 1.6 G/DL
ALP SERPL-CCNC: 104 U/L (ref 39–117)
ALT SERPL W P-5'-P-CCNC: 16 U/L (ref 1–33)
ANION GAP SERPL CALCULATED.3IONS-SCNC: 13 MMOL/L (ref 5–15)
AST SERPL-CCNC: 15 U/L (ref 1–32)
BILIRUB SERPL-MCNC: 0.3 MG/DL (ref 0–1.2)
BUN SERPL-MCNC: 9 MG/DL (ref 6–20)
BUN/CREAT SERPL: 8.5 (ref 7–25)
CALCIUM SPEC-SCNC: 9 MG/DL (ref 8.6–10.5)
CHLORIDE SERPL-SCNC: 103 MMOL/L (ref 98–107)
CK SERPL-CCNC: 48 U/L (ref 20–180)
CO2 SERPL-SCNC: 23 MMOL/L (ref 22–29)
CREAT SERPL-MCNC: 1.06 MG/DL (ref 0.57–1)
CRP SERPL-MCNC: 0.39 MG/DL (ref 0–0.5)
EGFRCR SERPLBLD CKD-EPI 2021: 66.2 ML/MIN/1.73
GLOBULIN UR ELPH-MCNC: 2.7 GM/DL
GLUCOSE SERPL-MCNC: 88 MG/DL (ref 65–99)
HAV IGM SERPL QL IA: NORMAL
HBV CORE IGM SERPL QL IA: NORMAL
HBV SURFACE AG SERPL QL IA: NORMAL
HCV AB SER QL: NORMAL
POTASSIUM SERPL-SCNC: 4.3 MMOL/L (ref 3.5–5.2)
PROT SERPL-MCNC: 6.9 G/DL (ref 6–8.5)
SODIUM SERPL-SCNC: 139 MMOL/L (ref 136–145)

## 2025-02-21 NOTE — TELEPHONE ENCOUNTER
Prior authorization initiated by [facility].  Update will be provided when a determination has been received.     Medication: Otezla 30mg  PA Submission Method: CMM  Case Number/CMM Key: [id number]

## 2025-02-21 NOTE — TELEPHONE ENCOUNTER
Caller: Lesly Bill    Relationship: Self    Best call back number:  [phone] (Mobile)     What test was performed:  BLOOD WORK     When was the test performed: 2-20-25    Where was the test performed:  [facility]     Additional notes:  PATIENT HAD BLOOD WORK YESTERDAY 2-20-25 FOR THE RHEUMATOLOGIST AND THE RESULTS CAME BACK THIS MORNING     PATIENT SAID SHE WOULD LIKE TO SEE WHAT ANNABELLA MAC THOUGHT ABOUT THE RESULTS     PLEASE CALL   PATIENT SAID SHE TRUSTS WHAT ANNABELLA THINKS

## 2025-02-24 ENCOUNTER — SPECIALTY PHARMACY (OUTPATIENT)
Age: 45
End: 2025-02-24
Payer: COMMERCIAL

## 2025-02-24 LAB — ALDOLASE SERPL-CCNC: 1.5 U/L (ref 3.3–10.3)

## 2025-02-24 NOTE — TELEPHONE ENCOUNTER
PA request has been approved and pharmacy notified (Filling pharmacy will be notified by phone, fax, or submitted prescription)  [id number]   Authorized Medication: Otezla 30mg + Otezla 20mg   Name of Insurance Approving PA: [facility]  Pharmacy PA Number:  [id number]  PA Effective Dates: 02/21/25-02/21/26  Dispensing Pharmacy: [facility], [place], operated [facility]

## 2025-02-25 LAB
14-3-3 ETA AG SER IA-MCNC: <0.2 NG/ML
GAMMA INTERFERON BACKGROUND BLD IA-ACNC: 0.03 IU/ML
M TB IFN-G BLD-IMP: NEGATIVE
M TB IFN-G CD4+ BCKGRND COR BLD-ACNC: 0.04 IU/ML
M TB IFN-G CD4+CD8+ BCKGRND COR BLD-ACNC: 0.05 IU/ML
MITOGEN IGNF BCKGRD COR BLD-ACNC: 6.53 IU/ML
QUANTIFERON INCUBATION: NORMAL
RF IGA SER-ACNC: <7 U
RF IGG SER-ACNC: <7 U
RF IGM SER IA-ACNC: <7 U
SERVICE CMNT-IMP: NORMAL

## 2025-02-26 ENCOUNTER — SPECIALTY PHARMACY (OUTPATIENT)
Facility: HOSPITAL | Age: 45
End: 2025-02-26
Payer: COMMERCIAL

## 2025-02-26 LAB — HLA-B27 QL NAA+PROBE: NEGATIVE

## 2025-02-26 RX ORDER — APREMILAST 30 MG/1
1 TABLET, FILM COATED ORAL 2 TIMES DAILY
Qty: 60 TABLET | Refills: 5 | Status: SHIPPED | OUTPATIENT
Start: 2025-02-26

## 2025-02-26 NOTE — PROGRESS NOTES
Specialty Pharmacy Patient Management Program  Rheumatology Initial Assessment     Lesly Bill was referred by an Rheumatology provider to the Rheumatology Patient Management program offered by [facility] for Psoriatic Arthritis on 02/26/25.  An initial outreach was conducted, including assessment of therapy appropriateness and specialty medication education for Otezla. The patient was introduced to services offered by [facility], including: regular assessments, refill coordination, curbside pick-up or mail order delivery options, prior authorization maintenance, and financial assistance programs as applicable. The patient was also provided with contact information for the pharmacy team.     Insurance Coverage & Financial Support  [employer] and copay card     Relevant Past Medical History and Comorbidities  Relevant medical history and concomitant health conditions were discussed with the patient. The patient's chart has been reviewed for relevant past medical history and comorbid conditions and updated as necessary.  Past Medical History:   Diagnosis Date    Acid reflux     Anxiety     Depression     Fracture     Fracture, foot 4 years ago    In boot fracture of 5th metatarsal.    Hypertension     IBS (irritable bowel syndrome)     Kidney stone     Kidney deformity - 2 ureters left kidney    Migraine     Psoriasis     [facility]     Urinary tract infection     Visual impairment 2019    Vitreal detachment floaters     Social History     Socioeconomic History    Marital status: Single    Number of children: 0    Highest education level: Master's degree (e.g., MA, MS, Tarik, MEd, MSW, OSMIN)   Tobacco Use    Smoking status: Never    Smokeless tobacco: Never   Vaping Use    Vaping status: Never Used   Substance and Sexual Activity    Alcohol use: Yes     Alcohol/week: 1.0 standard drink of alcohol     Types: 1 Glasses of wine per week     Comment:  Every once in a while socially I will have wine with dinner.    Drug use: Never    Sexual activity: Not Currently     Partners: Male     Birth control/protection: Birth control pill       Problem list reviewed by Dmitriy Perez, PharmD on 2/26/2025 at 11:18 AM    Allergies  Known allergies and reactions were discussed with the patient. The patient's chart has been reviewed for  allergy information and updated as necessary.   Allergies   Allergen Reactions    Sulfacetamide Hives and Unknown - Low Severity    Clove Oil Rash    Clove [Cloves (Syzygium Aromaticum)] Rash and GI Intolerance    Menthol Rash    Sertraline Unknown (See Comments)    Sulfa Antibiotics Hives and Rash       Allergies reviewed by Dmitriy Perez, PharmD on 2/26/2025 at 11:18 AM    Relevant Laboratory Values  Relevant laboratory values were discussed with the patient. The following specialty medication dose adjustment(s) are recommended: n/a  A1C Last 3 Results          7/2/2024    08:53 10/7/2024    08:45 1/27/2025    08:55   HGBA1C Last 3 Results   Hemoglobin A1C 5.40  6.00  5.80      Lab Results   Component Value Date    HGBA1C 5.80 (H) 01/27/2025     Lab Results   Component Value Date    GLUCOSE 88 02/20/2025    CALCIUM 9.0 02/20/2025     02/20/2025    K 4.3 02/20/2025    CO2 23.0 02/20/2025     02/20/2025    BUN 9 02/20/2025    CREATININE 1.06 (H) 02/20/2025    EGFRIFNONA 52 (L) 08/12/2021    BCR 8.5 02/20/2025    ANIONGAP 13.0 02/20/2025     Lab Results   Component Value Date    CHOL 175 01/27/2025    TRIG 190 (H) 01/27/2025    HDL 35 (L) 01/27/2025     (H) 01/27/2025         Current Medication List  This medication list has been reviewed with the patient and evaluated for any interactions or necessary modifications/recommendations, and updated to include all prescription medications, OTC medications, and supplements the patient is currently taking.  This list reflects what is contained in the patient's  profile, which has also been marked as reviewed to communicate to other providers it is the most up to date version of the patient's current medication therapy.     Current Outpatient Medications:     atorvastatin (LIPITOR) 20 MG tablet, Take 1 tablet by mouth Every Night., Disp: 90 tablet, Rfl: 1    azelastine (ASTELIN) 0.1 % nasal spray, As Needed., Disp: , Rfl:     cetirizine (zyrTEC) 10 MG tablet, Take 1 tablet by mouth Daily., Disp: 90 tablet, Rfl: 2    ciprofloxacin-dexAMETHasone (Ciprodex) 0.3-0.1 % otic suspension, Administer 4 drops into ear(s) as directed by provider 2 (Two) Times a Day., Disp: 7.5 mL, Rfl: 0    clobetasol propionate (TEMOVATE) 0.05 % cream, Apply 1 Application topically to the appropriate area as directed 2 (Two) Times a Day., Disp: 60 g, Rfl: 1    desonide (DESOWEN) 0.05 % cream, apply to the affected areas on scalp and feet twice daily x 2 wks. take week break then use as needed for flares, Disp: , Rfl:     Desvenlafaxine Succinate ER 25 MG tablet sustained-release 24 hour, Take 1 tablet by mouth Daily., Disp: 30 tablet, Rfl: 0    dicyclomine (BENTYL) 10 MG capsule, Take 1 capsule by mouth 4 (Four) Times a Day Before Meals & at Bedtime., Disp: 120 capsule, Rfl: 0    diphenoxylate-atropine (Lomotil) 2.5-0.025 MG per tablet, Take 1 tablet by mouth 4 (Four) Times a Day As Needed for Diarrhea., Disp: 60 tablet, Rfl: 0    Drospirenone (Slynd) 4 MG tablet, Take 1 tablet by mouth Daily., Disp: 84 tablet, Rfl: 3    famotidine (Pepcid) 20 MG tablet, Take 2 tablets by mouth Daily., Disp: 30 tablet, Rfl: 2    ferrous gluconate (FERGON) 324 MG tablet, Take 1 tablet by mouth Daily With Breakfast., Disp: 90 tablet, Rfl: 1    fluticasone (FLONASE) 50 MCG/ACT nasal spray, 2 sprays into the nostril(s) as directed by provider Daily., Disp: 9.9 mL, Rfl: 1    furosemide (Lasix) 20 MG tablet, Take 1 tablet by mouth Daily for 14 days., Disp: 14 tablet, Rfl: 0    hydroCHLOROthiazide 12.5 MG tablet, Take 1  tablet by mouth Daily., Disp: 30 tablet, Rfl: 5    hydrOXYzine (ATARAX) 10 MG tablet, Take 1 tablet by mouth 3 (Three) Times a Day As Needed for Anxiety., Disp: 30 tablet, Rfl: 0    icosapent ethyl (Vascepa) 1 g capsule capsule, Take 2 g by mouth 2 (Two) Times a Day With Meals., Disp: 360 capsule, Rfl: 1    levothyroxine (SYNTHROID, LEVOTHROID) 75 MCG tablet, Take 1 tablet by mouth Daily., Disp: 30 tablet, Rfl: 1    lisinopril (PRINIVIL,ZESTRIL) 20 MG tablet, Take 1 tablet by mouth Daily., Disp: 90 tablet, Rfl: 1    metroNIDAZOLE (METROGEL) 0.75 % vaginal gel, Insert 1 Applicatorful into the vagina Every Night., Disp: 1 each, Rfl: 2    omeprazole (priLOSEC) 40 MG capsule, TAKE 1 CAPSULE BY MOUTH EVERY DAY, Disp: 90 capsule, Rfl: 0    ondansetron ODT (ZOFRAN-ODT) 8 MG disintegrating tablet, Take 1/2 to 1 tablet by mouth (dissolve under tongue or swallow) every 8 hours as needed for nausea., Disp: 30 tablet, Rfl: 1    Otezla 30 MG tablet, Take 30 mg by mouth 2 (Two) Times a Day., Disp: 60 tablet, Rfl: 5    promethazine-dextromethorphan (PROMETHAZINE-DM) 6.25-15 MG/5ML syrup, Take 5 mL by mouth 4 (Four) Times a Day As Needed for Cough., Disp: 240 mL, Rfl: 0    SUMAtriptan (IMITREX) 100 MG tablet, Take 1/2 to 1 tablet at onset of headache. May repeat dose one time in 2 hours as needed. Do not exceed 200 mg in 24 hours., Disp: 9 tablet, Rfl: 5    topiramate (TOPAMAX) 25 MG tablet, Take 1 tablet by mouth every night at bedtime., Disp: 90 tablet, Rfl: 1    valACYclovir (VALTREX) 500 MG tablet, Take 1 tablet by mouth Daily., Disp: 90 tablet, Rfl: 1    vitamin D (ERGOCALCIFEROL) 1.25 MG (45710 UT) capsule capsule, Take 1 capsule by mouth 1 (One) Time Per Week., Disp: 4 capsule, Rfl: 2    Current Facility-Administered Medications:     cyanocobalamin injection 1,000 mcg, 1,000 mcg, Intramuscular, Q28 Days, Des Connell PA-C, 1,000 mcg at 01/13/25 1612    Medicines reviewed by Dmitriy Perez, AntoniettaD on  2/26/2025 at 11:18 AM    Drug Interactions  N/a    Initial Education Provided for Specialty Medication  The patient has been provided with the following education and any applicable administration techniques (i.e. self-injection) have been demonstrated for the therapies indicated. All questions and concerns have been addressed prior to the patient receiving the medication, and the patient has verbalized comprehension of the education and any materials provided. Additional patient education shall be provided and documented upon request by the patient, provider, or payer.          Otezla (apremilast)           Medication Expectations   Why am I taking this medication? You are taking this medication for treatment of plaque psoriasis, psoriatic arthritis, and certain ulcers.   What should I expect while on this medication? You should expect to a decrease in the frequency and severity of your symptoms.   How does the medication work? Otezla is called a PDE4 inhibitor, which works inside inflammatory cells to reduce PDE4 activity. A reduction in PDE4 activity will reduce the overactive inflammation from occurring.   How long will I be on this medication for? The amount of time you will be on this medication will be determined by your doctor and your response to the medication.    How do I take this medication? Take as directed on your prescription label. This medication is an oral tablet, swallow whole.  Do not chew, break or crush.  This medication may be taken with or without food.   What are some possible side effects? Depressed mood, weight loss, diarrhea, headache, nausea, vomiting   What happens if I miss a dose? If you miss a dose, take it as soon as you can. If it is almost time for your next dose, take only that dose. Do not take double or extra doses.                  Medication Safety   What are things I should warn my doctor immediately about? Patients and their families should watch out for new or worsening  depression or thoughts of suicide. Also watch out for sudden changes in feelings such as feeling anxious, agitated, panicky, irritable, hostile, aggressive, impulsive, severely restless, overly excited and hyperactive, or not being able to sleep.  Weight loss should also be reported.   What are things that I should be cautious of? Severe diarrhea, nausea and vomiting, or if you sweat a lot. The loss of too much body fluid can make it dangerous for you to take this medicine.   What are some medications that can interact with this one? This medicine may interact with the following medications:  carbamazepine, phenobarbital, phenytoin, and rifampin.            Medication Storage/Handling   How should I handle this medication? Keep this medication out of reach of pets/children.   How does this medication need to be stored? Store at room temperature in a dry place. Do not store in a bathroom.  Keep all drugs in a safe place.    How should I dispose of this medication? Throw away unused or  drugs. Do not flush down a toilet or pour down a drain unless you are told to do so. Check with your pharmacist if you have questions about the best way to throw out drugs. There may be drug take-back programs in your area.            Resources/Support   How can I remind myself to take this medication? You can download a reminder isrrael on your phone or use a calandar  to help.   Is financial support available?  Yes, [employer] can provide co-pay cards if you have commercial insurance or patient assistance if you have Medicare or no insurance.    Which vaccines are recommended for me? Talk to your doctor about these vaccines: Flu, Coronavirus (COVID-19), Pneumococcal (pneumonia), Tdap, Hepatitis B, Zoster (shingles)               Adherence and Self-Administration  Adherence related to the patient's specialty therapy was discussed with the patient. The Adherence segment of this outreach has been reviewed and updated.     Is there a  concern with patient's ability to self administer the medication correctly and without issue?: No  Were any potential barriers to adherence identified during the initial assessment or patient education?: No  Are there any concerns regarding the patient's understanding of the importance of medication adherence?: No  Methods for Supporting Patient Adherence and/or Self-Administration: n/a    Open Medication Therapy Problems  No medication therapy recommendations to display    Goals of Therapy  Goals related to the patient's specialty therapy were discussed with the patient. The Patient Goals segment of this outreach has been reviewed and updated.   Goals Addressed Today    None       Reassessment Plan & Follow-Up  1. Medication Therapy Changes: Otezla 30mg bid  2. Related Plans, Therapy Recommendations, or Therapy Problems to Be Addressed: Patient currently on therapy.  Rheumatology now prescribing her Otezla versus dermatology.  Patient does not have any questions or concerns about medication.  Advised patient to call direct line with any further questions.    3. Pharmacist to perform regular assessments no more than (6) months from the previous assessment.  4. Care Coordinator to set up future refill outreaches, coordinate prescription delivery, and escalate clinical questions to pharmacist.  5. Welcome information and patient satisfaction survey to be sent by specialty pharmacy team with patient's initial fill.    Attestation  Therapeutic appropriateness: Appropriate   I attest the patient was actively involved in and has agreed to the above plan of care. If the prescribed therapy is at any point deemed not appropriate based on the current or future assessments, a consultation will be initiated with the patient's specialty care provider to determine the best course of action. The revised plan of therapy will be documented along with any required assessments and/or additional patient education provided.     Dmitriy  Chris PharmD  Clinical Specialty Pharmacist, Rheumatology  2/26/2025  11:20 EST

## 2025-02-27 RX ORDER — TOPIRAMATE 25 MG/1
25 TABLET, FILM COATED ORAL
Qty: 90 TABLET | Refills: 2 | Status: SHIPPED | OUTPATIENT
Start: 2025-02-27

## 2025-02-28 LAB
ANA HOMOGEN TITR SER: ABNORMAL {TITER}
ANA PLUS 12 INTERPRETATION: ABNORMAL
ANA SER QL IF: POSITIVE
C3 SERPL-MCNC: 174 MG/DL (ref 90–180)
C4 SERPL-MCNC: 31 MG/DL (ref 14–44)
CARDIOLIPIN IGA SER IA-ACNC: <12 APL U/ML
CARDIOLIPIN IGG SER IA-ACNC: <15 GPL U/ML
CARDIOLIPIN IGM SER IA-ACNC: 19 MPL U/ML
CCP IGA+IGG SERPL IA-ACNC: <20 UNITS
CENTROMERE AB TITR SER IF: ABNORMAL {TITER}
CHROMATIN IGG SERPL-ACNC: <20 UNITS
DSDNA AB SER FARR-ACNC: <8 IU/ML
ENA SCL70 AB SER IA-ACNC: <20 UNITS
ENA SM AB SER-ACNC: <20 UNITS
ENA SS-A AB SER IA-ACNC: <20 UNITS
ENA SS-B AB SER IA-ACNC: <20 UNITS
LABORATORY COMMENT REPORT: ABNORMAL
RHEUMATOID FACT SERPL-ACNC: <14 IU/ML
THYROPEROXIDASE AB SERPL-ACNC: <9 IU/ML
U1 SNRNP AB SER IA-ACNC: <20 UNITS

## 2025-03-18 DIAGNOSIS — M19.071 PRIMARY OSTEOARTHRITIS OF BOTH FEET: Primary | ICD-10-CM

## 2025-03-18 DIAGNOSIS — M19.072 PRIMARY OSTEOARTHRITIS OF BOTH FEET: Primary | ICD-10-CM

## 2025-03-24 ENCOUNTER — SPECIALTY PHARMACY (OUTPATIENT)
Age: 45
End: 2025-03-24
Payer: COMMERCIAL

## 2025-03-24 NOTE — PROGRESS NOTES
Specialty Pharmacy Patient Management Program  Refill Outreach     Lesly was contacted today regarding refills of their medication(s).    Refill Questions      Flowsheet Row Most Recent Value   Changes to allergies? No   Changes to medications? No   New conditions or infections since last clinic visit No   Unplanned office visit, urgent care, ED, or hospital admission in the last 4 weeks  No   How does patient/caregiver feel medication is working? Very good   Financial problems or insurance changes  No   Since the previous refill, were any specialty medication doses or scheduled injections missed or delayed?  No   Does this patient require a clinical escalation to a pharmacist? No            Delivery Questions      Flowsheet Row Most Recent Value   Delivery method UPS   Delivery address verified with patient/caregiver? Yes   Delivery address Home   Number of medications in delivery 1   Medication(s) being filled and delivered Apremilast (Otezla)   Doses left of specialty medications 5   Copay verified? No   Copay amount 0   Copay form of payment No copayment ($0)   Delivery Date Selection 03/25/25   Signature Required No                 Follow-up: 23 day(s)     Irene Connell, Pharmacy Technician  3/24/2025  08:32 EDT

## 2025-03-25 ENCOUNTER — OFFICE VISIT (OUTPATIENT)
Dept: GASTROENTEROLOGY | Facility: CLINIC | Age: 45
End: 2025-03-25
Payer: COMMERCIAL

## 2025-03-25 VITALS
OXYGEN SATURATION: 98 % | HEART RATE: 76 BPM | WEIGHT: 186 LBS | SYSTOLIC BLOOD PRESSURE: 110 MMHG | DIASTOLIC BLOOD PRESSURE: 68 MMHG | TEMPERATURE: 97.5 F | BODY MASS INDEX: 29.19 KG/M2 | HEIGHT: 67 IN

## 2025-03-25 DIAGNOSIS — K58.0 IRRITABLE BOWEL SYNDROME WITH DIARRHEA: Primary | ICD-10-CM

## 2025-03-25 PROCEDURE — 99214 OFFICE O/P EST MOD 30 MIN: CPT | Performed by: INTERNAL MEDICINE

## 2025-03-25 NOTE — PROGRESS NOTES
PCP: Des Connell PA-C    No chief complaint on file.      History of Present Illness:   Lesly Bill is a 44 y.o. female who presents to GI clinic as a follow up for IBS-D.   She did feel like rifaximin helped. Having 4-5 liquid bms a day. No gib loss. Maintaining wt.    Past Medical History:   Diagnosis Date    Acid reflux     Anxiety     Depression     Fracture     Fracture, foot 4 years ago    In boot fracture of 5th metatarsal.    Hypertension     IBS (irritable bowel syndrome)     Kidney stone     Kidney deformity - 2 ureters left kidney    Migraine     Psoriasis     [facility]     Urinary tract infection     Visual impairment 2019    Vitreal detachment floaters       Past Surgical History:   Procedure Laterality Date    BREAST BIOPSY  2008    COLONOSCOPY      came out normal    GANGLION CYST EXCISION Right 12/20/2023    Right wrist dorsal ganglion cyst excision Dr. Meraz [facility]    WISDOM TOOTH EXTRACTION  I was 27 yrs old.    WRIST SURGERY Left 2015         Current Outpatient Medications:     atorvastatin (LIPITOR) 20 MG tablet, Take 1 tablet by mouth Every Night., Disp: 90 tablet, Rfl: 1    azelastine (ASTELIN) 0.1 % nasal spray, As Needed., Disp: , Rfl:     cetirizine (zyrTEC) 10 MG tablet, Take 1 tablet by mouth Daily., Disp: 90 tablet, Rfl: 2    ciprofloxacin-dexAMETHasone (Ciprodex) 0.3-0.1 % otic suspension, Administer 4 drops into ear(s) as directed by provider 2 (Two) Times a Day., Disp: 7.5 mL, Rfl: 0    clobetasol propionate (TEMOVATE) 0.05 % cream, Apply 1 Application topically to the appropriate area as directed 2 (Two) Times a Day., Disp: 60 g, Rfl: 1    desonide (DESOWEN) 0.05 % cream, apply to the affected areas on scalp and feet twice daily x 2 wks. take week break then use as needed for flares, Disp: , Rfl:     Desvenlafaxine Succinate ER 25 MG tablet sustained-release 24 hour, Take 1 tablet by mouth Daily., Disp: 30 tablet, Rfl: 0    dicyclomine (BENTYL)  10 MG capsule, Take 1 capsule by mouth 4 (Four) Times a Day Before Meals & at Bedtime., Disp: 120 capsule, Rfl: 0    diphenoxylate-atropine (Lomotil) 2.5-0.025 MG per tablet, Take 1 tablet by mouth 4 (Four) Times a Day As Needed for Diarrhea., Disp: 60 tablet, Rfl: 0    Drospirenone (Slynd) 4 MG tablet, Take 1 tablet by mouth Daily., Disp: 84 tablet, Rfl: 3    famotidine (Pepcid) 20 MG tablet, Take 2 tablets by mouth Daily., Disp: 30 tablet, Rfl: 2    ferrous gluconate (FERGON) 324 MG tablet, Take 1 tablet by mouth Daily With Breakfast., Disp: 90 tablet, Rfl: 1    fluticasone (FLONASE) 50 MCG/ACT nasal spray, 2 sprays into the nostril(s) as directed by provider Daily., Disp: 9.9 mL, Rfl: 1    furosemide (Lasix) 20 MG tablet, Take 1 tablet by mouth Daily for 14 days., Disp: 14 tablet, Rfl: 0    hydroCHLOROthiazide 12.5 MG tablet, Take 1 tablet by mouth Daily., Disp: 30 tablet, Rfl: 5    hydrOXYzine (ATARAX) 10 MG tablet, Take 1 tablet by mouth 3 (Three) Times a Day As Needed for Anxiety., Disp: 30 tablet, Rfl: 0    icosapent ethyl (Vascepa) 1 g capsule capsule, Take 2 g by mouth 2 (Two) Times a Day With Meals., Disp: 360 capsule, Rfl: 1    levothyroxine (SYNTHROID, LEVOTHROID) 75 MCG tablet, Take 1 tablet by mouth Daily., Disp: 30 tablet, Rfl: 1    lisinopril (PRINIVIL,ZESTRIL) 20 MG tablet, Take 1 tablet by mouth Daily., Disp: 90 tablet, Rfl: 1    metroNIDAZOLE (METROGEL) 0.75 % vaginal gel, Insert 1 Applicatorful into the vagina Every Night., Disp: 1 each, Rfl: 2    omeprazole (priLOSEC) 40 MG capsule, TAKE 1 CAPSULE BY MOUTH EVERY DAY, Disp: 90 capsule, Rfl: 0    ondansetron ODT (ZOFRAN-ODT) 8 MG disintegrating tablet, Take 1/2 to 1 tablet by mouth (dissolve under tongue or swallow) every 8 hours as needed for nausea., Disp: 30 tablet, Rfl: 1    Otezla 30 MG tablet, Take 30 mg by mouth 2 (Two) Times a Day., Disp: 60 tablet, Rfl: 5    promethazine-dextromethorphan (PROMETHAZINE-DM) 6.25-15 MG/5ML syrup, Take 5 mL  by mouth 4 (Four) Times a Day As Needed for Cough., Disp: 240 mL, Rfl: 0    SUMAtriptan (IMITREX) 100 MG tablet, Take 1/2 to 1 tablet at onset of headache. May repeat dose one time in 2 hours as needed. Do not exceed 200 mg in 24 hours., Disp: 9 tablet, Rfl: 5    topiramate (TOPAMAX) 25 MG tablet, TAKE 1 TABLET BY MOUTH AT BEDTIME, Disp: 90 tablet, Rfl: 2    valACYclovir (VALTREX) 500 MG tablet, Take 1 tablet by mouth Daily., Disp: 90 tablet, Rfl: 1    vitamin D (ERGOCALCIFEROL) 1.25 MG (32476 UT) capsule capsule, Take 1 capsule by mouth 1 (One) Time Per Week., Disp: 4 capsule, Rfl: 2    Current Facility-Administered Medications:     cyanocobalamin injection 1,000 mcg, 1,000 mcg, Intramuscular, Q28 Days, Des Connell PA-C, 1,000 mcg at 01/13/25 1612    Allergies   Allergen Reactions    Sulfacetamide Hives and Unknown - Low Severity    Clove Oil Rash    Clove [Cloves (Syzygium Aromaticum)] Rash and GI Intolerance    Menthol Rash    Sertraline Unknown (See Comments)    Sulfa Antibiotics Hives and Rash       Family History   Problem Relation Age of Onset    Hypertension Mother     Cancer Father         mesothelioma    Hypertension Father     Anxiety disorder Maternal Grandmother     Diabetes Maternal Grandmother         Type 2 diabetes    Ovarian cancer Paternal Grandmother         unknown    Breast cancer Paternal Aunt         60's    Cancer Paternal Aunt         Breast cancer    Breast cancer Paternal Aunt     Diabetes Maternal Uncle         Type 2 diabetes    Anxiety disorder Sister     Hypertension Sister     Uterine cancer Neg Hx     Colon cancer Neg Hx     Osteoporosis Neg Hx        Social History     Socioeconomic History    Marital status: Single    Number of children: 0    Highest education level: Master's degree (e.g., MA, MS, Tarik, MEd, MSW, OSMIN)   Tobacco Use    Smoking status: Never    Smokeless tobacco: Never   Vaping Use    Vaping status: Never Used   Substance and Sexual Activity    Alcohol  use: Yes     Alcohol/week: 1.0 standard drink of alcohol     Types: 1 Glasses of wine per week     Comment: Every once in a while socially I will have wine with dinner.    Drug use: Never    Sexual activity: Not Currently     Partners: Male     Birth control/protection: Birth control pill       Review of Systems  A complete 12 point ros was asked and is negative except for that mentioned above.  In particular:  No fever  No rash  No increased arthralgias  No worsening edema  No cough  No dyspnea  No chest pain      There were no vitals filed for this visit.    Physical Exam  General: well developed, well nourished  A+O x 3 NAD  HEENT: NCAT, pupils equal appearing, sclera appear white  NECK: full ROM  Respiratory: symmetric chest rise, normal effort, normal work of breathing, no overt rales  Abdomen: non-distended  Skin: normal color, no jaundice  Neuro: no tremor, no facial droop  Psych: normal mood and affect      Assessment/Plan  Workup: colonoscopy 2020 [facility]  Colonoscopy 6/5/24: adequate prep, diverticulosis in sigmoid colon, repeat 5-10 years    Ct a/p with iv contrast: 6/18/24:   Abnormal small bowel loops on luq, enteritis, small ascites.    1.) Abnormal finding on radiologic exam, 6/18/24  Suspect viral illness. She continues to follow up with ob gyn.  MRI abdomen 7/2024 with complete resolution of enteritis.    2.) IBS-D  Managed previously at   Doing ok on lomotil and prn bentyl  - past trials: prn bentyl, low fodmap diet  - minimal improvement on align, received good benefit from rifaximin 6 months ago; will rx another course of rifaximin  - she still has her gallbladder so one option may be to pursue eluxadoline. She was educated about risk of pancreatitis.    3.) GERD  Continue ppi  Future consideration: egd        Rahul Van MD  3/25/2025

## 2025-03-27 ENCOUNTER — PRIOR AUTHORIZATION (OUTPATIENT)
Dept: GASTROENTEROLOGY | Facility: CLINIC | Age: 45
End: 2025-03-27
Payer: COMMERCIAL

## 2025-03-28 ENCOUNTER — TELEPHONE (OUTPATIENT)
Dept: ORTHOPEDIC SURGERY | Facility: CLINIC | Age: 45
End: 2025-03-28

## 2025-03-28 NOTE — TELEPHONE ENCOUNTER
Caller: Lesly Bill    Relationship to patient: Self    Best call back number: [phone] (home)   Type of visit: NEW PATIENT     If rescheduling, when is the original appointment: 3.28.25 R/S TO 4.15.25

## 2025-04-08 RX ORDER — LEVOTHYROXINE SODIUM 75 UG/1
75 TABLET ORAL DAILY
Qty: 30 TABLET | Refills: 1 | Status: SHIPPED | OUTPATIENT
Start: 2025-04-08

## 2025-04-08 RX ORDER — LISINOPRIL 20 MG/1
20 TABLET ORAL DAILY
Qty: 90 TABLET | Refills: 0 | Status: SHIPPED | OUTPATIENT
Start: 2025-04-08

## 2025-04-08 RX ORDER — OMEPRAZOLE 40 MG/1
40 CAPSULE, DELAYED RELEASE ORAL DAILY
Qty: 90 CAPSULE | Refills: 0 | Status: SHIPPED | OUTPATIENT
Start: 2025-04-08

## 2025-04-08 RX ORDER — LISINOPRIL 20 MG/1
20 TABLET ORAL DAILY
Qty: 90 TABLET | Refills: 0 | Status: SHIPPED | OUTPATIENT
Start: 2025-04-08 | End: 2025-04-08 | Stop reason: SDUPTHER

## 2025-04-08 RX ORDER — OMEPRAZOLE 40 MG/1
40 CAPSULE, DELAYED RELEASE ORAL DAILY
Qty: 90 CAPSULE | Refills: 0 | Status: SHIPPED | OUTPATIENT
Start: 2025-04-08 | End: 2025-04-08 | Stop reason: SDUPTHER

## 2025-04-08 NOTE — TELEPHONE ENCOUNTER
Caller: Lesly Bill    Relationship: Self    Best call back number:   Telephone Information:   Mobile [phone]        Requested Prescriptions:   Requested Prescriptions     Pending Prescriptions Disp Refills    omeprazole (priLOSEC) 40 MG capsule 90 capsule 0     Sig: Take 1 capsule by mouth Daily.    levothyroxine (SYNTHROID, LEVOTHROID) 75 MCG tablet 30 tablet 1     Sig: Take 1 tablet by mouth Daily.    lisinopril (PRINIVIL,ZESTRIL) 20 MG tablet 90 tablet 0     Sig: Take 1 tablet by mouth Daily.        Pharmacy where request should be sent: [id number] [place] [id number] [facility] - [phone]  - [phone]     Last office visit with prescribing clinician: 1/27/2025   Last telemedicine visit with prescribing clinician: Visit date not found   Next office visit with prescribing clinician: 4/28/2025     Additional details provided by patient:     Does the patient have less than a 3 day supply:  [x] Yes  [] No    Would you like a call back once the refill request has been completed: [] Yes  No  []  If the office needs to give you a call back, can they leave a voicemail: [] Yes [x] No    Smita Costa Rep   04/08/25 10:38 EDT

## 2025-04-15 ENCOUNTER — OFFICE VISIT (OUTPATIENT)
Age: 45
End: 2025-04-15
Payer: COMMERCIAL

## 2025-04-15 VITALS
HEIGHT: 67 IN | WEIGHT: 182.3 LBS | DIASTOLIC BLOOD PRESSURE: 70 MMHG | BODY MASS INDEX: 28.61 KG/M2 | SYSTOLIC BLOOD PRESSURE: 106 MMHG

## 2025-04-15 DIAGNOSIS — M24.574 CONTRACTURE OF JOINT OF RIGHT FOOT: ICD-10-CM

## 2025-04-15 DIAGNOSIS — M24.575 CONTRACTURE OF JOINT OF LEFT FOOT: ICD-10-CM

## 2025-04-15 DIAGNOSIS — M79.671 BILATERAL FOOT PAIN: Primary | ICD-10-CM

## 2025-04-15 DIAGNOSIS — M79.672 BILATERAL FOOT PAIN: Primary | ICD-10-CM

## 2025-04-15 DIAGNOSIS — M19.079 ARTHRITIS OF FOOT, UNSPECIFIED LATERALITY: ICD-10-CM

## 2025-04-15 NOTE — PATIENT INSTRUCTIONS
Plantar Fasciitis Rehabilitation Stretches/Exercises:     YOU MUST DO:   5 REPETITIONS          6-8 TIMES PER DAY            FOR 4 MONTHS     Cross the injured leg over other leg. While placing fingers along base of toes, pull toes back toward shin until stretch felt in plantar fascia.           Stand with the ball of the injured foot on a stair. Reach for the bottom step with your heel until stretching felt in arch of foot. Hold this position for 30-60 seconds then relax and repeat.        Standing calf stretch - While facing a wall, put your hands against the wall at eye level. Keep injured leg back and uninjured leg forward while keeping the heel of the injured leg on the floor. Turn injured foot slightly inward while slowly leaning inward until you feel the back of your calf on the injured leg stretch. Repeat 3 times.        Towel Stretch - Sit on a hard surface with your injured leg stretched out. Wrap a towel around the foot of the stretched out leg and pull the towel toward your body, stretching the back of the calf for 30 seconds. Repeat 3 times.             References:  1. Jen CHERRY., Andrew NUNEZ. (1999) Plantar fasciitis: etiology and treatment. Journal of Orthopaedic & Sports Physical Therapy 29, 948-780  2. MARIO Chacon. Special Foot Exercises [Internet]. [facility]. Available

## 2025-04-15 NOTE — PROGRESS NOTES
[facility] Office Visit - Apple Rhoades PA-C    Office Visit       Patient Name: Lesly Bill    Chief Complaint:   Chief Complaint   Patient presents with    Right Foot - Pain    Left Foot - Pain       Referring Physician: Des Connell, *    History of Present Illness:   Lesly Bill is a very pleasant 44 y.o. female who presents to discuss her bilateral foot pain.  She reports pain at the bunions and the first MTP joint.  She is very active and enjoys dancing as her hobby.  She complains of pain with weightbearing and walking no rest pain or night pain.  Denies any trauma or injury.  Denies any numbness or tingling.  It has been bothering her for months.  She sees rheumatology for psoriatic arthritis.  Here for evaluation and treatment recommendations.      Subjective     Review of Systems   Constitutional:  Negative for chills, fever, unexpected weight gain and unexpected weight loss.   HENT:  Negative for congestion, postnasal drip and rhinorrhea.    Eyes:  Negative for blurred vision.   Respiratory:  Negative for shortness of breath.    Cardiovascular:  Negative for leg swelling.   Gastrointestinal:  Negative for abdominal pain, nausea and vomiting.   Genitourinary:  Negative for difficulty urinating.   Musculoskeletal:  Positive for arthralgias. Negative for gait problem, joint swelling and myalgias.   Skin:  Negative for skin lesions and wound.   Neurological:  Negative for dizziness, weakness, light-headedness and numbness.   Hematological:  Does not bruise/bleed easily.   Psychiatric/Behavioral:  Negative for depressed mood.         Past Medical History:   Past Medical History:   Diagnosis Date    Acid reflux     Allergic     Anxiety     Chronic kidney disease     Depression     Fracture     Fracture, foot 4 years ago    In boot fracture of 5th metatarsal.    Hypertension     IBS (irritable bowel syndrome)      Kidney stone     Kidney deformity - 2 ureters left kidney    Migraine     Psoriasis     [facility]     Urinary tract infection     Visual impairment 2019    Vitreal detachment floaters       Past Surgical History:   Past Surgical History:   Procedure Laterality Date    BREAST BIOPSY  2008    COLONOSCOPY      came out normal    GANGLION CYST EXCISION Right 12/20/2023    Right wrist dorsal ganglion cyst excision [facility]    WISDOM TOOTH EXTRACTION  I was 27 yrs old.    WRIST SURGERY Left 2015       Family History:   Family History   Problem Relation Age of Onset    Hypertension Mother     Cancer Father         mesothelioma    Hypertension Father     Hemochromatosis Father     Anxiety disorder Maternal Grandmother     Diabetes Maternal Grandmother         Type 2 diabetes    Ovarian cancer Paternal Grandmother         unknown    Breast cancer Paternal Aunt         60's    Cancer Paternal Aunt         Breast cancer    Breast cancer Paternal Aunt     Diabetes Maternal Uncle         Type 2 diabetes    Anxiety disorder Sister     Hypertension Sister     Hemochromatosis Maternal Grandfather     Uterine cancer Neg Hx     Colon cancer Neg Hx     Osteoporosis Neg Hx        Social History:   Social History     Socioeconomic History    Marital status: Single    Number of children: 0    Highest education level: Master's degree (e.g., MA, MS, Tarik, MEd, MSW, OSMIN)   Tobacco Use    Smoking status: Never     Passive exposure: Never    Smokeless tobacco: Never   Vaping Use    Vaping status: Never Used   Substance and Sexual Activity    Alcohol use: Yes     Alcohol/week: 1.0 standard drink of alcohol     Types: 1 Glasses of wine per week     Comment: Every once in a while socially I will have wine with dinner.    Drug use: Never    Sexual activity: Not Currently     Partners: Male     Birth control/protection: Birth control pill       Medications:   Current Outpatient Medications:     atorvastatin (LIPITOR) 20  MG tablet, Take 1 tablet by mouth Every Night., Disp: 90 tablet, Rfl: 1    azelastine (ASTELIN) 0.1 % nasal spray, As Needed., Disp: , Rfl:     cetirizine (zyrTEC) 10 MG tablet, Take 1 tablet by mouth Daily., Disp: 90 tablet, Rfl: 2    clobetasol propionate (TEMOVATE) 0.05 % cream, Apply 1 Application topically to the appropriate area as directed 2 (Two) Times a Day., Disp: 60 g, Rfl: 1    desonide (DESOWEN) 0.05 % cream, apply to the affected areas on scalp and feet twice daily x 2 wks. take week break then use as needed for flares, Disp: , Rfl:     Desvenlafaxine Succinate ER 25 MG tablet sustained-release 24 hour, Take 1 tablet by mouth Daily., Disp: 30 tablet, Rfl: 0    dicyclomine (BENTYL) 10 MG capsule, Take 1 capsule by mouth 4 (Four) Times a Day Before Meals & at Bedtime., Disp: 120 capsule, Rfl: 0    diphenoxylate-atropine (Lomotil) 2.5-0.025 MG per tablet, Take 1 tablet by mouth 4 (Four) Times a Day As Needed for Diarrhea., Disp: 60 tablet, Rfl: 0    Drospirenone (Slynd) 4 MG tablet, Take 1 tablet by mouth Daily., Disp: 84 tablet, Rfl: 3    famotidine (Pepcid) 20 MG tablet, Take 2 tablets by mouth Daily., Disp: 30 tablet, Rfl: 2    ferrous gluconate (FERGON) 324 MG tablet, Take 1 tablet by mouth Daily With Breakfast., Disp: 90 tablet, Rfl: 1    fluticasone (FLONASE) 50 MCG/ACT nasal spray, 2 sprays into the nostril(s) as directed by provider Daily., Disp: 9.9 mL, Rfl: 1    hydroCHLOROthiazide 12.5 MG tablet, Take 1 tablet by mouth Daily., Disp: 30 tablet, Rfl: 5    hydrOXYzine (ATARAX) 10 MG tablet, Take 1 tablet by mouth 3 (Three) Times a Day As Needed for Anxiety., Disp: 30 tablet, Rfl: 0    icosapent ethyl (Vascepa) 1 g capsule capsule, Take 2 g by mouth 2 (Two) Times a Day With Meals., Disp: 360 capsule, Rfl: 1    levothyroxine (SYNTHROID, LEVOTHROID) 75 MCG tablet, Take 1 tablet by mouth Daily., Disp: 30 tablet, Rfl: 1    lisinopril (PRINIVIL,ZESTRIL) 20 MG tablet, Take 1 tablet by mouth Daily., Disp:  90 tablet, Rfl: 0    metroNIDAZOLE (METROGEL) 0.75 % vaginal gel, Insert 1 Applicatorful into the vagina Every Night., Disp: 1 each, Rfl: 2    omeprazole (priLOSEC) 40 MG capsule, Take 1 capsule by mouth Daily., Disp: 90 capsule, Rfl: 0    ondansetron ODT (ZOFRAN-ODT) 8 MG disintegrating tablet, Take 1/2 to 1 tablet by mouth (dissolve under tongue or swallow) every 8 hours as needed for nausea., Disp: 30 tablet, Rfl: 1    Otezla 30 MG tablet, Take 30 mg by mouth 2 (Two) Times a Day., Disp: 60 tablet, Rfl: 5    promethazine-dextromethorphan (PROMETHAZINE-DM) 6.25-15 MG/5ML syrup, Take 5 mL by mouth 4 (Four) Times a Day As Needed for Cough., Disp: 240 mL, Rfl: 0    SUMAtriptan (IMITREX) 100 MG tablet, Take 1/2 to 1 tablet at onset of headache. May repeat dose one time in 2 hours as needed. Do not exceed 200 mg in 24 hours., Disp: 9 tablet, Rfl: 5    topiramate (TOPAMAX) 25 MG tablet, TAKE 1 TABLET BY MOUTH AT BEDTIME, Disp: 90 tablet, Rfl: 2    valACYclovir (VALTREX) 500 MG tablet, Take 1 tablet by mouth Daily., Disp: 90 tablet, Rfl: 1    vitamin D (ERGOCALCIFEROL) 1.25 MG (52749 UT) capsule capsule, Take 1 capsule by mouth 1 (One) Time Per Week., Disp: 4 capsule, Rfl: 2    ciprofloxacin-dexAMETHasone (Ciprodex) 0.3-0.1 % otic suspension, Administer 4 drops into ear(s) as directed by provider 2 (Two) Times a Day. (Patient not taking: Reported on 4/15/2025), Disp: 7.5 mL, Rfl: 0    furosemide (Lasix) 20 MG tablet, Take 1 tablet by mouth Daily for 14 days., Disp: 14 tablet, Rfl: 0    Current Facility-Administered Medications:     cyanocobalamin injection 1,000 mcg, 1,000 mcg, Intramuscular, Q28 Days, Des Connell PA-C, 1,000 mcg at 01/13/25 1612    Allergies:   Allergies   Allergen Reactions    Sulfacetamide Hives and Unknown - Low Severity    Clove Oil Rash    Clove [Cloves (Syzygium Aromaticum)] Rash and GI Intolerance    Menthol Rash    Sertraline Unknown (See Comments)    Sulfa Antibiotics Hives and Rash  "      I have reviewed and updated the following portions of the patient's history and review of systems: allergies, current medications, past family history, past medical history, past social history, past surgical history and problem list.    Objective      Vital Signs:   Vitals:    04/15/25 1521   BP: 106/70   Weight: 82.7 kg (182 lb 4.8 oz)   Height: 170.2 cm (67.01\")       Ortho Exam:  V:  Dorsalis Pedis:  Right: 2+; Left:2+    Posterior Tibial: Right:2+; Left:2+    Capillary Refill:  Brisk  MSK:      Tibia:  Right:  non tender; Left:  non tender      Ankle:  Right: non tender; Left:  non tender      Foot:  Right:  tender mildly at the bunion and the first MTP joint. ; Left:  tender mildly at the bunion and first MTP joint.      NEURO: Wallace-Shanique 5.07 monofilament test: not evaluated    Lower extremity sensation: intact     Calf Atrophy:none    Motor Function: all 5/5        Results Review:   XR Foot 3+ View Bilateral  Bilateral Feet X-Ray    Indication: Pain    Views:  AP, lateral, oblique weightbearing    Comparison:  2/20/25    Findings:  Right foot: Increased hallux valgus angle with prominence of the first MTP   joint medially.  Mild degenerative changes to the first MTP joint.  Mild   degenerative changes at the midfoot without large dorsal osteophytes.  No   other acute findings.  Left foot: Mild degenerative changes to the midfoot without large dorsal   osteophytes noted on the lateral.  No other acute findings.            Assessment / Plan      Assessment:  Diagnoses and all orders for this visit:    1. Bilateral foot pain (Primary)  -     XR Foot 3+ View Bilateral    2. Contracture of joint of right foot    3. Contracture of joint of left foot    4. Arthritis of foot, unspecified laterality        Quality Metrics:   BMI:   BMI is >= 25 and <30. (Overweight) The following options were offered after discussion;: Information on healthy weight added to patient's after visit summary.       Tobacco: "   Lesly Bill  reports that she has never smoked. She has never been exposed to tobacco smoke. She has never used smokeless tobacco.      Plan:  Bilateral bunion with mild first MTP joint arthritis.  I reviewed today's x-rays and clinical findings with the patient.  I think her pain and function limitations are secondary to a combination of both her bunions as well as the mild arthritis in the first MTP joint.   I explained the mechanical nature of the problem.  I explained that the bunion is not a growth on the foot but rather it is a widening of the angle between the first and second metatarsals.  I drew a picture on the foot and explained the problem in detail.  I explained that bunions are a result of heredity and shoe wear.  The reason they hurt is from shoes pushing on the bunion.      I explained that first line treatment is conservative.  Wide, round toed shoes  can help them be comfortable.  Sometimes custom orthotics can help if they also have metatarsalgia.  I explained that there is no brace that works to change the bunion angle.      If conservative treatment does not help the pain enough then the patient might consider surgery. Pain is the only indication for surgery.   I advised them that I never talk anyone into bunion surgery, only the patient can decide when or if the pain is enough to undergo surgery.      For the arthritis, we discussed turf toe plate, topical and oral anti-inflammatories, injection.  Patient does not feel she needs any further treatment at this time.  She really likes to dance and does not want anything inhibiting the motion of her first MTP joint.  She will return to see me as needed.          Apple Rhoades PA-C  [facility]       Dictated using Dragon Speech Recognition.

## 2025-04-21 ENCOUNTER — OFFICE VISIT (OUTPATIENT)
Dept: INTERNAL MEDICINE | Facility: CLINIC | Age: 45
End: 2025-04-21
Payer: COMMERCIAL

## 2025-04-21 VITALS
SYSTOLIC BLOOD PRESSURE: 110 MMHG | HEIGHT: 67 IN | DIASTOLIC BLOOD PRESSURE: 78 MMHG | WEIGHT: 181.6 LBS | TEMPERATURE: 98.2 F | BODY MASS INDEX: 28.5 KG/M2 | OXYGEN SATURATION: 99 % | HEART RATE: 78 BPM

## 2025-04-21 DIAGNOSIS — R05.1 ACUTE COUGH: ICD-10-CM

## 2025-04-21 DIAGNOSIS — J02.9 SORE THROAT: ICD-10-CM

## 2025-04-21 DIAGNOSIS — J01.40 ACUTE NON-RECURRENT PANSINUSITIS: Primary | ICD-10-CM

## 2025-04-21 PROCEDURE — 87428 SARSCOV & INF VIR A&B AG IA: CPT | Performed by: NURSE PRACTITIONER

## 2025-04-21 PROCEDURE — 99214 OFFICE O/P EST MOD 30 MIN: CPT | Performed by: NURSE PRACTITIONER

## 2025-04-21 PROCEDURE — 87880 STREP A ASSAY W/OPTIC: CPT | Performed by: NURSE PRACTITIONER

## 2025-04-21 RX ORDER — BENZONATATE 100 MG/1
100 CAPSULE ORAL 3 TIMES DAILY PRN
Qty: 60 CAPSULE | Refills: 0 | Status: SHIPPED | OUTPATIENT
Start: 2025-04-21

## 2025-04-21 RX ORDER — AMOXICILLIN 875 MG/1
875 TABLET, COATED ORAL EVERY 12 HOURS SCHEDULED
Qty: 20 TABLET | Refills: 0 | Status: SHIPPED | OUTPATIENT
Start: 2025-04-21 | End: 2025-05-01

## 2025-04-21 NOTE — PROGRESS NOTES
"Chief Complaint   Patient presents with    Cough    Sore Throat       HPI  Lesly Bill is a 44 y.o. female presents with sinus pressure, cough, and clogged ears.  This started about 3 days ago.  She seasonal allergies.  States she is blowing out \"thick yellow\".      The following portions of the patient's history were reviewed and updated as appropriate: allergies, current medications, past family history, past medical history, past social history, past surgical history, and problem list.    Subjective  Review of Systems   Constitutional:  Negative for activity change, appetite change, fatigue and fever.   HENT:  Positive for congestion, ear pain and sinus pressure.    Respiratory:  Positive for cough. Negative for shortness of breath and wheezing.    Cardiovascular:  Negative for chest pain and leg swelling.   Gastrointestinal:  Negative for abdominal pain.   Neurological:  Negative for dizziness, weakness and confusion.   Psychiatric/Behavioral:  Negative for behavioral problems and decreased concentration.        Objective  Visit Vitals  /78 (BP Location: Left arm, Patient Position: Sitting)   Pulse 78   Temp 98.2 °F (36.8 °C)   Ht 170.2 cm (67\")   Wt 82.4 kg (181 lb 9.6 oz)   SpO2 99%   BMI 28.44 kg/m²        Physical Exam  Vitals and nursing note reviewed.   Constitutional:       Appearance: Normal appearance.   HENT:      Head: Normocephalic and atraumatic.      Ears:      Comments: Both TMs dull     Nose: Mucosal edema present.      Right Sinus: Maxillary sinus tenderness present.      Left Sinus: Maxillary sinus tenderness present.      Mouth/Throat:      Mouth: Mucous membranes are moist.      Comments: +PND  Eyes:      Pupils: Pupils are equal, round, and reactive to light.   Cardiovascular:      Rate and Rhythm: Normal rate and regular rhythm.   Pulmonary:      Effort: Pulmonary effort is normal.      Breath sounds: Normal breath sounds.   Skin:     General: Skin is warm and dry.      " Capillary Refill: Capillary refill takes less than 2 seconds.   Neurological:      Mental Status: She is alert and oriented to person, place, and time. Mental status is at baseline.   Psychiatric:         Mood and Affect: Mood normal.          Procedures        Results for orders placed or performed in visit on 04/21/25   POCT SARS-CoV-2 Antigen JARAD + Flu    Collection Time: 04/23/25  6:52 AM    Specimen: Swab   Result Value Ref Range    SARS Antigen Not Detected Not Detected, Presumptive Negative    Influenza A Antigen JARAD Not Detected Not Detected    Influenza B Antigen JARAD Not Detected Not Detected    Internal Control Passed Passed    Lot Number [id number]     Expiration Date 11/27/2025    POCT rapid strep A    Collection Time: 04/23/25  6:53 AM    Specimen: Swab   Result Value Ref Range    Rapid Strep A Screen Negative Negative, VALID, INVALID, Not Performed    Internal Control Passed Passed    Lot Number [id number]     Expiration Date 05/9/2027           Assessment and Plan  Diagnoses and all orders for this visit:    1. Acute non-recurrent pansinusitis (Primary)  -     amoxicillin (AMOXIL) 875 MG tablet; Take 1 tablet by mouth Every 12 (Twelve) Hours for 10 days.  Dispense: 20 tablet; Refill: 0    2. Acute cough  -     POCT SARS-CoV-2 Antigen JARAD + Flu  -     benzonatate (Tessalon Perles) 100 MG capsule; Take 1 capsule by mouth 3 (Three) Times a Day As Needed for Cough.  Dispense: 60 capsule; Refill: 0    3. Sore throat  -     POCT rapid strep A    Negative testing  Appears sinus related  Start Amoxicillin  Tessalon Perles to help with cough    Return if symptoms worsen or fail to improve.      Cm Coffey, APRN

## 2025-04-23 LAB
EXPIRATION DATE: NORMAL
EXPIRATION DATE: NORMAL
FLUAV AG UPPER RESP QL IA.RAPID: NOT DETECTED
FLUBV AG UPPER RESP QL IA.RAPID: NOT DETECTED
INTERNAL CONTROL: NORMAL
INTERNAL CONTROL: NORMAL
Lab: NORMAL
Lab: NORMAL
S PYO AG THROAT QL: NEGATIVE
SARS-COV-2 AG UPPER RESP QL IA.RAPID: NOT DETECTED

## 2025-04-25 ENCOUNTER — TELEPHONE (OUTPATIENT)
Dept: INTERNAL MEDICINE | Age: 45
End: 2025-04-25

## 2025-04-25 ENCOUNTER — SPECIALTY PHARMACY (OUTPATIENT)
Age: 45
End: 2025-04-25
Payer: COMMERCIAL

## 2025-04-25 DIAGNOSIS — J01.40 ACUTE NON-RECURRENT PANSINUSITIS: Primary | ICD-10-CM

## 2025-04-25 RX ORDER — PREDNISONE 10 MG/1
TABLET ORAL
Qty: 21 TABLET | Refills: 0 | Status: SHIPPED | OUTPATIENT
Start: 2025-04-25

## 2025-04-25 NOTE — TELEPHONE ENCOUNTER
Caller: Lesly Bill    Relationship: Self    Best call back number: [phone]    What medication are you requesting: STEROIDS    What are your current symptoms: NASAL DRAINAGE, SINUS, COUGH    How long have you been experiencing symptoms: 5 DAYS    Have you had these symptoms before:    [x] Yes  [] No    Have you been treated for these symptoms before:   [x] Yes  [] No    If a prescription is needed, what is your preferred pharmacy and phone number:    [facility] [phone]  Additional notes:  PATIENT WAS SEEN ON MONDAY AND WAS OFFERED STEROIDS AND DID NOT WANT TO TAKE THEM HOWEVER PATIENT STILL ISN'T FEELING ANY BETTER AND WOULD NOW LIKE THE STEROIDS AS SUGGESTED. PLEASE ADVISE

## 2025-04-25 NOTE — PROGRESS NOTES
Specialty Pharmacy Patient Management Program  Refill Outreach     Lesly was contacted today regarding refills of their medication(s).    Refill Questions      Flowsheet Row Most Recent Value   Changes to allergies? No   Changes to medications? No   New conditions or infections since last clinic visit Yes   If yes, please describe  sinus infection   Unplanned office visit, urgent care, ED, or hospital admission in the last 4 weeks  Yes   How does patient/caregiver feel medication is working? Good   Financial problems or insurance changes  No   Since the previous refill, were any specialty medication doses or scheduled injections missed or delayed?  No   Does this patient require a clinical escalation to a pharmacist? No            Delivery Questions      Flowsheet Row Most Recent Value   Delivery method [employer]   Delivery address verified with patient/caregiver? Yes   Delivery address Home   Number of medications in delivery 1   Medication(s) being filled and delivered Apremilast (Otezla)   Doses left of specialty medications 5 (?)   Copay verified? Yes   Copay amount 0   Copay form of payment No copayment ($0)   Delivery Date Selection 04/29/25   Signature Required No                 Follow-up: 23 day(s)     Irene Connell, Pharmacy Technician  4/25/2025  09:54 EDT

## 2025-04-28 ENCOUNTER — OFFICE VISIT (OUTPATIENT)
Dept: INTERNAL MEDICINE | Age: 45
End: 2025-04-28
Payer: COMMERCIAL

## 2025-04-28 VITALS
TEMPERATURE: 97.3 F | DIASTOLIC BLOOD PRESSURE: 70 MMHG | WEIGHT: 183.8 LBS | SYSTOLIC BLOOD PRESSURE: 100 MMHG | OXYGEN SATURATION: 99 % | BODY MASS INDEX: 28.85 KG/M2 | HEART RATE: 76 BPM | HEIGHT: 67 IN

## 2025-04-28 DIAGNOSIS — E78.5 HYPERLIPIDEMIA, UNSPECIFIED HYPERLIPIDEMIA TYPE: ICD-10-CM

## 2025-04-28 DIAGNOSIS — Z00.00 ANNUAL PHYSICAL EXAM: Primary | ICD-10-CM

## 2025-04-28 LAB
25(OH)D3 SERPL-MCNC: 38.8 NG/ML (ref 30–100)
ALBUMIN SERPL-MCNC: 4.4 G/DL (ref 3.5–5.2)
ALBUMIN/GLOB SERPL: 1.5 G/DL
ALP SERPL-CCNC: 111 U/L (ref 39–117)
ALT SERPL W P-5'-P-CCNC: 18 U/L (ref 1–33)
ANION GAP SERPL CALCULATED.3IONS-SCNC: 11 MMOL/L (ref 5–15)
AST SERPL-CCNC: 22 U/L (ref 1–32)
BILIRUB SERPL-MCNC: 0.3 MG/DL (ref 0–1.2)
BUN SERPL-MCNC: 13 MG/DL (ref 6–20)
BUN/CREAT SERPL: 15.9 (ref 7–25)
CALCIUM SPEC-SCNC: 9.4 MG/DL (ref 8.6–10.5)
CHLORIDE SERPL-SCNC: 107 MMOL/L (ref 98–107)
CHOLEST SERPL-MCNC: 198 MG/DL (ref 0–200)
CO2 SERPL-SCNC: 20 MMOL/L (ref 22–29)
CREAT SERPL-MCNC: 0.82 MG/DL (ref 0.57–1)
DEPRECATED RDW RBC AUTO: 40.1 FL (ref 37–54)
EGFRCR SERPLBLD CKD-EPI 2021: 90 ML/MIN/1.73
ERYTHROCYTE [DISTWIDTH] IN BLOOD BY AUTOMATED COUNT: 12.5 % (ref 12.3–15.4)
FOLATE SERPL-MCNC: 17.3 NG/ML (ref 4.78–24.2)
GLOBULIN UR ELPH-MCNC: 3 GM/DL
GLUCOSE SERPL-MCNC: 105 MG/DL (ref 65–99)
HBA1C MFR BLD: 5.9 % (ref 4.8–5.6)
HCT VFR BLD AUTO: 43.3 % (ref 34–46.6)
HDLC SERPL-MCNC: 42 MG/DL (ref 40–60)
HGB BLD-MCNC: 14.4 G/DL (ref 12–15.9)
LDLC SERPL CALC-MCNC: 134 MG/DL (ref 0–100)
LDLC/HDLC SERPL: 3.14 {RATIO}
MCH RBC QN AUTO: 29.3 PG (ref 26.6–33)
MCHC RBC AUTO-ENTMCNC: 33.3 G/DL (ref 31.5–35.7)
MCV RBC AUTO: 88 FL (ref 79–97)
PLATELET # BLD AUTO: 311 10*3/MM3 (ref 140–450)
PMV BLD AUTO: 11.8 FL (ref 6–12)
POTASSIUM SERPL-SCNC: 4.4 MMOL/L (ref 3.5–5.2)
PROT SERPL-MCNC: 7.4 G/DL (ref 6–8.5)
RBC # BLD AUTO: 4.92 10*6/MM3 (ref 3.77–5.28)
SODIUM SERPL-SCNC: 138 MMOL/L (ref 136–145)
TRIGL SERPL-MCNC: 120 MG/DL (ref 0–150)
TSH SERPL DL<=0.05 MIU/L-ACNC: 0.95 UIU/ML (ref 0.27–4.2)
VIT B12 BLD-MCNC: 1223 PG/ML (ref 211–946)
VLDLC SERPL-MCNC: 22 MG/DL (ref 5–40)
WBC NRBC COR # BLD AUTO: 12.97 10*3/MM3 (ref 3.4–10.8)

## 2025-04-28 PROCEDURE — 83036 HEMOGLOBIN GLYCOSYLATED A1C: CPT | Performed by: PHYSICIAN ASSISTANT

## 2025-04-28 PROCEDURE — 99396 PREV VISIT EST AGE 40-64: CPT | Performed by: PHYSICIAN ASSISTANT

## 2025-04-28 PROCEDURE — 80061 LIPID PANEL: CPT | Performed by: PHYSICIAN ASSISTANT

## 2025-04-28 PROCEDURE — 82306 VITAMIN D 25 HYDROXY: CPT | Performed by: PHYSICIAN ASSISTANT

## 2025-04-28 PROCEDURE — 82746 ASSAY OF FOLIC ACID SERUM: CPT | Performed by: PHYSICIAN ASSISTANT

## 2025-04-28 PROCEDURE — 36415 COLL VENOUS BLD VENIPUNCTURE: CPT | Performed by: PHYSICIAN ASSISTANT

## 2025-04-28 PROCEDURE — 80050 GENERAL HEALTH PANEL: CPT | Performed by: PHYSICIAN ASSISTANT

## 2025-04-28 PROCEDURE — 82607 VITAMIN B-12: CPT | Performed by: PHYSICIAN ASSISTANT

## 2025-04-28 NOTE — PROGRESS NOTES
[facility] PRIMARY CARE  [address]  [facility] [address]  Dept: [phone]  Dept Fax: [phone]  Loc: [phone]  Loc Fax: [phone]    Lesly Bill  1980    Follow Up Office Visit Note    History of Present Illness:  Pt is a 45 y/o female in today for annual physical. Has been having some muscle aches. Stopped taking lipitor and sx improved.    Hypothyroidism  Patient reports no anxiety, cold intolerance, diarrhea, fatigue, heat intolerance or palpitations.       The following portions of the patient's history were reviewed and updated as appropriate: allergies, current medications, past family history, past medical history, past social history, past surgical history, and problem list.    Medications:    Current Outpatient Medications:     amoxicillin (AMOXIL) 875 MG tablet, Take 1 tablet by mouth Every 12 (Twelve) Hours for 10 days., Disp: 20 tablet, Rfl: 0    atorvastatin (LIPITOR) 20 MG tablet, Take 1 tablet by mouth Every Night., Disp: 90 tablet, Rfl: 1    azelastine (ASTELIN) 0.1 % nasal spray, As Needed., Disp: , Rfl:     benzonatate (Tessalon Perles) 100 MG capsule, Take 1 capsule by mouth 3 (Three) Times a Day As Needed for Cough., Disp: 60 capsule, Rfl: 0    cetirizine (zyrTEC) 10 MG tablet, Take 1 tablet by mouth Daily., Disp: 90 tablet, Rfl: 2    clobetasol propionate (TEMOVATE) 0.05 % cream, Apply 1 Application topically to the appropriate area as directed 2 (Two) Times a Day., Disp: 60 g, Rfl: 1    desonide (DESOWEN) 0.05 % cream, apply to the affected areas on scalp and feet twice daily x 2 wks. take week break then use as needed for flares, Disp: , Rfl:     Desvenlafaxine Succinate ER 25 MG tablet sustained-release 24 hour, Take 1 tablet by mouth Daily., Disp: 30 tablet, Rfl: 0    dicyclomine (BENTYL) 10 MG capsule, Take 1 capsule by mouth 4 (Four) Times a Day Before Meals & at Bedtime., Disp: 120 capsule, Rfl: 0     diphenoxylate-atropine (Lomotil) 2.5-0.025 MG per tablet, Take 1 tablet by mouth 4 (Four) Times a Day As Needed for Diarrhea., Disp: 60 tablet, Rfl: 0    Drospirenone (Slynd) 4 MG tablet, Take 1 tablet by mouth Daily., Disp: 84 tablet, Rfl: 3    famotidine (Pepcid) 20 MG tablet, Take 2 tablets by mouth Daily., Disp: 30 tablet, Rfl: 2    ferrous gluconate (FERGON) 324 MG tablet, Take 1 tablet by mouth Daily With Breakfast., Disp: 90 tablet, Rfl: 1    fluticasone (FLONASE) 50 MCG/ACT nasal spray, 2 sprays into the nostril(s) as directed by provider Daily., Disp: 9.9 mL, Rfl: 1    hydroCHLOROthiazide 12.5 MG tablet, Take 1 tablet by mouth Daily., Disp: 30 tablet, Rfl: 5    hydrOXYzine (ATARAX) 10 MG tablet, Take 1 tablet by mouth 3 (Three) Times a Day As Needed for Anxiety., Disp: 30 tablet, Rfl: 0    icosapent ethyl (Vascepa) 1 g capsule capsule, Take 2 g by mouth 2 (Two) Times a Day With Meals., Disp: 360 capsule, Rfl: 1    levothyroxine (SYNTHROID, LEVOTHROID) 75 MCG tablet, Take 1 tablet by mouth Daily., Disp: 30 tablet, Rfl: 1    lisinopril (PRINIVIL,ZESTRIL) 20 MG tablet, Take 1 tablet by mouth Daily., Disp: 90 tablet, Rfl: 0    metroNIDAZOLE (METROGEL) 0.75 % vaginal gel, Insert 1 Applicatorful into the vagina Every Night., Disp: 1 each, Rfl: 2    omeprazole (priLOSEC) 40 MG capsule, Take 1 capsule by mouth Daily., Disp: 90 capsule, Rfl: 0    ondansetron ODT (ZOFRAN-ODT) 8 MG disintegrating tablet, Take 1/2 to 1 tablet by mouth (dissolve under tongue or swallow) every 8 hours as needed for nausea., Disp: 30 tablet, Rfl: 1    Otezla 30 MG tablet, Take 30 mg by mouth 2 (Two) Times a Day., Disp: 60 tablet, Rfl: 5    predniSONE (DELTASONE) 10 MG (21) dose pack, Use as directed on package, Disp: 21 tablet, Rfl: 0    promethazine-dextromethorphan (PROMETHAZINE-DM) 6.25-15 MG/5ML syrup, Take 5 mL by mouth 4 (Four) Times a Day As Needed for Cough., Disp: 240 mL, Rfl: 0    SUMAtriptan (IMITREX) 100 MG tablet, Take 1/2 to  1 tablet at onset of headache. May repeat dose one time in 2 hours as needed. Do not exceed 200 mg in 24 hours., Disp: 9 tablet, Rfl: 5    topiramate (TOPAMAX) 25 MG tablet, TAKE 1 TABLET BY MOUTH AT BEDTIME, Disp: 90 tablet, Rfl: 2    valACYclovir (VALTREX) 500 MG tablet, Take 1 tablet by mouth Daily., Disp: 90 tablet, Rfl: 1    vitamin D (ERGOCALCIFEROL) 1.25 MG (48988 UT) capsule capsule, Take 1 capsule by mouth 1 (One) Time Per Week., Disp: 4 capsule, Rfl: 2    ciprofloxacin-dexAMETHasone (Ciprodex) 0.3-0.1 % otic suspension, Administer 4 drops into ear(s) as directed by provider 2 (Two) Times a Day. (Patient not taking: Reported on 4/28/2025), Disp: 7.5 mL, Rfl: 0    Evolocumab (REPATHA) solution prefilled syringe injection, Inject 1 mL under the skin into the appropriate area as directed Every 14 (Fourteen) Days., Disp: 1 mL, Rfl: 3    furosemide (Lasix) 20 MG tablet, Take 1 tablet by mouth Daily for 14 days., Disp: 14 tablet, Rfl: 0    Current Facility-Administered Medications:     cyanocobalamin injection 1,000 mcg, 1,000 mcg, Intramuscular, Q28 Days, Des Connell PA-C, 1,000 mcg at 01/13/25 1612    Subjective  Allergies   Allergen Reactions    Sulfacetamide Hives and Unknown - Low Severity    Clove Oil Rash    Clove [Cloves (Syzygium Aromaticum)] Rash and GI Intolerance    Menthol Rash    Sertraline Unknown (See Comments)    Sulfa Antibiotics Hives and Rash        Past Medical History:   Diagnosis Date    Acid reflux     Allergic     Anxiety     Chronic kidney disease     Depression     Fracture     Fracture, foot 4 years ago    In boot fracture of 5th metatarsal.    Hypertension     IBS (irritable bowel syndrome)     Kidney stone     Kidney deformity - 2 ureters left kidney    Migraine     Psoriasis     [facility]     Urinary tract infection     Visual impairment 2019    Vitreal detachment floaters       Past Surgical History:   Procedure Laterality Date    BREAST BIOPSY  2008     COLONOSCOPY      came out normal    GANGLION CYST EXCISION Right 12/20/2023    Right wrist dorsal ganglion cyst excision Dr. Meraz [facility]    WISDOM TOOTH EXTRACTION  I was 27 yrs old.    WRIST SURGERY Left 2015       Family History   Problem Relation Age of Onset    Hypertension Mother     Cancer Father         mesothelioma    Hypertension Father     Hemochromatosis Father     Anxiety disorder Maternal Grandmother     Diabetes Maternal Grandmother         Type 2 diabetes    Ovarian cancer Paternal Grandmother         unknown    Breast cancer Paternal Aunt         60's    Cancer Paternal Aunt         Breast cancer    Breast cancer Paternal Aunt     Diabetes Maternal Uncle         Type 2 diabetes    Anxiety disorder Sister     Hypertension Sister     Hemochromatosis Maternal Grandfather     Uterine cancer Neg Hx     Colon cancer Neg Hx     Osteoporosis Neg Hx         Social History     Socioeconomic History    Marital status: Single    Number of children: 0    Highest education level: Master's degree (e.g., MA, MS, Tarik, MEd, MSW, OSMIN)   Tobacco Use    Smoking status: Never     Passive exposure: Never    Smokeless tobacco: Never   Vaping Use    Vaping status: Never Used   Substance and Sexual Activity    Alcohol use: Yes     Alcohol/week: 1.0 standard drink of alcohol     Types: 1 Glasses of wine per week     Comment: Every once in a while socially I will have wine with dinner.    Drug use: Never    Sexual activity: Not Currently     Partners: Male     Birth control/protection: Birth control pill       Review of Systems   Constitutional:  Negative for activity change, chills, fatigue, fever and unexpected weight change.   HENT:  Negative for congestion, ear pain, postnasal drip, sinus pressure and sore throat.    Eyes:  Negative for pain, discharge and redness.   Respiratory:  Negative for cough, shortness of breath and wheezing.    Cardiovascular:  Negative for chest pain, palpitations and leg swelling.  "  Gastrointestinal:  Negative for diarrhea, nausea and vomiting.   Endocrine: Negative for cold intolerance and heat intolerance.   Genitourinary:  Negative for decreased urine volume and dysuria.   Musculoskeletal:  Negative for arthralgias and myalgias.   Skin:  Negative for rash and wound.   Neurological:  Negative for dizziness, light-headedness and headaches.   Hematological:  Does not bruise/bleed easily.   Psychiatric/Behavioral:  Negative for confusion, dysphoric mood and sleep disturbance. The patient is not nervous/anxious.          Objective  Vitals:    04/28/25 0832   BP: 100/70   BP Location: Left arm   Patient Position: Sitting   Cuff Size: Large Adult   Pulse: 76   Temp: 97.3 °F (36.3 °C)   TempSrc: Temporal   SpO2: 99%   Weight: 83.4 kg (183 lb 12.8 oz)   Height: 170.2 cm (67.01\")     Body mass index is 28.78 kg/m².     Physical Exam  Physical Exam  Vitals and nursing note reviewed.   Constitutional:       General: She is not in acute distress.     Appearance: She is not ill-appearing.   HENT:      Head: Normocephalic.      Right Ear: Tympanic membrane, ear canal and external ear normal. There is no impacted cerumen.      Left Ear: Tympanic membrane, ear canal and external ear normal. There is no impacted cerumen.      Nose: No congestion or rhinorrhea.      Mouth/Throat:      Mouth: Mucous membranes are moist.      Pharynx: Oropharynx is clear. No oropharyngeal exudate or posterior oropharyngeal erythema.   Eyes:      General:         Right eye: No discharge.         Left eye: No discharge.      Extraocular Movements: Extraocular movements intact.      Conjunctiva/sclera: Conjunctivae normal.      Pupils: Pupils are equal, round, and reactive to light.   Cardiovascular:      Rate and Rhythm: Normal rate and regular rhythm.      Heart sounds: Normal heart sounds. No murmur heard.     No friction rub. No gallop.   Pulmonary:      Effort: Pulmonary effort is normal. No respiratory distress.      Breath " sounds: Normal breath sounds. No wheezing.   Abdominal:      General: Bowel sounds are normal. There is no distension.      Palpations: Abdomen is soft. There is no mass.      Tenderness: There is no abdominal tenderness.   Musculoskeletal:         General: No swelling. Normal range of motion.      Cervical back: Normal range of motion. No tenderness.      Right lower leg: No edema.      Left lower leg: No edema.   Lymphadenopathy:      Cervical: No cervical adenopathy.   Skin:     Findings: No bruising, erythema or rash.   Neurological:      Mental Status: She is oriented to person, place, and time.      Gait: Gait normal.   Psychiatric:         Mood and Affect: Mood normal.         Behavior: Behavior normal.         Thought Content: Thought content normal.         Judgment: Judgment normal.         Diagnostic Data  Procedures    Assessment  Diagnoses and all orders for this visit:    1. Annual physical exam (Primary)  -     Vitamin B12 & Folate  -     Vitamin D,25-Hydroxy; Future  -     Lipid Panel  -     Hemoglobin A1c; Future  -     TSH Rfx On Abnormal To Free T4  -     Comprehensive Metabolic Panel  -     CBC (No Diff)    2. Hyperlipidemia, unspecified hyperlipidemia type    Other orders  -     Evolocumab (REPATHA) solution prefilled syringe injection; Inject 1 mL under the skin into the appropriate area as directed Every 14 (Fourteen) Days.  Dispense: 1 mL; Refill: 3        Plan    1. Annual physical exam (Primary)- ordered fasting labs and follow up with these.. advised on nutrition and exercise and follow up with this.    2. Hyperlipidemia, unspecified hyperlipidemia type- could not tolerated lipitor. D/c's and changed to Repatha.      Return in about 3 months (around 7/28/2025) for Recheck.    Des Connell PA-C  04/28/2025

## 2025-05-22 ENCOUNTER — SPECIALTY PHARMACY (OUTPATIENT)
Age: 45
End: 2025-05-22
Payer: COMMERCIAL

## 2025-05-22 NOTE — PROGRESS NOTES
Specialty Pharmacy Patient Management Program  Refill Outreach     Lesly was contacted today regarding refills of their medication(s). OTEZLA    Refill Questions      Flowsheet Row Most Recent Value   Changes to allergies? No   Changes to medications? No   New conditions or infections since last clinic visit No   Unplanned office visit, urgent care, ED, or hospital admission in the last 4 weeks  No   How does patient/caregiver feel medication is working? Very good   Financial problems or insurance changes  No   Since the previous refill, were any specialty medication doses or scheduled injections missed or delayed?  No   Does this patient require a clinical escalation to a pharmacist? No            Delivery Questions      Flowsheet Row Most Recent Value   Delivery method UPS   Delivery address verified with patient/caregiver? Yes   Delivery address Home   Number of medications in delivery 1   Medication(s) being filled and delivered Apremilast (Otezla)   Doses left of specialty medications 0   Copay verified? Yes   Copay amount 0   Copay form of payment No copayment ($0)   Delivery Date Selection 05/23/25   Signature Required No                 Follow-up: 21 day(s)     Nikia Morales, Pharmacy Technician  5/22/2025  12:12 EDT

## 2025-06-18 ENCOUNTER — SPECIALTY PHARMACY (OUTPATIENT)
Age: 45
End: 2025-06-18
Payer: COMMERCIAL

## 2025-06-18 NOTE — PROGRESS NOTES
Specialty Pharmacy Patient Management Program  Refill Outreach     Lsely was contacted today regarding refills of their medication(s). OTEZLA    Refill Questions      Flowsheet Row Most Recent Value   Changes to allergies? No   Changes to medications? No   New conditions or infections since last clinic visit No   Unplanned office visit, urgent care, ED, or hospital admission in the last 4 weeks  No   How does patient/caregiver feel medication is working? Very good   Financial problems or insurance changes  No   Since the previous refill, were any specialty medication doses or scheduled injections missed or delayed?  No   Does this patient require a clinical escalation to a pharmacist? No            Delivery Questions      Flowsheet Row Most Recent Value   Delivery method UPS   Delivery address verified with patient/caregiver? Yes   Delivery address Home   Number of medications in delivery 1   Medication(s) being filled and delivered Apremilast (Otezla)   Doses left of specialty medications 6   Copay verified? Yes   Copay amount 0   Copay form of payment No copayment ($0)   Delivery Date Selection 06/19/25  [PT meant to put 6/19]   Signature Required No   Do you consent to receive electronic handouts?  Yes                 Follow-up: 21 day(s)     Nikia Morales, Pharmacy Technician  6/18/2025  11:55 EDT

## 2025-06-30 RX ORDER — LEVOTHYROXINE SODIUM 75 UG/1
75 TABLET ORAL DAILY
Qty: 90 TABLET | Refills: 1 | Status: SHIPPED | OUTPATIENT
Start: 2025-06-30

## 2025-06-30 RX ORDER — OMEPRAZOLE 40 MG/1
40 CAPSULE, DELAYED RELEASE ORAL DAILY
Qty: 90 CAPSULE | Refills: 0 | Status: SHIPPED | OUTPATIENT
Start: 2025-06-30

## 2025-07-01 DIAGNOSIS — K58.0 IRRITABLE BOWEL SYNDROME WITH DIARRHEA: ICD-10-CM

## 2025-07-01 DIAGNOSIS — R19.7 DIARRHEA OF PRESUMED INFECTIOUS ORIGIN: ICD-10-CM

## 2025-07-01 RX ORDER — DIPHENOXYLATE HYDROCHLORIDE AND ATROPINE SULFATE 2.5; .025 MG/1; MG/1
TABLET ORAL
Qty: 60 TABLET | Refills: 0 | Status: SHIPPED | OUTPATIENT
Start: 2025-07-01

## 2025-07-01 NOTE — TELEPHONE ENCOUNTER
Rx Refill Note  Requested Prescriptions     Pending Prescriptions Disp Refills    diphenoxylate-atropine (LOMOTIL) 2.5-0.025 MG per tablet [Pharmacy Med Name: Diphenoxylate-Atropine Oral Tablet 2.5-0.025 MG] 60 tablet 0     Sig: TAKE 1 TABLET BY MOUTH 4 TIMES A DAY AS NEEDED FOR DIARRHEA      Last office visit with prescribing clinician: 3/25/2025   Last telemedicine visit with prescribing clinician: Visit date not found   Next office visit with prescribing clinician: 9/29/2025                             Donna Jimenez MA  07/01/25, 12:36 EDT

## 2025-07-02 ENCOUNTER — PRIOR AUTHORIZATION (OUTPATIENT)
Dept: INTERNAL MEDICINE | Age: 45
End: 2025-07-02
Payer: COMMERCIAL

## 2025-07-02 NOTE — TELEPHONE ENCOUNTER
PA for Omeprazole 40MG dr capsules sent to plan per covermymeds.   Awaiting determination.     Key: [id number]

## 2025-07-16 ENCOUNTER — SPECIALTY PHARMACY (OUTPATIENT)
Age: 45
End: 2025-07-16
Payer: COMMERCIAL

## 2025-07-16 NOTE — PROGRESS NOTES
Specialty Pharmacy Patient Management Program  Refill Outreach     Lesly was contacted today regarding refills of their medication(s).    Refill Questions      Flowsheet Row Most Recent Value   Changes to allergies? No   Changes to medications? No   New conditions or infections since last clinic visit No   Unplanned office visit, urgent care, ED, or hospital admission in the last 4 weeks  No   How does patient/caregiver feel medication is working? Good   Financial problems or insurance changes  No   Since the previous refill, were any specialty medication doses or scheduled injections missed or delayed?  No   Does this patient require a clinical escalation to a pharmacist? No            Delivery Questions      Flowsheet Row Most Recent Value   Delivery method UPS   Delivery address verified with patient/caregiver? Yes   Delivery address Home   Number of medications in delivery 1   Medication(s) being filled and delivered Apremilast (Otezla)   Doses left of specialty medications 5   Copay verified? Yes   Copay amount 0   Copay form of payment No copayment ($0)   Delivery Date Selection 07/17/25   Signature Required No   Do you consent to receive electronic handouts?  Yes                 Follow-up: 23 day(s)     Irene Connell, Pharmacy Technician  7/16/2025  11:05 EDT

## 2025-07-17 ENCOUNTER — SPECIALTY PHARMACY (OUTPATIENT)
Facility: HOSPITAL | Age: 45
End: 2025-07-17
Payer: COMMERCIAL

## 2025-07-17 RX ORDER — APREMILAST 30 MG/1
1 TABLET, FILM COATED ORAL 2 TIMES DAILY
Qty: 60 TABLET | Refills: 5 | Status: SHIPPED | OUTPATIENT
Start: 2025-07-17

## 2025-07-17 NOTE — PROGRESS NOTES
Specialty Pharmacy Patient Management Program  Per Protocol Prescription Order/Refill     Patient currently fills medications at [facility] [place], operated by [facility] and is enrolled in an Rheumatology Patient Management Program.     Requested Prescriptions     Signed Prescriptions Disp Refills    Otezla 30 MG tablet 60 tablet 5     Sig: Take 30 mg by mouth 2 (Two) Times a Day.     Prescription orders above were sent to the pharmacy per Collaborative Care Agreement Protocol.

## 2025-07-21 ENCOUNTER — LAB (OUTPATIENT)
Dept: INTERNAL MEDICINE | Age: 45
End: 2025-07-21
Payer: COMMERCIAL

## 2025-07-21 ENCOUNTER — OFFICE VISIT (OUTPATIENT)
Dept: INTERNAL MEDICINE | Age: 45
End: 2025-07-21
Payer: COMMERCIAL

## 2025-07-21 VITALS
SYSTOLIC BLOOD PRESSURE: 116 MMHG | OXYGEN SATURATION: 98 % | BODY MASS INDEX: 28.12 KG/M2 | HEIGHT: 67 IN | HEART RATE: 80 BPM | TEMPERATURE: 96.8 F | DIASTOLIC BLOOD PRESSURE: 82 MMHG | WEIGHT: 179.2 LBS

## 2025-07-21 DIAGNOSIS — I10 ESSENTIAL HYPERTENSION: ICD-10-CM

## 2025-07-21 DIAGNOSIS — R25.1 TREMOR: ICD-10-CM

## 2025-07-21 DIAGNOSIS — Z00.00 HEALTH CARE MAINTENANCE: ICD-10-CM

## 2025-07-21 DIAGNOSIS — E03.9 HYPOTHYROIDISM, UNSPECIFIED TYPE: ICD-10-CM

## 2025-07-21 DIAGNOSIS — M65.4 DE QUERVAIN'S TENOSYNOVITIS, RIGHT: Primary | ICD-10-CM

## 2025-07-21 LAB
ALBUMIN SERPL-MCNC: 4.3 G/DL (ref 3.5–5.2)
ALBUMIN/GLOB SERPL: 1.4 G/DL
ALP SERPL-CCNC: 103 U/L (ref 39–117)
ALT SERPL W P-5'-P-CCNC: 22 U/L (ref 1–33)
ANION GAP SERPL CALCULATED.3IONS-SCNC: 10.7 MMOL/L (ref 5–15)
AST SERPL-CCNC: 20 U/L (ref 1–32)
BILIRUB SERPL-MCNC: 0.5 MG/DL (ref 0–1.2)
BUN SERPL-MCNC: 13 MG/DL (ref 6–20)
BUN/CREAT SERPL: 12 (ref 7–25)
CALCIUM SPEC-SCNC: 9.5 MG/DL (ref 8.6–10.5)
CHLORIDE SERPL-SCNC: 102 MMOL/L (ref 98–107)
CHOLEST SERPL-MCNC: 180 MG/DL (ref 0–200)
CO2 SERPL-SCNC: 24.3 MMOL/L (ref 22–29)
CREAT SERPL-MCNC: 1.08 MG/DL (ref 0.57–1)
DEPRECATED RDW RBC AUTO: 39.3 FL (ref 37–54)
EGFRCR SERPLBLD CKD-EPI 2021: 64.7 ML/MIN/1.73
ERYTHROCYTE [DISTWIDTH] IN BLOOD BY AUTOMATED COUNT: 12.5 % (ref 12.3–15.4)
GLOBULIN UR ELPH-MCNC: 3.1 GM/DL
GLUCOSE SERPL-MCNC: 127 MG/DL (ref 65–99)
HBA1C MFR BLD: 6 % (ref 4.8–5.6)
HCT VFR BLD AUTO: 44 % (ref 34–46.6)
HDLC SERPL-MCNC: 42 MG/DL (ref 40–60)
HGB BLD-MCNC: 14.8 G/DL (ref 12–15.9)
LDLC SERPL CALC-MCNC: 119 MG/DL (ref 0–100)
LDLC/HDLC SERPL: 2.79 {RATIO}
MCH RBC QN AUTO: 29.4 PG (ref 26.6–33)
MCHC RBC AUTO-ENTMCNC: 33.6 G/DL (ref 31.5–35.7)
MCV RBC AUTO: 87.5 FL (ref 79–97)
PLATELET # BLD AUTO: 327 10*3/MM3 (ref 140–450)
PMV BLD AUTO: 11.5 FL (ref 6–12)
POTASSIUM SERPL-SCNC: 4.4 MMOL/L (ref 3.5–5.2)
PROT SERPL-MCNC: 7.4 G/DL (ref 6–8.5)
RBC # BLD AUTO: 5.03 10*6/MM3 (ref 3.77–5.28)
SODIUM SERPL-SCNC: 137 MMOL/L (ref 136–145)
TRIGL SERPL-MCNC: 105 MG/DL (ref 0–150)
TSH SERPL DL<=0.05 MIU/L-ACNC: 2.63 UIU/ML (ref 0.27–4.2)
VLDLC SERPL-MCNC: 19 MG/DL (ref 5–40)
WBC NRBC COR # BLD AUTO: 10.76 10*3/MM3 (ref 3.4–10.8)

## 2025-07-21 PROCEDURE — 36415 COLL VENOUS BLD VENIPUNCTURE: CPT | Performed by: PHYSICIAN ASSISTANT

## 2025-07-21 PROCEDURE — 82746 ASSAY OF FOLIC ACID SERUM: CPT | Performed by: PHYSICIAN ASSISTANT

## 2025-07-21 PROCEDURE — 80050 GENERAL HEALTH PANEL: CPT | Performed by: PHYSICIAN ASSISTANT

## 2025-07-21 PROCEDURE — 99214 OFFICE O/P EST MOD 30 MIN: CPT | Performed by: PHYSICIAN ASSISTANT

## 2025-07-21 PROCEDURE — 82607 VITAMIN B-12: CPT | Performed by: PHYSICIAN ASSISTANT

## 2025-07-21 PROCEDURE — 82306 VITAMIN D 25 HYDROXY: CPT | Performed by: PHYSICIAN ASSISTANT

## 2025-07-21 PROCEDURE — 80061 LIPID PANEL: CPT | Performed by: PHYSICIAN ASSISTANT

## 2025-07-21 PROCEDURE — 83036 HEMOGLOBIN GLYCOSYLATED A1C: CPT | Performed by: PHYSICIAN ASSISTANT

## 2025-07-21 NOTE — PROGRESS NOTES
[facility] PRIMARY [facility]  [address]  [facility] [address]  Dept: [phone]  Dept Fax: [phone]  Loc: [phone]  Loc Fax: [phone]    Lesly Bill  1980    Follow Up Office Visit Note    History of Present Illness:  Patient is a 44-year-old female in today for right hand pain.  Patient types and also notes with her right hand.  Pain started a few weeks ago.  Getting worse.  When asked about tremor, patient states that she has had this for a while.  Unclear as to how long.    Hand Pain   Pertinent negatives include no chest pain.       The following portions of the patient's history were reviewed and updated as appropriate: allergies, current medications, past family history, past medical history, past social history, past surgical history, and problem list.    Medications:    Current Outpatient Medications:     azelastine (ASTELIN) 0.1 % nasal spray, As Needed., Disp: , Rfl:     benzonatate (Tessalon Perles) 100 MG capsule, Take 1 capsule by mouth 3 (Three) Times a Day As Needed for Cough., Disp: 60 capsule, Rfl: 0    cetirizine (zyrTEC) 10 MG tablet, Take 1 tablet by mouth Daily., Disp: 90 tablet, Rfl: 2    clobetasol propionate (TEMOVATE) 0.05 % cream, Apply 1 Application topically to the appropriate area as directed 2 (Two) Times a Day., Disp: 60 g, Rfl: 1    desonide (DESOWEN) 0.05 % cream, apply to the affected areas on scalp and feet twice daily x 2 wks. take week break then use as needed for flares, Disp: , Rfl:     Desvenlafaxine Succinate ER 25 MG tablet sustained-release 24 hour, Take 1 tablet by mouth Daily., Disp: 30 tablet, Rfl: 0    dicyclomine (BENTYL) 10 MG capsule, Take 1 capsule by mouth 4 (Four) Times a Day Before Meals & at Bedtime., Disp: 120 capsule, Rfl: 0    diphenoxylate-atropine (LOMOTIL) 2.5-0.025 MG per tablet, TAKE 1 TABLET BY MOUTH 4 TIMES A DAY AS NEEDED FOR DIARRHEA, Disp: 60 tablet, Rfl: 0     Drospirenone (Slynd) 4 MG tablet, Take 1 tablet by mouth Daily., Disp: 84 tablet, Rfl: 3    Evolocumab (REPATHA) solution prefilled syringe injection, Inject 1 mL under the skin into the appropriate area as directed Every 14 (Fourteen) Days., Disp: 1 mL, Rfl: 3    famotidine (Pepcid) 20 MG tablet, Take 2 tablets by mouth Daily., Disp: 30 tablet, Rfl: 2    ferrous gluconate (FERGON) 324 MG tablet, Take 1 tablet by mouth Daily With Breakfast., Disp: 90 tablet, Rfl: 1    fluticasone (FLONASE) 50 MCG/ACT nasal spray, 2 sprays into the nostril(s) as directed by provider Daily., Disp: 9.9 mL, Rfl: 1    hydroCHLOROthiazide 12.5 MG tablet, Take 1 tablet by mouth Daily., Disp: 30 tablet, Rfl: 5    hydrOXYzine (ATARAX) 10 MG tablet, Take 1 tablet by mouth 3 (Three) Times a Day As Needed for Anxiety., Disp: 30 tablet, Rfl: 0    icosapent ethyl (Vascepa) 1 g capsule capsule, Take 2 g by mouth 2 (Two) Times a Day With Meals., Disp: 360 capsule, Rfl: 1    levothyroxine (SYNTHROID, LEVOTHROID) 75 MCG tablet, Take 1 tablet by mouth Daily., Disp: 90 tablet, Rfl: 1    lisinopril (PRINIVIL,ZESTRIL) 20 MG tablet, Take 1 tablet by mouth Daily., Disp: 90 tablet, Rfl: 0    metroNIDAZOLE (METROGEL) 0.75 % vaginal gel, Insert 1 Applicatorful into the vagina Every Night., Disp: 1 each, Rfl: 2    omeprazole (priLOSEC) 40 MG capsule, Take 1 capsule by mouth Daily., Disp: 90 capsule, Rfl: 0    ondansetron ODT (ZOFRAN-ODT) 8 MG disintegrating tablet, Take 1/2 to 1 tablet by mouth (dissolve under tongue or swallow) every 8 hours as needed for nausea., Disp: 30 tablet, Rfl: 1    Otezla 30 MG tablet, Take 30 mg by mouth 2 (Two) Times a Day., Disp: 60 tablet, Rfl: 5    predniSONE (DELTASONE) 10 MG (21) dose pack, Use as directed on package, Disp: 21 tablet, Rfl: 0    promethazine-dextromethorphan (PROMETHAZINE-DM) 6.25-15 MG/5ML syrup, Take 5 mL by mouth 4 (Four) Times a Day As Needed for Cough., Disp: 240 mL, Rfl: 0    SUMAtriptan (IMITREX) 100 MG  tablet, Take 1/2 to 1 tablet at onset of headache. May repeat dose one time in 2 hours as needed. Do not exceed 200 mg in 24 hours., Disp: 9 tablet, Rfl: 5    topiramate (TOPAMAX) 25 MG tablet, TAKE 1 TABLET BY MOUTH AT BEDTIME, Disp: 90 tablet, Rfl: 2    valACYclovir (VALTREX) 500 MG tablet, Take 1 tablet by mouth Daily., Disp: 90 tablet, Rfl: 1    vitamin D (ERGOCALCIFEROL) 1.25 MG (61832 UT) capsule capsule, Take 1 capsule by mouth 1 (One) Time Per Week., Disp: 4 capsule, Rfl: 2    ciprofloxacin-dexAMETHasone (Ciprodex) 0.3-0.1 % otic suspension, Administer 4 drops into ear(s) as directed by provider 2 (Two) Times a Day. (Patient not taking: Reported on 7/21/2025), Disp: 7.5 mL, Rfl: 0    Diclofenac Sodium (VOLTAREN) 1 % gel gel, Apply 1 gram topically to indicated area 4 times daily as needed for mild to moderate pain., Disp: 100 g, Rfl: 2    furosemide (Lasix) 20 MG tablet, Take 1 tablet by mouth Daily for 14 days., Disp: 14 tablet, Rfl: 0    Current Facility-Administered Medications:     cyanocobalamin injection 1,000 mcg, 1,000 mcg, Intramuscular, Q28 Days, Des Connell PA-C, 1,000 mcg at 01/13/25 1612    Subjective  Allergies   Allergen Reactions    Sulfacetamide Hives and Unknown - Low Severity    Clove Oil Rash    Clove [Cloves (Syzygium Aromaticum)] Rash and GI Intolerance    Menthol Rash    Sertraline Unknown (See Comments)    Sulfa Antibiotics Hives and Rash        Past Medical History:   Diagnosis Date    Acid reflux     Allergic     Anxiety     Chronic kidney disease     Depression     Fracture     Fracture, foot 4 years ago    In boot fracture of 5th metatarsal.    Hypertension     IBS (irritable bowel syndrome)     Kidney stone     Kidney deformity - 2 ureters left kidney    Migraine     Psoriasis     [facility]     Urinary tract infection     Visual impairment 2019    Vitreal detachment floaters       Past Surgical History:   Procedure Laterality Date    BREAST BIOPSY  2008     COLONOSCOPY      came out normal    GANGLION CYST EXCISION Right 12/20/2023    Right wrist dorsal ganglion cyst excision Dr. Meraz [facility]    WISDOM TOOTH EXTRACTION  I was 27 yrs old.    WRIST SURGERY Left 2015       Family History   Problem Relation Age of Onset    Hypertension Mother     Cancer Father         mesothelioma    Hypertension Father     Hemochromatosis Father     Anxiety disorder Maternal Grandmother     Diabetes Maternal Grandmother         Type 2 diabetes    Ovarian cancer Paternal Grandmother         unknown    Breast cancer Paternal Aunt         60's    Cancer Paternal Aunt         Breast cancer    Breast cancer Paternal Aunt     Diabetes Maternal Uncle         Type 2 diabetes    Anxiety disorder Sister     Hypertension Sister     Hemochromatosis Maternal Grandfather     Uterine cancer Neg Hx     Colon cancer Neg Hx     Osteoporosis Neg Hx         Social History     Socioeconomic History    Marital status: Single    Number of children: 0    Highest education level: Master's degree (e.g., MA, MS, Tarik, MEd, MSW, OSMIN)   Tobacco Use    Smoking status: Never     Passive exposure: Never    Smokeless tobacco: Never   Vaping Use    Vaping status: Never Used   Substance and Sexual Activity    Alcohol use: Yes     Alcohol/week: 1.0 standard drink of alcohol     Types: 1 Glasses of wine per week     Comment: Every once in a while socially I will have wine with dinner.    Drug use: Never    Sexual activity: Not Currently     Partners: Male     Birth control/protection: Birth control pill       Review of Systems   Constitutional:  Negative for activity change, chills, fatigue, fever and unexpected weight change.   HENT:  Negative for congestion, ear pain, postnasal drip, sinus pressure and sore throat.    Eyes:  Negative for pain, discharge and redness.   Respiratory:  Negative for cough, shortness of breath and wheezing.    Cardiovascular:  Negative for chest pain, palpitations and leg swelling.  "  Gastrointestinal:  Negative for diarrhea, nausea and vomiting.   Endocrine: Negative for cold intolerance and heat intolerance.   Genitourinary:  Negative for decreased urine volume and dysuria.   Musculoskeletal:  Positive for arthralgias. Negative for myalgias.   Skin:  Negative for rash and wound.   Neurological:  Positive for tremors. Negative for dizziness, light-headedness and headaches.   Hematological:  Does not bruise/bleed easily.   Psychiatric/Behavioral:  Negative for confusion, dysphoric mood and sleep disturbance. The patient is not nervous/anxious.          Objective  Vitals:    07/21/25 0934   BP: 116/82   BP Location: Left arm   Patient Position: Sitting   Cuff Size: Adult   Pulse: 80   Temp: 96.8 °F (36 °C)   TempSrc: Temporal   SpO2: 98%   Weight: 81.3 kg (179 lb 3.2 oz)   Height: 170.2 cm (67.01\")     Body mass index is 28.06 kg/m².     Physical Exam  Physical Exam  Vitals and nursing note reviewed.   Constitutional:       General: She is not in acute distress.     Appearance: She is not ill-appearing.   HENT:      Head: Normocephalic.      Right Ear: Tympanic membrane, ear canal and external ear normal. There is no impacted cerumen.      Left Ear: Tympanic membrane, ear canal and external ear normal. There is no impacted cerumen.      Nose: No congestion or rhinorrhea.      Mouth/Throat:      Mouth: Mucous membranes are moist.      Pharynx: Oropharynx is clear. No oropharyngeal exudate or posterior oropharyngeal erythema.   Eyes:      General:         Right eye: No discharge.         Left eye: No discharge.      Extraocular Movements: Extraocular movements intact.      Conjunctiva/sclera: Conjunctivae normal.      Pupils: Pupils are equal, round, and reactive to light.   Cardiovascular:      Rate and Rhythm: Normal rate and regular rhythm.      Heart sounds: Normal heart sounds. No murmur heard.     No friction rub. No gallop.   Pulmonary:      Effort: Pulmonary effort is normal. No " respiratory distress.      Breath sounds: Normal breath sounds. No wheezing.   Abdominal:      General: Bowel sounds are normal. There is no distension.      Palpations: Abdomen is soft. There is no mass.      Tenderness: There is no abdominal tenderness.   Musculoskeletal:         General: No swelling. Normal range of motion.      Cervical back: Normal range of motion. No tenderness.      Right lower leg: No edema.      Left lower leg: No edema.   Lymphadenopathy:      Cervical: No cervical adenopathy.   Skin:     Findings: No bruising, erythema or rash.   Neurological:      Mental Status: She is oriented to person, place, and time.      Gait: Gait normal.   Psychiatric:         Mood and Affect: Mood normal.         Behavior: Behavior normal.         Thought Content: Thought content normal.         Judgment: Judgment normal.         Diagnostic Data  Procedures    Assessment  Diagnoses and all orders for this visit:    1. De Quervain's tenosynovitis, right (Primary)  -     Ambulatory Referral to Physical Therapy for Evaluation & Treatment    2. Tremor  -     Ambulatory Referral to Neurology    3. Health care maintenance  -     Vitamin B12 & Folate  -     Vitamin D,25-Hydroxy; Future  -     Lipid Panel  -     Hemoglobin A1c; Future  -     TSH Rfx On Abnormal To Free T4  -     Comprehensive Metabolic Panel  -     CBC (No Diff)    4. Essential hypertension    5. Hypothyroidism, unspecified type    Other orders  -     Diclofenac Sodium (VOLTAREN) 1 % gel gel; Apply 1 gram topically to indicated area 4 times daily as needed for mild to moderate pain.  Dispense: 100 g; Refill: 2        Plan    1. De Quervain's tenosynovitis, right (Primary)- worse symptoms, referred to physical therapy.  Advised on wrist splint.  Prescribed Voltaren gel.    2. Tremor- Ambulatory Referral to Neurology    3. Health care maintenance- ordered fasting labs.    4. Essential hypertension- stable on hydrochlorothiazide and lisinopril.    5.  Hypothyroidism, unspecified type- compliant on levothyroxine.  Repeat TSH.      Return in about 6 weeks (around 9/1/2025) for Recheck.    Des Connell PA-C  07/21/2025

## 2025-07-22 ENCOUNTER — PRIOR AUTHORIZATION (OUTPATIENT)
Dept: INTERNAL MEDICINE | Age: 45
End: 2025-07-22
Payer: COMMERCIAL

## 2025-07-22 LAB
25(OH)D3 SERPL-MCNC: 42.5 NG/ML (ref 30–100)
FOLATE SERPL-MCNC: >20 NG/ML (ref 4.78–24.2)
VIT B12 BLD-MCNC: 1241 PG/ML (ref 211–946)

## 2025-08-06 ENCOUNTER — SPECIALTY PHARMACY (OUTPATIENT)
Age: 45
End: 2025-08-06
Payer: COMMERCIAL

## 2025-08-06 ENCOUNTER — OFFICE VISIT (OUTPATIENT)
Age: 45
End: 2025-08-06
Payer: COMMERCIAL

## 2025-08-06 VITALS
TEMPERATURE: 98 F | SYSTOLIC BLOOD PRESSURE: 108 MMHG | BODY MASS INDEX: 28.17 KG/M2 | DIASTOLIC BLOOD PRESSURE: 66 MMHG | HEART RATE: 85 BPM | WEIGHT: 179.5 LBS | HEIGHT: 67 IN

## 2025-08-06 DIAGNOSIS — L40.50 PSORIATIC ARTHRITIS: Primary | Chronic | ICD-10-CM

## 2025-08-06 DIAGNOSIS — Z79.899 HIGH RISK MEDICATION USE: Chronic | ICD-10-CM

## 2025-08-06 PROCEDURE — 99214 OFFICE O/P EST MOD 30 MIN: CPT | Performed by: INTERNAL MEDICINE

## 2025-08-06 RX ORDER — COVID-19 ANTIGEN TEST
KIT MISCELLANEOUS
COMMUNITY

## 2025-08-06 RX ORDER — APREMILAST 30 MG/1
1 TABLET, FILM COATED ORAL 2 TIMES DAILY
Qty: 60 TABLET | Refills: 5 | Status: SHIPPED | OUTPATIENT
Start: 2025-08-06 | End: 2025-08-11 | Stop reason: SDUPTHER

## 2025-08-06 RX ORDER — ALBUTEROL SULFATE 90 UG/1
INHALANT RESPIRATORY (INHALATION)
COMMUNITY
Start: 2025-04-21

## 2025-08-07 ENCOUNTER — RESULTS FOLLOW-UP (OUTPATIENT)
Age: 45
End: 2025-08-07
Payer: COMMERCIAL

## 2025-08-07 LAB
ALBUMIN SERPL-MCNC: 4.4 G/DL (ref 3.9–4.9)
ALP SERPL-CCNC: 92 IU/L (ref 44–121)
ALT SERPL-CCNC: 20 IU/L (ref 0–32)
AST SERPL-CCNC: 16 IU/L (ref 0–40)
BASOPHILS # BLD AUTO: 0.1 X10E3/UL (ref 0–0.2)
BASOPHILS NFR BLD AUTO: 1 %
BILIRUB SERPL-MCNC: 0.2 MG/DL (ref 0–1.2)
BUN SERPL-MCNC: 12 MG/DL (ref 6–24)
BUN/CREAT SERPL: 12 (ref 9–23)
CALCIUM SERPL-MCNC: 9.3 MG/DL (ref 8.7–10.2)
CHLORIDE SERPL-SCNC: 107 MMOL/L (ref 96–106)
CO2 SERPL-SCNC: 21 MMOL/L (ref 20–29)
CREAT SERPL-MCNC: 1 MG/DL (ref 0.57–1)
CRP SERPL-MCNC: 6 MG/L (ref 0–10)
EGFRCR SERPLBLD CKD-EPI 2021: 71 ML/MIN/1.73
EOSINOPHIL # BLD AUTO: 0.1 X10E3/UL (ref 0–0.4)
EOSINOPHIL NFR BLD AUTO: 1 %
ERYTHROCYTE [DISTWIDTH] IN BLOOD BY AUTOMATED COUNT: 12.7 % (ref 11.7–15.4)
ERYTHROCYTE [SEDIMENTATION RATE] IN BLOOD BY WESTERGREN METHOD: 24 MM/HR (ref 0–32)
GLOBULIN SER CALC-MCNC: 2.2 G/DL (ref 1.5–4.5)
GLUCOSE SERPL-MCNC: 86 MG/DL (ref 70–99)
HCT VFR BLD AUTO: 42.3 % (ref 34–46.6)
HGB BLD-MCNC: 13.5 G/DL (ref 11.1–15.9)
IMM GRANULOCYTES # BLD AUTO: 0 X10E3/UL (ref 0–0.1)
IMM GRANULOCYTES NFR BLD AUTO: 0 %
LYMPHOCYTES # BLD AUTO: 3.1 X10E3/UL (ref 0.7–3.1)
LYMPHOCYTES NFR BLD AUTO: 32 %
MCH RBC QN AUTO: 28.6 PG (ref 26.6–33)
MCHC RBC AUTO-ENTMCNC: 31.9 G/DL (ref 31.5–35.7)
MCV RBC AUTO: 90 FL (ref 79–97)
MONOCYTES # BLD AUTO: 1 X10E3/UL (ref 0.1–0.9)
MONOCYTES NFR BLD AUTO: 10 %
NEUTROPHILS # BLD AUTO: 5.6 X10E3/UL (ref 1.4–7)
NEUTROPHILS NFR BLD AUTO: 56 %
PLATELET # BLD AUTO: 279 X10E3/UL (ref 150–450)
POTASSIUM SERPL-SCNC: 4.2 MMOL/L (ref 3.5–5.2)
PROT SERPL-MCNC: 6.6 G/DL (ref 6–8.5)
RBC # BLD AUTO: 4.72 X10E6/UL (ref 3.77–5.28)
SODIUM SERPL-SCNC: 142 MMOL/L (ref 134–144)
WBC # BLD AUTO: 9.9 X10E3/UL (ref 3.4–10.8)

## 2025-08-08 ENCOUNTER — SPECIALTY PHARMACY (OUTPATIENT)
Age: 45
End: 2025-08-08
Payer: COMMERCIAL

## 2025-08-11 ENCOUNTER — SPECIALTY PHARMACY (OUTPATIENT)
Dept: GENERAL RADIOLOGY | Facility: HOSPITAL | Age: 45
End: 2025-08-11
Payer: COMMERCIAL

## 2025-08-11 RX ORDER — APREMILAST 30 MG/1
1 TABLET, FILM COATED ORAL 2 TIMES DAILY
Qty: 60 TABLET | Refills: 4 | Status: SHIPPED | OUTPATIENT
Start: 2025-08-11

## 2025-08-28 RX ORDER — LISINOPRIL 20 MG/1
20 TABLET ORAL DAILY
Qty: 90 TABLET | Refills: 0 | Status: SHIPPED | OUTPATIENT
Start: 2025-08-28